# Patient Record
Sex: FEMALE | Race: WHITE | NOT HISPANIC OR LATINO | Employment: UNEMPLOYED | URBAN - NONMETROPOLITAN AREA
[De-identification: names, ages, dates, MRNs, and addresses within clinical notes are randomized per-mention and may not be internally consistent; named-entity substitution may affect disease eponyms.]

---

## 2017-08-14 ENCOUNTER — TRANSCRIBE ORDERS (OUTPATIENT)
Dept: ADMINISTRATIVE | Facility: HOSPITAL | Age: 82
End: 2017-08-14

## 2017-08-14 DIAGNOSIS — R42 DIZZINESS AND GIDDINESS: Primary | ICD-10-CM

## 2017-08-14 DIAGNOSIS — R41.3 MEMORY LOSS: ICD-10-CM

## 2017-08-28 ENCOUNTER — HOSPITAL ENCOUNTER (OUTPATIENT)
Dept: CT IMAGING | Facility: HOSPITAL | Age: 82
Discharge: HOME | End: 2017-08-28
Attending: INTERNAL MEDICINE

## 2017-08-28 ENCOUNTER — HOSPITAL ENCOUNTER (OUTPATIENT)
Dept: CARDIOLOGY | Facility: HOSPITAL | Age: 82
Discharge: HOME | End: 2017-08-28
Attending: INTERNAL MEDICINE | Admitting: INTERNAL MEDICINE

## 2017-08-28 DIAGNOSIS — R42 DIZZINESS AND GIDDINESS: ICD-10-CM

## 2017-08-28 DIAGNOSIS — R41.3 MEMORY LOSS: ICD-10-CM

## 2017-08-28 PROCEDURE — 93880 EXTRACRANIAL BILAT STUDY: CPT | Performed by: RADIOLOGY

## 2017-08-28 PROCEDURE — 70450 CT HEAD/BRAIN W/O DYE: CPT | Performed by: RADIOLOGY

## 2017-08-28 PROCEDURE — 70450 CT HEAD/BRAIN W/O DYE: CPT

## 2017-08-28 PROCEDURE — 93880 EXTRACRANIAL BILAT STUDY: CPT

## 2017-08-29 ENCOUNTER — APPOINTMENT (OUTPATIENT)
Dept: CT IMAGING | Facility: HOSPITAL | Age: 82
End: 2017-08-29
Attending: INTERNAL MEDICINE

## 2018-07-02 ENCOUNTER — HOSPITAL ENCOUNTER (OUTPATIENT)
Dept: GENERAL RADIOLOGY | Facility: HOSPITAL | Age: 83
Discharge: HOME | End: 2018-07-02
Attending: INTERNAL MEDICINE | Admitting: INTERNAL MEDICINE

## 2018-07-02 ENCOUNTER — TRANSCRIBE ORDERS (OUTPATIENT)
Dept: ADMINISTRATIVE | Facility: HOSPITAL | Age: 83
End: 2018-07-02

## 2018-07-02 DIAGNOSIS — R05.9 COUGH: ICD-10-CM

## 2018-07-02 DIAGNOSIS — R05.9 COUGH: Primary | ICD-10-CM

## 2018-07-02 PROCEDURE — 71046 X-RAY EXAM CHEST 2 VIEWS: CPT | Performed by: RADIOLOGY

## 2018-07-02 PROCEDURE — 71046 X-RAY EXAM CHEST 2 VIEWS: CPT

## 2018-12-17 ENCOUNTER — APPOINTMENT (OUTPATIENT)
Dept: GENERAL RADIOLOGY | Facility: HOSPITAL | Age: 83
End: 2018-12-17

## 2018-12-17 ENCOUNTER — HOSPITAL ENCOUNTER (EMERGENCY)
Facility: HOSPITAL | Age: 83
Discharge: ANOTHER HOSPITAL | End: 2018-12-17
Attending: FAMILY MEDICINE | Admitting: INTERNAL MEDICINE

## 2018-12-17 ENCOUNTER — ANESTHESIA (OUTPATIENT)
Dept: PERIOP | Facility: HOSPITAL | Age: 83
End: 2018-12-17

## 2018-12-17 ENCOUNTER — HOSPITAL ENCOUNTER (INPATIENT)
Facility: HOSPITAL | Age: 83
LOS: 4 days | Discharge: HOME HEALTH | End: 2018-12-22
Attending: THORACIC SURGERY (CARDIOTHORACIC VASCULAR SURGERY) | Admitting: THORACIC SURGERY (CARDIOTHORACIC VASCULAR SURGERY)

## 2018-12-17 ENCOUNTER — ANESTHESIA EVENT (OUTPATIENT)
Dept: PERIOP | Facility: HOSPITAL | Age: 83
End: 2018-12-17

## 2018-12-17 VITALS
HEART RATE: 83 BPM | SYSTOLIC BLOOD PRESSURE: 108 MMHG | DIASTOLIC BLOOD PRESSURE: 59 MMHG | WEIGHT: 120 LBS | TEMPERATURE: 97.9 F | BODY MASS INDEX: 23.56 KG/M2 | HEIGHT: 60 IN | RESPIRATION RATE: 20 BRPM | OXYGEN SATURATION: 98 %

## 2018-12-17 DIAGNOSIS — I74.3 POPLITEAL THROMBOSIS (HCC): ICD-10-CM

## 2018-12-17 DIAGNOSIS — I99.8 ISCHEMIC LEG: ICD-10-CM

## 2018-12-17 DIAGNOSIS — Z74.09 IMPAIRED MOBILITY AND ADLS: ICD-10-CM

## 2018-12-17 DIAGNOSIS — Z78.9 IMPAIRED MOBILITY AND ADLS: ICD-10-CM

## 2018-12-17 DIAGNOSIS — I44.2 COMPLETE HEART BLOCK (HCC): Primary | ICD-10-CM

## 2018-12-17 DIAGNOSIS — Z86.73 HISTORY OF CVA (CEREBROVASCULAR ACCIDENT): ICD-10-CM

## 2018-12-17 DIAGNOSIS — Z74.09 IMPAIRED FUNCTIONAL MOBILITY, BALANCE, GAIT, AND ENDURANCE: Primary | ICD-10-CM

## 2018-12-17 DIAGNOSIS — J44.9 CHRONIC OBSTRUCTIVE PULMONARY DISEASE, UNSPECIFIED COPD TYPE (HCC): ICD-10-CM

## 2018-12-17 DIAGNOSIS — I44.2 COMPLETE HEART BLOCK (HCC): ICD-10-CM

## 2018-12-17 PROBLEM — I73.9 PAD (PERIPHERAL ARTERY DISEASE): Status: ACTIVE | Noted: 2018-12-17

## 2018-12-17 PROBLEM — I21.4 NSTEMI (NON-ST ELEVATED MYOCARDIAL INFARCTION): Status: ACTIVE | Noted: 2018-12-17

## 2018-12-17 PROBLEM — I10 HTN (HYPERTENSION): Status: ACTIVE | Noted: 2018-12-17

## 2018-12-17 PROBLEM — E78.5 HYPERLIPIDEMIA: Status: ACTIVE | Noted: 2018-12-17

## 2018-12-17 PROBLEM — Z91.199 HISTORY OF NONCOMPLIANCE WITH MEDICAL TREATMENT: Status: ACTIVE | Noted: 2018-12-17

## 2018-12-17 PROBLEM — Z72.0 TOBACCO USE: Status: ACTIVE | Noted: 2018-12-17

## 2018-12-17 PROBLEM — N17.9 ACUTE KIDNEY INJURY (HCC): Status: ACTIVE | Noted: 2018-12-17

## 2018-12-17 PROBLEM — I25.10 CAD (CORONARY ARTERY DISEASE): Status: ACTIVE | Noted: 2018-12-17

## 2018-12-17 LAB
ALBUMIN SERPL-MCNC: 4.3 G/DL (ref 3.4–4.8)
ALBUMIN/GLOB SERPL: 1.4 G/DL (ref 1.5–2.5)
ALP SERPL-CCNC: 82 U/L (ref 35–104)
ALT SERPL W P-5'-P-CCNC: 28 U/L (ref 10–36)
ANION GAP SERPL CALCULATED.3IONS-SCNC: 13.6 MMOL/L (ref 3.6–11.2)
APTT PPP: 126.3 SECONDS (ref 55–70)
AST SERPL-CCNC: 68 U/L (ref 10–30)
BASOPHILS # BLD AUTO: 0.03 10*3/MM3 (ref 0–0.3)
BASOPHILS NFR BLD AUTO: 0.2 % (ref 0–2)
BILIRUB SERPL-MCNC: 0.2 MG/DL (ref 0.2–1.8)
BNP SERPL-MCNC: 377 PG/ML (ref 0–100)
BUN BLD-MCNC: 27 MG/DL (ref 7–21)
BUN/CREAT SERPL: 15.7 (ref 7–25)
CALCIUM SPEC-SCNC: 9.5 MG/DL (ref 7.7–10)
CHLORIDE SERPL-SCNC: 104 MMOL/L (ref 99–112)
CHOLEST SERPL-MCNC: 167 MG/DL (ref 0–200)
CO2 SERPL-SCNC: 15.4 MMOL/L (ref 24.3–31.9)
CREAT BLD-MCNC: 1.72 MG/DL (ref 0.43–1.29)
DEPRECATED RDW RBC AUTO: 46.1 FL (ref 37–54)
EOSINOPHIL # BLD AUTO: 0 10*3/MM3 (ref 0–0.7)
EOSINOPHIL NFR BLD AUTO: 0 % (ref 0–7)
ERYTHROCYTE [DISTWIDTH] IN BLOOD BY AUTOMATED COUNT: 14.1 % (ref 11.5–14.5)
GFR SERPL CREATININE-BSD FRML MDRD: 28 ML/MIN/1.73
GLOBULIN UR ELPH-MCNC: 3.1 GM/DL
GLUCOSE BLD-MCNC: 165 MG/DL (ref 70–110)
HCT VFR BLD AUTO: 41.9 % (ref 37–47)
HDLC SERPL-MCNC: 38 MG/DL (ref 60–100)
HGB BLD-MCNC: 13.3 G/DL (ref 12–16)
IMM GRANULOCYTES # BLD: 0.05 10*3/MM3 (ref 0–0.03)
IMM GRANULOCYTES NFR BLD: 0.4 % (ref 0–0.5)
INR PPP: 1.02 (ref 0.91–1.09)
LDLC SERPL CALC-MCNC: 95 MG/DL (ref 0–100)
LDLC/HDLC SERPL: 2.49 {RATIO}
LYMPHOCYTES # BLD AUTO: 1.43 10*3/MM3 (ref 1–3)
LYMPHOCYTES NFR BLD AUTO: 11.5 % (ref 16–46)
MAGNESIUM SERPL-MCNC: 2.5 MG/DL (ref 1.7–2.6)
MCH RBC QN AUTO: 29.7 PG (ref 27–33)
MCHC RBC AUTO-ENTMCNC: 31.7 G/DL (ref 33–37)
MCV RBC AUTO: 93.5 FL (ref 80–94)
MONOCYTES # BLD AUTO: 0.71 10*3/MM3 (ref 0.1–0.9)
MONOCYTES NFR BLD AUTO: 5.7 % (ref 0–12)
NEUTROPHILS # BLD AUTO: 10.19 10*3/MM3 (ref 1.4–6.5)
NEUTROPHILS NFR BLD AUTO: 82.2 % (ref 40–75)
OSMOLALITY SERPL CALC.SUM OF ELEC: 275.2 MOSM/KG (ref 273–305)
PLATELET # BLD AUTO: 205 10*3/MM3 (ref 130–400)
PMV BLD AUTO: 10.4 FL (ref 6–10)
POTASSIUM BLD-SCNC: 4.3 MMOL/L (ref 3.5–5.3)
PROT SERPL-MCNC: 7.4 G/DL (ref 6–8)
PROTHROMBIN TIME: 10.7 SECONDS (ref 9.6–11.5)
RBC # BLD AUTO: 4.48 10*6/MM3 (ref 4.2–5.4)
SODIUM BLD-SCNC: 133 MMOL/L (ref 135–153)
TRIGL SERPL-MCNC: 171 MG/DL (ref 0–150)
TROPONIN I SERPL-MCNC: 10.98 NG/ML
TSH SERPL DL<=0.05 MIU/L-ACNC: 0.76 MIU/ML (ref 0.55–4.78)
VLDLC SERPL-MCNC: 34.2 MG/DL
WBC NRBC COR # BLD: 12.41 10*3/MM3 (ref 4.5–12.5)

## 2018-12-17 PROCEDURE — 71045 X-RAY EXAM CHEST 1 VIEW: CPT

## 2018-12-17 PROCEDURE — A9270 NON-COVERED ITEM OR SERVICE: HCPCS | Performed by: NURSE PRACTITIONER

## 2018-12-17 PROCEDURE — 25010000002 HEPARIN (PORCINE) PER 1000 UNITS: Performed by: INTERNAL MEDICINE

## 2018-12-17 PROCEDURE — 25010000002 CEFUROXIME: Performed by: PHYSICIAN ASSISTANT

## 2018-12-17 PROCEDURE — 99233 SBSQ HOSP IP/OBS HIGH 50: CPT | Performed by: INTERNAL MEDICINE

## 2018-12-17 PROCEDURE — C1894 INTRO/SHEATH, NON-LASER: HCPCS | Performed by: THORACIC SURGERY (CARDIOTHORACIC VASCULAR SURGERY)

## 2018-12-17 PROCEDURE — 83735 ASSAY OF MAGNESIUM: CPT | Performed by: EMERGENCY MEDICINE

## 2018-12-17 PROCEDURE — 99153 MOD SED SAME PHYS/QHP EA: CPT

## 2018-12-17 PROCEDURE — 25010000002 MORPHINE PER 10 MG: Performed by: EMERGENCY MEDICINE

## 2018-12-17 PROCEDURE — 0 IOPAMIDOL PER 1 ML: Performed by: INTERNAL MEDICINE

## 2018-12-17 PROCEDURE — 83880 ASSAY OF NATRIURETIC PEPTIDE: CPT | Performed by: EMERGENCY MEDICINE

## 2018-12-17 PROCEDURE — 96376 TX/PRO/DX INJ SAME DRUG ADON: CPT

## 2018-12-17 PROCEDURE — 96375 TX/PRO/DX INJ NEW DRUG ADDON: CPT

## 2018-12-17 PROCEDURE — 96374 THER/PROPH/DIAG INJ IV PUSH: CPT

## 2018-12-17 PROCEDURE — 93458 L HRT ARTERY/VENTRICLE ANGIO: CPT | Performed by: INTERNAL MEDICINE

## 2018-12-17 PROCEDURE — C1769 GUIDE WIRE: HCPCS | Performed by: THORACIC SURGERY (CARDIOTHORACIC VASCULAR SURGERY)

## 2018-12-17 PROCEDURE — 99285 EMERGENCY DEPT VISIT HI MDM: CPT

## 2018-12-17 PROCEDURE — 25010000002 GLUCAGON (HUMAN RECOMBINANT) 1 MG RECONSTITUTED SOLUTION: Performed by: FAMILY MEDICINE

## 2018-12-17 PROCEDURE — 80061 LIPID PANEL: CPT | Performed by: EMERGENCY MEDICINE

## 2018-12-17 PROCEDURE — 85025 COMPLETE CBC W/AUTO DIFF WBC: CPT | Performed by: EMERGENCY MEDICINE

## 2018-12-17 PROCEDURE — C1894 INTRO/SHEATH, NON-LASER: HCPCS | Performed by: INTERNAL MEDICINE

## 2018-12-17 PROCEDURE — 63710000001 HEPARIN (PORCINE) IN NACL 25000-0.45 UT/250ML-% SOLUTION: Performed by: THORACIC SURGERY (CARDIOTHORACIC VASCULAR SURGERY)

## 2018-12-17 PROCEDURE — G0278 ILIAC ART ANGIO,CARDIAC CATH: HCPCS | Performed by: INTERNAL MEDICINE

## 2018-12-17 PROCEDURE — 84484 ASSAY OF TROPONIN QUANT: CPT | Performed by: EMERGENCY MEDICINE

## 2018-12-17 PROCEDURE — 36415 COLL VENOUS BLD VENIPUNCTURE: CPT

## 2018-12-17 PROCEDURE — 25010000002 PROPOFOL 10 MG/ML EMULSION: Performed by: NURSE ANESTHETIST, CERTIFIED REGISTERED

## 2018-12-17 PROCEDURE — 99223 1ST HOSP IP/OBS HIGH 75: CPT | Performed by: THORACIC SURGERY (CARDIOTHORACIC VASCULAR SURGERY)

## 2018-12-17 PROCEDURE — 93005 ELECTROCARDIOGRAM TRACING: CPT | Performed by: FAMILY MEDICINE

## 2018-12-17 PROCEDURE — 99152 MOD SED SAME PHYS/QHP 5/>YRS: CPT

## 2018-12-17 PROCEDURE — C1757 CATH, THROMBECTOMY/EMBOLECT: HCPCS | Performed by: THORACIC SURGERY (CARDIOTHORACIC VASCULAR SURGERY)

## 2018-12-17 PROCEDURE — 85730 THROMBOPLASTIN TIME PARTIAL: CPT | Performed by: THORACIC SURGERY (CARDIOTHORACIC VASCULAR SURGERY)

## 2018-12-17 PROCEDURE — 85610 PROTHROMBIN TIME: CPT | Performed by: THORACIC SURGERY (CARDIOTHORACIC VASCULAR SURGERY)

## 2018-12-17 PROCEDURE — 84443 ASSAY THYROID STIM HORMONE: CPT | Performed by: EMERGENCY MEDICINE

## 2018-12-17 PROCEDURE — C1769 GUIDE WIRE: HCPCS | Performed by: INTERNAL MEDICINE

## 2018-12-17 PROCEDURE — 25010000002 MIDAZOLAM PER 1 MG: Performed by: INTERNAL MEDICINE

## 2018-12-17 PROCEDURE — 35371 RECHANNELING OF ARTERY: CPT | Performed by: THORACIC SURGERY (CARDIOTHORACIC VASCULAR SURGERY)

## 2018-12-17 PROCEDURE — A9270 NON-COVERED ITEM OR SERVICE: HCPCS | Performed by: THORACIC SURGERY (CARDIOTHORACIC VASCULAR SURGERY)

## 2018-12-17 PROCEDURE — 25010000002 HEPARIN (PORCINE) PER 1000 UNITS: Performed by: THORACIC SURGERY (CARDIOTHORACIC VASCULAR SURGERY)

## 2018-12-17 PROCEDURE — 25010000003 CEFAZOLIN IN DEXTROSE 2-4 GM/100ML-% SOLUTION: Performed by: NURSE ANESTHETIST, CERTIFIED REGISTERED

## 2018-12-17 PROCEDURE — 71045 X-RAY EXAM CHEST 1 VIEW: CPT | Performed by: RADIOLOGY

## 2018-12-17 PROCEDURE — 80053 COMPREHEN METABOLIC PANEL: CPT | Performed by: EMERGENCY MEDICINE

## 2018-12-17 PROCEDURE — 75630 X-RAY AORTA LEG ARTERIES: CPT | Performed by: INTERNAL MEDICINE

## 2018-12-17 PROCEDURE — 04CM0ZZ EXTIRPATION OF MATTER FROM RIGHT POPLITEAL ARTERY, OPEN APPROACH: ICD-10-PCS | Performed by: THORACIC SURGERY (CARDIOTHORACIC VASCULAR SURGERY)

## 2018-12-17 PROCEDURE — 88304 TISSUE EXAM BY PATHOLOGIST: CPT | Performed by: THORACIC SURGERY (CARDIOTHORACIC VASCULAR SURGERY)

## 2018-12-17 PROCEDURE — B41F1ZZ FLUOROSCOPY OF RIGHT LOWER EXTREMITY ARTERIES USING LOW OSMOLAR CONTRAST: ICD-10-PCS | Performed by: THORACIC SURGERY (CARDIOTHORACIC VASCULAR SURGERY)

## 2018-12-17 PROCEDURE — 25010000002 FUROSEMIDE PER 20 MG: Performed by: PHYSICIAN ASSISTANT

## 2018-12-17 PROCEDURE — 96361 HYDRATE IV INFUSION ADD-ON: CPT

## 2018-12-17 PROCEDURE — 25010000002 HEPARIN (PORCINE) PER 1000 UNITS: Performed by: NURSE ANESTHETIST, CERTIFIED REGISTERED

## 2018-12-17 PROCEDURE — 25010000002 FENTANYL CITRATE (PF) 100 MCG/2ML SOLUTION: Performed by: INTERNAL MEDICINE

## 2018-12-17 PROCEDURE — 99283 EMERGENCY DEPT VISIT LOW MDM: CPT | Performed by: INTERNAL MEDICINE

## 2018-12-17 PROCEDURE — 63710000001 ACETAMINOPHEN 325 MG TABLET: Performed by: NURSE PRACTITIONER

## 2018-12-17 RX ORDER — MAGNESIUM HYDROXIDE 1200 MG/15ML
LIQUID ORAL AS NEEDED
Status: DISCONTINUED | OUTPATIENT
Start: 2018-12-17 | End: 2018-12-17 | Stop reason: HOSPADM

## 2018-12-17 RX ORDER — HEPARIN SODIUM 10000 [USP'U]/100ML
12 INJECTION, SOLUTION INTRAVENOUS
Status: DISCONTINUED | OUTPATIENT
Start: 2018-12-17 | End: 2018-12-17

## 2018-12-17 RX ORDER — HEPARIN SODIUM 1000 [USP'U]/ML
3000 INJECTION, SOLUTION INTRAVENOUS; SUBCUTANEOUS ONCE
Status: COMPLETED | OUTPATIENT
Start: 2018-12-17 | End: 2018-12-17

## 2018-12-17 RX ORDER — ERGOCALCIFEROL 1.25 MG/1
50000 CAPSULE ORAL WEEKLY
Status: ON HOLD | COMMUNITY
End: 2019-03-24

## 2018-12-17 RX ORDER — MORPHINE SULFATE 2 MG/ML
2 INJECTION, SOLUTION INTRAMUSCULAR; INTRAVENOUS EVERY 4 HOURS PRN
Status: DISCONTINUED | OUTPATIENT
Start: 2018-12-17 | End: 2018-12-22 | Stop reason: HOSPADM

## 2018-12-17 RX ORDER — ONDANSETRON 2 MG/ML
4 INJECTION INTRAMUSCULAR; INTRAVENOUS EVERY 6 HOURS PRN
Status: DISCONTINUED | OUTPATIENT
Start: 2018-12-17 | End: 2018-12-18 | Stop reason: SDUPTHER

## 2018-12-17 RX ORDER — HEPARIN SODIUM 5000 [USP'U]/ML
60 INJECTION, SOLUTION INTRAVENOUS; SUBCUTANEOUS ONCE
Status: DISCONTINUED | OUTPATIENT
Start: 2018-12-17 | End: 2018-12-17

## 2018-12-17 RX ORDER — ATENOLOL 25 MG/1
25 TABLET ORAL DAILY
COMMUNITY
End: 2019-01-04

## 2018-12-17 RX ORDER — HEPARIN SODIUM 10000 [USP'U]/100ML
INJECTION, SOLUTION INTRAVENOUS CONTINUOUS PRN
Status: COMPLETED | OUTPATIENT
Start: 2018-12-17 | End: 2018-12-17

## 2018-12-17 RX ORDER — MELOXICAM 15 MG/1
15 TABLET ORAL DAILY
COMMUNITY
End: 2019-04-01 | Stop reason: HOSPADM

## 2018-12-17 RX ORDER — HEPARIN SODIUM 1000 [USP'U]/ML
INJECTION, SOLUTION INTRAVENOUS; SUBCUTANEOUS AS NEEDED
Status: DISCONTINUED | OUTPATIENT
Start: 2018-12-17 | End: 2018-12-17 | Stop reason: SURG

## 2018-12-17 RX ORDER — FUROSEMIDE 10 MG/ML
40 INJECTION INTRAMUSCULAR; INTRAVENOUS ONCE
Status: COMPLETED | OUTPATIENT
Start: 2018-12-17 | End: 2018-12-17

## 2018-12-17 RX ORDER — HEPARIN SODIUM 5000 [USP'U]/ML
60 INJECTION, SOLUTION INTRAVENOUS; SUBCUTANEOUS AS NEEDED
Status: DISCONTINUED | OUTPATIENT
Start: 2018-12-17 | End: 2018-12-17

## 2018-12-17 RX ORDER — MORPHINE SULFATE 2 MG/ML
2 INJECTION, SOLUTION INTRAMUSCULAR; INTRAVENOUS ONCE
Status: COMPLETED | OUTPATIENT
Start: 2018-12-17 | End: 2018-12-17

## 2018-12-17 RX ORDER — ACETAMINOPHEN 325 MG/1
650 TABLET ORAL EVERY 4 HOURS PRN
Status: DISCONTINUED | OUTPATIENT
Start: 2018-12-17 | End: 2018-12-22 | Stop reason: HOSPADM

## 2018-12-17 RX ORDER — SODIUM CHLORIDE 9 MG/ML
INJECTION, SOLUTION INTRAVENOUS CONTINUOUS PRN
Status: COMPLETED | OUTPATIENT
Start: 2018-12-17 | End: 2018-12-17

## 2018-12-17 RX ORDER — ATORVASTATIN CALCIUM 40 MG/1
40 TABLET, FILM COATED ORAL DAILY
COMMUNITY

## 2018-12-17 RX ORDER — HEPARIN SODIUM 1000 [USP'U]/ML
INJECTION, SOLUTION INTRAVENOUS; SUBCUTANEOUS AS NEEDED
Status: DISCONTINUED | OUTPATIENT
Start: 2018-12-17 | End: 2018-12-17 | Stop reason: HOSPADM

## 2018-12-17 RX ORDER — HEPARIN SODIUM 10000 [USP'U]/100ML
16 INJECTION, SOLUTION INTRAVENOUS
Status: DISCONTINUED | OUTPATIENT
Start: 2018-12-17 | End: 2018-12-18

## 2018-12-17 RX ORDER — HEPARIN SODIUM 5000 [USP'U]/ML
30 INJECTION, SOLUTION INTRAVENOUS; SUBCUTANEOUS AS NEEDED
Status: DISCONTINUED | OUTPATIENT
Start: 2018-12-17 | End: 2018-12-17

## 2018-12-17 RX ORDER — FENTANYL CITRATE 50 UG/ML
INJECTION, SOLUTION INTRAMUSCULAR; INTRAVENOUS AS NEEDED
Status: DISCONTINUED | OUTPATIENT
Start: 2018-12-17 | End: 2018-12-17 | Stop reason: HOSPADM

## 2018-12-17 RX ORDER — HYDROCODONE BITARTRATE AND ACETAMINOPHEN 5; 325 MG/1; MG/1
1 TABLET ORAL EVERY 6 HOURS PRN
Status: DISCONTINUED | OUTPATIENT
Start: 2018-12-17 | End: 2018-12-22 | Stop reason: HOSPADM

## 2018-12-17 RX ORDER — LIDOCAINE HYDROCHLORIDE 10 MG/ML
INJECTION, SOLUTION EPIDURAL; INFILTRATION; INTRACAUDAL; PERINEURAL AS NEEDED
Status: DISCONTINUED | OUTPATIENT
Start: 2018-12-17 | End: 2018-12-17 | Stop reason: SURG

## 2018-12-17 RX ORDER — FAMOTIDINE 10 MG/ML
20 INJECTION, SOLUTION INTRAVENOUS DAILY
Status: DISCONTINUED | OUTPATIENT
Start: 2018-12-17 | End: 2018-12-20

## 2018-12-17 RX ORDER — SODIUM CHLORIDE 0.9 % (FLUSH) 0.9 %
3 SYRINGE (ML) INJECTION EVERY 12 HOURS SCHEDULED
Status: DISCONTINUED | OUTPATIENT
Start: 2018-12-17 | End: 2018-12-19

## 2018-12-17 RX ORDER — ATRACURIUM BESYLATE 10 MG/ML
INJECTION, SOLUTION INTRAVENOUS AS NEEDED
Status: DISCONTINUED | OUTPATIENT
Start: 2018-12-17 | End: 2018-12-17 | Stop reason: SURG

## 2018-12-17 RX ORDER — OLMESARTAN MEDOXOMIL 40 MG/1
40 TABLET ORAL DAILY
Status: ON HOLD | COMMUNITY
End: 2019-04-17

## 2018-12-17 RX ORDER — LIDOCAINE HYDROCHLORIDE 20 MG/ML
INJECTION, SOLUTION INFILTRATION; PERINEURAL AS NEEDED
Status: DISCONTINUED | OUTPATIENT
Start: 2018-12-17 | End: 2018-12-17 | Stop reason: HOSPADM

## 2018-12-17 RX ORDER — HYDRALAZINE HYDROCHLORIDE 50 MG/1
50 TABLET, FILM COATED ORAL 3 TIMES DAILY
COMMUNITY
End: 2018-12-22 | Stop reason: HOSPADM

## 2018-12-17 RX ORDER — PROPOFOL 10 MG/ML
VIAL (ML) INTRAVENOUS AS NEEDED
Status: DISCONTINUED | OUTPATIENT
Start: 2018-12-17 | End: 2018-12-17 | Stop reason: SURG

## 2018-12-17 RX ORDER — SODIUM CHLORIDE 0.9 % (FLUSH) 0.9 %
3-10 SYRINGE (ML) INJECTION AS NEEDED
Status: DISCONTINUED | OUTPATIENT
Start: 2018-12-17 | End: 2018-12-19

## 2018-12-17 RX ORDER — MIDAZOLAM HYDROCHLORIDE 1 MG/ML
INJECTION INTRAMUSCULAR; INTRAVENOUS AS NEEDED
Status: DISCONTINUED | OUTPATIENT
Start: 2018-12-17 | End: 2018-12-17 | Stop reason: HOSPADM

## 2018-12-17 RX ORDER — SODIUM CHLORIDE 0.9 % (FLUSH) 0.9 %
10 SYRINGE (ML) INJECTION AS NEEDED
Status: DISCONTINUED | OUTPATIENT
Start: 2018-12-17 | End: 2018-12-17 | Stop reason: HOSPADM

## 2018-12-17 RX ORDER — CEFAZOLIN SODIUM 2 G/100ML
INJECTION, SOLUTION INTRAVENOUS AS NEEDED
Status: DISCONTINUED | OUTPATIENT
Start: 2018-12-17 | End: 2018-12-17 | Stop reason: SURG

## 2018-12-17 RX ORDER — SODIUM CHLORIDE 450 MG/100ML
100 INJECTION, SOLUTION INTRAVENOUS CONTINUOUS
Status: DISCONTINUED | OUTPATIENT
Start: 2018-12-17 | End: 2018-12-17

## 2018-12-17 RX ADMIN — ATROPINE SULFATE 1 MG: 0.1 INJECTION PARENTERAL at 06:25

## 2018-12-17 RX ADMIN — LIDOCAINE HYDROCHLORIDE 50 MG: 10 INJECTION, SOLUTION EPIDURAL; INFILTRATION; INTRACAUDAL; PERINEURAL at 15:02

## 2018-12-17 RX ADMIN — HEPARIN SODIUM 5000 UNITS: 1000 INJECTION, SOLUTION INTRAVENOUS; SUBCUTANEOUS at 15:28

## 2018-12-17 RX ADMIN — SODIUM CHLORIDE 500 ML: 9 INJECTION, SOLUTION INTRAVENOUS at 05:59

## 2018-12-17 RX ADMIN — CEFUROXIME 1.5 G: 1.5 INJECTION, POWDER, FOR SOLUTION INTRAVENOUS at 21:21

## 2018-12-17 RX ADMIN — ACETAMINOPHEN 650 MG: 325 TABLET ORAL at 22:25

## 2018-12-17 RX ADMIN — GLUCAGON HYDROCHLORIDE 1 MG: 1 INJECTION, POWDER, FOR SOLUTION INTRAMUSCULAR; INTRAVENOUS; SUBCUTANEOUS at 06:02

## 2018-12-17 RX ADMIN — ATROPINE SULFATE 1 MG: 0.1 INJECTION, SOLUTION ENDOTRACHEAL; INTRAMUSCULAR; INTRAVENOUS; SUBCUTANEOUS at 07:08

## 2018-12-17 RX ADMIN — HEPARIN SODIUM 3000 UNITS: 1000 INJECTION, SOLUTION INTRAVENOUS; SUBCUTANEOUS at 14:10

## 2018-12-17 RX ADMIN — ATROPINE SULFATE 0.5 MG: 0.1 INJECTION PARENTERAL at 05:15

## 2018-12-17 RX ADMIN — FUROSEMIDE 40 MG: 10 INJECTION, SOLUTION INTRAMUSCULAR; INTRAVENOUS at 21:21

## 2018-12-17 RX ADMIN — PROPOFOL 100 MG: 10 INJECTION, EMULSION INTRAVENOUS at 15:02

## 2018-12-17 RX ADMIN — ATRACURIUM BESYLATE 50 MG: 10 INJECTION, SOLUTION INTRAVENOUS at 15:02

## 2018-12-17 RX ADMIN — FAMOTIDINE 20 MG: 10 INJECTION, SOLUTION INTRAVENOUS at 14:20

## 2018-12-17 RX ADMIN — SODIUM CHLORIDE: 9 INJECTION, SOLUTION INTRAVENOUS at 11:12

## 2018-12-17 RX ADMIN — CEFAZOLIN SODIUM 2 G: 2 INJECTION, SOLUTION INTRAVENOUS at 15:13

## 2018-12-17 RX ADMIN — HEPARIN SODIUM 12 UNITS/KG/HR: 10000 INJECTION, SOLUTION INTRAVENOUS at 14:10

## 2018-12-17 RX ADMIN — MORPHINE SULFATE 2 MG: 2 INJECTION, SOLUTION INTRAMUSCULAR; INTRAVENOUS at 06:03

## 2018-12-17 NOTE — ANESTHESIA PROCEDURE NOTES
ANESTHESIA INTUBATION  Urgency: elective    Airway not difficult    General Information and Staff    Patient location during procedure: OR  CRNA: Ta Stout CRNA    Indications and Patient Condition  Indications for airway management: airway protection    Preoxygenated: yes  MILS not maintained throughout  Mask difficulty assessment: 1 - vent by mask    Final Airway Details  Final airway type: endotracheal airway      Successful airway: ETT  Cuffed: yes   Successful intubation technique: direct laryngoscopy  Endotracheal tube insertion site: oral  Blade: Flor  Blade size: 3  ETT size (mm): 7.0  Cormack-Lehane Classification: grade I - full view of glottis  Placement verified by: chest auscultation and capnometry   Measured from: lips  ETT to lips (cm): 20  Number of attempts at approach: 1    Additional Comments  Negative epigastric sounds, Breath sound equal bilaterally with symmetric chest rise and fall

## 2018-12-17 NOTE — ANESTHESIA POSTPROCEDURE EVALUATION
Patient: Rupinder Lees    Procedure Summary     Date:  12/17/18 Room / Location:   [facility] 15 /  [facility] 15    Anesthesia Start:  1458 Anesthesia Stop:  1653    Procedure:  RT. LOWER EXTREMITY EMBELECTOMY (Right Thigh) Diagnosis:      Surgeon:  Dvaid Beatty MD Provider:  Ever Zamarripa MD    Anesthesia Type:  general ASA Status:  5 - Emergent          Anesthesia Type: general  Last vitals  BP   120/48 (12/17/18 1445)   Temp       Pulse   94 (12/17/18 1445)   Resp         SpO2   99 % (12/17/18 1445)     Post Anesthesia Care and Evaluation    Patient location during evaluation: ICU  Patient participation: waiting for patient participation  Level of consciousness: sleepy but conscious  Pain management: adequate  Airway patency: patent  Anesthetic complications: No anesthetic complications  PONV Status: none  Cardiovascular status: acceptable  Respiratory status: nonlabored ventilation, acceptable and nasal cannula  Hydration status: acceptable

## 2018-12-17 NOTE — ANESTHESIA PREPROCEDURE EVALUATION
Anesthesia Evaluation                  Airway   Mallampati: II  Dental      Pulmonary    (+) COPD,   Cardiovascular     (+) hypertension, past MI , PVD,       Neuro/Psych  GI/Hepatic/Renal/Endo      Musculoskeletal     Abdominal    Substance History      OB/GYN          Other                        Anesthesia Plan    ASA 5 - emergent     general     intravenous induction   Anesthetic plan, all risks, benefits, and alternatives have been provided, discussed and informed consent has been obtained with: patient.

## 2018-12-18 ENCOUNTER — APPOINTMENT (OUTPATIENT)
Dept: GENERAL RADIOLOGY | Facility: HOSPITAL | Age: 83
End: 2018-12-18

## 2018-12-18 PROBLEM — I74.3: Status: ACTIVE | Noted: 2018-12-18

## 2018-12-18 LAB
ALBUMIN SERPL-MCNC: 3.84 G/DL (ref 3.2–4.8)
ALBUMIN/GLOB SERPL: 2.2 G/DL (ref 1.5–2.5)
ALP SERPL-CCNC: 61 U/L (ref 25–100)
ALT SERPL W P-5'-P-CCNC: 25 U/L (ref 7–40)
ANION GAP SERPL CALCULATED.3IONS-SCNC: 10 MMOL/L (ref 3–11)
APTT PPP: 37.3 SECONDS (ref 55–70)
APTT PPP: 55.7 SECONDS (ref 55–70)
AST SERPL-CCNC: 39 U/L (ref 0–33)
BASOPHILS # BLD AUTO: 0 10*3/MM3 (ref 0–0.2)
BASOPHILS NFR BLD AUTO: 0 % (ref 0–1)
BILIRUB SERPL-MCNC: 0.4 MG/DL (ref 0.3–1.2)
BUN BLD-MCNC: 27 MG/DL (ref 9–23)
BUN/CREAT SERPL: 26 (ref 7–25)
CALCIUM SPEC-SCNC: 8.1 MG/DL (ref 8.7–10.4)
CHLORIDE SERPL-SCNC: 107 MMOL/L (ref 99–109)
CO2 SERPL-SCNC: 20 MMOL/L (ref 20–31)
CREAT BLD-MCNC: 1.04 MG/DL (ref 0.6–1.3)
DEPRECATED RDW RBC AUTO: 46.2 FL (ref 37–54)
DEPRECATED RDW RBC AUTO: 49.4 FL (ref 37–54)
EOSINOPHIL # BLD AUTO: 0 10*3/MM3 (ref 0–0.3)
EOSINOPHIL NFR BLD AUTO: 0 % (ref 0–3)
ERYTHROCYTE [DISTWIDTH] IN BLOOD BY AUTOMATED COUNT: 13.8 % (ref 11.3–14.5)
ERYTHROCYTE [DISTWIDTH] IN BLOOD BY AUTOMATED COUNT: 14.1 % (ref 11.3–14.5)
GFR SERPL CREATININE-BSD FRML MDRD: 51 ML/MIN/1.73
GLOBULIN UR ELPH-MCNC: 1.8 GM/DL
GLUCOSE BLD-MCNC: 130 MG/DL (ref 70–100)
HCT VFR BLD AUTO: 34.3 % (ref 34.5–44)
HCT VFR BLD AUTO: 35.7 % (ref 34.5–44)
HGB BLD-MCNC: 10.8 G/DL (ref 11.5–15.5)
HGB BLD-MCNC: 11.2 G/DL (ref 11.5–15.5)
IMM GRANULOCYTES # BLD: 0.02 10*3/MM3 (ref 0–0.03)
IMM GRANULOCYTES NFR BLD: 0.2 % (ref 0–0.6)
LYMPHOCYTES # BLD AUTO: 1.14 10*3/MM3 (ref 0.6–4.8)
LYMPHOCYTES NFR BLD AUTO: 11.2 % (ref 24–44)
MAGNESIUM SERPL-MCNC: 1.8 MG/DL (ref 1.3–2.7)
MCH RBC QN AUTO: 28.8 PG (ref 27–31)
MCH RBC QN AUTO: 29.9 PG (ref 27–31)
MCHC RBC AUTO-ENTMCNC: 31.4 G/DL (ref 32–36)
MCHC RBC AUTO-ENTMCNC: 31.5 G/DL (ref 32–36)
MCV RBC AUTO: 91.5 FL (ref 80–99)
MCV RBC AUTO: 95.2 FL (ref 80–99)
MONOCYTES # BLD AUTO: 0.8 10*3/MM3 (ref 0–1)
MONOCYTES NFR BLD AUTO: 7.9 % (ref 0–12)
NEUTROPHILS # BLD AUTO: 8.24 10*3/MM3 (ref 1.5–8.3)
NEUTROPHILS NFR BLD AUTO: 80.9 % (ref 41–71)
PHOSPHATE SERPL-MCNC: 4.2 MG/DL (ref 2.4–5.1)
PLATELET # BLD AUTO: 158 10*3/MM3 (ref 150–450)
PLATELET # BLD AUTO: 193 10*3/MM3 (ref 150–450)
PMV BLD AUTO: 10.6 FL (ref 6–12)
PMV BLD AUTO: 10.8 FL (ref 6–12)
POTASSIUM BLD-SCNC: 3.5 MMOL/L (ref 3.5–5.5)
PROT SERPL-MCNC: 5.6 G/DL (ref 5.7–8.2)
RBC # BLD AUTO: 3.75 10*6/MM3 (ref 3.89–5.14)
RBC # BLD AUTO: 3.75 10*6/MM3 (ref 3.89–5.14)
SODIUM BLD-SCNC: 137 MMOL/L (ref 132–146)
TROPONIN I SERPL-MCNC: 4.4 NG/ML
WBC NRBC COR # BLD: 10.18 10*3/MM3 (ref 3.5–10.8)
WBC NRBC COR # BLD: 11.37 10*3/MM3 (ref 3.5–10.8)

## 2018-12-18 PROCEDURE — 85730 THROMBOPLASTIN TIME PARTIAL: CPT

## 2018-12-18 PROCEDURE — 84100 ASSAY OF PHOSPHORUS: CPT | Performed by: INTERNAL MEDICINE

## 2018-12-18 PROCEDURE — C1785 PMKR, DUAL, RATE-RESP: HCPCS | Performed by: INTERNAL MEDICINE

## 2018-12-18 PROCEDURE — 25010000002 HEPARIN (PORCINE) PER 1000 UNITS

## 2018-12-18 PROCEDURE — 99152 MOD SED SAME PHYS/QHP 5/>YRS: CPT | Performed by: INTERNAL MEDICINE

## 2018-12-18 PROCEDURE — 99233 SBSQ HOSP IP/OBS HIGH 50: CPT | Performed by: INTERNAL MEDICINE

## 2018-12-18 PROCEDURE — 25010000002 ONDANSETRON PER 1 MG: Performed by: INTERNAL MEDICINE

## 2018-12-18 PROCEDURE — 84484 ASSAY OF TROPONIN QUANT: CPT | Performed by: PHYSICIAN ASSISTANT

## 2018-12-18 PROCEDURE — 33208 INSRT HEART PM ATRIAL & VENT: CPT | Performed by: INTERNAL MEDICINE

## 2018-12-18 PROCEDURE — 85027 COMPLETE CBC AUTOMATED: CPT | Performed by: INTERNAL MEDICINE

## 2018-12-18 PROCEDURE — 25010000002 VANCOMYCIN PER 500 MG: Performed by: PHYSICIAN ASSISTANT

## 2018-12-18 PROCEDURE — 02HK3JZ INSERTION OF PACEMAKER LEAD INTO RIGHT VENTRICLE, PERCUTANEOUS APPROACH: ICD-10-PCS | Performed by: INTERNAL MEDICINE

## 2018-12-18 PROCEDURE — 71045 X-RAY EXAM CHEST 1 VIEW: CPT

## 2018-12-18 PROCEDURE — 25010000002 MIDAZOLAM PER 1 MG: Performed by: INTERNAL MEDICINE

## 2018-12-18 PROCEDURE — 25010000002 CEFUROXIME: Performed by: PHYSICIAN ASSISTANT

## 2018-12-18 PROCEDURE — 80053 COMPREHEN METABOLIC PANEL: CPT | Performed by: INTERNAL MEDICINE

## 2018-12-18 PROCEDURE — 99024 POSTOP FOLLOW-UP VISIT: CPT | Performed by: THORACIC SURGERY (CARDIOTHORACIC VASCULAR SURGERY)

## 2018-12-18 PROCEDURE — 02H63JZ INSERTION OF PACEMAKER LEAD INTO RIGHT ATRIUM, PERCUTANEOUS APPROACH: ICD-10-PCS | Performed by: INTERNAL MEDICINE

## 2018-12-18 PROCEDURE — 85025 COMPLETE CBC W/AUTO DIFF WBC: CPT | Performed by: INTERNAL MEDICINE

## 2018-12-18 PROCEDURE — 25010000002 FENTANYL CITRATE (PF) 100 MCG/2ML SOLUTION: Performed by: INTERNAL MEDICINE

## 2018-12-18 PROCEDURE — 0JH606Z INSERTION OF PACEMAKER, DUAL CHAMBER INTO CHEST SUBCUTANEOUS TISSUE AND FASCIA, OPEN APPROACH: ICD-10-PCS | Performed by: INTERNAL MEDICINE

## 2018-12-18 PROCEDURE — C1898 LEAD, PMKR, OTHER THAN TRANS: HCPCS | Performed by: INTERNAL MEDICINE

## 2018-12-18 PROCEDURE — A9270 NON-COVERED ITEM OR SERVICE: HCPCS | Performed by: NURSE PRACTITIONER

## 2018-12-18 PROCEDURE — 83735 ASSAY OF MAGNESIUM: CPT | Performed by: INTERNAL MEDICINE

## 2018-12-18 PROCEDURE — C1892 INTRO/SHEATH,FIXED,PEEL-AWAY: HCPCS | Performed by: INTERNAL MEDICINE

## 2018-12-18 PROCEDURE — 63710000001 POTASSIUM CHLORIDE 20 MEQ PACK: Performed by: NURSE PRACTITIONER

## 2018-12-18 PROCEDURE — 85347 COAGULATION TIME ACTIVATED: CPT

## 2018-12-18 DEVICE — LD PM TENDRIL STS 6F46CM 2088TC46: Type: IMPLANTABLE DEVICE | Status: FUNCTIONAL

## 2018-12-18 DEVICE — LD PM TENDRIL STS 6F52CM 2088TC52: Type: IMPLANTABLE DEVICE | Status: FUNCTIONAL

## 2018-12-18 DEVICE — GEN PM ASSURITY DR RF: Type: IMPLANTABLE DEVICE | Status: FUNCTIONAL

## 2018-12-18 RX ORDER — SODIUM CHLORIDE 0.9 % (FLUSH) 0.9 %
3-10 SYRINGE (ML) INJECTION AS NEEDED
Status: DISCONTINUED | OUTPATIENT
Start: 2018-12-18 | End: 2018-12-22 | Stop reason: HOSPADM

## 2018-12-18 RX ORDER — FENTANYL CITRATE 50 UG/ML
INJECTION, SOLUTION INTRAMUSCULAR; INTRAVENOUS AS NEEDED
Status: DISCONTINUED | OUTPATIENT
Start: 2018-12-18 | End: 2018-12-18 | Stop reason: HOSPADM

## 2018-12-18 RX ORDER — HEPARIN SODIUM 10000 [USP'U]/100ML
23 INJECTION, SOLUTION INTRAVENOUS
Status: DISCONTINUED | OUTPATIENT
Start: 2018-12-19 | End: 2018-12-21

## 2018-12-18 RX ORDER — ONDANSETRON 2 MG/ML
INJECTION INTRAMUSCULAR; INTRAVENOUS AS NEEDED
Status: DISCONTINUED | OUTPATIENT
Start: 2018-12-18 | End: 2018-12-18 | Stop reason: HOSPADM

## 2018-12-18 RX ORDER — HEPARIN SODIUM 1000 [USP'U]/ML
3000 INJECTION, SOLUTION INTRAVENOUS; SUBCUTANEOUS ONCE
Status: COMPLETED | OUTPATIENT
Start: 2018-12-18 | End: 2018-12-18

## 2018-12-18 RX ORDER — SODIUM CHLORIDE 9 MG/ML
INJECTION, SOLUTION INTRAVENOUS CONTINUOUS PRN
Status: COMPLETED | OUTPATIENT
Start: 2018-12-18 | End: 2018-12-18

## 2018-12-18 RX ORDER — SODIUM CHLORIDE 0.9 % (FLUSH) 0.9 %
5 SYRINGE (ML) INJECTION AS NEEDED
Status: DISCONTINUED | OUTPATIENT
Start: 2018-12-18 | End: 2018-12-22 | Stop reason: HOSPADM

## 2018-12-18 RX ORDER — SODIUM CHLORIDE, SODIUM LACTATE, POTASSIUM CHLORIDE, CALCIUM CHLORIDE 600; 310; 30; 20 MG/100ML; MG/100ML; MG/100ML; MG/100ML
250 INJECTION, SOLUTION INTRAVENOUS ONCE
Status: COMPLETED | OUTPATIENT
Start: 2018-12-18 | End: 2018-12-18

## 2018-12-18 RX ORDER — MIDAZOLAM HYDROCHLORIDE 1 MG/ML
INJECTION INTRAMUSCULAR; INTRAVENOUS AS NEEDED
Status: DISCONTINUED | OUTPATIENT
Start: 2018-12-18 | End: 2018-12-18 | Stop reason: HOSPADM

## 2018-12-18 RX ORDER — POTASSIUM CHLORIDE 750 MG/1
40 CAPSULE, EXTENDED RELEASE ORAL AS NEEDED
Status: DISCONTINUED | OUTPATIENT
Start: 2018-12-18 | End: 2018-12-22 | Stop reason: HOSPADM

## 2018-12-18 RX ORDER — POTASSIUM CHLORIDE 1.5 G/1.77G
40 POWDER, FOR SOLUTION ORAL AS NEEDED
Status: DISCONTINUED | OUTPATIENT
Start: 2018-12-18 | End: 2018-12-22 | Stop reason: HOSPADM

## 2018-12-18 RX ORDER — BUPIVACAINE HYDROCHLORIDE 5 MG/ML
INJECTION, SOLUTION EPIDURAL; INTRACAUDAL AS NEEDED
Status: DISCONTINUED | OUTPATIENT
Start: 2018-12-18 | End: 2018-12-18 | Stop reason: HOSPADM

## 2018-12-18 RX ORDER — SODIUM CHLORIDE 0.9 % (FLUSH) 0.9 %
3 SYRINGE (ML) INJECTION EVERY 12 HOURS SCHEDULED
Status: DISCONTINUED | OUTPATIENT
Start: 2018-12-18 | End: 2018-12-22 | Stop reason: HOSPADM

## 2018-12-18 RX ORDER — MAGNESIUM SULFATE HEPTAHYDRATE 40 MG/ML
2 INJECTION, SOLUTION INTRAVENOUS AS NEEDED
Status: DISCONTINUED | OUTPATIENT
Start: 2018-12-18 | End: 2018-12-22 | Stop reason: HOSPADM

## 2018-12-18 RX ORDER — ONDANSETRON 2 MG/ML
4 INJECTION INTRAMUSCULAR; INTRAVENOUS EVERY 6 HOURS PRN
Status: DISCONTINUED | OUTPATIENT
Start: 2018-12-18 | End: 2018-12-22 | Stop reason: HOSPADM

## 2018-12-18 RX ORDER — MAGNESIUM SULFATE HEPTAHYDRATE 40 MG/ML
4 INJECTION, SOLUTION INTRAVENOUS AS NEEDED
Status: DISCONTINUED | OUTPATIENT
Start: 2018-12-18 | End: 2018-12-22 | Stop reason: HOSPADM

## 2018-12-18 RX ORDER — VANCOMYCIN HYDROCHLORIDE 1 G/200ML
20 INJECTION, SOLUTION INTRAVENOUS ONCE
Status: COMPLETED | OUTPATIENT
Start: 2018-12-18 | End: 2018-12-18

## 2018-12-18 RX ORDER — POTASSIUM CHLORIDE 7.45 MG/ML
10 INJECTION INTRAVENOUS
Status: DISCONTINUED | OUTPATIENT
Start: 2018-12-18 | End: 2018-12-22 | Stop reason: HOSPADM

## 2018-12-18 RX ORDER — LIDOCAINE HYDROCHLORIDE 10 MG/ML
INJECTION, SOLUTION INFILTRATION; PERINEURAL AS NEEDED
Status: DISCONTINUED | OUTPATIENT
Start: 2018-12-18 | End: 2018-12-18 | Stop reason: HOSPADM

## 2018-12-18 RX ADMIN — FAMOTIDINE 20 MG: 10 INJECTION, SOLUTION INTRAVENOUS at 10:20

## 2018-12-18 RX ADMIN — CEFUROXIME 1.5 G: 1.5 INJECTION, POWDER, FOR SOLUTION INTRAVENOUS at 05:39

## 2018-12-18 RX ADMIN — POTASSIUM CHLORIDE 40 MEQ: 1.5 POWDER, FOR SOLUTION ORAL at 06:32

## 2018-12-18 RX ADMIN — HEPARIN SODIUM 3000 UNITS: 1000 INJECTION, SOLUTION INTRAVENOUS; SUBCUTANEOUS at 06:32

## 2018-12-18 RX ADMIN — SODIUM CHLORIDE, POTASSIUM CHLORIDE, SODIUM LACTATE AND CALCIUM CHLORIDE 250 ML/HR: 600; 310; 30; 20 INJECTION, SOLUTION INTRAVENOUS at 00:54

## 2018-12-18 RX ADMIN — SODIUM CHLORIDE, PRESERVATIVE FREE 3 ML: 5 INJECTION INTRAVENOUS at 10:20

## 2018-12-18 RX ADMIN — ACETAMINOPHEN 650 MG: 325 TABLET ORAL at 23:14

## 2018-12-18 RX ADMIN — VANCOMYCIN HYDROCHLORIDE 1000 MG: 1 INJECTION, SOLUTION INTRAVENOUS at 17:10

## 2018-12-18 RX ADMIN — MAGNESIUM SULFATE HEPTAHYDRATE 4 G: 40 INJECTION, SOLUTION INTRAVENOUS at 06:32

## 2018-12-19 ENCOUNTER — APPOINTMENT (OUTPATIENT)
Dept: GENERAL RADIOLOGY | Facility: HOSPITAL | Age: 83
End: 2018-12-19

## 2018-12-19 LAB
ANION GAP SERPL CALCULATED.3IONS-SCNC: 5 MMOL/L (ref 3–11)
APTT PPP: 35.8 SECONDS (ref 55–70)
APTT PPP: 38.5 SECONDS (ref 55–70)
BUN BLD-MCNC: 22 MG/DL (ref 9–23)
BUN/CREAT SERPL: 26.2 (ref 7–25)
CALCIUM SPEC-SCNC: 8.2 MG/DL (ref 8.7–10.4)
CHLORIDE SERPL-SCNC: 109 MMOL/L (ref 99–109)
CO2 SERPL-SCNC: 25 MMOL/L (ref 20–31)
CREAT BLD-MCNC: 0.84 MG/DL (ref 0.6–1.3)
GFR SERPL CREATININE-BSD FRML MDRD: 65 ML/MIN/1.73
GLUCOSE BLD-MCNC: 102 MG/DL (ref 70–100)
MAGNESIUM SERPL-MCNC: 2.6 MG/DL (ref 1.3–2.7)
POTASSIUM BLD-SCNC: 4.2 MMOL/L (ref 3.5–5.5)
SODIUM BLD-SCNC: 139 MMOL/L (ref 132–146)

## 2018-12-19 PROCEDURE — 85730 THROMBOPLASTIN TIME PARTIAL: CPT

## 2018-12-19 PROCEDURE — 99024 POSTOP FOLLOW-UP VISIT: CPT | Performed by: THORACIC SURGERY (CARDIOTHORACIC VASCULAR SURGERY)

## 2018-12-19 PROCEDURE — 80048 BASIC METABOLIC PNL TOTAL CA: CPT | Performed by: INTERNAL MEDICINE

## 2018-12-19 PROCEDURE — 25010000002 VANCOMYCIN PER 500 MG: Performed by: INTERNAL MEDICINE

## 2018-12-19 PROCEDURE — 71045 X-RAY EXAM CHEST 1 VIEW: CPT

## 2018-12-19 PROCEDURE — 99024 POSTOP FOLLOW-UP VISIT: CPT | Performed by: INTERNAL MEDICINE

## 2018-12-19 PROCEDURE — 83735 ASSAY OF MAGNESIUM: CPT | Performed by: THORACIC SURGERY (CARDIOTHORACIC VASCULAR SURGERY)

## 2018-12-19 PROCEDURE — 25010000002 HEPARIN (PORCINE) PER 1000 UNITS: Performed by: PHYSICIAN ASSISTANT

## 2018-12-19 PROCEDURE — 99232 SBSQ HOSP IP/OBS MODERATE 35: CPT | Performed by: INTERNAL MEDICINE

## 2018-12-19 RX ORDER — CLOPIDOGREL BISULFATE 75 MG/1
75 TABLET ORAL DAILY
Status: DISCONTINUED | OUTPATIENT
Start: 2018-12-19 | End: 2018-12-22 | Stop reason: HOSPADM

## 2018-12-19 RX ADMIN — SODIUM CHLORIDE, PRESERVATIVE FREE 3 ML: 5 INJECTION INTRAVENOUS at 08:25

## 2018-12-19 RX ADMIN — ACETAMINOPHEN 650 MG: 325 TABLET ORAL at 12:57

## 2018-12-19 RX ADMIN — CLOPIDOGREL BISULFATE 75 MG: 75 TABLET ORAL at 17:42

## 2018-12-19 RX ADMIN — VANCOMYCIN HYDROCHLORIDE 750 MG: 750 INJECTION, SOLUTION INTRAVENOUS at 12:57

## 2018-12-19 RX ADMIN — HEPARIN SODIUM 16 UNITS/KG/HR: 10000 INJECTION, SOLUTION INTRAVENOUS at 07:14

## 2018-12-19 RX ADMIN — FAMOTIDINE 20 MG: 10 INJECTION, SOLUTION INTRAVENOUS at 08:25

## 2018-12-19 RX ADMIN — ACETAMINOPHEN 650 MG: 325 TABLET ORAL at 06:01

## 2018-12-20 LAB
ANION GAP SERPL CALCULATED.3IONS-SCNC: 7 MMOL/L (ref 3–11)
APTT PPP: 44.8 SECONDS (ref 55–70)
APTT PPP: 49.5 SECONDS (ref 55–70)
APTT PPP: 72.5 SECONDS (ref 55–70)
BASOPHILS # BLD AUTO: 0.02 10*3/MM3 (ref 0–0.2)
BASOPHILS NFR BLD AUTO: 0.3 % (ref 0–1)
BUN BLD-MCNC: 16 MG/DL (ref 9–23)
BUN/CREAT SERPL: 21.6 (ref 7–25)
CALCIUM SPEC-SCNC: 7.9 MG/DL (ref 8.7–10.4)
CHLORIDE SERPL-SCNC: 109 MMOL/L (ref 99–109)
CO2 SERPL-SCNC: 23 MMOL/L (ref 20–31)
CREAT BLD-MCNC: 0.74 MG/DL (ref 0.6–1.3)
CYTO UR: NORMAL
DEPRECATED RDW RBC AUTO: 45.4 FL (ref 37–54)
EOSINOPHIL # BLD AUTO: 0.04 10*3/MM3 (ref 0–0.3)
EOSINOPHIL NFR BLD AUTO: 0.5 % (ref 0–3)
ERYTHROCYTE [DISTWIDTH] IN BLOOD BY AUTOMATED COUNT: 13.7 % (ref 11.3–14.5)
GFR SERPL CREATININE-BSD FRML MDRD: 75 ML/MIN/1.73
GLUCOSE BLD-MCNC: 96 MG/DL (ref 70–100)
HCT VFR BLD AUTO: 31.8 % (ref 34.5–44)
HGB BLD-MCNC: 10.1 G/DL (ref 11.5–15.5)
IMM GRANULOCYTES # BLD: 0.02 10*3/MM3 (ref 0–0.03)
IMM GRANULOCYTES NFR BLD: 0.3 % (ref 0–0.6)
LAB AP CASE REPORT: NORMAL
LAB AP CLINICAL INFORMATION: NORMAL
LYMPHOCYTES # BLD AUTO: 1.57 10*3/MM3 (ref 0.6–4.8)
LYMPHOCYTES NFR BLD AUTO: 21.1 % (ref 24–44)
MCH RBC QN AUTO: 29.1 PG (ref 27–31)
MCHC RBC AUTO-ENTMCNC: 31.8 G/DL (ref 32–36)
MCV RBC AUTO: 91.6 FL (ref 80–99)
MONOCYTES # BLD AUTO: 0.56 10*3/MM3 (ref 0–1)
MONOCYTES NFR BLD AUTO: 7.5 % (ref 0–12)
NEUTROPHILS # BLD AUTO: 5.24 10*3/MM3 (ref 1.5–8.3)
NEUTROPHILS NFR BLD AUTO: 70.6 % (ref 41–71)
PATH REPORT.FINAL DX SPEC: NORMAL
PATH REPORT.GROSS SPEC: NORMAL
PLATELET # BLD AUTO: 162 10*3/MM3 (ref 150–450)
PMV BLD AUTO: 10.1 FL (ref 6–12)
POTASSIUM BLD-SCNC: 3.8 MMOL/L (ref 3.5–5.5)
RBC # BLD AUTO: 3.47 10*6/MM3 (ref 3.89–5.14)
SODIUM BLD-SCNC: 139 MMOL/L (ref 132–146)
WBC NRBC COR # BLD: 7.43 10*3/MM3 (ref 3.5–10.8)

## 2018-12-20 PROCEDURE — 85730 THROMBOPLASTIN TIME PARTIAL: CPT | Performed by: INTERNAL MEDICINE

## 2018-12-20 PROCEDURE — 25010000002 HEPARIN (PORCINE) PER 1000 UNITS

## 2018-12-20 PROCEDURE — 93005 ELECTROCARDIOGRAM TRACING: CPT | Performed by: NURSE PRACTITIONER

## 2018-12-20 PROCEDURE — 97162 PT EVAL MOD COMPLEX 30 MIN: CPT

## 2018-12-20 PROCEDURE — 93010 ELECTROCARDIOGRAM REPORT: CPT | Performed by: INTERNAL MEDICINE

## 2018-12-20 PROCEDURE — 85730 THROMBOPLASTIN TIME PARTIAL: CPT

## 2018-12-20 PROCEDURE — 25010000002 AMIODARONE IN DEXTROSE 5% 150-4.21 MG/100ML-% SOLUTION: Performed by: INTERNAL MEDICINE

## 2018-12-20 PROCEDURE — 25010000002 AMIODARONE IN DEXTROSE 5% 360-4.14 MG/200ML-% SOLUTION: Performed by: INTERNAL MEDICINE

## 2018-12-20 PROCEDURE — 80048 BASIC METABOLIC PNL TOTAL CA: CPT | Performed by: INTERNAL MEDICINE

## 2018-12-20 PROCEDURE — 85025 COMPLETE CBC W/AUTO DIFF WBC: CPT | Performed by: INTERNAL MEDICINE

## 2018-12-20 PROCEDURE — 99233 SBSQ HOSP IP/OBS HIGH 50: CPT | Performed by: INTERNAL MEDICINE

## 2018-12-20 PROCEDURE — 99024 POSTOP FOLLOW-UP VISIT: CPT | Performed by: THORACIC SURGERY (CARDIOTHORACIC VASCULAR SURGERY)

## 2018-12-20 PROCEDURE — 97530 THERAPEUTIC ACTIVITIES: CPT

## 2018-12-20 RX ORDER — HEPARIN SODIUM 1000 [USP'U]/ML
2000 INJECTION, SOLUTION INTRAVENOUS; SUBCUTANEOUS ONCE
Status: COMPLETED | OUTPATIENT
Start: 2018-12-20 | End: 2018-12-20

## 2018-12-20 RX ORDER — AMIODARONE HYDROCHLORIDE 200 MG/1
200 TABLET ORAL EVERY 12 HOURS SCHEDULED
Status: DISCONTINUED | OUTPATIENT
Start: 2018-12-20 | End: 2018-12-22 | Stop reason: HOSPADM

## 2018-12-20 RX ORDER — FAMOTIDINE 20 MG/1
20 TABLET, FILM COATED ORAL DAILY
Status: DISCONTINUED | OUTPATIENT
Start: 2018-12-21 | End: 2018-12-22 | Stop reason: HOSPADM

## 2018-12-20 RX ADMIN — METOPROLOL TARTRATE 5 MG: 5 INJECTION, SOLUTION INTRAVENOUS at 02:24

## 2018-12-20 RX ADMIN — ACETAMINOPHEN 650 MG: 325 TABLET ORAL at 20:07

## 2018-12-20 RX ADMIN — METOPROLOL TARTRATE 5 MG: 5 INJECTION, SOLUTION INTRAVENOUS at 04:56

## 2018-12-20 RX ADMIN — FAMOTIDINE 20 MG: 10 INJECTION, SOLUTION INTRAVENOUS at 08:34

## 2018-12-20 RX ADMIN — ACETAMINOPHEN 650 MG: 325 TABLET ORAL at 04:56

## 2018-12-20 RX ADMIN — SODIUM CHLORIDE, PRESERVATIVE FREE 3 ML: 5 INJECTION INTRAVENOUS at 20:17

## 2018-12-20 RX ADMIN — HEPARIN SODIUM 22 UNITS/KG/HR: 10000 INJECTION, SOLUTION INTRAVENOUS at 07:43

## 2018-12-20 RX ADMIN — AMIODARONE HYDROCHLORIDE 150 MG: 1.5 INJECTION, SOLUTION INTRAVENOUS at 09:57

## 2018-12-20 RX ADMIN — AMIODARONE HYDROCHLORIDE 0.5 MG/MIN: 1.8 INJECTION, SOLUTION INTRAVENOUS at 16:48

## 2018-12-20 RX ADMIN — AMIODARONE HYDROCHLORIDE 200 MG: 200 TABLET ORAL at 09:56

## 2018-12-20 RX ADMIN — HEPARIN SODIUM 2000 UNITS: 1000 INJECTION, SOLUTION INTRAVENOUS; SUBCUTANEOUS at 15:01

## 2018-12-20 RX ADMIN — AMIODARONE HYDROCHLORIDE 1 MG/MIN: 1.8 INJECTION, SOLUTION INTRAVENOUS at 10:15

## 2018-12-20 RX ADMIN — AMIODARONE HYDROCHLORIDE 200 MG: 200 TABLET ORAL at 20:07

## 2018-12-20 RX ADMIN — CLOPIDOGREL BISULFATE 75 MG: 75 TABLET ORAL at 08:34

## 2018-12-21 LAB
ALBUMIN SERPL-MCNC: 3.49 G/DL (ref 3.2–4.8)
ANION GAP SERPL CALCULATED.3IONS-SCNC: 9 MMOL/L (ref 3–11)
APTT PPP: 56.5 SECONDS (ref 55–70)
BASOPHILS # BLD AUTO: 0.04 10*3/MM3 (ref 0–0.2)
BASOPHILS NFR BLD AUTO: 0.7 % (ref 0–1)
BUN BLD-MCNC: 13 MG/DL (ref 9–23)
BUN/CREAT SERPL: 16.7 (ref 7–25)
CALCIUM SPEC-SCNC: 8.3 MG/DL (ref 8.7–10.4)
CHLORIDE SERPL-SCNC: 107 MMOL/L (ref 99–109)
CO2 SERPL-SCNC: 26 MMOL/L (ref 20–31)
CREAT BLD-MCNC: 0.78 MG/DL (ref 0.6–1.3)
DEPRECATED RDW RBC AUTO: 45.4 FL (ref 37–54)
EOSINOPHIL # BLD AUTO: 0.11 10*3/MM3 (ref 0–0.3)
EOSINOPHIL NFR BLD AUTO: 1.9 % (ref 0–3)
ERYTHROCYTE [DISTWIDTH] IN BLOOD BY AUTOMATED COUNT: 13.7 % (ref 11.3–14.5)
GFR SERPL CREATININE-BSD FRML MDRD: 71 ML/MIN/1.73
GLUCOSE BLD-MCNC: 105 MG/DL (ref 70–100)
HCT VFR BLD AUTO: 33.8 % (ref 34.5–44)
HGB BLD-MCNC: 10.8 G/DL (ref 11.5–15.5)
IMM GRANULOCYTES # BLD AUTO: 0.01 10*3/MM3 (ref 0–0.03)
IMM GRANULOCYTES NFR BLD AUTO: 0.2 % (ref 0–0.6)
LYMPHOCYTES # BLD AUTO: 1.36 10*3/MM3 (ref 0.6–4.8)
LYMPHOCYTES NFR BLD AUTO: 23.8 % (ref 24–44)
MCH RBC QN AUTO: 29.1 PG (ref 27–31)
MCHC RBC AUTO-ENTMCNC: 32 G/DL (ref 32–36)
MCV RBC AUTO: 91.1 FL (ref 80–99)
MONOCYTES # BLD AUTO: 0.63 10*3/MM3 (ref 0–1)
MONOCYTES NFR BLD AUTO: 11 % (ref 0–12)
NEUTROPHILS # BLD AUTO: 3.57 10*3/MM3 (ref 1.5–8.3)
NEUTROPHILS NFR BLD AUTO: 62.6 % (ref 41–71)
PHOSPHATE SERPL-MCNC: 2.5 MG/DL (ref 2.4–5.1)
PLATELET # BLD AUTO: 212 10*3/MM3 (ref 150–450)
PMV BLD AUTO: 10.2 FL (ref 6–12)
POTASSIUM BLD-SCNC: 3.8 MMOL/L (ref 3.5–5.5)
RBC # BLD AUTO: 3.71 10*6/MM3 (ref 3.89–5.14)
SODIUM BLD-SCNC: 142 MMOL/L (ref 132–146)
WBC NRBC COR # BLD: 5.71 10*3/MM3 (ref 3.5–10.8)

## 2018-12-21 PROCEDURE — 99024 POSTOP FOLLOW-UP VISIT: CPT | Performed by: THORACIC SURGERY (CARDIOTHORACIC VASCULAR SURGERY)

## 2018-12-21 PROCEDURE — 25010000002 AMIODARONE IN DEXTROSE 5% 360-4.14 MG/200ML-% SOLUTION: Performed by: INTERNAL MEDICINE

## 2018-12-21 PROCEDURE — 85730 THROMBOPLASTIN TIME PARTIAL: CPT

## 2018-12-21 PROCEDURE — 99024 POSTOP FOLLOW-UP VISIT: CPT | Performed by: PHYSICIAN ASSISTANT

## 2018-12-21 PROCEDURE — 97116 GAIT TRAINING THERAPY: CPT

## 2018-12-21 PROCEDURE — 25010000002 HEPARIN (PORCINE) PER 1000 UNITS

## 2018-12-21 PROCEDURE — 80069 RENAL FUNCTION PANEL: CPT | Performed by: INTERNAL MEDICINE

## 2018-12-21 PROCEDURE — 97165 OT EVAL LOW COMPLEX 30 MIN: CPT

## 2018-12-21 PROCEDURE — 99024 POSTOP FOLLOW-UP VISIT: CPT | Performed by: NURSE PRACTITIONER

## 2018-12-21 PROCEDURE — 99232 SBSQ HOSP IP/OBS MODERATE 35: CPT | Performed by: INTERNAL MEDICINE

## 2018-12-21 PROCEDURE — 85025 COMPLETE CBC W/AUTO DIFF WBC: CPT | Performed by: INTERNAL MEDICINE

## 2018-12-21 RX ORDER — SENNA AND DOCUSATE SODIUM 50; 8.6 MG/1; MG/1
2 TABLET, FILM COATED ORAL 2 TIMES DAILY
Status: DISCONTINUED | OUTPATIENT
Start: 2018-12-21 | End: 2018-12-22 | Stop reason: HOSPADM

## 2018-12-21 RX ADMIN — HEPARIN SODIUM 23 UNITS/KG/HR: 10000 INJECTION, SOLUTION INTRAVENOUS at 04:02

## 2018-12-21 RX ADMIN — METOPROLOL TARTRATE 25 MG: 25 TABLET ORAL at 09:49

## 2018-12-21 RX ADMIN — FAMOTIDINE 20 MG: 20 TABLET ORAL at 08:19

## 2018-12-21 RX ADMIN — METOPROLOL TARTRATE 25 MG: 25 TABLET ORAL at 21:08

## 2018-12-21 RX ADMIN — SODIUM CHLORIDE, PRESERVATIVE FREE 3 ML: 5 INJECTION INTRAVENOUS at 21:10

## 2018-12-21 RX ADMIN — AMIODARONE HYDROCHLORIDE 200 MG: 200 TABLET ORAL at 21:08

## 2018-12-21 RX ADMIN — AMIODARONE HYDROCHLORIDE 200 MG: 200 TABLET ORAL at 08:18

## 2018-12-21 RX ADMIN — CLOPIDOGREL BISULFATE 75 MG: 75 TABLET ORAL at 08:19

## 2018-12-21 RX ADMIN — AMIODARONE HYDROCHLORIDE 0.5 MG/MIN: 1.8 INJECTION, SOLUTION INTRAVENOUS at 04:02

## 2018-12-21 RX ADMIN — APIXABAN 2.5 MG: 2.5 TABLET, FILM COATED ORAL at 11:29

## 2018-12-21 RX ADMIN — APIXABAN 2.5 MG: 2.5 TABLET, FILM COATED ORAL at 21:08

## 2018-12-22 VITALS
OXYGEN SATURATION: 97 % | DIASTOLIC BLOOD PRESSURE: 84 MMHG | HEART RATE: 79 BPM | RESPIRATION RATE: 18 BRPM | BODY MASS INDEX: 24.24 KG/M2 | TEMPERATURE: 97.2 F | HEIGHT: 60 IN | SYSTOLIC BLOOD PRESSURE: 132 MMHG | WEIGHT: 123.46 LBS

## 2018-12-22 PROCEDURE — 99024 POSTOP FOLLOW-UP VISIT: CPT | Performed by: INTERNAL MEDICINE

## 2018-12-22 PROCEDURE — 99024 POSTOP FOLLOW-UP VISIT: CPT | Performed by: THORACIC SURGERY (CARDIOTHORACIC VASCULAR SURGERY)

## 2018-12-22 RX ORDER — METOPROLOL TARTRATE 50 MG/1
50 TABLET, FILM COATED ORAL EVERY 12 HOURS SCHEDULED
Qty: 60 TABLET | Refills: 11 | Status: CANCELLED | OUTPATIENT
Start: 2018-12-22

## 2018-12-22 RX ORDER — AMIODARONE HYDROCHLORIDE 200 MG/1
200 TABLET ORAL EVERY 12 HOURS SCHEDULED
Qty: 60 TABLET | Refills: 6 | Status: SHIPPED | OUTPATIENT
Start: 2018-12-22 | End: 2018-12-22

## 2018-12-22 RX ORDER — METOPROLOL TARTRATE 50 MG/1
50 TABLET, FILM COATED ORAL EVERY 12 HOURS SCHEDULED
Status: DISCONTINUED | OUTPATIENT
Start: 2018-12-22 | End: 2018-12-22 | Stop reason: HOSPADM

## 2018-12-22 RX ORDER — METOPROLOL TARTRATE 50 MG/1
50 TABLET, FILM COATED ORAL EVERY 12 HOURS SCHEDULED
Qty: 60 TABLET | Refills: 6 | Status: SHIPPED | OUTPATIENT
Start: 2018-12-22 | End: 2018-12-22

## 2018-12-22 RX ORDER — METOPROLOL TARTRATE 50 MG/1
50 TABLET, FILM COATED ORAL EVERY 12 HOURS SCHEDULED
Qty: 60 TABLET | Refills: 6 | Status: ON HOLD | OUTPATIENT
Start: 2018-12-22 | End: 2019-04-01 | Stop reason: SDUPTHER

## 2018-12-22 RX ORDER — ASPIRIN 325 MG
325 TABLET, DELAYED RELEASE (ENTERIC COATED) ORAL DAILY
Qty: 100 TABLET | Refills: 3 | Status: ON HOLD | OUTPATIENT
Start: 2018-12-22 | End: 2019-03-24

## 2018-12-22 RX ORDER — AMIODARONE HYDROCHLORIDE 200 MG/1
200 TABLET ORAL EVERY 12 HOURS SCHEDULED
Qty: 60 TABLET | Refills: 6 | Status: ON HOLD | OUTPATIENT
Start: 2018-12-22 | End: 2019-04-17

## 2018-12-22 RX ORDER — AMIODARONE HYDROCHLORIDE 200 MG/1
200 TABLET ORAL EVERY 12 HOURS SCHEDULED
Qty: 60 TABLET | Refills: 6 | Status: CANCELLED | OUTPATIENT
Start: 2018-12-22

## 2018-12-22 RX ADMIN — CLOPIDOGREL BISULFATE 75 MG: 75 TABLET ORAL at 08:12

## 2018-12-22 RX ADMIN — AMIODARONE HYDROCHLORIDE 200 MG: 200 TABLET ORAL at 08:12

## 2018-12-22 RX ADMIN — SODIUM CHLORIDE, PRESERVATIVE FREE 3 ML: 5 INJECTION INTRAVENOUS at 08:13

## 2018-12-22 RX ADMIN — APIXABAN 2.5 MG: 2.5 TABLET, FILM COATED ORAL at 08:12

## 2018-12-22 RX ADMIN — ACETAMINOPHEN 650 MG: 325 TABLET ORAL at 07:20

## 2018-12-22 RX ADMIN — METOPROLOL TARTRATE 50 MG: 50 TABLET ORAL at 08:12

## 2018-12-22 RX ADMIN — FAMOTIDINE 20 MG: 20 TABLET ORAL at 08:12

## 2018-12-22 RX ADMIN — SODIUM CHLORIDE, PRESERVATIVE FREE 3 ML: 5 INJECTION INTRAVENOUS at 08:14

## 2018-12-23 ENCOUNTER — READMISSION MANAGEMENT (OUTPATIENT)
Dept: CALL CENTER | Facility: HOSPITAL | Age: 83
End: 2018-12-23

## 2018-12-23 NOTE — OUTREACH NOTE
Prep Survey      Responses   Facility patient discharged from?  [place]   Is patient eligible?  Yes   Discharge diagnosis  pacemaker implanted, RLE thrombectomy   Does the patient have one of the following disease processes/diagnoses(primary or secondary)?  Cardiothoracic surgery   Does the patient have Home health ordered?  Yes   What is the Home health agency?   [facility]   Is there a DME ordered?  No   Prep survey completed?  Yes          Dilma France RN

## 2018-12-26 ENCOUNTER — READMISSION MANAGEMENT (OUTPATIENT)
Dept: CALL CENTER | Facility: HOSPITAL | Age: 83
End: 2018-12-26

## 2018-12-26 ENCOUNTER — CLINICAL SUPPORT NO REQUIREMENTS (OUTPATIENT)
Dept: CARDIOLOGY | Facility: CLINIC | Age: 83
End: 2018-12-26

## 2018-12-26 DIAGNOSIS — I48.0 PAROXYSMAL ATRIAL FIBRILLATION (HCC): Primary | ICD-10-CM

## 2018-12-26 NOTE — OUTREACH NOTE
CT Surgery Week 1 Survey      Responses   Facility patient discharged from?  [place]   Does the patient have one of the following disease processes/diagnoses(primary or secondary)?  Cardiothoracic surgery   Is there a successful TCM telephone encounter documented?  No   Week 1 attempt successful?  Yes   Call start time  1608   Call end time  1612   Discharge diagnosis  pacemaker implanted, RLE thrombectomy   Meds reviewed with patient/caregiver?  Yes   Is the patient having any side effects they believe may be caused by any medication additions or changes?  No   Does the patient have all medications related to this admission filled (includes all antibiotics, pain medications, cardiac medications, etc.)  Yes   Is the patient taking all medications as directed (includes completed medication regime)?  Yes   Does the patient have a primary care provider?   Yes   Does the patient have an appointment scheduled with their C/T surgeon?  Yes   Has the patient kept scheduled appointments due by today?  N/A   What is the Home health agency?   [facility]   Has home health visited the patient within 72 hours of discharge?  Call prior to 72 hours   Psychosocial issues?  No   Did the patient receive a copy of their discharge instructions?  Yes   Nursing interventions  Reviewed instructions with patient   What is the patient's perception of their health status since discharge?  Improving   Nursing interventions  Nurse provided patient education   Is the patient/caregiver able to teach back normal signs of recovery?  Pain or discomfort at incisional site   Nursing interventions  Reassured on normal signs of recovery   Is the patient /caregiver able to teach back basic post-op care?  Take showers only when approved by MD-sponge bathe until then, Keep incision areas clean, dry and protected, Lifting as instructed by MD in discharge instructions   Is the patient/caregiver able to teach back signs and symptoms of  incisional infection?  Increased redness, swelling or pain at the incisonal site, Increased drainage or bleeding, Pus or odor from incision, Fever   Is the patient/caregiver able to teach back steps to recovery at home?  Set small, achievable goals for return to baseline health, Rest and rebuild strength, gradually increase activity   Is the patient/caregiver able to teach back the hierarchy of who to call/visit for symptoms/problems? PCP, Specialist, Home health nurse, Urgent Care, ED, 911  Yes   Week 1 call completed?  Yes            Bhumi Daugherty RN

## 2019-01-03 ENCOUNTER — READMISSION MANAGEMENT (OUTPATIENT)
Dept: CALL CENTER | Facility: HOSPITAL | Age: 84
End: 2019-01-03

## 2019-01-03 NOTE — OUTREACH NOTE
CT Surgery Week 2 Survey      Responses   Facility patient discharged from?  [place]   Does the patient have one of the following disease processes/diagnoses(primary or secondary)?  Cardiothoracic surgery   Week 2 attempt successful?  Yes   Call start time  1019   Call end time  1024   Discharge diagnosis  pacemaker implanted, RLE thrombectomy   Meds reviewed with patient/caregiver?  Yes   Is the patient taking all medications as directed (includes completed medication regime)?  Yes   Has the patient kept scheduled appointments due by today?  N/A   Comments  MD appt  01/04/2019   What is the Home health agency?   [facility]   Has home health visited the patient within 72 hours of discharge?  Yes   Psychosocial issues?  No   Nursing interventions  Reviewed instructions with patient   What is the patient's perception of their health status since discharge?  Improving   Week 2 call completed?  Yes          Mayte Tran RN

## 2019-01-03 NOTE — DISCHARGE SUMMARY
CTS Discharge Summary    Patient Care Team:  Gifty Kruse MD as PCP - General  Gifty Kruse MD as PCP - Family Medicine      Date of Admission: 12/17/2018  1:01 PM  Date of Discharge:  12/22/2018    Discharge Diagnosis  Past Medical History:   Diagnosis Date   • COPD  12/17/2018   • History of CVA 2013 12/17/2018   • HTN 12/17/2018   • PAD  12/17/2018   • Tobacco use 12/17/2018         Right ischemic leg    Complete heart block S/P transvenous pacemaker placement at [facility] on 12/17    PAD     HTN    History of subacute left MCA distribution ischemic CVA    COPD     Tobacco use    History of noncompliance with medical treatment    Acute kidney injury Cr 1.72 on admission     Hyperlipidemia    NSTEMI     CAD with occluded LAD and RCA per [place] 12/17/18    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  The patient evidently noted the onset of pain in her right foot at approximately 5 PM on December 16, 2018.  Patient was seen in the emergency department at [place] approximately 12 hours later.  When she arrived in [place] she was noted to be hypotensive, bradycardic, and was complaining of severe pain and numbness in the right lower extremity.  History of claudication, rest pain, or other vascular sequelae not available at the time of this dictation.  Unfortunately the patient received 2 mg of Ativan in preparation for her helicopter transfer and she is now obtunded and unresponsive verbally.  While in [place] she was seen in consultation by cardiology and was taken to the cardiac Cath Lab.  She was noted to have elevation of her troponin.  She was also noted to have bradycardia and what appeared to be sinus arrest on her EKG.  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  Coronary angiography revealed mild coronary lesions in the LAD and right coronary artery.  Risk factors for vascular disease in this patient include a previous history of vascular problems (left middle cerebral artery CVA), tobacco abuse, hyperlipidemia, previous NSTEMI, hypertension, and a past history of peripheral arterial disease.          Hospital Course  The patient is an 83-year-old female who was transferred to [facility] from [facility] and underwent emergent right lower extremity embolectomy performed by Dr. David Beatty.  The patient tolerated procedure well without any immediate complications and was transferred to the intensive care unit in stable condition.  On postoperative day #1, the patient was doing well having previously been extruded.  Patient had dopplerable biphasic dorsalis pedis and posterior tibials pulses bilaterally.  Patient was regaining sensorium in the right foot and motor function of the right foot was normal.  Patient continued to require temporary transvenous pacing since having transferred.  The electrophysiology service was consulted to evaluate for possible permanent pacemaker placement.  Patient's heparin drip was continued.  On postoperative day #2, patient continued to make good progress regaining sensation and motor function lower extremities maintaining good pulses.  Patient did have her permanent pacemaker placed and tolerated this well.  The patient had the right femoral vein and femoral arterial sheaths removed without difficulty.  Patient experienced some postoperative confusion at night and on 12/20/2018 experienced some paroxysmal atrial fibrillation.  The heparin drip was eventually discontinued and the patient was started on Eliquis.  The amiodarone drip initiated for atrial fibrillation was transitioned to oral dosing.  Over the previous couple days, the patient continued to make slow but steady progress, ambulating and  working with nursing staff to improve her functional status.  On 12/22/2018, patient ultimately met criteria for discharge and was discharged home without difficulty.    Procedures Performed    #1Cut Down Right Femoral Artery, Right Lower Extremity Embolectomy, Right Lower Extremity Angiography  #2 Permanent Pacemaker Placement      Consults:   Consults     Date and Time Order Name Status Description    12/17/2018 1659 Inpatient Cardiology Consult Completed       Intensivist      Discharge Medications     Discharge Medications      New Medications      Instructions Start Date   amiodarone 200 MG tablet  Commonly known as:  PACERONE  Notes to patient:  Morning dose given, start taking tonight.   200 mg, Oral, Every 12 Hours Scheduled      aspirin  MG tablet  Notes to patient:  Start 12/23 in morning.   325 mg, Oral, Daily      ELIQUIS 2.5 MG tablet tablet  Generic drug:  apixaban  Notes to patient:  Morning dose given, start taking tonight.   2.5 mg, Oral, Every 12 Hours Scheduled      metoprolol tartrate 50 MG tablet  Commonly known as:  LOPRESSOR  Notes to patient:  Morning dose given, start taking tonight.   50 mg, Oral, Every 12 Hours Scheduled         Continue These Medications      Instructions Start Date   atenolol 25 MG tablet  Commonly known as:  TENORMIN  Notes to patient:  Start taking 12/23 in morning.   25 mg, Oral, Daily      atorvastatin 40 MG tablet  Commonly known as:  LIPITOR  Notes to patient:  Start taking this evening.    40 mg, Oral, Daily      meloxicam 15 MG tablet  Commonly known as:  MOBIC  Notes to patient:  Start taking 12/23 in morning.    15 mg, Oral, Daily      olmesartan 40 MG tablet  Commonly known as:  BENICAR  Notes to patient:  Start taking 12/23 in morning.    40 mg, Oral, Daily      vitamin D 87877 units capsule capsule  Commonly known as:  ERGOCALCIFEROL   50,000 Units, Oral, Weekly         Stop These Medications    hydrALAZINE 50 MG tablet  Commonly known as:   APRESOLINE            Discharge Diet:   Cardiac, ADA 1800-calorie diet      Activity at Discharge:   No lifting greater than 10 pounds for 3 weeks and no driving for 3 weeks or while taking narcotics.    Follow-up Appointments  Future Appointments   Date Time Provider Department Center   1/4/2019  2:00 PM Tyrese Davis MD [facility] None   4/12/2019 11:30 AM Tyrese Davis MD [facility] None     Follow up with Dr. Beatty in office in 3-4 weeks.       GATITO Aaron  01/03/19  4:08 PM

## 2019-01-04 ENCOUNTER — TELEPHONE (OUTPATIENT)
Dept: CARDIOLOGY | Facility: CLINIC | Age: 84
End: 2019-01-04

## 2019-01-04 ENCOUNTER — OFFICE VISIT (OUTPATIENT)
Dept: CARDIOLOGY | Facility: CLINIC | Age: 84
End: 2019-01-04

## 2019-01-04 DIAGNOSIS — Z95.0 CARDIAC PACEMAKER IN SITU: Primary | ICD-10-CM

## 2019-01-04 PROCEDURE — 99024 POSTOP FOLLOW-UP VISIT: CPT | Performed by: INTERNAL MEDICINE

## 2019-01-04 PROCEDURE — 93280 PM DEVICE PROGR EVAL DUAL: CPT | Performed by: INTERNAL MEDICINE

## 2019-01-04 NOTE — PROGRESS NOTES
WOUND CHECK    2019    Rupinder HO Nabeel, : 1935        B/P: (Sitting) (Standing)     Pulse:    Patient has fever: [] Temperature if indicated: No fevers noted per patient.    Wound Location: left infraclavicular    Dressing Removed [x]        Old Dressing Appearance:  Clean, dry []                 Old, bloody drainage [x]                            Moist, serous drainage []                Moist, thick yellow/green drainage []       Wound Appearance: Redness []   none               Drainage []   none               Culture obtained []  no      Color:      Consistency:      Amount:         Gloves used, wound cleansed with sterile 4x4 and peroxide [x]       MD notified [] MD orders:    Antibiotic started []  If checked, type no  Other: Wound is clean, dry and intact. No s/s of infection noted. Patient given wound care instructions and limitations. She verbalized understanding.    Appointment for follow-up scheduled for 3 months post procedure [x]    Future Appointments   Date Time Provider Department Center   2019  2:00 PM Tyrese Davis MD [facility] None   2019 11:30 AM Tyrese Davis MD [facility] None           Lisbeth Castro RN, 19      MD Signature:______________________________ Completed By/Date:

## 2019-01-08 LAB — ACT BLD: 92 SECONDS (ref 82–152)

## 2019-01-10 ENCOUNTER — READMISSION MANAGEMENT (OUTPATIENT)
Dept: CALL CENTER | Facility: HOSPITAL | Age: 84
End: 2019-01-10

## 2019-01-10 NOTE — OUTREACH NOTE
CT Surgery Week 3 Survey      Responses   Facility patient discharged from?  [place]   Does the patient have one of the following disease processes/diagnoses(primary or secondary)?  Cardiothoracic surgery   Week 3 attempt successful?  Yes   Call start time  1130   Call end time  1134   Discharge diagnosis  pacemaker implanted, RLE thrombectomy   Meds reviewed with patient/caregiver?  Yes   Is the patient taking all medications as directed (includes completed medication regime)?  Yes   Has the patient kept scheduled appointments due by today?  Yes   What is the patient's perception of their health status since discharge?  Improving   Nursing interventions  Nurse provided patient education   Nursing interventions  Reassured on normal signs of recovery   Is the patient /caregiver able to teach back basic post-op care?  Take showers only when approved by MD-sponge bathe until then, Keep incision areas clean, dry and protected, Lifting as instructed by MD in discharge instructions   Is the patient/caregiver able to teach back signs and symptoms of incisional infection?  Increased redness, swelling or pain at the incisonal site, Increased drainage or bleeding, Pus or odor from incision, Fever   Is the patient/caregiver able to teach back steps to recovery at home?  Set small, achievable goals for return to baseline health, Rest and rebuild strength, gradually increase activity   Additional teach back comments  Pt states she is doing very well. Pacer site looks good.   Week 3 call completed?  Yes          Odette Burnett RN

## 2019-01-17 ENCOUNTER — READMISSION MANAGEMENT (OUTPATIENT)
Dept: CALL CENTER | Facility: HOSPITAL | Age: 84
End: 2019-01-17

## 2019-01-17 NOTE — OUTREACH NOTE
CT Surgery Week 4 Survey      Responses   Facility patient discharged from?  [place]   Does the patient have one of the following disease processes/diagnoses(primary or secondary)?  Cardiothoracic surgery   Week 4 attempt successful?  No          Evette Dugan RN

## 2019-01-25 ENCOUNTER — TELEPHONE (OUTPATIENT)
Dept: CARDIAC SURGERY | Facility: CLINIC | Age: 84
End: 2019-01-25

## 2019-01-25 NOTE — TELEPHONE ENCOUNTER
Pt called to today to [facility] f/u due to the travel is to long for pt and will not want to r/s told pt that she should f/u with pcp

## 2019-02-06 ENCOUNTER — APPOINTMENT (OUTPATIENT)
Dept: ULTRASOUND IMAGING | Facility: HOSPITAL | Age: 84
End: 2019-02-06

## 2019-02-06 ENCOUNTER — HOSPITAL ENCOUNTER (EMERGENCY)
Facility: HOSPITAL | Age: 84
Discharge: HOME | End: 2019-02-06
Attending: FAMILY MEDICINE | Admitting: FAMILY MEDICINE

## 2019-02-06 VITALS
OXYGEN SATURATION: 97 % | WEIGHT: 115 LBS | HEART RATE: 87 BPM | SYSTOLIC BLOOD PRESSURE: 136 MMHG | RESPIRATION RATE: 18 BRPM | DIASTOLIC BLOOD PRESSURE: 88 MMHG | TEMPERATURE: 98.9 F | BODY MASS INDEX: 22.58 KG/M2 | HEIGHT: 60 IN

## 2019-02-06 DIAGNOSIS — I73.9 CLAUDICATION (HCC): Primary | ICD-10-CM

## 2019-02-06 LAB
ALBUMIN SERPL-MCNC: 4 G/DL (ref 3.4–4.8)
ALBUMIN/GLOB SERPL: 1.5 G/DL (ref 1.5–2.5)
ALP SERPL-CCNC: 78 U/L (ref 35–104)
ALT SERPL W P-5'-P-CCNC: 26 U/L (ref 10–36)
ANION GAP SERPL CALCULATED.3IONS-SCNC: 5 MMOL/L (ref 3.6–11.2)
APTT PPP: 34.1 SECONDS (ref 23.8–36.1)
AST SERPL-CCNC: 20 U/L (ref 10–30)
BASOPHILS # BLD AUTO: 0.03 10*3/MM3 (ref 0–0.3)
BASOPHILS NFR BLD AUTO: 0.3 % (ref 0–2)
BILIRUB SERPL-MCNC: 0.2 MG/DL (ref 0.2–1.8)
BNP SERPL-MCNC: 313 PG/ML (ref 0–100)
BUN BLD-MCNC: 20 MG/DL (ref 7–21)
BUN/CREAT SERPL: 15.9 (ref 7–25)
CALCIUM SPEC-SCNC: 8.8 MG/DL (ref 7.7–10)
CHLORIDE SERPL-SCNC: 109 MMOL/L (ref 99–112)
CO2 SERPL-SCNC: 22 MMOL/L (ref 24.3–31.9)
CREAT BLD-MCNC: 1.26 MG/DL (ref 0.43–1.29)
CRP SERPL-MCNC: 4.61 MG/DL (ref 0–0.99)
DEPRECATED RDW RBC AUTO: 54.6 FL (ref 37–54)
EOSINOPHIL # BLD AUTO: 0.12 10*3/MM3 (ref 0–0.7)
EOSINOPHIL NFR BLD AUTO: 1.2 % (ref 0–7)
ERYTHROCYTE [DISTWIDTH] IN BLOOD BY AUTOMATED COUNT: 15.2 % (ref 11.5–14.5)
GFR SERPL CREATININE-BSD FRML MDRD: 41 ML/MIN/1.73
GLOBULIN UR ELPH-MCNC: 2.7 GM/DL
GLUCOSE BLD-MCNC: 106 MG/DL (ref 70–110)
HCT VFR BLD AUTO: 36.1 % (ref 37–47)
HGB BLD-MCNC: 11.1 G/DL (ref 12–16)
IMM GRANULOCYTES # BLD AUTO: 0.04 10*3/MM3 (ref 0–0.03)
IMM GRANULOCYTES NFR BLD AUTO: 0.4 % (ref 0–0.5)
INR PPP: 1.19 (ref 0.9–1.1)
LYMPHOCYTES # BLD AUTO: 1.35 10*3/MM3 (ref 1–3)
LYMPHOCYTES NFR BLD AUTO: 13.8 % (ref 16–46)
MAGNESIUM SERPL-MCNC: 2.4 MG/DL (ref 1.7–2.6)
MCH RBC QN AUTO: 30.2 PG (ref 27–33)
MCHC RBC AUTO-ENTMCNC: 30.7 G/DL (ref 33–37)
MCV RBC AUTO: 98.4 FL (ref 80–94)
MONOCYTES # BLD AUTO: 0.9 10*3/MM3 (ref 0.1–0.9)
MONOCYTES NFR BLD AUTO: 9.2 % (ref 0–12)
NEUTROPHILS # BLD AUTO: 7.36 10*3/MM3 (ref 1.4–6.5)
NEUTROPHILS NFR BLD AUTO: 75.1 % (ref 40–75)
NRBC BLD AUTO-RTO: 0 /100 WBC (ref 0–0)
OSMOLALITY SERPL CALC.SUM OF ELEC: 275 MOSM/KG (ref 273–305)
PLATELET # BLD AUTO: 264 10*3/MM3 (ref 130–400)
PMV BLD AUTO: 9.8 FL (ref 6–10)
POTASSIUM BLD-SCNC: 3.9 MMOL/L (ref 3.5–5.3)
PROT SERPL-MCNC: 6.7 G/DL (ref 6–8)
PROTHROMBIN TIME: 15.3 SECONDS (ref 11–15.4)
RBC # BLD AUTO: 3.67 10*6/MM3 (ref 4.2–5.4)
SODIUM BLD-SCNC: 136 MMOL/L (ref 135–153)
TROPONIN I SERPL-MCNC: 0.01 NG/ML
WBC NRBC COR # BLD: 9.8 10*3/MM3 (ref 4.5–12.5)

## 2019-02-06 PROCEDURE — 36415 COLL VENOUS BLD VENIPUNCTURE: CPT

## 2019-02-06 PROCEDURE — 80053 COMPREHEN METABOLIC PANEL: CPT | Performed by: FAMILY MEDICINE

## 2019-02-06 PROCEDURE — 85025 COMPLETE CBC W/AUTO DIFF WBC: CPT | Performed by: FAMILY MEDICINE

## 2019-02-06 PROCEDURE — 86140 C-REACTIVE PROTEIN: CPT | Performed by: FAMILY MEDICINE

## 2019-02-06 PROCEDURE — 93971 EXTREMITY STUDY: CPT | Performed by: RADIOLOGY

## 2019-02-06 PROCEDURE — 93926 LOWER EXTREMITY STUDY: CPT | Performed by: RADIOLOGY

## 2019-02-06 PROCEDURE — 85730 THROMBOPLASTIN TIME PARTIAL: CPT | Performed by: FAMILY MEDICINE

## 2019-02-06 PROCEDURE — 85610 PROTHROMBIN TIME: CPT | Performed by: FAMILY MEDICINE

## 2019-02-06 PROCEDURE — 84484 ASSAY OF TROPONIN QUANT: CPT | Performed by: FAMILY MEDICINE

## 2019-02-06 PROCEDURE — 93005 ELECTROCARDIOGRAM TRACING: CPT | Performed by: FAMILY MEDICINE

## 2019-02-06 PROCEDURE — 99283 EMERGENCY DEPT VISIT LOW MDM: CPT

## 2019-02-06 PROCEDURE — 93971 EXTREMITY STUDY: CPT

## 2019-02-06 PROCEDURE — 93010 ELECTROCARDIOGRAM REPORT: CPT | Performed by: INTERNAL MEDICINE

## 2019-02-06 PROCEDURE — 83735 ASSAY OF MAGNESIUM: CPT | Performed by: FAMILY MEDICINE

## 2019-02-06 PROCEDURE — 93926 LOWER EXTREMITY STUDY: CPT

## 2019-02-06 PROCEDURE — 83880 ASSAY OF NATRIURETIC PEPTIDE: CPT | Performed by: FAMILY MEDICINE

## 2019-02-06 NOTE — ED PROVIDER NOTES
Subjective   Patient is an 83-year-old female who presents emergency department for right-sided leg pain.  She states that this pain has been going on for approximately 6 weeks after she had peripheral stenting done for an arterial occlusion of his leg.  She was supposed to follow-up with her vascular surgeon that performed this procedure about 2 weeks ago was unable to make the trip due to pain.  She states that her right leg has been hurting tremendously since this procedure was performed and is getting worse each day.  She denies any fever, chills, nausea, vomiting or chest pain.  The same day she had her vascular procedure done she also had a cardiac pacemaker placed.  She states that since this pacemaker was placed she has not had any issues with her heart and has not had any chest pain or shortness of air.  Her only complaint is of intense right-sided leg pain.  The pain is gotten so bad that she has difficulty walking to her mailbox to get the mail.  She was prescribed different medications for this procedure including Plavix, aspirin and a blood thinner and states that she's been compliant with all of them.  She has no pain in her left leg.  She denies any additional symptoms.  She also denies any history of prolonged inactivity.            Review of Systems   Constitutional: Negative for activity change, appetite change, chills, diaphoresis, fatigue and fever.   HENT: Negative for congestion, postnasal drip, rhinorrhea, sinus pressure, sinus pain, sneezing and sore throat.    Eyes: Negative for pain, discharge, redness and itching.   Respiratory: Negative for apnea, cough, choking, chest tightness and shortness of breath.    Cardiovascular: Negative for chest pain, palpitations and leg swelling.   Gastrointestinal: Negative for abdominal distention, abdominal pain, constipation, diarrhea and nausea.   Endocrine: Negative for cold intolerance, heat intolerance and polydipsia.   Genitourinary: Negative for  difficulty urinating, dysuria and hematuria.   Musculoskeletal: Positive for gait problem. Negative for arthralgias and back pain.   Skin: Negative for color change, pallor and rash.   Allergic/Immunologic: Negative for environmental allergies, food allergies and immunocompromised state.   Neurological: Negative for dizziness, facial asymmetry and headaches.   Psychiatric/Behavioral: Negative for agitation and behavioral problems.       Past Medical History:   Diagnosis Date   • COPD  12/17/2018   • DVT of lower extremity (deep venous thrombosis) (CMS/HCC)    • History of CVA 2013 12/17/2018   • HTN 12/17/2018   • PAD  12/17/2018   • Tobacco use 12/17/2018       No Known Allergies    Past Surgical History:   Procedure Laterality Date   • CARDIAC CATHETERIZATION  12/17/2018    Procedure: Left Heart Cath;  Surgeon: Imtiaz Fuentes MD;  Location:  [facility] CATH INVASIVE LOCATION;  Service: Cardiology   • CARDIAC CATHETERIZATION N/A 12/17/2018    Procedure: Thrombolysis-peripheral;  Surgeon: Imtiaz Fuentes MD;  Location:  [facility] CATH INVASIVE LOCATION;  Service: Cardiology   • CARDIAC ELECTROPHYSIOLOGY PROCEDURE N/A 12/18/2018    Procedure: PACEMAKER IMPLANTATION- DC;  Surgeon: Thony Bobby MD;  Location: [facility] EP INVASIVE LOCATION;  Service: Cardiology   • CARDIAC ELECTROPHYSIOLOGY PROCEDURE N/A 12/17/2018    Procedure: Temporary Pacemaker;  Surgeon: Imtiaz Fuentes MD;  Location:  [facility] CATH INVASIVE LOCATION;  Service: Cardiology   • FEMORAL ARTERY STENT  2013   • FEMORAL POPLITEAL BYPASS Right 12/17/2018    Procedure: LOWER EXTREMITY EMBELECTOMY RIGHT;  Surgeon: David Beatty MD;  Location: [facility] HYBRID OR 15;  Service: Vascular   • HYSTERECTOMY     • LEFT HEART CATH  12/17/2018    At [facility] with TV PM placement    • PACEMAKER IMPLANTATION         History reviewed. No pertinent family history.    Social History     Socioeconomic History   • Marital status:       Spouse name: Not on file   • Number of children: Not on file   • Years of education: Not on file   • Highest education level: Not on file   Tobacco Use   • Smoking status: Current Every Day Smoker     Packs/day: 0.50     Years: 55.00     Pack years: 27.50     Types: Cigarettes   Substance and Sexual Activity   • Alcohol use: No     Frequency: Never   • Drug use: No   • Sexual activity: Defer           Objective   Physical Exam   Constitutional: She is oriented to person, place, and time. She appears well-developed and well-nourished. No distress.   Elderly and frail   HENT:   Head: Normocephalic and atraumatic.   Right Ear: External ear normal.   Left Ear: External ear normal.   Nose: Nose normal.   Mouth/Throat: Oropharynx is clear and moist. No oropharyngeal exudate.   Eyes: Conjunctivae and EOM are normal. Pupils are equal, round, and reactive to light. Right eye exhibits no discharge. Left eye exhibits no discharge. No scleral icterus.   Neck: Normal range of motion. Neck supple. No JVD present. No tracheal deviation present. No thyromegaly present.   Cardiovascular: Normal rate, regular rhythm and normal heart sounds. Exam reveals no gallop and no friction rub.   No murmur heard.  Unable to palpate dorsalis pedis or posterior tibial pulse on either leg.  Feet are warm to touch and have good capillary refill.   Pulmonary/Chest: Effort normal and breath sounds normal. No stridor. No respiratory distress. She has no wheezes. She has no rales. She exhibits no tenderness.   Abdominal: Soft. Bowel sounds are normal. She exhibits no distension and no mass. There is no tenderness. There is no guarding. No hernia.   Musculoskeletal: Normal range of motion. She exhibits no edema, tenderness or deformity.   Lymphadenopathy:     She has no cervical adenopathy.   Neurological: She is alert and oriented to person, place, and time. She displays normal reflexes. No cranial nerve deficit. Coordination normal.   Skin: Skin is  warm and dry. Capillary refill takes less than 2 seconds. No rash noted. She is not diaphoretic. No erythema. No pallor.   Psychiatric: She has a normal mood and affect. Her behavior is normal.   Nursing note and vitals reviewed.      Procedures           ED Course  ED Course as of Feb 06 1618 Wed Feb 06, 2019   1448 Atrial paced rhythm with prolonged AV conduction rate 70 bpm RI interval 242 ms ECG 12 Lead [EG]   1617 Patient was advised to follow-up with Dr. Beatty, to discuss further intervention as she is experiencing symptoms of claudication.  Patient agrees to follow-up and plans in doing so shortly.  Dr. Beatty information was given to the patient on discharge instructions.  [EG]      ED Course User Index  [EG] Fior Gonzales DO                  MDM  Number of Diagnoses or Management Options  Claudication (CMS/HCC): new and requires workup     Amount and/or Complexity of Data Reviewed  Clinical lab tests: ordered and reviewed  Tests in the radiology section of CPT®: ordered and reviewed  Tests in the medicine section of CPT®: ordered and reviewed  Decide to obtain previous medical records or to obtain history from someone other than the patient: yes  Obtain history from someone other than the patient: yes  Review and summarize past medical records: yes  Independent visualization of images, tracings, or specimens: yes    Risk of Complications, Morbidity, and/or Mortality  Presenting problems: high  Diagnostic procedures: high  Management options: high    Critical Care  Total time providing critical care: < 30 minutes    Patient Progress  Patient progress: stable        Final diagnoses:   Claudication (CMS/HCC)            Fior Gonzales DO  02/06/19 1611

## 2019-02-08 ENCOUNTER — TRANSCRIBE ORDERS (OUTPATIENT)
Dept: ADMINISTRATIVE | Facility: HOSPITAL | Age: 84
End: 2019-02-08

## 2019-02-08 DIAGNOSIS — I99.8 ANGIECTASIA: Primary | ICD-10-CM

## 2019-02-14 ENCOUNTER — OFFICE VISIT (OUTPATIENT)
Dept: CARDIAC SURGERY | Facility: CLINIC | Age: 84
End: 2019-02-14

## 2019-02-14 VITALS
SYSTOLIC BLOOD PRESSURE: 163 MMHG | DIASTOLIC BLOOD PRESSURE: 83 MMHG | OXYGEN SATURATION: 98 % | BODY MASS INDEX: 22.81 KG/M2 | WEIGHT: 116.2 LBS | TEMPERATURE: 98 F | HEART RATE: 78 BPM | HEIGHT: 60 IN

## 2019-02-14 DIAGNOSIS — I99.8 ISCHEMIC LEG: ICD-10-CM

## 2019-02-14 DIAGNOSIS — I44.2 COMPLETE HEART BLOCK (HCC): ICD-10-CM

## 2019-02-14 DIAGNOSIS — J44.9 CHRONIC OBSTRUCTIVE PULMONARY DISEASE, UNSPECIFIED COPD TYPE (HCC): ICD-10-CM

## 2019-02-14 DIAGNOSIS — I73.9 PAD (PERIPHERAL ARTERY DISEASE) (HCC): ICD-10-CM

## 2019-02-14 DIAGNOSIS — I25.118 CORONARY ARTERY DISEASE OF NATIVE HEART WITH STABLE ANGINA PECTORIS, UNSPECIFIED VESSEL OR LESION TYPE (HCC): ICD-10-CM

## 2019-02-14 DIAGNOSIS — I74.3 POPLITEAL THROMBOSIS (HCC): ICD-10-CM

## 2019-02-14 DIAGNOSIS — N17.9 ACUTE KIDNEY INJURY (HCC): Primary | ICD-10-CM

## 2019-02-14 DIAGNOSIS — Z86.73 HISTORY OF CVA (CEREBROVASCULAR ACCIDENT): ICD-10-CM

## 2019-02-14 PROCEDURE — 99024 POSTOP FOLLOW-UP VISIT: CPT | Performed by: THORACIC SURGERY (CARDIOTHORACIC VASCULAR SURGERY)

## 2019-02-14 NOTE — PROGRESS NOTES
02/14/2019  Patient Information  Rupinder Lees                                                                                          [address] [facility] [place] [address]   1935  'PCP/Referring Physician'  Gifty Kruse MD  [phone]  No ref. provider found    Chief Complaint   Patient presents with   • Post-op Follow-up     Hospital follow-up s/p right lower extremity embolectomy 12/17/18 for acute ischemia in right lower extremity due to thrombosis      CC: my legs hurt to walk  History of Present Illness: 83-year-old  female being seen in follow-up.  This patient presented 6 weeks ago at [facility] in [facility] with an ischemic right lower extremity.  Embolectomy/thrombectomy was performed.  Postoperatively the patient had dopplerable dorsalis pedis and posterior tibial pulses bilaterally.  Prior to transfer the patient had undergone cardiac catheterization and was also noted in [place] to have complete heart block.  Her complete heart block symptoms did not change following transfer and she underwent placement of a pacemaker by the electrophysiology service.  She was also treated by them with anticoagulation and with amiodarone for intermittent atrial fibrillation.  She is now being seen in follow-up for the right lower extremity.  She states that she can only walk about 30 feet before she gets pain in her right thigh and right calf.  If she stops and weights she can then walk further until the pain comes back.  She states that this is a difficult situation at best and it is lifestyle limiting.  Unfortunately she has been noncompliant with her medical regimen and continues to smoke.  She has a past history of myocardial infarction and cerebrovascular accident.  She does not have rest pain.      Patient Active Problem List   Diagnosis   • Complete heart block S/P transvenous pacemaker placement at [facility] on 12/17   • PAD    • HTN   • History of  subacute left MCA distribution ischemic CVA   • COPD    • Tobacco use   • History of noncompliance with medical treatment   • Acute kidney injury Cr 1.72 on admission    • Hyperlipidemia   • NSTEMI    • CAD with occluded LAD and RCA per [facility] 12/17/18   • Right ischemic leg   • Popliteal thrombosis (CMS/[facility])     Past Medical History:   Diagnosis Date   • COPD  12/17/2018   • DVT of lower extremity (deep venous thrombosis) (CMS/[facility])    • History of CVA 2013 12/17/2018   • HTN 12/17/2018   • PAD  12/17/2018   • Tobacco use 12/17/2018     Past Surgical History:   Procedure Laterality Date   • CARDIAC CATHETERIZATION  12/17/2018    Procedure: Left Heart Cath;  Surgeon: Imtiaz Fuentes MD;  Location:  [facility] CATH INVASIVE LOCATION;  Service: Cardiology   • CARDIAC CATHETERIZATION N/A 12/17/2018    Procedure: Thrombolysis-peripheral;  Surgeon: Imtiaz Fuentes MD;  Location:  [facility] CATH INVASIVE LOCATION;  Service: Cardiology   • CARDIAC ELECTROPHYSIOLOGY PROCEDURE N/A 12/18/2018    Procedure: PACEMAKER IMPLANTATION- DC;  Surgeon: Thony Bobby MD;  Location:  [facility] EP INVASIVE LOCATION;  Service: Cardiology   • CARDIAC ELECTROPHYSIOLOGY PROCEDURE N/A 12/17/2018    Procedure: Temporary Pacemaker;  Surgeon: Imtiaz Fuentes MD;  Location:  [facility] CATH INVASIVE LOCATION;  Service: Cardiology   • FEMORAL ARTERY STENT  2013   • FEMORAL POPLITEAL BYPASS Right 12/17/2018    Procedure: LOWER EXTREMITY EMBELECTOMY RIGHT;  Surgeon: David Beatty MD;  Location:  [facility] HYBRID OR 15;  Service: Vascular   • HYSTERECTOMY     • LEFT HEART CATH  12/17/2018    At [facility] with TV PM placement    • PACEMAKER IMPLANTATION         Current Outpatient Medications:   •  amiodarone (PACERONE) 200 MG tablet, Take 1 tablet by mouth Every 12 (Twelve) Hours., Disp: 60 tablet, Rfl: 6  •  apixaban (ELIQUIS) 2.5 MG tablet tablet, Take 1 tablet by mouth Every 12 (Twelve) Hours., Disp: 60 tablet, Rfl: 6  •  aspirin EC  325 MG tablet, Take 1 tablet by mouth Daily., Disp: 100 tablet, Rfl: 3  •  atorvastatin (LIPITOR) 40 MG tablet, Take 40 mg by mouth Daily., Disp: , Rfl:   •  meloxicam (MOBIC) 15 MG tablet, Take 15 mg by mouth Daily., Disp: , Rfl:   •  metoprolol tartrate (LOPRESSOR) 50 MG tablet, Take 1 tablet by mouth Every 12 (Twelve) Hours., Disp: 60 tablet, Rfl: 6  •  olmesartan (BENICAR) 40 MG tablet, Take 40 mg by mouth Daily., Disp: , Rfl:   •  vitamin D (ERGOCALCIFEROL) 36470 units capsule capsule, Take 50,000 Units by mouth 1 (One) Time Per Week., Disp: , Rfl:   No Known Allergies  Social History     Socioeconomic History   • Marital status:      Spouse name: Not on file   • Number of children: 3   • Years of education: Not on file   • Highest education level: Not on file   Social Needs   • Financial resource strain: Not on file   • Food insecurity - worry: Not on file   • Food insecurity - inability: Not on file   • Transportation needs - medical: Not on file   • Transportation needs - non-medical: Not on file   Occupational History   • Occupation: Homemaker   Tobacco Use   • Smoking status: Current Every Day Smoker     Packs/day: 0.50     Years: 55.00     Pack years: 27.50     Types: Cigarettes   • Smokeless tobacco: Never Used   Substance and Sexual Activity   • Alcohol use: No     Frequency: Never   • Drug use: No   • Sexual activity: Defer   Other Topics Concern   • Not on file   Social History Narrative    Lives in [place], [place] alone     Family History   Problem Relation Age of Onset   • Cancer Mother    • Cancer Father    • Heart attack Brother    • Heart attack Brother      Review of Systems   Constitution: Positive for weakness and malaise/fatigue. Negative for chills, fever, night sweats and weight loss.   HENT: Negative for hearing loss, odynophagia and sore throat.    Eyes: Negative for blurred vision, vision loss in left eye, vision loss in right eye and visual disturbance.   Cardiovascular: Positive for  "claudication. Negative for chest pain, dyspnea on exertion, leg swelling, orthopnea and palpitations.   Respiratory: Positive for cough and shortness of breath. Negative for hemoptysis and wheezing.    Endocrine: Negative for cold intolerance, heat intolerance, polydipsia, polyphagia and polyuria.   Hematologic/Lymphatic: Does not bruise/bleed easily.   Skin: Negative for itching and rash.   Musculoskeletal: Positive for arthritis, back pain, joint pain and muscle weakness. Negative for joint swelling and myalgias.   Gastrointestinal: Positive for nausea. Negative for abdominal pain, constipation, diarrhea, hematemesis, hematochezia, melena and vomiting.   Genitourinary: Negative for dysuria, frequency and hematuria.   Neurological: Negative for focal weakness, headaches, numbness and seizures.   Psychiatric/Behavioral: Positive for memory loss. Negative for altered mental status and suicidal ideas. The patient is not nervous/anxious.    All other systems reviewed and are negative.    Vitals:    02/14/19 1255   BP: 163/83   BP Location: Right arm   Patient Position: Sitting   Pulse: 78   Temp: 98 °F (36.7 °C)   TempSrc: Temporal   SpO2: 98%   Weight: 52.7 kg (116 lb 3.2 oz)   Height: 152.4 cm (60\")      Physical Exam   Constitutional: She is oriented to person, place, and time. She appears well-developed and well-nourished. No distress.    Elderly  female complaining primarily of leg pain when she walks   HENT:   Head: Normocephalic.   Right Ear: External ear normal.   Left Ear: External ear normal.   Nose: Nose normal.   Eyes: Pupils are equal, round, and reactive to light.   Neck: Normal range of motion. Neck supple. No tracheal deviation present. No thyromegaly present.   Cardiovascular: Normal rate.   Murmur heard.  1/6 systolic murmur left sternal border   Pulmonary/Chest: Effort normal and breath sounds normal. No respiratory distress. She has no wheezes. She has no rales. She exhibits no tenderness. "   Scattered rhonchi bilaterally no wheezes   Abdominal: Soft. Bowel sounds are normal. She exhibits no distension and no mass. There is no tenderness.   Musculoskeletal: Normal range of motion. She exhibits no edema.   Femoral pulses are 1+ popliteal pulses are absent dorsalis pedis and posterior tibial pulses are present with the Doppler.  Hair and scan on the toes is normal.  Nails are normal.  Capillary refill is 2 seconds bilateral.   Lymphadenopathy:     She has no cervical adenopathy.   Neurological: She is alert and oriented to person, place, and time. She has normal reflexes. No cranial nerve deficit.   Skin: Skin is warm and dry. No rash noted. No erythema.   Psychiatric: She has a normal mood and affect. Her behavior is normal.       Labs/Imaging: none    Assessment:   Elderly  female with significant peripheral vascular disease and likely small vessel disease in both lower extremities.  No current evidence of acute ischemia however the patient does have severe chronic ischemic changes.  Comorbidities include the patient's noncompliance, coronary artery disease, cerebrovascular disease, and an ongoing history of tobacco abuse    Plan: the patient will have CT angiography of the abdominal aorta with runoff done in [place] and then will be followed up at this office.    Patient Active Problem List   Diagnosis   • Complete heart block S/P transvenous pacemaker placement at [facility] on 12/17   • PAD    • HTN   • History of subacute left MCA distribution ischemic CVA   • COPD    • Tobacco use   • History of noncompliance with medical treatment   • Acute kidney injury Cr 1.72 on admission    • Hyperlipidemia   • NSTEMI    • CAD with occluded LAD and RCA per [facility] 12/17/18   • Right ischemic leg   • Popliteal thrombosis (CMS/HCC)         David Beatty MD  CTSurgery  02/18/19   4:37 PM

## 2019-02-15 DIAGNOSIS — I73.9 PVD (PERIPHERAL VASCULAR DISEASE) WITH CLAUDICATION (HCC): Primary | ICD-10-CM

## 2019-02-19 ENCOUNTER — HOSPITAL ENCOUNTER (OUTPATIENT)
Dept: CT IMAGING | Facility: HOSPITAL | Age: 84
Discharge: HOME | End: 2019-02-19

## 2019-02-19 DIAGNOSIS — I73.9 PVD (PERIPHERAL VASCULAR DISEASE) WITH CLAUDICATION (HCC): ICD-10-CM

## 2019-02-26 ENCOUNTER — TRANSCRIBE ORDERS (OUTPATIENT)
Dept: INFUSION THERAPY | Facility: HOSPITAL | Age: 84
End: 2019-02-26

## 2019-02-26 DIAGNOSIS — N28.9 ACUTE RENAL INSUFFICIENCY: Primary | ICD-10-CM

## 2019-02-27 ENCOUNTER — HOSPITAL ENCOUNTER (OUTPATIENT)
Dept: INFUSION THERAPY | Facility: HOSPITAL | Age: 84
Discharge: HOME | End: 2019-02-27
Admitting: NURSE PRACTITIONER

## 2019-02-27 VITALS
RESPIRATION RATE: 18 BRPM | BODY MASS INDEX: 22.58 KG/M2 | HEIGHT: 60 IN | TEMPERATURE: 98.6 F | SYSTOLIC BLOOD PRESSURE: 170 MMHG | WEIGHT: 115 LBS | DIASTOLIC BLOOD PRESSURE: 81 MMHG | HEART RATE: 72 BPM

## 2019-02-27 DIAGNOSIS — N28.9 ACUTE RENAL INSUFFICIENCY: ICD-10-CM

## 2019-02-27 LAB
ANION GAP SERPL CALCULATED.3IONS-SCNC: 5.3 MMOL/L (ref 3.6–11.2)
BUN BLD-MCNC: 20 MG/DL (ref 7–21)
BUN/CREAT SERPL: 16.5 (ref 7–25)
CALCIUM SPEC-SCNC: 9 MG/DL (ref 7.7–10)
CHLORIDE SERPL-SCNC: 110 MMOL/L (ref 99–112)
CO2 SERPL-SCNC: 23.7 MMOL/L (ref 24.3–31.9)
CREAT BLD-MCNC: 1.21 MG/DL (ref 0.43–1.29)
GFR SERPL CREATININE-BSD FRML MDRD: 43 ML/MIN/1.73
GLUCOSE BLD-MCNC: 90 MG/DL (ref 70–110)
OSMOLALITY SERPL CALC.SUM OF ELEC: 279.7 MOSM/KG (ref 273–305)
POTASSIUM BLD-SCNC: 4.3 MMOL/L (ref 3.5–5.3)
SODIUM BLD-SCNC: 139 MMOL/L (ref 135–153)

## 2019-02-27 PROCEDURE — 96360 HYDRATION IV INFUSION INIT: CPT

## 2019-02-27 PROCEDURE — 80048 BASIC METABOLIC PNL TOTAL CA: CPT | Performed by: NURSE PRACTITIONER

## 2019-02-27 RX ADMIN — SODIUM CHLORIDE 1000 ML: 9 INJECTION, SOLUTION INTRAVENOUS at 11:04

## 2019-03-04 ENCOUNTER — HOSPITAL ENCOUNTER (OUTPATIENT)
Dept: CT IMAGING | Facility: HOSPITAL | Age: 84
Discharge: HOME | End: 2019-03-04
Admitting: SURGERY

## 2019-03-04 PROCEDURE — 75635 CT ANGIO ABDOMINAL ARTERIES: CPT

## 2019-03-04 PROCEDURE — 0 IOPAMIDOL PER 1 ML: Performed by: SURGERY

## 2019-03-04 PROCEDURE — 75635 CT ANGIO ABDOMINAL ARTERIES: CPT | Performed by: RADIOLOGY

## 2019-03-04 RX ADMIN — IOPAMIDOL 100 ML: 755 INJECTION, SOLUTION INTRAVENOUS at 10:59

## 2019-03-21 ENCOUNTER — OFFICE VISIT (OUTPATIENT)
Dept: CARDIAC SURGERY | Facility: CLINIC | Age: 84
End: 2019-03-21

## 2019-03-21 VITALS
TEMPERATURE: 97.8 F | OXYGEN SATURATION: 98 % | HEART RATE: 80 BPM | DIASTOLIC BLOOD PRESSURE: 54 MMHG | HEIGHT: 60 IN | SYSTOLIC BLOOD PRESSURE: 112 MMHG | BODY MASS INDEX: 23.32 KG/M2 | WEIGHT: 118.8 LBS

## 2019-03-21 DIAGNOSIS — J44.9 CHRONIC OBSTRUCTIVE PULMONARY DISEASE, UNSPECIFIED COPD TYPE (HCC): ICD-10-CM

## 2019-03-21 DIAGNOSIS — J43.9 PULMONARY EMPHYSEMA, UNSPECIFIED EMPHYSEMA TYPE (HCC): Primary | ICD-10-CM

## 2019-03-21 DIAGNOSIS — I73.9 PAD (PERIPHERAL ARTERY DISEASE) (HCC): ICD-10-CM

## 2019-03-21 PROCEDURE — 99213 OFFICE O/P EST LOW 20 MIN: CPT | Performed by: THORACIC SURGERY (CARDIOTHORACIC VASCULAR SURGERY)

## 2019-03-21 RX ORDER — HYDRALAZINE HYDROCHLORIDE 50 MG/1
25 TABLET, FILM COATED ORAL 2 TIMES DAILY
Status: ON HOLD | COMMUNITY
Start: 2019-02-25 | End: 2019-04-17

## 2019-03-21 RX ORDER — ATENOLOL 25 MG/1
25 TABLET ORAL DAILY
COMMUNITY
Start: 2019-03-04 | End: 2019-04-01 | Stop reason: HOSPADM

## 2019-03-21 RX ORDER — CLOPIDOGREL BISULFATE 75 MG/1
TABLET ORAL
Status: ON HOLD | COMMUNITY
Start: 2019-02-25 | End: 2019-03-24

## 2019-03-21 NOTE — PROGRESS NOTES
03/21/2019  Patient Information  [place] [address]   1935  'PCP/Referring Physician'  Gifty Kruse MD  [phone]  No ref. provider found    Chief Complaint   Patient presents with   • Leg Pain     Follow-up with results from CTA abdominal aorta with runoff.      CC: My feet hurt when I walk    History of Present Illness: 83-year-old  female being seen in follow-up after a recent CT angiogram of the abdominal aorta with runoff study.  This patient has a long history of vascular disease.  Unfortunately she has been relatively noncompliant with her medical regimen and continues to smoke.  Risk factors for vascular disease include a positive smoking history, hypertension, dyslipidemia, and a positive family history of atherosclerotic disease.  The patient states that she has numbness in her toes. She does not have rest pain.  She can walk approximately 50 feet but then gets discomfort in her calves and her thighs. She previously had stenting of the left iliac artery.  She has recently had a thrombectomy of the right superficial femoral artery and popliteal artery.  A CT angiogram shows major vessels are open with some vascular disease however the patient has significant microvascular disease and occlusion of the vessels and the distal aspect of both lower extremities.  It appears to me that the occlusions are so far distal that bypass or stenting is not possible.  H      Patient Active Problem List   Diagnosis   • Complete heart block S/P transvenous pacemaker placement at [facility] on 12/17   • PAD    • HTN   • History of subacute left MCA distribution ischemic CVA   • COPD    • Tobacco use   • History of noncompliance with medical treatment   • Acute kidney injury Cr 1.72 on admission    • Hyperlipidemia   • NSTEMI    • CAD with occluded LAD and RCA per [facility] 12/17/18   • Right  ischemic leg   • Popliteal thrombosis (CMS/[facility])     Past Medical History:   Diagnosis Date   • COPD  12/17/2018   • DVT of lower extremity (deep venous thrombosis) (CMS/HCC)    • History of CVA 2013 12/17/2018   • HTN 12/17/2018   • PAD  12/17/2018   • Tobacco use 12/17/2018     Past Surgical History:   Procedure Laterality Date   • CARDIAC CATHETERIZATION  12/17/2018    Procedure: Left Heart Cath;  Surgeon: Imtiaz Fuentes MD;  Location:  [facility] CATH INVASIVE LOCATION;  Service: Cardiology   • CARDIAC CATHETERIZATION N/A 12/17/2018    Procedure: Thrombolysis-peripheral;  Surgeon: Imtiaz Fuentes MD;  Location:  [facility] CATH INVASIVE LOCATION;  Service: Cardiology   • CARDIAC ELECTROPHYSIOLOGY PROCEDURE N/A 12/18/2018    Procedure: PACEMAKER IMPLANTATION- DC;  Surgeon: Thony Bobby MD;  Location:  [facility] INVASIVE LOCATION;  Service: Cardiology   • CARDIAC ELECTROPHYSIOLOGY PROCEDURE N/A 12/17/2018    Procedure: Temporary Pacemaker;  Surgeon: Imtiaz Fuentes MD;  Location:  [facility] CATH INVASIVE LOCATION;  Service: Cardiology   • FEMORAL ARTERY STENT  2013   • FEMORAL POPLITEAL BYPASS Right 12/17/2018    Procedure: LOWER EXTREMITY EMBELECTOMY RIGHT;  Surgeon: David Beatty MD;  Location:  [facility] HYBRID OR 15;  Service: Vascular   • HYSTERECTOMY     • LEFT HEART CATH  12/17/2018    At [facility] with TV PM placement    • PACEMAKER IMPLANTATION         Current Outpatient Medications:   •  amiodarone (PACERONE) 200 MG tablet, Take 1 tablet by mouth Every 12 (Twelve) Hours., Disp: 60 tablet, Rfl: 6  •  apixaban (ELIQUIS) 2.5 MG tablet tablet, Take 1 tablet by mouth Every 12 (Twelve) Hours., Disp: 60 tablet, Rfl: 6  •  aspirin  MG tablet, Take 1 tablet by mouth Daily., Disp: 100 tablet, Rfl: 3  •  atenolol (TENORMIN) 25 MG tablet, , Disp: , Rfl:   •  atorvastatin (LIPITOR) 40 MG tablet, Take 40 mg by mouth Daily., Disp: , Rfl:   •  clopidogrel (PLAVIX) 75 MG tablet, , Disp: , Rfl:    •  hydrALAZINE (APRESOLINE) 50 MG tablet, , Disp: , Rfl:   •  meloxicam (MOBIC) 15 MG tablet, Take 15 mg by mouth Daily., Disp: , Rfl:   •  metoprolol tartrate (LOPRESSOR) 50 MG tablet, Take 1 tablet by mouth Every 12 (Twelve) Hours., Disp: 60 tablet, Rfl: 6  •  olmesartan (BENICAR) 40 MG tablet, Take 40 mg by mouth Daily., Disp: , Rfl:   •  vitamin D (ERGOCALCIFEROL) 71591 units capsule capsule, Take 50,000 Units by mouth 1 (One) Time Per Week., Disp: , Rfl:   No Known Allergies  Social History     Socioeconomic History   • Marital status:      Spouse name: Not on file   • Number of children: 3   • Years of education: Not on file   • Highest education level: Not on file   Occupational History   • Occupation: Homemaker   Tobacco Use   • Smoking status: Current Every Day Smoker     Packs/day: 0.50     Years: 55.00     Pack years: 27.50     Types: Cigarettes   • Smokeless tobacco: Never Used   Substance and Sexual Activity   • Alcohol use: No     Frequency: Never   • Drug use: No   • Sexual activity: Defer   Social History Narrative    Lives in [facility]     Family History   Problem Relation Age of Onset   • Cancer Mother    • Cancer Father    • Heart attack Brother    • Heart attack Brother      Review of Systems   Constitution: Negative for chills, fever, malaise/fatigue, night sweats and weight loss.   HENT: Positive for nosebleeds. Negative for hearing loss, odynophagia and sore throat.    Cardiovascular: Positive for claudication and dyspnea on exertion. Negative for chest pain, leg swelling, orthopnea and palpitations.   Respiratory: Negative for cough and hemoptysis.    Endocrine: Negative for cold intolerance, heat intolerance, polydipsia, polyphagia and polyuria.   Hematologic/Lymphatic: Bruises/bleeds easily.   Skin: Negative for itching and rash.   Musculoskeletal: Negative for joint pain, joint swelling and myalgias.   Gastrointestinal: Negative for abdominal pain, constipation, diarrhea,  "hematemesis, hematochezia, melena, nausea and vomiting.   Genitourinary: Negative for dysuria, frequency and hematuria.   Neurological: Positive for dizziness, light-headedness and loss of balance. Negative for focal weakness, headaches, numbness and seizures.   Psychiatric/Behavioral: Negative for depression and suicidal ideas. The patient is not nervous/anxious.    All other systems reviewed and are negative.    Vitals:    03/21/19 1017   BP: 112/54   BP Location: Right arm   Patient Position: Sitting   Pulse: 80   Temp: 97.8 °F (36.6 °C)   TempSrc: Temporal   SpO2: 98%   Weight: 53.9 kg (118 lb 12.8 oz)   Height: 152.4 cm (60\")      Physical Exam   Constitutional: She is oriented to person, place, and time. She appears well-developed and well-nourished. No distress.   Elderly  female in no acute distress but complaining of bilateral leg and foot pain.   HENT:   Head: Normocephalic.   Right Ear: External ear normal.   Left Ear: External ear normal.   Nose: Nose normal.   Eyes: Pupils are equal, round, and reactive to light.   Neck: Normal range of motion. Neck supple. No tracheal deviation present. No thyromegaly present.   Cardiovascular: Normal rate.   Murmur heard.  1/6 systolic murmur left sternal border normal S1 and normal S2.  Regular rhythm.   Pulmonary/Chest: Effort normal and breath sounds normal. No respiratory distress. She has no wheezes. She has no rales. She exhibits no tenderness.   Abdominal: Soft. Bowel sounds are normal. She exhibits no distension and no mass. There is no tenderness.   Soft nontender with no palpable masses.  Femoral pulses are 1-2+ with no bruits.   Musculoskeletal: Normal range of motion. She exhibits no edema.   Dorsalis pedis and posterior tibial pulses are present with the Doppler bilaterally.  The feet are slightly reddened but capillary refill is 2 seconds bilaterally.  There is no hair distribution on the toes.  The nails are normal.  Skin appendages are not " atrophic.   Lymphadenopathy:     She has no cervical adenopathy.   Neurological: She is alert and oriented to person, place, and time. She has normal reflexes. No cranial nerve deficit.   Skin: Skin is warm and dry. No rash noted. No erythema.   Psychiatric: She has a normal mood and affect. Her behavior is normal.       Labs/Imaging:  CT angiogram of the aorta with runoff reviewed as noted above.      Assessment:  Elderly  female with significant peripheral vascular disease primarily distal in both lower extremities with significant microvascular disease in the feet.    Plan: I have had a long discussion with the patient in regard to continued aggressive medical treatment, walking to tolerance, and absolutely avoiding tobacco abuse. Although the patient medication list has Pletal on the the patient has no recollection of taking the medicine.  I have given her a prescription for this and we will see her back in 3 months.      Patient Active Problem List   Diagnosis   • Complete heart block S/P transvenous pacemaker placement at [facility] on 12/17   • PAD    • HTN   • History of subacute left MCA distribution ischemic CVA   • COPD    • Tobacco use   • History of noncompliance with medical treatment   • Acute kidney injury Cr 1.72 on admission    • Hyperlipidemia   • NSTEMI    • CAD with occluded LAD and RCA per [facility] 12/17/18   • Right ischemic leg   • Popliteal thrombosis (CMS/[facility])       David Beatty MD  CTSurgery  03/24/19   4:31 PM

## 2019-03-24 ENCOUNTER — APPOINTMENT (OUTPATIENT)
Dept: GENERAL RADIOLOGY | Facility: HOSPITAL | Age: 84
End: 2019-03-24

## 2019-03-24 ENCOUNTER — HOSPITAL ENCOUNTER (INPATIENT)
Facility: HOSPITAL | Age: 84
LOS: 8 days | Discharge: HOME HEALTH | End: 2019-04-01
Attending: EMERGENCY MEDICINE | Admitting: INTERNAL MEDICINE

## 2019-03-24 DIAGNOSIS — A41.9 SEPSIS, DUE TO UNSPECIFIED ORGANISM: ICD-10-CM

## 2019-03-24 DIAGNOSIS — R09.02 COPD WITH HYPOXIA (HCC): ICD-10-CM

## 2019-03-24 DIAGNOSIS — I74.3 POPLITEAL THROMBOSIS (HCC): ICD-10-CM

## 2019-03-24 DIAGNOSIS — J18.9 PNEUMONIA OF LEFT LOWER LOBE DUE TO INFECTIOUS ORGANISM: Primary | ICD-10-CM

## 2019-03-24 DIAGNOSIS — I99.8 ISCHEMIC LEG: ICD-10-CM

## 2019-03-24 DIAGNOSIS — J44.9 COPD WITH HYPOXIA (HCC): ICD-10-CM

## 2019-03-24 LAB
A-A DO2: 40.7 MMHG (ref 0–300)
ALBUMIN SERPL-MCNC: 4.06 G/DL (ref 3.5–5.2)
ALBUMIN/GLOB SERPL: 1.4 G/DL
ALP SERPL-CCNC: 82 U/L (ref 39–117)
ALT SERPL W P-5'-P-CCNC: 130 U/L (ref 1–33)
ANION GAP SERPL CALCULATED.3IONS-SCNC: 12.9 MMOL/L
ARTERIAL PATENCY WRIST A: ABNORMAL
AST SERPL-CCNC: 125 U/L (ref 1–32)
ATMOSPHERIC PRESS: 734 MMHG
BACTERIA UR QL AUTO: ABNORMAL /HPF
BASE EXCESS BLDA CALC-SCNC: -8.8 MMOL/L
BASOPHILS # BLD AUTO: 0.02 10*3/MM3 (ref 0–0.2)
BASOPHILS NFR BLD AUTO: 0.2 % (ref 0–1.5)
BDY SITE: ABNORMAL
BILIRUB SERPL-MCNC: 0.2 MG/DL (ref 0.2–1.2)
BILIRUB UR QL STRIP: NEGATIVE
BODY TEMPERATURE: 98.6 C
BUN BLD-MCNC: 29 MG/DL (ref 8–23)
BUN/CREAT SERPL: 22.3 (ref 7–25)
CALCIUM SPEC-SCNC: 8.6 MG/DL (ref 8.6–10.5)
CHLORIDE SERPL-SCNC: 103 MMOL/L (ref 98–107)
CLARITY UR: CLEAR
CO2 SERPL-SCNC: 19.1 MMOL/L (ref 22–29)
COHGB MFR BLD: 1.8 % (ref 0–5)
COLOR UR: YELLOW
CREAT BLD-MCNC: 1.3 MG/DL (ref 0.57–1)
D-LACTATE SERPL-SCNC: 2.7 MMOL/L (ref 0.5–2)
D-LACTATE SERPL-SCNC: 3.6 MMOL/L (ref 0.5–2)
D-LACTATE SERPL-SCNC: 4.1 MMOL/L (ref 0.5–2)
DEPRECATED RDW RBC AUTO: 49.7 FL (ref 37–54)
EOSINOPHIL # BLD AUTO: 0 10*3/MM3 (ref 0–0.4)
EOSINOPHIL NFR BLD AUTO: 0 % (ref 0.3–6.2)
ERYTHROCYTE [DISTWIDTH] IN BLOOD BY AUTOMATED COUNT: 15.3 % (ref 12.3–15.4)
FLUAV AG NPH QL: NEGATIVE
FLUBV AG NPH QL IA: NEGATIVE
GFR SERPL CREATININE-BSD FRML MDRD: 39 ML/MIN/1.73
GLOBULIN UR ELPH-MCNC: 2.8 GM/DL
GLUCOSE BLD-MCNC: 149 MG/DL (ref 65–99)
GLUCOSE UR STRIP-MCNC: ABNORMAL MG/DL
HCO3 BLDA-SCNC: 15.3 MMOL/L (ref 22–26)
HCT VFR BLD AUTO: 26.1 % (ref 34–46.6)
HCT VFR BLD CALC: 26 % (ref 37–47)
HGB BLD-MCNC: 7.9 G/DL (ref 12–15.9)
HGB BLDA-MCNC: 9 G/DL (ref 12–16)
HGB UR QL STRIP.AUTO: NEGATIVE
HOLD SPECIMEN: NORMAL
HOROWITZ INDEX BLD+IHG-RTO: 21 %
HYALINE CASTS UR QL AUTO: ABNORMAL /LPF
IMM GRANULOCYTES # BLD AUTO: 0.03 10*3/MM3 (ref 0–0.05)
IMM GRANULOCYTES NFR BLD AUTO: 0.4 % (ref 0–0.5)
KETONES UR QL STRIP: NEGATIVE
L PNEUMO1 AG UR QL IA: NEGATIVE
LEUKOCYTE ESTERASE UR QL STRIP.AUTO: NEGATIVE
LYMPHOCYTES # BLD AUTO: 0.38 10*3/MM3 (ref 0.7–3.1)
LYMPHOCYTES NFR BLD AUTO: 4.7 % (ref 19.6–45.3)
M PNEUMO IGM SER QL: NEGATIVE
MCH RBC QN AUTO: 27.9 PG (ref 26.6–33)
MCHC RBC AUTO-ENTMCNC: 30.3 G/DL (ref 31.5–35.7)
MCV RBC AUTO: 92.2 FL (ref 79–97)
METHGB BLD QL: 0.2 % (ref 0–3)
MODALITY: ABNORMAL
MONOCYTES # BLD AUTO: 0.53 10*3/MM3 (ref 0.1–0.9)
MONOCYTES NFR BLD AUTO: 6.5 % (ref 5–12)
NEUTROPHILS # BLD AUTO: 7.15 10*3/MM3 (ref 1.4–7)
NEUTROPHILS NFR BLD AUTO: 88.2 % (ref 42.7–76)
NITRITE UR QL STRIP: NEGATIVE
NOTE: ABNORMAL
OXYHGB MFR BLDV: 91.1 % (ref 85–100)
PCO2 BLDA: 27 MM HG (ref 35–45)
PCO2 TEMP ADJ BLD: ABNORMAL MM HG (ref 35–45)
PH BLDA: 7.37 PH UNITS (ref 7.35–7.45)
PH UR STRIP.AUTO: <=5 [PH] (ref 5–8)
PH, TEMP CORRECTED: ABNORMAL PH UNITS (ref 7.35–7.45)
PLATELET # BLD AUTO: 224 10*3/MM3 (ref 140–450)
PMV BLD AUTO: 10.1 FL (ref 6–12)
PO2 BLDA: 71.2 MM HG (ref 80–100)
PO2 TEMP ADJ BLD: ABNORMAL MM HG (ref 83–108)
POTASSIUM BLD-SCNC: 4 MMOL/L (ref 3.5–5.2)
PROT SERPL-MCNC: 6.9 G/DL (ref 6–8.5)
PROT UR QL STRIP: ABNORMAL
RBC # BLD AUTO: 2.83 10*6/MM3 (ref 3.77–5.28)
RBC # UR: ABNORMAL /HPF
REF LAB TEST METHOD: ABNORMAL
SAO2 % BLDCOA: 93 % (ref 90–100)
SODIUM BLD-SCNC: 135 MMOL/L (ref 136–145)
SP GR UR STRIP: 1.02 (ref 1–1.03)
SQUAMOUS #/AREA URNS HPF: ABNORMAL /HPF
TROPONIN T SERPL-MCNC: <0.01 NG/ML (ref 0–0.03)
UROBILINOGEN UR QL STRIP: ABNORMAL
WBC NRBC COR # BLD: 8.11 10*3/MM3 (ref 3.4–10.8)
WBC UR QL AUTO: ABNORMAL /HPF
WHOLE BLOOD HOLD SPECIMEN: NORMAL
WHOLE BLOOD HOLD SPECIMEN: NORMAL

## 2019-03-24 PROCEDURE — 36600 WITHDRAWAL OF ARTERIAL BLOOD: CPT | Performed by: EMERGENCY MEDICINE

## 2019-03-24 PROCEDURE — 99285 EMERGENCY DEPT VISIT HI MDM: CPT

## 2019-03-24 PROCEDURE — 86738 MYCOPLASMA ANTIBODY: CPT | Performed by: NURSE PRACTITIONER

## 2019-03-24 PROCEDURE — 99223 1ST HOSP IP/OBS HIGH 75: CPT | Performed by: INTERNAL MEDICINE

## 2019-03-24 PROCEDURE — 85025 COMPLETE CBC W/AUTO DIFF WBC: CPT | Performed by: EMERGENCY MEDICINE

## 2019-03-24 PROCEDURE — 94799 UNLISTED PULMONARY SVC/PX: CPT

## 2019-03-24 PROCEDURE — 87077 CULTURE AEROBIC IDENTIFY: CPT | Performed by: NURSE PRACTITIONER

## 2019-03-24 PROCEDURE — 87899 AGENT NOS ASSAY W/OPTIC: CPT | Performed by: NURSE PRACTITIONER

## 2019-03-24 PROCEDURE — 87186 SC STD MICRODIL/AGAR DIL: CPT | Performed by: NURSE PRACTITIONER

## 2019-03-24 PROCEDURE — 82805 BLOOD GASES W/O2 SATURATION: CPT | Performed by: EMERGENCY MEDICINE

## 2019-03-24 PROCEDURE — 94640 AIRWAY INHALATION TREATMENT: CPT

## 2019-03-24 PROCEDURE — 82375 ASSAY CARBOXYHB QUANT: CPT | Performed by: EMERGENCY MEDICINE

## 2019-03-24 PROCEDURE — 71045 X-RAY EXAM CHEST 1 VIEW: CPT

## 2019-03-24 PROCEDURE — 71045 X-RAY EXAM CHEST 1 VIEW: CPT | Performed by: RADIOLOGY

## 2019-03-24 PROCEDURE — 87205 SMEAR GRAM STAIN: CPT | Performed by: NURSE PRACTITIONER

## 2019-03-24 PROCEDURE — 81001 URINALYSIS AUTO W/SCOPE: CPT | Performed by: EMERGENCY MEDICINE

## 2019-03-24 PROCEDURE — 25010000002 CEFTRIAXONE: Performed by: EMERGENCY MEDICINE

## 2019-03-24 PROCEDURE — 80053 COMPREHEN METABOLIC PANEL: CPT | Performed by: EMERGENCY MEDICINE

## 2019-03-24 PROCEDURE — 84484 ASSAY OF TROPONIN QUANT: CPT | Performed by: EMERGENCY MEDICINE

## 2019-03-24 PROCEDURE — 83050 HGB METHEMOGLOBIN QUAN: CPT | Performed by: EMERGENCY MEDICINE

## 2019-03-24 PROCEDURE — 25010000002 METHYLPREDNISOLONE PER 125 MG: Performed by: EMERGENCY MEDICINE

## 2019-03-24 PROCEDURE — 87070 CULTURE OTHR SPECIMN AEROBIC: CPT | Performed by: NURSE PRACTITIONER

## 2019-03-24 PROCEDURE — 93010 ELECTROCARDIOGRAM REPORT: CPT | Performed by: INTERNAL MEDICINE

## 2019-03-24 PROCEDURE — 87040 BLOOD CULTURE FOR BACTERIA: CPT | Performed by: EMERGENCY MEDICINE

## 2019-03-24 PROCEDURE — 93005 ELECTROCARDIOGRAM TRACING: CPT | Performed by: EMERGENCY MEDICINE

## 2019-03-24 PROCEDURE — 83605 ASSAY OF LACTIC ACID: CPT | Performed by: EMERGENCY MEDICINE

## 2019-03-24 PROCEDURE — 83605 ASSAY OF LACTIC ACID: CPT | Performed by: NURSE PRACTITIONER

## 2019-03-24 PROCEDURE — 25010000002 AZITHROMYCIN: Performed by: EMERGENCY MEDICINE

## 2019-03-24 PROCEDURE — 25010000002 METHYLPREDNISOLONE PER 40 MG: Performed by: NURSE PRACTITIONER

## 2019-03-24 PROCEDURE — 87804 INFLUENZA ASSAY W/OPTIC: CPT | Performed by: NURSE PRACTITIONER

## 2019-03-24 RX ORDER — ATENOLOL 25 MG/1
25 TABLET ORAL DAILY
Status: CANCELLED | OUTPATIENT
Start: 2019-03-24

## 2019-03-24 RX ORDER — PANTOPRAZOLE SODIUM 40 MG/1
40 TABLET, DELAYED RELEASE ORAL
Status: DISCONTINUED | OUTPATIENT
Start: 2019-03-24 | End: 2019-04-01 | Stop reason: HOSPADM

## 2019-03-24 RX ORDER — IPRATROPIUM BROMIDE AND ALBUTEROL SULFATE 2.5; .5 MG/3ML; MG/3ML
3 SOLUTION RESPIRATORY (INHALATION) ONCE
Status: COMPLETED | OUTPATIENT
Start: 2019-03-24 | End: 2019-03-24

## 2019-03-24 RX ORDER — IPRATROPIUM BROMIDE AND ALBUTEROL SULFATE 2.5; .5 MG/3ML; MG/3ML
3 SOLUTION RESPIRATORY (INHALATION)
Status: DISCONTINUED | OUTPATIENT
Start: 2019-03-24 | End: 2019-03-24

## 2019-03-24 RX ORDER — CILOSTAZOL 100 MG/1
100 TABLET ORAL 2 TIMES DAILY
Status: ON HOLD | COMMUNITY
End: 2019-04-19 | Stop reason: SDUPTHER

## 2019-03-24 RX ORDER — METHYLPREDNISOLONE SODIUM SUCCINATE 125 MG/2ML
125 INJECTION, POWDER, LYOPHILIZED, FOR SOLUTION INTRAMUSCULAR; INTRAVENOUS ONCE
Status: COMPLETED | OUTPATIENT
Start: 2019-03-24 | End: 2019-03-24

## 2019-03-24 RX ORDER — METHYLPREDNISOLONE SODIUM SUCCINATE 40 MG/ML
40 INJECTION, POWDER, LYOPHILIZED, FOR SOLUTION INTRAMUSCULAR; INTRAVENOUS EVERY 12 HOURS
Status: DISCONTINUED | OUTPATIENT
Start: 2019-03-24 | End: 2019-04-01 | Stop reason: HOSPADM

## 2019-03-24 RX ORDER — SODIUM CHLORIDE 0.9 % (FLUSH) 0.9 %
1-10 SYRINGE (ML) INJECTION AS NEEDED
Status: DISCONTINUED | OUTPATIENT
Start: 2019-03-24 | End: 2019-04-01 | Stop reason: HOSPADM

## 2019-03-24 RX ORDER — SODIUM CHLORIDE 0.9 % (FLUSH) 0.9 %
3 SYRINGE (ML) INJECTION EVERY 12 HOURS SCHEDULED
Status: DISCONTINUED | OUTPATIENT
Start: 2019-03-24 | End: 2019-04-01 | Stop reason: HOSPADM

## 2019-03-24 RX ORDER — CILOSTAZOL 100 MG/1
100 TABLET ORAL 2 TIMES DAILY
Status: DISCONTINUED | OUTPATIENT
Start: 2019-03-24 | End: 2019-04-01 | Stop reason: HOSPADM

## 2019-03-24 RX ORDER — METOPROLOL TARTRATE 50 MG/1
50 TABLET, FILM COATED ORAL EVERY 12 HOURS SCHEDULED
Status: DISCONTINUED | OUTPATIENT
Start: 2019-03-24 | End: 2019-03-31

## 2019-03-24 RX ORDER — LOSARTAN POTASSIUM 50 MG/1
50 TABLET ORAL
Status: DISCONTINUED | OUTPATIENT
Start: 2019-03-24 | End: 2019-04-01 | Stop reason: HOSPADM

## 2019-03-24 RX ORDER — AMIODARONE HYDROCHLORIDE 200 MG/1
200 TABLET ORAL EVERY 12 HOURS SCHEDULED
Status: DISCONTINUED | OUTPATIENT
Start: 2019-03-24 | End: 2019-04-01 | Stop reason: HOSPADM

## 2019-03-24 RX ORDER — MELOXICAM 7.5 MG/1
15 TABLET ORAL DAILY
Status: CANCELLED | OUTPATIENT
Start: 2019-03-24

## 2019-03-24 RX ORDER — HYDRALAZINE HYDROCHLORIDE 50 MG/1
50 TABLET, FILM COATED ORAL EVERY 8 HOURS SCHEDULED
Status: DISCONTINUED | OUTPATIENT
Start: 2019-03-24 | End: 2019-04-01 | Stop reason: HOSPADM

## 2019-03-24 RX ORDER — ATORVASTATIN CALCIUM 40 MG/1
40 TABLET, FILM COATED ORAL DAILY
Status: DISCONTINUED | OUTPATIENT
Start: 2019-03-24 | End: 2019-04-01 | Stop reason: HOSPADM

## 2019-03-24 RX ORDER — SODIUM CHLORIDE 0.9 % (FLUSH) 0.9 %
10 SYRINGE (ML) INJECTION AS NEEDED
Status: DISCONTINUED | OUTPATIENT
Start: 2019-03-24 | End: 2019-04-01 | Stop reason: HOSPADM

## 2019-03-24 RX ORDER — NITROGLYCERIN 0.4 MG/1
0.4 TABLET SUBLINGUAL
Status: DISCONTINUED | OUTPATIENT
Start: 2019-03-24 | End: 2019-04-01 | Stop reason: HOSPADM

## 2019-03-24 RX ADMIN — IPRATROPIUM BROMIDE AND ALBUTEROL SULFATE 3 ML: .5; 3 SOLUTION RESPIRATORY (INHALATION) at 13:07

## 2019-03-24 RX ADMIN — ATORVASTATIN CALCIUM 40 MG: 40 TABLET, FILM COATED ORAL at 16:34

## 2019-03-24 RX ADMIN — IPRATROPIUM BROMIDE 0.5 MG: 0.5 SOLUTION RESPIRATORY (INHALATION) at 19:57

## 2019-03-24 RX ADMIN — SODIUM CHLORIDE, PRESERVATIVE FREE 3 ML: 5 INJECTION INTRAVENOUS at 21:38

## 2019-03-24 RX ADMIN — APIXABAN 2.5 MG: 2.5 TABLET, FILM COATED ORAL at 21:37

## 2019-03-24 RX ADMIN — SODIUM CHLORIDE 500 ML: 9 INJECTION, SOLUTION INTRAVENOUS at 09:00

## 2019-03-24 RX ADMIN — METHYLPREDNISOLONE SODIUM SUCCINATE 125 MG: 125 INJECTION, POWDER, FOR SOLUTION INTRAMUSCULAR; INTRAVENOUS at 07:53

## 2019-03-24 RX ADMIN — AMIODARONE HYDROCHLORIDE 200 MG: 200 TABLET ORAL at 21:37

## 2019-03-24 RX ADMIN — CILOSTAZOL 100 MG: 100 TABLET ORAL at 21:37

## 2019-03-24 RX ADMIN — PANTOPRAZOLE SODIUM 40 MG: 40 TABLET, DELAYED RELEASE ORAL at 13:23

## 2019-03-24 RX ADMIN — IPRATROPIUM BROMIDE AND ALBUTEROL SULFATE 3 ML: .5; 3 SOLUTION RESPIRATORY (INHALATION) at 08:00

## 2019-03-24 RX ADMIN — SODIUM CHLORIDE, PRESERVATIVE FREE 3 ML: 5 INJECTION INTRAVENOUS at 13:24

## 2019-03-24 RX ADMIN — DOXYCYCLINE 100 MG: 100 INJECTION, POWDER, LYOPHILIZED, FOR SOLUTION INTRAVENOUS at 14:44

## 2019-03-24 RX ADMIN — IPRATROPIUM BROMIDE AND ALBUTEROL SULFATE 3 ML: .5; 3 SOLUTION RESPIRATORY (INHALATION) at 09:27

## 2019-03-24 RX ADMIN — CEFTRIAXONE 1 G: 1 INJECTION, POWDER, FOR SOLUTION INTRAMUSCULAR; INTRAVENOUS at 09:00

## 2019-03-24 RX ADMIN — AZITHROMYCIN MONOHYDRATE 500 MG: 500 INJECTION, POWDER, LYOPHILIZED, FOR SOLUTION INTRAVENOUS at 09:52

## 2019-03-24 RX ADMIN — METHYLPREDNISOLONE SODIUM SUCCINATE 40 MG: 40 INJECTION, POWDER, FOR SOLUTION INTRAMUSCULAR; INTRAVENOUS at 13:23

## 2019-03-25 LAB
ALBUMIN SERPL-MCNC: 3.52 G/DL (ref 3.5–5.2)
ALBUMIN/GLOB SERPL: 1.4 G/DL
ALP SERPL-CCNC: 72 U/L (ref 39–117)
ALT SERPL W P-5'-P-CCNC: 184 U/L (ref 1–33)
ANION GAP SERPL CALCULATED.3IONS-SCNC: 11.2 MMOL/L
AST SERPL-CCNC: 163 U/L (ref 1–32)
BASOPHILS # BLD AUTO: 0.01 10*3/MM3 (ref 0–0.2)
BASOPHILS NFR BLD AUTO: 0.1 % (ref 0–1.5)
BILIRUB SERPL-MCNC: 0.2 MG/DL (ref 0.2–1.2)
BUN BLD-MCNC: 20 MG/DL (ref 8–23)
BUN/CREAT SERPL: 18.5 (ref 7–25)
CALCIUM SPEC-SCNC: 8.6 MG/DL (ref 8.6–10.5)
CHLORIDE SERPL-SCNC: 109 MMOL/L (ref 98–107)
CO2 SERPL-SCNC: 18.8 MMOL/L (ref 22–29)
CREAT BLD-MCNC: 1.08 MG/DL (ref 0.57–1)
CRP SERPL-MCNC: 10.89 MG/DL (ref 0–0.5)
D-LACTATE SERPL-SCNC: 1.2 MMOL/L (ref 0.5–2)
DEPRECATED RDW RBC AUTO: 49.7 FL (ref 37–54)
EOSINOPHIL # BLD AUTO: 0.01 10*3/MM3 (ref 0–0.4)
EOSINOPHIL NFR BLD AUTO: 0.1 % (ref 0.3–6.2)
ERYTHROCYTE [DISTWIDTH] IN BLOOD BY AUTOMATED COUNT: 15.2 % (ref 12.3–15.4)
GFR SERPL CREATININE-BSD FRML MDRD: 49 ML/MIN/1.73
GLOBULIN UR ELPH-MCNC: 2.5 GM/DL
GLUCOSE BLD-MCNC: 153 MG/DL (ref 65–99)
HCT VFR BLD AUTO: 23 % (ref 34–46.6)
HGB BLD-MCNC: 7 G/DL (ref 12–15.9)
IMM GRANULOCYTES # BLD AUTO: 0.03 10*3/MM3 (ref 0–0.05)
IMM GRANULOCYTES NFR BLD AUTO: 0.3 % (ref 0–0.5)
LYMPHOCYTES # BLD AUTO: 0.44 10*3/MM3 (ref 0.7–3.1)
LYMPHOCYTES NFR BLD AUTO: 4.4 % (ref 19.6–45.3)
MAGNESIUM SERPL-MCNC: 2.1 MG/DL (ref 1.6–2.4)
MCH RBC QN AUTO: 28.1 PG (ref 26.6–33)
MCHC RBC AUTO-ENTMCNC: 30.4 G/DL (ref 31.5–35.7)
MCV RBC AUTO: 92.4 FL (ref 79–97)
MONOCYTES # BLD AUTO: 0.39 10*3/MM3 (ref 0.1–0.9)
MONOCYTES NFR BLD AUTO: 3.9 % (ref 5–12)
NEUTROPHILS # BLD AUTO: 9.1 10*3/MM3 (ref 1.4–7)
NEUTROPHILS NFR BLD AUTO: 91.2 % (ref 42.7–76)
PHOSPHATE SERPL-MCNC: 2.4 MG/DL (ref 2.5–4.5)
PLATELET # BLD AUTO: 229 10*3/MM3 (ref 140–450)
PMV BLD AUTO: 10.3 FL (ref 6–12)
POTASSIUM BLD-SCNC: 3.4 MMOL/L (ref 3.5–5.2)
PROT SERPL-MCNC: 6 G/DL (ref 6–8.5)
RBC # BLD AUTO: 2.49 10*6/MM3 (ref 3.77–5.28)
SODIUM BLD-SCNC: 139 MMOL/L (ref 136–145)
WBC NRBC COR # BLD: 9.98 10*3/MM3 (ref 3.4–10.8)

## 2019-03-25 PROCEDURE — 80053 COMPREHEN METABOLIC PANEL: CPT | Performed by: NURSE PRACTITIONER

## 2019-03-25 PROCEDURE — 94799 UNLISTED PULMONARY SVC/PX: CPT

## 2019-03-25 PROCEDURE — 85025 COMPLETE CBC W/AUTO DIFF WBC: CPT | Performed by: NURSE PRACTITIONER

## 2019-03-25 PROCEDURE — 83735 ASSAY OF MAGNESIUM: CPT | Performed by: NURSE PRACTITIONER

## 2019-03-25 PROCEDURE — 25010000002 METHYLPREDNISOLONE PER 40 MG: Performed by: NURSE PRACTITIONER

## 2019-03-25 PROCEDURE — 84100 ASSAY OF PHOSPHORUS: CPT | Performed by: NURSE PRACTITIONER

## 2019-03-25 PROCEDURE — 99233 SBSQ HOSP IP/OBS HIGH 50: CPT | Performed by: INTERNAL MEDICINE

## 2019-03-25 PROCEDURE — 25010000002 CEFTRIAXONE: Performed by: NURSE PRACTITIONER

## 2019-03-25 PROCEDURE — 86140 C-REACTIVE PROTEIN: CPT | Performed by: NURSE PRACTITIONER

## 2019-03-25 PROCEDURE — 83605 ASSAY OF LACTIC ACID: CPT | Performed by: NURSE PRACTITIONER

## 2019-03-25 RX ADMIN — DOXYCYCLINE 100 MG: 100 INJECTION, POWDER, LYOPHILIZED, FOR SOLUTION INTRAVENOUS at 14:32

## 2019-03-25 RX ADMIN — METOPROLOL TARTRATE 50 MG: 50 TABLET, FILM COATED ORAL at 08:57

## 2019-03-25 RX ADMIN — DOXYCYCLINE 100 MG: 100 INJECTION, POWDER, LYOPHILIZED, FOR SOLUTION INTRAVENOUS at 01:30

## 2019-03-25 RX ADMIN — SODIUM CHLORIDE, PRESERVATIVE FREE 3 ML: 5 INJECTION INTRAVENOUS at 08:59

## 2019-03-25 RX ADMIN — ATORVASTATIN CALCIUM 40 MG: 40 TABLET, FILM COATED ORAL at 08:57

## 2019-03-25 RX ADMIN — APIXABAN 2.5 MG: 2.5 TABLET, FILM COATED ORAL at 21:00

## 2019-03-25 RX ADMIN — IPRATROPIUM BROMIDE 0.5 MG: 0.5 SOLUTION RESPIRATORY (INHALATION) at 06:58

## 2019-03-25 RX ADMIN — IPRATROPIUM BROMIDE 0.5 MG: 0.5 SOLUTION RESPIRATORY (INHALATION) at 20:09

## 2019-03-25 RX ADMIN — HYDRALAZINE HYDROCHLORIDE 50 MG: 50 TABLET ORAL at 04:59

## 2019-03-25 RX ADMIN — LOSARTAN POTASSIUM 50 MG: 50 TABLET, FILM COATED ORAL at 08:58

## 2019-03-25 RX ADMIN — SODIUM CHLORIDE, PRESERVATIVE FREE 3 ML: 5 INJECTION INTRAVENOUS at 21:02

## 2019-03-25 RX ADMIN — CILOSTAZOL 100 MG: 100 TABLET ORAL at 08:58

## 2019-03-25 RX ADMIN — METOPROLOL TARTRATE 50 MG: 50 TABLET, FILM COATED ORAL at 21:01

## 2019-03-25 RX ADMIN — AMIODARONE HYDROCHLORIDE 200 MG: 200 TABLET ORAL at 21:00

## 2019-03-25 RX ADMIN — IPRATROPIUM BROMIDE 0.5 MG: 0.5 SOLUTION RESPIRATORY (INHALATION) at 13:29

## 2019-03-25 RX ADMIN — APIXABAN 2.5 MG: 2.5 TABLET, FILM COATED ORAL at 08:58

## 2019-03-25 RX ADMIN — AMIODARONE HYDROCHLORIDE 200 MG: 200 TABLET ORAL at 08:57

## 2019-03-25 RX ADMIN — PANTOPRAZOLE SODIUM 40 MG: 40 TABLET, DELAYED RELEASE ORAL at 05:00

## 2019-03-25 RX ADMIN — CILOSTAZOL 100 MG: 100 TABLET ORAL at 21:00

## 2019-03-25 RX ADMIN — CEFTRIAXONE 1 G: 1 INJECTION, POWDER, FOR SOLUTION INTRAMUSCULAR; INTRAVENOUS at 09:01

## 2019-03-25 RX ADMIN — METHYLPREDNISOLONE SODIUM SUCCINATE 40 MG: 40 INJECTION, POWDER, FOR SOLUTION INTRAMUSCULAR; INTRAVENOUS at 00:02

## 2019-03-25 RX ADMIN — METHYLPREDNISOLONE SODIUM SUCCINATE 40 MG: 40 INJECTION, POWDER, FOR SOLUTION INTRAMUSCULAR; INTRAVENOUS at 23:37

## 2019-03-25 RX ADMIN — IPRATROPIUM BROMIDE 0.5 MG: 0.5 SOLUTION RESPIRATORY (INHALATION) at 00:56

## 2019-03-25 RX ADMIN — METHYLPREDNISOLONE SODIUM SUCCINATE 40 MG: 40 INJECTION, POWDER, FOR SOLUTION INTRAMUSCULAR; INTRAVENOUS at 11:00

## 2019-03-26 LAB
BASOPHILS # BLD AUTO: 0 10*3/MM3 (ref 0–0.2)
BASOPHILS NFR BLD AUTO: 0 % (ref 0–1.5)
CRP SERPL-MCNC: 4 MG/DL (ref 0–0.5)
DEPRECATED RDW RBC AUTO: 50.1 FL (ref 37–54)
EOSINOPHIL # BLD AUTO: 0 10*3/MM3 (ref 0–0.4)
EOSINOPHIL NFR BLD AUTO: 0 % (ref 0.3–6.2)
ERYTHROCYTE [DISTWIDTH] IN BLOOD BY AUTOMATED COUNT: 15.3 % (ref 12.3–15.4)
HCT VFR BLD AUTO: 24.8 % (ref 34–46.6)
HGB BLD-MCNC: 7.5 G/DL (ref 12–15.9)
IMM GRANULOCYTES # BLD AUTO: 0.05 10*3/MM3 (ref 0–0.05)
IMM GRANULOCYTES NFR BLD AUTO: 0.4 % (ref 0–0.5)
LYMPHOCYTES # BLD AUTO: 0.45 10*3/MM3 (ref 0.7–3.1)
LYMPHOCYTES NFR BLD AUTO: 3.2 % (ref 19.6–45.3)
MCH RBC QN AUTO: 27.9 PG (ref 26.6–33)
MCHC RBC AUTO-ENTMCNC: 30.2 G/DL (ref 31.5–35.7)
MCV RBC AUTO: 92.2 FL (ref 79–97)
MONOCYTES # BLD AUTO: 0.38 10*3/MM3 (ref 0.1–0.9)
MONOCYTES NFR BLD AUTO: 2.7 % (ref 5–12)
NEUTROPHILS # BLD AUTO: 13.37 10*3/MM3 (ref 1.4–7)
NEUTROPHILS NFR BLD AUTO: 93.7 % (ref 42.7–76)
PLATELET # BLD AUTO: 273 10*3/MM3 (ref 140–450)
PMV BLD AUTO: 10.4 FL (ref 6–12)
RBC # BLD AUTO: 2.69 10*6/MM3 (ref 3.77–5.28)
WBC NRBC COR # BLD: 14.25 10*3/MM3 (ref 3.4–10.8)

## 2019-03-26 PROCEDURE — 85025 COMPLETE CBC W/AUTO DIFF WBC: CPT | Performed by: INTERNAL MEDICINE

## 2019-03-26 PROCEDURE — 86140 C-REACTIVE PROTEIN: CPT | Performed by: INTERNAL MEDICINE

## 2019-03-26 PROCEDURE — 99232 SBSQ HOSP IP/OBS MODERATE 35: CPT | Performed by: INTERNAL MEDICINE

## 2019-03-26 PROCEDURE — 25010000002 METHYLPREDNISOLONE PER 40 MG: Performed by: NURSE PRACTITIONER

## 2019-03-26 PROCEDURE — 94799 UNLISTED PULMONARY SVC/PX: CPT

## 2019-03-26 PROCEDURE — 25010000002 CEFTRIAXONE: Performed by: NURSE PRACTITIONER

## 2019-03-26 RX ORDER — BUDESONIDE 0.5 MG/2ML
0.5 INHALANT ORAL
Status: DISCONTINUED | OUTPATIENT
Start: 2019-03-26 | End: 2019-04-01 | Stop reason: HOSPADM

## 2019-03-26 RX ADMIN — METOPROLOL TARTRATE 50 MG: 50 TABLET, FILM COATED ORAL at 21:01

## 2019-03-26 RX ADMIN — SODIUM CHLORIDE, PRESERVATIVE FREE 3 ML: 5 INJECTION INTRAVENOUS at 21:02

## 2019-03-26 RX ADMIN — CEFTRIAXONE 1 G: 1 INJECTION, POWDER, FOR SOLUTION INTRAMUSCULAR; INTRAVENOUS at 09:04

## 2019-03-26 RX ADMIN — PANTOPRAZOLE SODIUM 40 MG: 40 TABLET, DELAYED RELEASE ORAL at 05:46

## 2019-03-26 RX ADMIN — IPRATROPIUM BROMIDE 0.5 MG: 0.5 SOLUTION RESPIRATORY (INHALATION) at 09:01

## 2019-03-26 RX ADMIN — AMIODARONE HYDROCHLORIDE 200 MG: 200 TABLET ORAL at 21:01

## 2019-03-26 RX ADMIN — METOPROLOL TARTRATE 50 MG: 50 TABLET, FILM COATED ORAL at 09:04

## 2019-03-26 RX ADMIN — APIXABAN 2.5 MG: 2.5 TABLET, FILM COATED ORAL at 21:01

## 2019-03-26 RX ADMIN — METHYLPREDNISOLONE SODIUM SUCCINATE 40 MG: 40 INJECTION, POWDER, FOR SOLUTION INTRAMUSCULAR; INTRAVENOUS at 12:30

## 2019-03-26 RX ADMIN — AMIODARONE HYDROCHLORIDE 200 MG: 200 TABLET ORAL at 09:04

## 2019-03-26 RX ADMIN — APIXABAN 2.5 MG: 2.5 TABLET, FILM COATED ORAL at 09:04

## 2019-03-26 RX ADMIN — IPRATROPIUM BROMIDE 0.5 MG: 0.5 SOLUTION RESPIRATORY (INHALATION) at 19:02

## 2019-03-26 RX ADMIN — BUDESONIDE 0.5 MG: 0.5 INHALANT RESPIRATORY (INHALATION) at 19:02

## 2019-03-26 RX ADMIN — ATORVASTATIN CALCIUM 40 MG: 40 TABLET, FILM COATED ORAL at 09:04

## 2019-03-26 RX ADMIN — LOSARTAN POTASSIUM 50 MG: 50 TABLET, FILM COATED ORAL at 09:04

## 2019-03-26 RX ADMIN — CILOSTAZOL 100 MG: 100 TABLET ORAL at 21:01

## 2019-03-26 RX ADMIN — DOXYCYCLINE 100 MG: 100 INJECTION, POWDER, LYOPHILIZED, FOR SOLUTION INTRAVENOUS at 13:51

## 2019-03-26 RX ADMIN — IPRATROPIUM BROMIDE 0.5 MG: 0.5 SOLUTION RESPIRATORY (INHALATION) at 12:06

## 2019-03-26 RX ADMIN — HYDRALAZINE HYDROCHLORIDE 50 MG: 50 TABLET ORAL at 05:46

## 2019-03-26 RX ADMIN — IPRATROPIUM BROMIDE 0.5 MG: 0.5 SOLUTION RESPIRATORY (INHALATION) at 15:28

## 2019-03-26 RX ADMIN — IPRATROPIUM BROMIDE 0.5 MG: 0.5 SOLUTION RESPIRATORY (INHALATION) at 01:59

## 2019-03-26 RX ADMIN — CILOSTAZOL 100 MG: 100 TABLET ORAL at 09:04

## 2019-03-26 RX ADMIN — DOXYCYCLINE 100 MG: 100 INJECTION, POWDER, LYOPHILIZED, FOR SOLUTION INTRAVENOUS at 02:31

## 2019-03-26 RX ADMIN — BUDESONIDE 0.5 MG: 0.5 INHALANT RESPIRATORY (INHALATION) at 12:06

## 2019-03-26 RX ADMIN — METHYLPREDNISOLONE SODIUM SUCCINATE 40 MG: 40 INJECTION, POWDER, FOR SOLUTION INTRAMUSCULAR; INTRAVENOUS at 23:34

## 2019-03-27 LAB
BACTERIA SPEC RESP CULT: ABNORMAL
BACTERIA SPEC RESP CULT: ABNORMAL
GRAM STN SPEC: ABNORMAL

## 2019-03-27 PROCEDURE — 25010000002 CEFTRIAXONE: Performed by: NURSE PRACTITIONER

## 2019-03-27 PROCEDURE — 94799 UNLISTED PULMONARY SVC/PX: CPT

## 2019-03-27 PROCEDURE — 99232 SBSQ HOSP IP/OBS MODERATE 35: CPT | Performed by: INTERNAL MEDICINE

## 2019-03-27 PROCEDURE — 25010000002 METHYLPREDNISOLONE PER 40 MG: Performed by: NURSE PRACTITIONER

## 2019-03-27 RX ADMIN — CILOSTAZOL 100 MG: 100 TABLET ORAL at 08:16

## 2019-03-27 RX ADMIN — IPRATROPIUM BROMIDE 0.5 MG: 0.5 SOLUTION RESPIRATORY (INHALATION) at 07:08

## 2019-03-27 RX ADMIN — AMIODARONE HYDROCHLORIDE 200 MG: 200 TABLET ORAL at 08:16

## 2019-03-27 RX ADMIN — IPRATROPIUM BROMIDE 0.5 MG: 0.5 SOLUTION RESPIRATORY (INHALATION) at 14:22

## 2019-03-27 RX ADMIN — METHYLPREDNISOLONE SODIUM SUCCINATE 40 MG: 40 INJECTION, POWDER, FOR SOLUTION INTRAMUSCULAR; INTRAVENOUS at 11:41

## 2019-03-27 RX ADMIN — CILOSTAZOL 100 MG: 100 TABLET ORAL at 19:55

## 2019-03-27 RX ADMIN — IPRATROPIUM BROMIDE 0.5 MG: 0.5 SOLUTION RESPIRATORY (INHALATION) at 00:15

## 2019-03-27 RX ADMIN — DOXYCYCLINE 100 MG: 100 INJECTION, POWDER, LYOPHILIZED, FOR SOLUTION INTRAVENOUS at 01:24

## 2019-03-27 RX ADMIN — BUDESONIDE 0.5 MG: 0.5 INHALANT RESPIRATORY (INHALATION) at 07:08

## 2019-03-27 RX ADMIN — METOPROLOL TARTRATE 50 MG: 50 TABLET, FILM COATED ORAL at 08:16

## 2019-03-27 RX ADMIN — IPRATROPIUM BROMIDE 0.5 MG: 0.5 SOLUTION RESPIRATORY (INHALATION) at 18:50

## 2019-03-27 RX ADMIN — BUDESONIDE 0.5 MG: 0.5 INHALANT RESPIRATORY (INHALATION) at 18:50

## 2019-03-27 RX ADMIN — HYDRALAZINE HYDROCHLORIDE 50 MG: 50 TABLET ORAL at 05:46

## 2019-03-27 RX ADMIN — IPRATROPIUM BROMIDE 0.5 MG: 0.5 SOLUTION RESPIRATORY (INHALATION) at 11:19

## 2019-03-27 RX ADMIN — APIXABAN 2.5 MG: 2.5 TABLET, FILM COATED ORAL at 08:16

## 2019-03-27 RX ADMIN — METOPROLOL TARTRATE 50 MG: 50 TABLET, FILM COATED ORAL at 19:55

## 2019-03-27 RX ADMIN — METHYLPREDNISOLONE SODIUM SUCCINATE 40 MG: 40 INJECTION, POWDER, FOR SOLUTION INTRAMUSCULAR; INTRAVENOUS at 22:52

## 2019-03-27 RX ADMIN — AMIODARONE HYDROCHLORIDE 200 MG: 200 TABLET ORAL at 19:55

## 2019-03-27 RX ADMIN — SODIUM CHLORIDE, PRESERVATIVE FREE 3 ML: 5 INJECTION INTRAVENOUS at 19:57

## 2019-03-27 RX ADMIN — DOXYCYCLINE 100 MG: 100 INJECTION, POWDER, LYOPHILIZED, FOR SOLUTION INTRAVENOUS at 13:43

## 2019-03-27 RX ADMIN — LOSARTAN POTASSIUM 50 MG: 50 TABLET, FILM COATED ORAL at 08:16

## 2019-03-27 RX ADMIN — ATORVASTATIN CALCIUM 40 MG: 40 TABLET, FILM COATED ORAL at 08:16

## 2019-03-27 RX ADMIN — PANTOPRAZOLE SODIUM 40 MG: 40 TABLET, DELAYED RELEASE ORAL at 05:45

## 2019-03-27 RX ADMIN — CEFTRIAXONE 1 G: 1 INJECTION, POWDER, FOR SOLUTION INTRAMUSCULAR; INTRAVENOUS at 09:47

## 2019-03-27 RX ADMIN — IPRATROPIUM BROMIDE 0.5 MG: 0.5 SOLUTION RESPIRATORY (INHALATION) at 03:11

## 2019-03-27 RX ADMIN — APIXABAN 2.5 MG: 2.5 TABLET, FILM COATED ORAL at 19:55

## 2019-03-28 LAB
ALBUMIN SERPL-MCNC: 3.07 G/DL (ref 3.5–5.2)
ALBUMIN/GLOB SERPL: 1.2 G/DL
ALP SERPL-CCNC: 63 U/L (ref 39–117)
ALT SERPL W P-5'-P-CCNC: 117 U/L (ref 1–33)
ANION GAP SERPL CALCULATED.3IONS-SCNC: 10.9 MMOL/L
AST SERPL-CCNC: 34 U/L (ref 1–32)
BASOPHILS # BLD AUTO: 0.01 10*3/MM3 (ref 0–0.2)
BASOPHILS NFR BLD AUTO: 0.1 % (ref 0–1.5)
BILIRUB SERPL-MCNC: 0.2 MG/DL (ref 0.2–1.2)
BUN BLD-MCNC: 35 MG/DL (ref 8–23)
BUN/CREAT SERPL: 25.9 (ref 7–25)
CALCIUM SPEC-SCNC: 8.5 MG/DL (ref 8.6–10.5)
CHLORIDE SERPL-SCNC: 108 MMOL/L (ref 98–107)
CO2 SERPL-SCNC: 18.1 MMOL/L (ref 22–29)
CREAT BLD-MCNC: 1.35 MG/DL (ref 0.57–1)
CRP SERPL-MCNC: 0.98 MG/DL (ref 0–0.5)
DEPRECATED RDW RBC AUTO: 49.2 FL (ref 37–54)
EOSINOPHIL # BLD AUTO: 0 10*3/MM3 (ref 0–0.4)
EOSINOPHIL NFR BLD AUTO: 0 % (ref 0.3–6.2)
ERYTHROCYTE [DISTWIDTH] IN BLOOD BY AUTOMATED COUNT: 15.2 % (ref 12.3–15.4)
GFR SERPL CREATININE-BSD FRML MDRD: 38 ML/MIN/1.73
GLOBULIN UR ELPH-MCNC: 2.5 GM/DL
GLUCOSE BLD-MCNC: 126 MG/DL (ref 65–99)
HCT VFR BLD AUTO: 24.2 % (ref 34–46.6)
HGB BLD-MCNC: 7.3 G/DL (ref 12–15.9)
IMM GRANULOCYTES # BLD AUTO: 0.03 10*3/MM3 (ref 0–0.05)
IMM GRANULOCYTES NFR BLD AUTO: 0.4 % (ref 0–0.5)
LYMPHOCYTES # BLD AUTO: 0.38 10*3/MM3 (ref 0.7–3.1)
LYMPHOCYTES NFR BLD AUTO: 5 % (ref 19.6–45.3)
MAGNESIUM SERPL-MCNC: 2.1 MG/DL (ref 1.6–2.4)
MCH RBC QN AUTO: 27.3 PG (ref 26.6–33)
MCHC RBC AUTO-ENTMCNC: 30.2 G/DL (ref 31.5–35.7)
MCV RBC AUTO: 90.6 FL (ref 79–97)
MONOCYTES # BLD AUTO: 0.27 10*3/MM3 (ref 0.1–0.9)
MONOCYTES NFR BLD AUTO: 3.6 % (ref 5–12)
NEUTROPHILS # BLD AUTO: 6.85 10*3/MM3 (ref 1.4–7)
NEUTROPHILS NFR BLD AUTO: 90.9 % (ref 42.7–76)
PHOSPHATE SERPL-MCNC: 3.3 MG/DL (ref 2.5–4.5)
PLATELET # BLD AUTO: 291 10*3/MM3 (ref 140–450)
PMV BLD AUTO: 10.1 FL (ref 6–12)
POTASSIUM BLD-SCNC: 3.9 MMOL/L (ref 3.5–5.2)
PROT SERPL-MCNC: 5.6 G/DL (ref 6–8.5)
RBC # BLD AUTO: 2.67 10*6/MM3 (ref 3.77–5.28)
SODIUM BLD-SCNC: 137 MMOL/L (ref 136–145)
WBC NRBC COR # BLD: 7.54 10*3/MM3 (ref 3.4–10.8)

## 2019-03-28 PROCEDURE — 86140 C-REACTIVE PROTEIN: CPT | Performed by: INTERNAL MEDICINE

## 2019-03-28 PROCEDURE — 85025 COMPLETE CBC W/AUTO DIFF WBC: CPT | Performed by: INTERNAL MEDICINE

## 2019-03-28 PROCEDURE — 25010000002 METHYLPREDNISOLONE PER 40 MG: Performed by: NURSE PRACTITIONER

## 2019-03-28 PROCEDURE — 83735 ASSAY OF MAGNESIUM: CPT | Performed by: INTERNAL MEDICINE

## 2019-03-28 PROCEDURE — 93010 ELECTROCARDIOGRAM REPORT: CPT | Performed by: INTERNAL MEDICINE

## 2019-03-28 PROCEDURE — 94799 UNLISTED PULMONARY SVC/PX: CPT

## 2019-03-28 PROCEDURE — 84100 ASSAY OF PHOSPHORUS: CPT | Performed by: INTERNAL MEDICINE

## 2019-03-28 PROCEDURE — 25010000002 CEFTRIAXONE: Performed by: NURSE PRACTITIONER

## 2019-03-28 PROCEDURE — 99232 SBSQ HOSP IP/OBS MODERATE 35: CPT | Performed by: INTERNAL MEDICINE

## 2019-03-28 PROCEDURE — 93005 ELECTROCARDIOGRAM TRACING: CPT | Performed by: INTERNAL MEDICINE

## 2019-03-28 PROCEDURE — 80053 COMPREHEN METABOLIC PANEL: CPT | Performed by: INTERNAL MEDICINE

## 2019-03-28 RX ADMIN — IPRATROPIUM BROMIDE 0.5 MG: 0.5 SOLUTION RESPIRATORY (INHALATION) at 10:51

## 2019-03-28 RX ADMIN — IPRATROPIUM BROMIDE 0.5 MG: 0.5 SOLUTION RESPIRATORY (INHALATION) at 14:52

## 2019-03-28 RX ADMIN — IPRATROPIUM BROMIDE 0.5 MG: 0.5 SOLUTION RESPIRATORY (INHALATION) at 23:11

## 2019-03-28 RX ADMIN — IPRATROPIUM BROMIDE 0.5 MG: 0.5 SOLUTION RESPIRATORY (INHALATION) at 19:51

## 2019-03-28 RX ADMIN — IPRATROPIUM BROMIDE 0.5 MG: 0.5 SOLUTION RESPIRATORY (INHALATION) at 07:43

## 2019-03-28 RX ADMIN — IPRATROPIUM BROMIDE 0.5 MG: 0.5 SOLUTION RESPIRATORY (INHALATION) at 03:16

## 2019-03-28 RX ADMIN — AMIODARONE HYDROCHLORIDE 200 MG: 200 TABLET ORAL at 21:40

## 2019-03-28 RX ADMIN — CEFTRIAXONE 1 G: 1 INJECTION, POWDER, FOR SOLUTION INTRAMUSCULAR; INTRAVENOUS at 09:11

## 2019-03-28 RX ADMIN — APIXABAN 2.5 MG: 2.5 TABLET, FILM COATED ORAL at 21:40

## 2019-03-28 RX ADMIN — BUDESONIDE 0.5 MG: 0.5 INHALANT RESPIRATORY (INHALATION) at 19:51

## 2019-03-28 RX ADMIN — HYDRALAZINE HYDROCHLORIDE 50 MG: 50 TABLET ORAL at 14:09

## 2019-03-28 RX ADMIN — PANTOPRAZOLE SODIUM 40 MG: 40 TABLET, DELAYED RELEASE ORAL at 05:25

## 2019-03-28 RX ADMIN — CILOSTAZOL 100 MG: 100 TABLET ORAL at 21:40

## 2019-03-28 RX ADMIN — METHYLPREDNISOLONE SODIUM SUCCINATE 40 MG: 40 INJECTION, POWDER, FOR SOLUTION INTRAMUSCULAR; INTRAVENOUS at 23:40

## 2019-03-28 RX ADMIN — AMIODARONE HYDROCHLORIDE 200 MG: 200 TABLET ORAL at 09:09

## 2019-03-28 RX ADMIN — METHYLPREDNISOLONE SODIUM SUCCINATE 40 MG: 40 INJECTION, POWDER, FOR SOLUTION INTRAMUSCULAR; INTRAVENOUS at 12:25

## 2019-03-28 RX ADMIN — ATORVASTATIN CALCIUM 40 MG: 40 TABLET, FILM COATED ORAL at 09:09

## 2019-03-28 RX ADMIN — SODIUM CHLORIDE, PRESERVATIVE FREE 3 ML: 5 INJECTION INTRAVENOUS at 09:11

## 2019-03-28 RX ADMIN — APIXABAN 2.5 MG: 2.5 TABLET, FILM COATED ORAL at 09:09

## 2019-03-28 RX ADMIN — HYDRALAZINE HYDROCHLORIDE 50 MG: 50 TABLET ORAL at 05:25

## 2019-03-28 RX ADMIN — IPRATROPIUM BROMIDE 0.5 MG: 0.5 SOLUTION RESPIRATORY (INHALATION) at 00:20

## 2019-03-28 RX ADMIN — DOXYCYCLINE 100 MG: 100 INJECTION, POWDER, LYOPHILIZED, FOR SOLUTION INTRAVENOUS at 01:42

## 2019-03-28 RX ADMIN — BUDESONIDE 0.5 MG: 0.5 INHALANT RESPIRATORY (INHALATION) at 07:47

## 2019-03-28 RX ADMIN — CILOSTAZOL 100 MG: 100 TABLET ORAL at 09:09

## 2019-03-28 RX ADMIN — DOXYCYCLINE 100 MG: 100 INJECTION, POWDER, LYOPHILIZED, FOR SOLUTION INTRAVENOUS at 14:09

## 2019-03-29 LAB
ANION GAP SERPL CALCULATED.3IONS-SCNC: 13.9 MMOL/L
BACTERIA SPEC AEROBE CULT: NORMAL
BACTERIA SPEC AEROBE CULT: NORMAL
BASOPHILS # BLD AUTO: 0 10*3/MM3 (ref 0–0.2)
BASOPHILS NFR BLD AUTO: 0 % (ref 0–1.5)
BUN BLD-MCNC: 36 MG/DL (ref 8–23)
BUN/CREAT SERPL: 27.3 (ref 7–25)
CALCIUM SPEC-SCNC: 9.2 MG/DL (ref 8.6–10.5)
CHLORIDE SERPL-SCNC: 106 MMOL/L (ref 98–107)
CO2 SERPL-SCNC: 20.1 MMOL/L (ref 22–29)
CREAT BLD-MCNC: 1.32 MG/DL (ref 0.57–1)
DEPRECATED RDW RBC AUTO: 48.7 FL (ref 37–54)
EOSINOPHIL # BLD AUTO: 0 10*3/MM3 (ref 0–0.4)
EOSINOPHIL NFR BLD AUTO: 0 % (ref 0.3–6.2)
ERYTHROCYTE [DISTWIDTH] IN BLOOD BY AUTOMATED COUNT: 15.4 % (ref 12.3–15.4)
FOLATE SERPL-MCNC: 10.2 NG/ML (ref 4.78–24.2)
GFR SERPL CREATININE-BSD FRML MDRD: 39 ML/MIN/1.73
GLUCOSE BLD-MCNC: 120 MG/DL (ref 65–99)
HCT VFR BLD AUTO: 25.7 % (ref 34–46.6)
HGB BLD-MCNC: 7.9 G/DL (ref 12–15.9)
IMM GRANULOCYTES # BLD AUTO: 0.07 10*3/MM3 (ref 0–0.05)
IMM GRANULOCYTES NFR BLD AUTO: 0.6 % (ref 0–0.5)
IRON 24H UR-MRATE: 23 MCG/DL (ref 37–145)
IRON SATN MFR SERPL: 7 % (ref 20–50)
LYMPHOCYTES # BLD AUTO: 0.47 10*3/MM3 (ref 0.7–3.1)
LYMPHOCYTES NFR BLD AUTO: 4 % (ref 19.6–45.3)
MCH RBC QN AUTO: 27.2 PG (ref 26.6–33)
MCHC RBC AUTO-ENTMCNC: 30.7 G/DL (ref 31.5–35.7)
MCV RBC AUTO: 88.6 FL (ref 79–97)
MONOCYTES # BLD AUTO: 1.01 10*3/MM3 (ref 0.1–0.9)
MONOCYTES NFR BLD AUTO: 8.7 % (ref 5–12)
NEUTROPHILS # BLD AUTO: 10.11 10*3/MM3 (ref 1.4–7)
NEUTROPHILS NFR BLD AUTO: 86.7 % (ref 42.7–76)
PLATELET # BLD AUTO: 316 10*3/MM3 (ref 140–450)
PMV BLD AUTO: 10.4 FL (ref 6–12)
POTASSIUM BLD-SCNC: 3.9 MMOL/L (ref 3.5–5.2)
RBC # BLD AUTO: 2.9 10*6/MM3 (ref 3.77–5.28)
SODIUM BLD-SCNC: 140 MMOL/L (ref 136–145)
TIBC SERPL-MCNC: 329 MCG/DL (ref 298–536)
TRANSFERRIN SERPL-MCNC: 221 MG/DL (ref 200–360)
VIT B12 BLD-MCNC: 921 PG/ML (ref 211–946)
WBC NRBC COR # BLD: 11.66 10*3/MM3 (ref 3.4–10.8)

## 2019-03-29 PROCEDURE — 36410 VNPNXR 3YR/> PHY/QHP DX/THER: CPT

## 2019-03-29 PROCEDURE — 25010000002 METHYLPREDNISOLONE PER 40 MG: Performed by: NURSE PRACTITIONER

## 2019-03-29 PROCEDURE — 82746 ASSAY OF FOLIC ACID SERUM: CPT | Performed by: INTERNAL MEDICINE

## 2019-03-29 PROCEDURE — 94799 UNLISTED PULMONARY SVC/PX: CPT

## 2019-03-29 PROCEDURE — 83540 ASSAY OF IRON: CPT | Performed by: INTERNAL MEDICINE

## 2019-03-29 PROCEDURE — C1751 CATH, INF, PER/CENT/MIDLINE: HCPCS

## 2019-03-29 PROCEDURE — 80048 BASIC METABOLIC PNL TOTAL CA: CPT | Performed by: INTERNAL MEDICINE

## 2019-03-29 PROCEDURE — 93010 ELECTROCARDIOGRAM REPORT: CPT | Performed by: INTERNAL MEDICINE

## 2019-03-29 PROCEDURE — 93005 ELECTROCARDIOGRAM TRACING: CPT | Performed by: PHYSICIAN ASSISTANT

## 2019-03-29 PROCEDURE — 85025 COMPLETE CBC W/AUTO DIFF WBC: CPT | Performed by: INTERNAL MEDICINE

## 2019-03-29 PROCEDURE — 25010000002 CEFTRIAXONE: Performed by: NURSE PRACTITIONER

## 2019-03-29 PROCEDURE — 84466 ASSAY OF TRANSFERRIN: CPT | Performed by: INTERNAL MEDICINE

## 2019-03-29 PROCEDURE — 99232 SBSQ HOSP IP/OBS MODERATE 35: CPT | Performed by: INTERNAL MEDICINE

## 2019-03-29 PROCEDURE — 82607 VITAMIN B-12: CPT | Performed by: INTERNAL MEDICINE

## 2019-03-29 RX ORDER — SODIUM CHLORIDE 0.9 % (FLUSH) 0.9 %
10 SYRINGE (ML) INJECTION EVERY 12 HOURS SCHEDULED
Status: DISCONTINUED | OUTPATIENT
Start: 2019-03-29 | End: 2019-04-01 | Stop reason: HOSPADM

## 2019-03-29 RX ORDER — SODIUM CHLORIDE 9 MG/ML
50 INJECTION, SOLUTION INTRAVENOUS CONTINUOUS
Status: DISCONTINUED | OUTPATIENT
Start: 2019-03-29 | End: 2019-04-01 | Stop reason: HOSPADM

## 2019-03-29 RX ORDER — SODIUM CHLORIDE 0.9 % (FLUSH) 0.9 %
10 SYRINGE (ML) INJECTION AS NEEDED
Status: DISCONTINUED | OUTPATIENT
Start: 2019-03-29 | End: 2019-04-01 | Stop reason: HOSPADM

## 2019-03-29 RX ADMIN — SODIUM CHLORIDE, PRESERVATIVE FREE 3 ML: 5 INJECTION INTRAVENOUS at 08:45

## 2019-03-29 RX ADMIN — IPRATROPIUM BROMIDE 0.5 MG: 0.5 SOLUTION RESPIRATORY (INHALATION) at 19:41

## 2019-03-29 RX ADMIN — DOXYCYCLINE 100 MG: 100 INJECTION, POWDER, LYOPHILIZED, FOR SOLUTION INTRAVENOUS at 02:41

## 2019-03-29 RX ADMIN — IPRATROPIUM BROMIDE 0.5 MG: 0.5 SOLUTION RESPIRATORY (INHALATION) at 10:46

## 2019-03-29 RX ADMIN — BUDESONIDE 0.5 MG: 0.5 INHALANT RESPIRATORY (INHALATION) at 07:33

## 2019-03-29 RX ADMIN — DOXYCYCLINE 100 MG: 100 INJECTION, POWDER, LYOPHILIZED, FOR SOLUTION INTRAVENOUS at 14:25

## 2019-03-29 RX ADMIN — METOPROLOL TARTRATE 50 MG: 50 TABLET, FILM COATED ORAL at 08:44

## 2019-03-29 RX ADMIN — PANTOPRAZOLE SODIUM 40 MG: 40 TABLET, DELAYED RELEASE ORAL at 05:31

## 2019-03-29 RX ADMIN — LOSARTAN POTASSIUM 50 MG: 50 TABLET, FILM COATED ORAL at 08:44

## 2019-03-29 RX ADMIN — HYDRALAZINE HYDROCHLORIDE 50 MG: 50 TABLET ORAL at 05:31

## 2019-03-29 RX ADMIN — IPRATROPIUM BROMIDE 0.5 MG: 0.5 SOLUTION RESPIRATORY (INHALATION) at 22:44

## 2019-03-29 RX ADMIN — APIXABAN 2.5 MG: 2.5 TABLET, FILM COATED ORAL at 08:44

## 2019-03-29 RX ADMIN — SODIUM CHLORIDE 250 ML: 9 INJECTION, SOLUTION INTRAVENOUS at 10:15

## 2019-03-29 RX ADMIN — CILOSTAZOL 100 MG: 100 TABLET ORAL at 21:04

## 2019-03-29 RX ADMIN — IPRATROPIUM BROMIDE 0.5 MG: 0.5 SOLUTION RESPIRATORY (INHALATION) at 02:43

## 2019-03-29 RX ADMIN — APIXABAN 2.5 MG: 2.5 TABLET, FILM COATED ORAL at 21:04

## 2019-03-29 RX ADMIN — SODIUM CHLORIDE 50 ML/HR: 9 INJECTION, SOLUTION INTRAVENOUS at 23:59

## 2019-03-29 RX ADMIN — IPRATROPIUM BROMIDE 0.5 MG: 0.5 SOLUTION RESPIRATORY (INHALATION) at 07:32

## 2019-03-29 RX ADMIN — METHYLPREDNISOLONE SODIUM SUCCINATE 40 MG: 40 INJECTION, POWDER, FOR SOLUTION INTRAMUSCULAR; INTRAVENOUS at 23:59

## 2019-03-29 RX ADMIN — AMIODARONE HYDROCHLORIDE 200 MG: 200 TABLET ORAL at 08:44

## 2019-03-29 RX ADMIN — BUDESONIDE 0.5 MG: 0.5 INHALANT RESPIRATORY (INHALATION) at 19:41

## 2019-03-29 RX ADMIN — METHYLPREDNISOLONE SODIUM SUCCINATE 40 MG: 40 INJECTION, POWDER, FOR SOLUTION INTRAMUSCULAR; INTRAVENOUS at 12:30

## 2019-03-29 RX ADMIN — CEFTRIAXONE 1 G: 1 INJECTION, POWDER, FOR SOLUTION INTRAMUSCULAR; INTRAVENOUS at 10:15

## 2019-03-29 RX ADMIN — ATORVASTATIN CALCIUM 40 MG: 40 TABLET, FILM COATED ORAL at 08:44

## 2019-03-29 RX ADMIN — CILOSTAZOL 100 MG: 100 TABLET ORAL at 08:44

## 2019-03-29 RX ADMIN — IPRATROPIUM BROMIDE 0.5 MG: 0.5 SOLUTION RESPIRATORY (INHALATION) at 15:00

## 2019-03-30 ENCOUNTER — APPOINTMENT (OUTPATIENT)
Dept: CT IMAGING | Facility: HOSPITAL | Age: 84
End: 2019-03-30

## 2019-03-30 LAB
ANION GAP SERPL CALCULATED.3IONS-SCNC: 11.2 MMOL/L
BASOPHILS # BLD AUTO: 0 10*3/MM3 (ref 0–0.2)
BASOPHILS NFR BLD AUTO: 0 % (ref 0–1.5)
BUN BLD-MCNC: 36 MG/DL (ref 8–23)
BUN/CREAT SERPL: 31 (ref 7–25)
CALCIUM SPEC-SCNC: 8.5 MG/DL (ref 8.6–10.5)
CHLORIDE SERPL-SCNC: 110 MMOL/L (ref 98–107)
CO2 SERPL-SCNC: 20.8 MMOL/L (ref 22–29)
CREAT BLD-MCNC: 1.16 MG/DL (ref 0.57–1)
DEPRECATED RDW RBC AUTO: 49 FL (ref 37–54)
EOSINOPHIL # BLD AUTO: 0 10*3/MM3 (ref 0–0.4)
EOSINOPHIL NFR BLD AUTO: 0 % (ref 0.3–6.2)
ERYTHROCYTE [DISTWIDTH] IN BLOOD BY AUTOMATED COUNT: 15.3 % (ref 12.3–15.4)
GFR SERPL CREATININE-BSD FRML MDRD: 45 ML/MIN/1.73
GLUCOSE BLD-MCNC: 117 MG/DL (ref 65–99)
GLUCOSE BLDC GLUCOMTR-MCNC: 126 MG/DL (ref 70–130)
HCT VFR BLD AUTO: 24.5 % (ref 34–46.6)
HGB BLD-MCNC: 7.5 G/DL (ref 12–15.9)
IMM GRANULOCYTES # BLD AUTO: 0.04 10*3/MM3 (ref 0–0.05)
IMM GRANULOCYTES NFR BLD AUTO: 0.5 % (ref 0–0.5)
LYMPHOCYTES # BLD AUTO: 0.38 10*3/MM3 (ref 0.7–3.1)
LYMPHOCYTES NFR BLD AUTO: 5.1 % (ref 19.6–45.3)
MCH RBC QN AUTO: 27.5 PG (ref 26.6–33)
MCHC RBC AUTO-ENTMCNC: 30.6 G/DL (ref 31.5–35.7)
MCV RBC AUTO: 89.7 FL (ref 79–97)
MONOCYTES # BLD AUTO: 0.26 10*3/MM3 (ref 0.1–0.9)
MONOCYTES NFR BLD AUTO: 3.5 % (ref 5–12)
NEUTROPHILS # BLD AUTO: 6.77 10*3/MM3 (ref 1.4–7)
NEUTROPHILS NFR BLD AUTO: 90.9 % (ref 42.7–76)
PLATELET # BLD AUTO: 296 10*3/MM3 (ref 140–450)
PMV BLD AUTO: 9.7 FL (ref 6–12)
POTASSIUM BLD-SCNC: 3.9 MMOL/L (ref 3.5–5.2)
RBC # BLD AUTO: 2.73 10*6/MM3 (ref 3.77–5.28)
SODIUM BLD-SCNC: 142 MMOL/L (ref 136–145)
WBC NRBC COR # BLD: 7.45 10*3/MM3 (ref 3.4–10.8)

## 2019-03-30 PROCEDURE — 94799 UNLISTED PULMONARY SVC/PX: CPT

## 2019-03-30 PROCEDURE — 85025 COMPLETE CBC W/AUTO DIFF WBC: CPT | Performed by: INTERNAL MEDICINE

## 2019-03-30 PROCEDURE — 25010000002 CEFTRIAXONE: Performed by: NURSE PRACTITIONER

## 2019-03-30 PROCEDURE — 82962 GLUCOSE BLOOD TEST: CPT

## 2019-03-30 PROCEDURE — 80048 BASIC METABOLIC PNL TOTAL CA: CPT | Performed by: INTERNAL MEDICINE

## 2019-03-30 PROCEDURE — 71250 CT THORAX DX C-: CPT | Performed by: RADIOLOGY

## 2019-03-30 PROCEDURE — 99232 SBSQ HOSP IP/OBS MODERATE 35: CPT | Performed by: INTERNAL MEDICINE

## 2019-03-30 PROCEDURE — 71250 CT THORAX DX C-: CPT

## 2019-03-30 PROCEDURE — 25010000002 METHYLPREDNISOLONE PER 40 MG: Performed by: NURSE PRACTITIONER

## 2019-03-30 RX ORDER — FERROUS GLUCONATE 324(37.5)
324 TABLET ORAL
Status: DISCONTINUED | OUTPATIENT
Start: 2019-03-31 | End: 2019-04-01 | Stop reason: HOSPADM

## 2019-03-30 RX ADMIN — ATORVASTATIN CALCIUM 40 MG: 40 TABLET, FILM COATED ORAL at 09:58

## 2019-03-30 RX ADMIN — IPRATROPIUM BROMIDE 0.5 MG: 0.5 SOLUTION RESPIRATORY (INHALATION) at 11:09

## 2019-03-30 RX ADMIN — IPRATROPIUM BROMIDE 0.5 MG: 0.5 SOLUTION RESPIRATORY (INHALATION) at 03:08

## 2019-03-30 RX ADMIN — IPRATROPIUM BROMIDE 0.5 MG: 0.5 SOLUTION RESPIRATORY (INHALATION) at 22:25

## 2019-03-30 RX ADMIN — CEFTRIAXONE 1 G: 1 INJECTION, POWDER, FOR SOLUTION INTRAMUSCULAR; INTRAVENOUS at 09:58

## 2019-03-30 RX ADMIN — BUDESONIDE 0.5 MG: 0.5 INHALANT RESPIRATORY (INHALATION) at 07:22

## 2019-03-30 RX ADMIN — CILOSTAZOL 100 MG: 100 TABLET ORAL at 09:58

## 2019-03-30 RX ADMIN — IPRATROPIUM BROMIDE 0.5 MG: 0.5 SOLUTION RESPIRATORY (INHALATION) at 19:12

## 2019-03-30 RX ADMIN — BUDESONIDE 0.5 MG: 0.5 INHALANT RESPIRATORY (INHALATION) at 19:13

## 2019-03-30 RX ADMIN — SODIUM CHLORIDE, PRESERVATIVE FREE 3 ML: 5 INJECTION INTRAVENOUS at 10:00

## 2019-03-30 RX ADMIN — DOXYCYCLINE 100 MG: 100 INJECTION, POWDER, LYOPHILIZED, FOR SOLUTION INTRAVENOUS at 13:41

## 2019-03-30 RX ADMIN — IPRATROPIUM BROMIDE 0.5 MG: 0.5 SOLUTION RESPIRATORY (INHALATION) at 15:22

## 2019-03-30 RX ADMIN — METHYLPREDNISOLONE SODIUM SUCCINATE 40 MG: 40 INJECTION, POWDER, FOR SOLUTION INTRAMUSCULAR; INTRAVENOUS at 13:45

## 2019-03-30 RX ADMIN — METHYLPREDNISOLONE SODIUM SUCCINATE 40 MG: 40 INJECTION, POWDER, FOR SOLUTION INTRAMUSCULAR; INTRAVENOUS at 09:58

## 2019-03-30 RX ADMIN — SODIUM CHLORIDE, PRESERVATIVE FREE 10 ML: 5 INJECTION INTRAVENOUS at 20:50

## 2019-03-30 RX ADMIN — AMIODARONE HYDROCHLORIDE 200 MG: 200 TABLET ORAL at 09:59

## 2019-03-30 RX ADMIN — DOXYCYCLINE 100 MG: 100 INJECTION, POWDER, LYOPHILIZED, FOR SOLUTION INTRAVENOUS at 02:41

## 2019-03-30 RX ADMIN — IPRATROPIUM BROMIDE 0.5 MG: 0.5 SOLUTION RESPIRATORY (INHALATION) at 07:21

## 2019-03-30 RX ADMIN — APIXABAN 2.5 MG: 2.5 TABLET, FILM COATED ORAL at 09:59

## 2019-03-30 RX ADMIN — APIXABAN 2.5 MG: 2.5 TABLET, FILM COATED ORAL at 20:50

## 2019-03-30 RX ADMIN — PANTOPRAZOLE SODIUM 40 MG: 40 TABLET, DELAYED RELEASE ORAL at 05:17

## 2019-03-31 LAB
ANION GAP SERPL CALCULATED.3IONS-SCNC: 10.5 MMOL/L
BASOPHILS # BLD AUTO: 0 10*3/MM3 (ref 0–0.2)
BASOPHILS NFR BLD AUTO: 0 % (ref 0–1.5)
BUN BLD-MCNC: 33 MG/DL (ref 8–23)
BUN/CREAT SERPL: 28.9 (ref 7–25)
CALCIUM SPEC-SCNC: 8.6 MG/DL (ref 8.6–10.5)
CHLORIDE SERPL-SCNC: 110 MMOL/L (ref 98–107)
CO2 SERPL-SCNC: 21.5 MMOL/L (ref 22–29)
CREAT BLD-MCNC: 1.14 MG/DL (ref 0.57–1)
DEPRECATED RDW RBC AUTO: 48.2 FL (ref 37–54)
EOSINOPHIL # BLD AUTO: 0 10*3/MM3 (ref 0–0.4)
EOSINOPHIL NFR BLD AUTO: 0 % (ref 0.3–6.2)
ERYTHROCYTE [DISTWIDTH] IN BLOOD BY AUTOMATED COUNT: 15.3 % (ref 12.3–15.4)
GFR SERPL CREATININE-BSD FRML MDRD: 46 ML/MIN/1.73
GLUCOSE BLD-MCNC: 128 MG/DL (ref 65–99)
HCT VFR BLD AUTO: 24 % (ref 34–46.6)
HGB BLD-MCNC: 7.3 G/DL (ref 12–15.9)
IMM GRANULOCYTES # BLD AUTO: 0.07 10*3/MM3 (ref 0–0.05)
IMM GRANULOCYTES NFR BLD AUTO: 0.7 % (ref 0–0.5)
LYMPHOCYTES # BLD AUTO: 0.37 10*3/MM3 (ref 0.7–3.1)
LYMPHOCYTES NFR BLD AUTO: 3.6 % (ref 19.6–45.3)
MCH RBC QN AUTO: 26.8 PG (ref 26.6–33)
MCHC RBC AUTO-ENTMCNC: 30.4 G/DL (ref 31.5–35.7)
MCV RBC AUTO: 88.2 FL (ref 79–97)
MONOCYTES # BLD AUTO: 0.4 10*3/MM3 (ref 0.1–0.9)
MONOCYTES NFR BLD AUTO: 3.9 % (ref 5–12)
NEUTROPHILS # BLD AUTO: 9.37 10*3/MM3 (ref 1.4–7)
NEUTROPHILS NFR BLD AUTO: 91.8 % (ref 42.7–76)
PLATELET # BLD AUTO: 328 10*3/MM3 (ref 140–450)
PMV BLD AUTO: 9.9 FL (ref 6–12)
POTASSIUM BLD-SCNC: 3.8 MMOL/L (ref 3.5–5.2)
RBC # BLD AUTO: 2.72 10*6/MM3 (ref 3.77–5.28)
SODIUM BLD-SCNC: 142 MMOL/L (ref 136–145)
WBC NRBC COR # BLD: 10.21 10*3/MM3 (ref 3.4–10.8)

## 2019-03-31 PROCEDURE — 25010000002 METHYLPREDNISOLONE PER 40 MG: Performed by: NURSE PRACTITIONER

## 2019-03-31 PROCEDURE — 93005 ELECTROCARDIOGRAM TRACING: CPT | Performed by: INTERNAL MEDICINE

## 2019-03-31 PROCEDURE — 80048 BASIC METABOLIC PNL TOTAL CA: CPT | Performed by: INTERNAL MEDICINE

## 2019-03-31 PROCEDURE — 85025 COMPLETE CBC W/AUTO DIFF WBC: CPT | Performed by: INTERNAL MEDICINE

## 2019-03-31 PROCEDURE — 94799 UNLISTED PULMONARY SVC/PX: CPT

## 2019-03-31 PROCEDURE — 93010 ELECTROCARDIOGRAM REPORT: CPT | Performed by: INTERNAL MEDICINE

## 2019-03-31 PROCEDURE — 25010000002 CEFTRIAXONE: Performed by: NURSE PRACTITIONER

## 2019-03-31 PROCEDURE — 99232 SBSQ HOSP IP/OBS MODERATE 35: CPT | Performed by: INTERNAL MEDICINE

## 2019-03-31 RX ADMIN — PANTOPRAZOLE SODIUM 40 MG: 40 TABLET, DELAYED RELEASE ORAL at 05:17

## 2019-03-31 RX ADMIN — METOPROLOL TARTRATE 75 MG: 50 TABLET, FILM COATED ORAL at 16:19

## 2019-03-31 RX ADMIN — METHYLPREDNISOLONE SODIUM SUCCINATE 40 MG: 40 INJECTION, POWDER, FOR SOLUTION INTRAMUSCULAR; INTRAVENOUS at 11:37

## 2019-03-31 RX ADMIN — ATORVASTATIN CALCIUM 40 MG: 40 TABLET, FILM COATED ORAL at 09:42

## 2019-03-31 RX ADMIN — BUDESONIDE 0.5 MG: 0.5 INHALANT RESPIRATORY (INHALATION) at 18:30

## 2019-03-31 RX ADMIN — METHYLPREDNISOLONE SODIUM SUCCINATE 40 MG: 40 INJECTION, POWDER, FOR SOLUTION INTRAMUSCULAR; INTRAVENOUS at 00:23

## 2019-03-31 RX ADMIN — CILOSTAZOL 100 MG: 100 TABLET ORAL at 21:11

## 2019-03-31 RX ADMIN — BUDESONIDE 0.5 MG: 0.5 INHALANT RESPIRATORY (INHALATION) at 07:13

## 2019-03-31 RX ADMIN — FERROUS GLUCONATE TAB 324 MG (37.5 MG ELEMENTAL IRON) 324 MG: 324 (37.5 FE) TAB at 09:43

## 2019-03-31 RX ADMIN — METHYLPREDNISOLONE SODIUM SUCCINATE 40 MG: 40 INJECTION, POWDER, FOR SOLUTION INTRAMUSCULAR; INTRAVENOUS at 22:54

## 2019-03-31 RX ADMIN — IPRATROPIUM BROMIDE 0.5 MG: 0.5 SOLUTION RESPIRATORY (INHALATION) at 18:29

## 2019-03-31 RX ADMIN — IPRATROPIUM BROMIDE 0.5 MG: 0.5 SOLUTION RESPIRATORY (INHALATION) at 10:38

## 2019-03-31 RX ADMIN — CEFTRIAXONE 1 G: 1 INJECTION, POWDER, FOR SOLUTION INTRAMUSCULAR; INTRAVENOUS at 09:42

## 2019-03-31 RX ADMIN — AMIODARONE HYDROCHLORIDE 200 MG: 200 TABLET ORAL at 21:11

## 2019-03-31 RX ADMIN — SODIUM CHLORIDE, PRESERVATIVE FREE 10 ML: 5 INJECTION INTRAVENOUS at 09:43

## 2019-03-31 RX ADMIN — IPRATROPIUM BROMIDE 0.5 MG: 0.5 SOLUTION RESPIRATORY (INHALATION) at 07:12

## 2019-03-31 RX ADMIN — APIXABAN 2.5 MG: 2.5 TABLET, FILM COATED ORAL at 09:42

## 2019-03-31 RX ADMIN — SODIUM CHLORIDE, PRESERVATIVE FREE 10 ML: 5 INJECTION INTRAVENOUS at 21:11

## 2019-03-31 RX ADMIN — AMIODARONE HYDROCHLORIDE 200 MG: 200 TABLET ORAL at 09:42

## 2019-03-31 RX ADMIN — IPRATROPIUM BROMIDE 0.5 MG: 0.5 SOLUTION RESPIRATORY (INHALATION) at 22:37

## 2019-03-31 RX ADMIN — CILOSTAZOL 100 MG: 100 TABLET ORAL at 09:42

## 2019-03-31 RX ADMIN — SODIUM CHLORIDE 50 ML/HR: 9 INJECTION, SOLUTION INTRAVENOUS at 16:17

## 2019-03-31 RX ADMIN — APIXABAN 2.5 MG: 2.5 TABLET, FILM COATED ORAL at 21:11

## 2019-03-31 RX ADMIN — IPRATROPIUM BROMIDE 0.5 MG: 0.5 SOLUTION RESPIRATORY (INHALATION) at 14:35

## 2019-04-01 VITALS
HEART RATE: 70 BPM | BODY MASS INDEX: 23.87 KG/M2 | SYSTOLIC BLOOD PRESSURE: 130 MMHG | OXYGEN SATURATION: 97 % | WEIGHT: 121.56 LBS | RESPIRATION RATE: 18 BRPM | TEMPERATURE: 98.7 F | HEIGHT: 60 IN | DIASTOLIC BLOOD PRESSURE: 71 MMHG

## 2019-04-01 LAB
ANION GAP SERPL CALCULATED.3IONS-SCNC: 10 MMOL/L
BASOPHILS # BLD AUTO: 0 10*3/MM3 (ref 0–0.2)
BASOPHILS NFR BLD AUTO: 0 % (ref 0–1.5)
BUN BLD-MCNC: 30 MG/DL (ref 8–23)
BUN/CREAT SERPL: 31.6 (ref 7–25)
CALCIUM SPEC-SCNC: 8.3 MG/DL (ref 8.6–10.5)
CHLORIDE SERPL-SCNC: 109 MMOL/L (ref 98–107)
CO2 SERPL-SCNC: 21 MMOL/L (ref 22–29)
CREAT BLD-MCNC: 0.95 MG/DL (ref 0.57–1)
DEPRECATED RDW RBC AUTO: 49.1 FL (ref 37–54)
EOSINOPHIL # BLD AUTO: 0 10*3/MM3 (ref 0–0.4)
EOSINOPHIL NFR BLD AUTO: 0 % (ref 0.3–6.2)
ERYTHROCYTE [DISTWIDTH] IN BLOOD BY AUTOMATED COUNT: 15.5 % (ref 12.3–15.4)
GFR SERPL CREATININE-BSD FRML MDRD: 56 ML/MIN/1.73
GLUCOSE BLD-MCNC: 108 MG/DL (ref 65–99)
HCT VFR BLD AUTO: 23.1 % (ref 34–46.6)
HGB BLD-MCNC: 7.1 G/DL (ref 12–15.9)
IMM GRANULOCYTES # BLD AUTO: 0.06 10*3/MM3 (ref 0–0.05)
IMM GRANULOCYTES NFR BLD AUTO: 0.7 % (ref 0–0.5)
LYMPHOCYTES # BLD AUTO: 0.35 10*3/MM3 (ref 0.7–3.1)
LYMPHOCYTES NFR BLD AUTO: 4.2 % (ref 19.6–45.3)
MCH RBC QN AUTO: 27.5 PG (ref 26.6–33)
MCHC RBC AUTO-ENTMCNC: 30.7 G/DL (ref 31.5–35.7)
MCV RBC AUTO: 89.5 FL (ref 79–97)
MONOCYTES # BLD AUTO: 0.26 10*3/MM3 (ref 0.1–0.9)
MONOCYTES NFR BLD AUTO: 3.1 % (ref 5–12)
NEUTROPHILS # BLD AUTO: 7.7 10*3/MM3 (ref 1.4–7)
NEUTROPHILS NFR BLD AUTO: 92 % (ref 42.7–76)
PLATELET # BLD AUTO: 312 10*3/MM3 (ref 140–450)
PMV BLD AUTO: 9.6 FL (ref 6–12)
POTASSIUM BLD-SCNC: 3.9 MMOL/L (ref 3.5–5.2)
RBC # BLD AUTO: 2.58 10*6/MM3 (ref 3.77–5.28)
SODIUM BLD-SCNC: 140 MMOL/L (ref 136–145)
WBC NRBC COR # BLD: 8.37 10*3/MM3 (ref 3.4–10.8)

## 2019-04-01 PROCEDURE — 25010000002 METHYLPREDNISOLONE PER 40 MG: Performed by: NURSE PRACTITIONER

## 2019-04-01 PROCEDURE — 80048 BASIC METABOLIC PNL TOTAL CA: CPT | Performed by: INTERNAL MEDICINE

## 2019-04-01 PROCEDURE — 99222 1ST HOSP IP/OBS MODERATE 55: CPT | Performed by: NURSE PRACTITIONER

## 2019-04-01 PROCEDURE — 94799 UNLISTED PULMONARY SVC/PX: CPT

## 2019-04-01 PROCEDURE — 99239 HOSP IP/OBS DSCHRG MGMT >30: CPT | Performed by: INTERNAL MEDICINE

## 2019-04-01 PROCEDURE — 85025 COMPLETE CBC W/AUTO DIFF WBC: CPT | Performed by: INTERNAL MEDICINE

## 2019-04-01 RX ORDER — PREDNISONE 20 MG/1
40 TABLET ORAL DAILY
Qty: 14 TABLET | Refills: 0 | Status: SHIPPED | OUTPATIENT
Start: 2019-04-01 | End: 2019-04-08

## 2019-04-01 RX ORDER — PANTOPRAZOLE SODIUM 40 MG/1
40 TABLET, DELAYED RELEASE ORAL DAILY
Qty: 30 TABLET | Refills: 0 | Status: SHIPPED | OUTPATIENT
Start: 2019-04-01 | End: 2019-04-11

## 2019-04-01 RX ORDER — METOPROLOL TARTRATE 100 MG/1
100 TABLET ORAL EVERY 12 HOURS SCHEDULED
Status: DISCONTINUED | OUTPATIENT
Start: 2019-04-01 | End: 2019-04-01 | Stop reason: HOSPADM

## 2019-04-01 RX ORDER — DOXYCYCLINE HYCLATE 50 MG/1
324 CAPSULE, GELATIN COATED ORAL
Qty: 30 TABLET | Refills: 0 | Status: ON HOLD | OUTPATIENT
Start: 2019-04-02 | End: 2019-04-17

## 2019-04-01 RX ORDER — AZITHROMYCIN 500 MG/1
500 TABLET, FILM COATED ORAL DAILY
Qty: 3 TABLET | Refills: 0 | Status: SHIPPED | OUTPATIENT
Start: 2019-04-01 | End: 2019-04-04

## 2019-04-01 RX ORDER — IPRATROPIUM BROMIDE AND ALBUTEROL SULFATE 2.5; .5 MG/3ML; MG/3ML
3 SOLUTION RESPIRATORY (INHALATION) EVERY 4 HOURS PRN
Qty: 360 ML | Refills: 0 | Status: SHIPPED | OUTPATIENT
Start: 2019-04-01 | End: 2019-04-01 | Stop reason: HOSPADM

## 2019-04-01 RX ORDER — METOPROLOL TARTRATE 100 MG/1
100 TABLET ORAL EVERY 12 HOURS SCHEDULED
Qty: 60 TABLET | Refills: 0 | Status: ON HOLD | OUTPATIENT
Start: 2019-04-01 | End: 2019-04-17

## 2019-04-01 RX ADMIN — BUDESONIDE 0.5 MG: 0.5 INHALANT RESPIRATORY (INHALATION) at 06:59

## 2019-04-01 RX ADMIN — IPRATROPIUM BROMIDE 0.5 MG: 0.5 SOLUTION RESPIRATORY (INHALATION) at 14:33

## 2019-04-01 RX ADMIN — PANTOPRAZOLE SODIUM 40 MG: 40 TABLET, DELAYED RELEASE ORAL at 05:47

## 2019-04-01 RX ADMIN — IPRATROPIUM BROMIDE 0.5 MG: 0.5 SOLUTION RESPIRATORY (INHALATION) at 02:20

## 2019-04-01 RX ADMIN — HYDRALAZINE HYDROCHLORIDE 50 MG: 50 TABLET ORAL at 16:20

## 2019-04-01 RX ADMIN — SODIUM CHLORIDE, PRESERVATIVE FREE 10 ML: 5 INJECTION INTRAVENOUS at 09:31

## 2019-04-01 RX ADMIN — APIXABAN 2.5 MG: 2.5 TABLET, FILM COATED ORAL at 09:31

## 2019-04-01 RX ADMIN — IPRATROPIUM BROMIDE 0.5 MG: 0.5 SOLUTION RESPIRATORY (INHALATION) at 06:48

## 2019-04-01 RX ADMIN — LOSARTAN POTASSIUM 50 MG: 50 TABLET, FILM COATED ORAL at 09:31

## 2019-04-01 RX ADMIN — AMIODARONE HYDROCHLORIDE 200 MG: 200 TABLET ORAL at 09:31

## 2019-04-01 RX ADMIN — CILOSTAZOL 100 MG: 100 TABLET ORAL at 09:31

## 2019-04-01 RX ADMIN — FERROUS GLUCONATE TAB 324 MG (37.5 MG ELEMENTAL IRON) 324 MG: 324 (37.5 FE) TAB at 09:30

## 2019-04-01 RX ADMIN — METOPROLOL TARTRATE 75 MG: 50 TABLET, FILM COATED ORAL at 09:31

## 2019-04-01 RX ADMIN — METHYLPREDNISOLONE SODIUM SUCCINATE 40 MG: 40 INJECTION, POWDER, FOR SOLUTION INTRAMUSCULAR; INTRAVENOUS at 11:54

## 2019-04-01 RX ADMIN — ATORVASTATIN CALCIUM 40 MG: 40 TABLET, FILM COATED ORAL at 09:31

## 2019-04-01 NOTE — PLAN OF CARE
Problem: Patient Care Overview  Goal: Plan of Care Review   04/01/19 0127   Plan of Care Review   Progress improving   Coping/Psychosocial   Plan of Care Reviewed With patient       Problem: Fall Risk (Adult)  Goal: Absence of Fall   04/01/19 0127   Fall Risk (Adult)   Absence of Fall making progress toward outcome       Problem: Breathing Pattern Ineffective (Adult)  Goal: Effective Oxygenation/Ventilation   04/01/19 0127   Breathing Pattern Ineffective (Adult)   Effective Oxygenation/Ventilation making progress toward outcome     Goal: Anxiety/Fear Reduction   04/01/19 0127   Breathing Pattern Ineffective (Adult)   Anxiety/Fear Reduction making progress toward outcome       Problem: Skin Injury Risk (Adult)  Goal: Identify Related Risk Factors and Signs and Symptoms   04/01/19 0127   Skin Injury Risk (Adult)   Related Risk Factors (Skin Injury Risk) advanced age;hospitalization prolonged;mobility impaired     Goal: Skin Health and Integrity   04/01/19 0127   Skin Injury Risk (Adult)   Skin Health and Integrity making progress toward outcome

## 2019-04-01 NOTE — PROGRESS NOTES
Discharge Planning Assessment   Ho     Patient Name: Rupinder Lees  MRN: [id number]  Today's Date: 4/1/2019    Admit Date: 3/24/2019      Discharge Plan     Row Name 04/01/19 1732       Plan    Final Discharge Disposition Code  06 - home with home health care    Final Note Pt is being discharged home today. SS spoke to pt and daughter in law, Michaelle. Pt is agreeable for home health services. Pt request quad cane. Pt needs a nebulizer machine and home oxygen due to O2 SAT on RA being 83%. Pt has no preference for DME company or home health agency. Pt lives in [place]. SS ask Dr. Munguia for orders for DME and home health services. SS contacted [facility] on-call service per Aracelis. [facility] on-call nurse will call back. SS contacted [facility] per Lucy to order DME. SS faxed information including orders to [facility]. [facility] to deliver DME to hospital and home oxygen concentrator to pt's home. SS received call back from [facility] per on-call nurseJennifer. No other needs identified.               Home Medical Care - Selection Complete      Service Provider Request Status Selected Services Address Phone Number Fax Number    [facility] [address] [place] [id number] [phone]     Eliane Le

## 2019-04-01 NOTE — DISCHARGE PLACEMENT REQUEST
"Rupinder Arias (83 y.o. Female)     Date of Birth Social Security Number Address Home Phone MRN    1935  [address] [facility] [address] [phone] [id number]    Faith Marital Status          Baptism        Admission Date Admission Type Admitting Provider Attending Provider Department, Room/Bed    3/24/19 Emergency Austin Vogt MD Baibars, Mhd Motaz, MD [address], 3351/2S    Discharge Date Discharge Disposition Discharge Destination         [place] [facility] [place]              Attending Provider:  Lilly Munguia MD    Allergies:  No Known Allergies    Isolation:  None   Infection:  None   Code Status:  CPR    Ht:  152.4 cm (60\")   Wt:  55.1 kg (121 lb 9 oz)    Admission Cmt:  None   Principal Problem:  None                Active Insurance as of 3/24/2019     Primary Coverage     Payor Plan Insurance Group Employer/Plan Group    MEDICARE MEDICARE A & B      Payor Plan Address Payor Plan Phone Number Payor Plan Fax Number Effective Dates    [address] [phone]  8/1/2002 - None Entered    [facility] [id number]       Subscriber Name Subscriber Birth Date Member ID       RUPINDER ARIAS 1935 [id number]           Secondary Coverage     Payor Plan Insurance Group Employer/Plan Group    ANTHEM BLUE CROSS ANTHEM BLUE CROSS BLUE SHIELD PPO [id number]     Payor Plan Address Payor Plan Phone Number Payor Plan Fax Number Effective Dates    [address] [phone]  1/1/2011 - None Entered    [facility] [id number]       Subscriber Name Subscriber Birth Date Member ID       RUPINDER ARIAS 1935 [id number]                 Emergency Contacts      (Rel.) Home Phone Work Phone Mobile Phone    AGNIESZKA ARIAS (Daughter) [phone] -- --    Michaelle Arias (Other) [phone] -- --    Nabeel Antony (Son) -- -- [phone]        [address] [address]  Phone:  [phone]  Fax:   Date: Apr 1, 2019 "      Ambulatory Referral to Home Health     Patient:  Rupinder Lees MRN:  [id number]   [address] [place] [address] :  1935  SSN:    Phone: [phone] Sex:  F      INSURANCE PAYOR PLAN GROUP # SUBSCRIBER ID   Primary:  Secondary:    MEDICARE  ANTH BLUE CROSS [id number]  [id number]    [id number]  [id number]      Referring Provider Information:  CASEY ARRIAGA Phone: [phone] Fax:       Referral Information:   # Visits:  1 Referral Type: Home Health [42]   Urgency:  Routine Referral Reason: Specialty Services Required   Start Date: 2019 End Date:  To be determined by Insurer   Diagnosis: COPD with hypoxia (CMS/HCC) (J44.9,R09.02 [ICD-10-CM] 496,799.02 [ICD-9-CM])      Refer to Dept: [facility]  Refer to Provider:   Refer to Facility:       Face to Face Visit Date: 2019  Follow-up Provider for Plan of Care? I treated the patient in an acute care facility and will not continue treatment after discharge.  Follow-up Provider: JUSTIN JORDAN [[id number]]  Reason/Clinical Findings: COPD exacerbation  Describe mobility limitations that make leaving home difficult: debility  Nursing/Therapeutic Services Requested: Skilled Nursing  Nursing/Therapeutic Services Requested: Physical Therapy  Nursing/Therapeutic Services Requested: Occupational Therapy  Skilled nursing orders: O2 instruction  Skilled nursing orders: COPD management  Skilled nursing orders: Cardiopulmonary assessments  PT orders: Strengthening  PT orders: Home safety assessment  Occupational orders: Activities of daily living  Occupational orders: Energy conservation  Occupational orders: Home safety assessment  Occupational orders: Strengthening  Occupational orders: Cognition  Occupational orders: Fine motor  Frequency: 1 Week 1     This document serves as a request of services and does not constitute Insurance authorization or approval of services.  To determine eligibility, please contact  the members Insurance carrier to verify and review coverage.     If you have medical questions regarding this request for services. Please contact [address] at [phone] between the hours of 8:00am - 5:00pm (Mon-Fri).       Authorizing Provider:Lilly Munguia MD  Authorizing Provider's NPI: [id number]  Order Entered By: Lilly Munguia MD 2019  4:48 PM     Electronically signed by: Lilly Munguia MD 2019  4:48 PM               History & Physical      Austin Vogt MD at 3/24/2019 10:44 AM            [facility]  History & Physical          Patient Identification:  Name:  Rupinder Lees  Age:  83 y.o.  Sex:  female  :  1935  MRN:  [id number]   Visit Number:  [id number]  Primary Care Physician:  Gifty Kruse MD    I have seen the patient in conjunction with ASHISH Hutton and I agree with the following statements:     Subjective     Chief complaint: dyspnea     History of presenting illness:    Mrs. Lees is a 83-year-old female patient who presented to [facility] ED on 3/24/2019. She presented to the ED today for coughing and shortness breath for around 3 days that has continued to worsen. She has had shortness of breath with activity, she has also had fatigue, cough that has been productive but doesn't know the color.Pt denies any fever.  She has had diarrhea that started around 3am, several episodes, no vomiting or nausea. She has not had fever but has had chills. She denies any orthopnea. No chest pain. She has been out running around and got sick the day after. Pt is also complaining of pain in her toe which is been going on for many days.     Her workup in the ER showed a blood gas with pH of 7.372, CO2 27.0, PO2 71.2, bicarb 15.3, troponin was less than 0.010, sodium 135 potassium 4.0, creatinine 1.30, lactic acid 2.7, WBC 8.11, H&H was 7.9 and 26.1.  Blood cultures were obtained in the ED.   Chest x-ray was done in the ER and per radiology reading was found to have minimal left basilar airspace disease.    Her medical history is significant for COPD, DVT, history of CVA, hypertension, peripheral arterial disease, CAD s/p stent 12/2018, s/p pacemaker, paroxysmal a-fib on chronic oral anticoagulation. She does smoke, 1/2 ppd for around 60 years. Her next of kin is her son, Antony Lees. She lives at home alone and cares for herself. Code status discussed, full code.       ---------------------------------------------------------------------------------------------------------------------   Review of Systems   Constitutional: Positive for chills and fatigue. Negative for fever.   HENT: Negative for congestion and rhinorrhea.    Eyes: Negative for visual disturbance.   Respiratory: Positive for cough and shortness of breath. Negative for wheezing.    Cardiovascular: Negative for chest pain and leg swelling.   Gastrointestinal: Positive for diarrhea. Negative for abdominal distention, abdominal pain, constipation, nausea and vomiting.   Endocrine: Negative for cold intolerance and heat intolerance.   Genitourinary: Negative for difficulty urinating.   Musculoskeletal: Negative for arthralgias.   Skin: Negative for color change and pallor.   Allergic/Immunologic: Negative for environmental allergies.   Neurological: Negative for dizziness, weakness and light-headedness.   Hematological: Negative for adenopathy.   Psychiatric/Behavioral: Negative for agitation, behavioral problems and confusion.      ---------------------------------------------------------------------------------------------------------------------   Past Medical History:   Diagnosis Date   • COPD  12/17/2018   • DVT of lower extremity (deep venous thrombosis) (CMS/HCC)    • History of CVA 2013 12/17/2018   • HTN 12/17/2018   • PAD  12/17/2018   • Tobacco use 12/17/2018     Past Surgical History:   Procedure Laterality Date   • CARDIAC  CATHETERIZATION  12/17/2018    Procedure: Left Heart Cath;  Surgeon: Imtiaz Fuentes MD;  Location:  [facility] LOC[facility];  Service: Cardiology   • CARDIAC CATHETERIZATION N/A 12/17/2018    Procedure: Thrombolysis-peripheral;  Surgeon: Imtiaz Fuentes MD;  Location:  [facility];  Service: Cardiology   • CARDIAC ELECTROPHYSIOLOGY PROCEDURE N/A 12/18/2018    Procedure: PACEMAKER IMPLANTATION- DC;  Surgeon: Thony Bobby MD;  Location:  [facility];  Service: Cardiology   • CARDIAC ELECTROPHYSIOLOGY PROCEDURE N/A 12/17/2018    Procedure: Temporary Pacemaker;  Surgeon: Imtiaz Fuentes MD;  Location:  [facility];  Service: Cardiology   • FEMORAL ARTERY STENT  2013   • FEMORAL POPLITEAL BYPASS Right 12/17/2018    Procedure: LOWER EXTREMITY EMBELECTOMY RIGHT;  Surgeon: David Beatty MD;  Location:  [facility];  Service: Vascular   • HYSTERECTOMY     • LEFT HEART CATH  12/17/2018    At [facility] with TV PM placement    • PACEMAKER IMPLANTATION       Family History   Problem Relation Age of Onset   • Cancer Mother    • Cancer Father    • Heart attack Brother    • Heart attack Brother      Social History     Socioeconomic History   • Marital status:      Spouse name: Not on file   • Number of children: 3   • Years of education: Not on file   • Highest education level: Not on file   Occupational History   • Occupation: Homemaker   Tobacco Use   • Smoking status: Current Every Day Smoker     Packs/day: 0.50     Years: 55.00     Pack years: 27.50     Types: Cigarettes   • Smokeless tobacco: Never Used   Substance and Sexual Activity   • Alcohol use: No     Frequency: Never   • Drug use: No   • Sexual activity: Defer   Social History Narrative    Lives in [place] alone     ---------------------------------------------------------------------------------------------------------------------   Allergies:  Patient has  no known allergies.  ---------------------------------------------------------------------------------------------------------------------     Medications below are reported home medications pulling from within the system; at this time, these medications have not been reconciled unless otherwise specified and are in the verification process for further verifcation as current home medications.    Prior to Admission Medications     Prescriptions Last Dose Informant Patient Reported? Taking?    cilostazol (PLETAL) 100 MG tablet   Yes Yes    Take 100 mg by mouth 2 (Two) Times a Day.    amiodarone (PACERONE) 200 MG tablet   No No    Take 1 tablet by mouth Every 12 (Twelve) Hours.    apixaban (ELIQUIS) 2.5 MG tablet tablet   No No    Take 1 tablet by mouth Every 12 (Twelve) Hours.    aspirin  MG tablet   No No    Take 1 tablet by mouth Daily.    atenolol (TENORMIN) 25 MG tablet   Yes No    Take 25 mg by mouth Daily.    atorvastatin (LIPITOR) 40 MG tablet   Yes No    Take 40 mg by mouth Daily.    clopidogrel (PLAVIX) 75 MG tablet   Yes No    hydrALAZINE (APRESOLINE) 50 MG tablet   Yes No    Take 50 mg by mouth 3 (Three) Times a Day.    meloxicam (MOBIC) 15 MG tablet   Yes No    Take 15 mg by mouth Daily.    metoprolol tartrate (LOPRESSOR) 50 MG tablet   No No    Take 1 tablet by mouth Every 12 (Twelve) Hours.    olmesartan (BENICAR) 40 MG tablet   Yes No    Take 40 mg by mouth Daily.    vitamin D (ERGOCALCIFEROL) 41517 units capsule capsule   Yes No    Take 50,000 Units by mouth 1 (One) Time Per Week.        Hospital Scheduled Meds:    azithromycin 500 mg Intravenous Once        ---------------------------------------------------------------------------------------------------------------------   Objective       Vital Signs:  Temp:  [98.3 °F (36.8 °C)] 98.3 °F (36.8 °C)  Heart Rate:  [70-80] 80  Resp:  [20-24] 20  BP: (125-161)/(47-92) 149/69      03/24/19  0722   Weight: 53.5 kg (118 lb)     Body mass index is 23.05  kg/m².  ---------------------------------------------------------------------------------------------------------------------       Physical Exam    Physical Exam:  Constitutional:  Well-developed and well-nourished.     HENT:  Head: Normocephalic and atraumatic.  Mouth:  Moist mucous membranes.    Eyes:  Pupils are equal, round, and reactive to light.  No scleral icterus.  Neck:  Neck supple.      Cardiovascular:  Normal rate, regular rhythm.  with no murmur.  Pulmonary/Chest:  Mild dyspnea, tachypnea, rhonchi, rales and b/l expiratory wheezing throughout.  Abdominal:  Soft.  Bowel sounds are present.  No distension and no tenderness.   Musculoskeletal:  No edema, no tenderness, and no deformity.  No red or swollen joints anywhere.    Neurological:  Alert and oriented to person, place, and time.   Skin:  Skin is warm and dry.  No rash noted.  No pallor.   Psychiatric:  Normal mood and affect.  Behavior is normal.  Judgment and thought content normal.   Peripheral vascular:  No edema and strong pulses on all 4 extremities.  ---------------------------------------------------------------------------------------------------------------------  EKG:          ---------------------------------------------------------------------------------------------------------------------   Results from last 7 days   Lab Units 03/24/19  0748   LACTATE mmol/L 2.7*   WBC 10*3/mm3 8.11   HEMOGLOBIN g/dL 7.9*   HEMATOCRIT % 26.1*   MCV fL 92.2   MCHC g/dL 30.3*   PLATELETS 10*3/mm3 224     Results from last 7 days   Lab Units 03/24/19  0957   PH, ARTERIAL pH units 7.372   PO2 ART mm Hg 71.2*   PCO2, ARTERIAL mm Hg 27.0*   HCO3 ART mmol/L 15.3*     Results from last 7 days   Lab Units 03/24/19  0748   SODIUM mmol/L 135*   POTASSIUM mmol/L 4.0   CHLORIDE mmol/L 103   CO2 mmol/L 19.1*   BUN mg/dL 29*   CREATININE mg/dL 1.30*   EGFR IF NONAFRICN AM mL/min/1.73 39*   CALCIUM mg/dL 8.6   GLUCOSE mg/dL 149*   ALBUMIN g/dL 4.06   BILIRUBIN mg/dL  0.2   ALK PHOS U/L 82   AST (SGOT) U/L 125*   ALT (SGPT) U/L 130*   Estimated Creatinine Clearance: 28.7 mL/min (A) (by C-G formula based on SCr of 1.3 mg/dL (H)).  No results found for: AMMONIA  Results from last 7 days   Lab Units 03/24/19  0748   TROPONIN T ng/mL <0.010         Lab Results   Component Value Date    HGBA1C 6.1 (H) 05/31/2015     Lab Results   Component Value Date    TSH 0.762 12/17/2018    FREET4 1.47 05/31/2015     No results found for: PREGTESTUR, PREGSERUM, HCG, HCGQUANT  Pain Management Panel     Pain Management Panel Latest Ref Rng & Units 5/31/2015    AMPHETAMINES SCREEN, URINE NEGATIVE Negative    BARBITURATES SCREEN NEGATIVE Negative    BENZODIAZEPINE SCREEN, URINE NEGATIVE Negative    COCAINE SCREEN, URINE NEGATIVE Negative    METHADONE SCREEN, URINE NEGATIVE Negative                        ---------------------------------------------------------------------------------------------------------------------  Imaging Results (last 7 days)     Procedure Component Value Units Date/Time    XR Chest 1 View [[id number]] Collected:  03/24/19 0844     Updated:  03/24/19 0847    Narrative:       XR CHEST 1 VW-     CLINICAL INDICATION: cough        COMPARISON: 12/17/2018      TECHNIQUE: Single frontal view of the chest.     FINDINGS:     Minimal left basilar airspace disease  The cardiac silhouette is normal. The pulmonary vasculature is  unremarkable.  Scoliosis  There are no suspicious-appearing parenchymal soft tissue nodules.          Impression:       Minimal left basilar airspace disease     This report was finalized on 3/24/2019 8:45 AM by Dr. Juan Plascencia MD.             Cultures: blood cultures done in the ED     I have personally reviewed the radiology images and read the final radiology report.  ---------------------------------------------------------------------------------------------------------------------  Assessment / Plan       Assessment and Plan:    Sepsis r/t CAP (left sided)  vs/and COPD exacerbation   Lactic Acidosis   COPD with acute exacerbation   Paroxysmal A-Fib on chronic oral anticoagulation   s/p pacemaker d/t complete heart block 2018  Essential HTN   Hx of CAD   Hx of DVT  Hx of CVA  Hx of PAD   Tobacco use     -Sepsis r/t CAP vs/and COPD exacerbation: Will admit to telemetry, continue doxycycline and Rocephin. Medrol 40 mg IV BID, duo-nebs every 6 hours, incentive spirometer 10 times an hour. Will add pulmicort nebs. Atypical's and respiratory cultures ordered. Repeat labs in the am.     -Lactic Acidosis: 2.7 on admission, repeat ordered for 12noon.     -COPD with acute exacerbation: continue with plan as outlined above, monitor Sp02, supplemental oxygen to maintain Saturation 90-92%.     -Paroxysmal a-fib on chronic oral anticoagulation, s/p pacer 2018, hx of CAD, HTN: We will continue home Eliquis when med rec is available by pharmacy, monitor on telemetry.     -Anemia: normocytic, normochromic    Activity: up with assistance   Precautions: falls   DVT prophylaxis: eliquis   GI prophylaxis: protonix 40mg PO     Code status: Full     Patient is high risk for the following reasons: Sepsis r/t CAP, COPD exacerbation, Paroxysmal A-Fib on Chronic oral anticoagulation    ASHISH Solomon  03/24/19  10:44 AM  ---------------------------------------------------------------------------------------------------------------------   I have seen and examined the patient. I agree with the assessment and plan of ASHISH Solomon. I have amended the above note to reflect my findings in history, physical examination, decision making and management plan.       Electronically signed by Austin Vogt MD at 3/24/2019  8:24 PM       ICU Vital Signs     Row Name 04/01/19 1438 04/01/19 1433 04/01/19 1405 04/01/19 1400 04/01/19 1102       Vitals    Temp  --  --  --  --  98.7 °F (37.1 °C)    Temp src  --  --  --  --  Oral    Pulse  70  72  --  --  70    Heart Rate Source  --   --  --  --  Monitor    Resp  18  18  --  --  20    Resp Rate Source  --  --  --  --  Visual    BP  --  --  --  --  130/71    BP Location  --  --  --  --  Left arm    BP Method  --  --  --  --  Automatic    Patient Position  --  --  --  --  Lying       Oxygen Therapy    SpO2  --  97 %  93 %  83 %  (Abnormal)   --    Device (Oxygen Therapy)  --  nasal cannula  nasal cannula  room air pt walked from the bed to the bathroom door on room air  --    Flow (L/min)  --  2  2  --  --    Row Name 04/01/19 1042 04/01/19 0900 04/01/19 0727 04/01/19 0704 04/01/19 0659       Vitals    Temp  --  --  98.6 °F (37 °C)  --  --    Temp src  --  --  Oral  --  --    Pulse  74  --  71  70  74    Heart Rate Source  --  --  Monitor  --  --    Resp  18  --  20  18  18    Resp Rate Source  --  --  Visual  --  --    BP  --  --  134/73  --  --    BP Location  --  --  Left arm  --  --    BP Method  --  --  Automatic  --  --    Patient Position  --  --  Lying  --  --       Oxygen Therapy    SpO2  98 %  --  98 %  --  --    Device (Oxygen Therapy)  nasal cannula  nasal cannula  --  --  --    Flow (L/min)  2  2  --  --  --    Row Name 04/01/19 0648 04/01/19 0548 04/01/19 0531 04/01/19 0300 04/01/19 0228       Height and Weight    Weight  --  55.1 kg (121 lb 9 oz)  --  --  --       Vitals    Temp  --  --  --  98.1 °F (36.7 °C)  --    Temp src  --  --  --  Oral  --    Pulse  71  --  70  72  74    Heart Rate Source  --  --  Monitor  Monitor  Monitor    Resp  18  --  --  18  18    Resp Rate Source  --  --  --  Visual  Visual    BP  --  --  128/65  141/67  --    BP Location  --  --  Left arm  Left arm  --    BP Method  --  --  Automatic  Automatic  --    Patient Position  --  --  Lying  Lying  --       Oxygen Therapy    SpO2  96 %  --  --  95 %  97 %    Pulse Oximetry Type  --  --  --  --  Continuous    Device (Oxygen Therapy)  nasal cannula  --  --  --  nasal cannula    Flow (L/min)  2  --  --  --  2    Shaun Name 04/01/19 0220 03/31/19 2233 03/31/19 3335  03/31/19 1900 03/31/19 1844       Vitals    Temp  --  98 °F (36.7 °C)  --  98.1 °F (36.7 °C)  --    Temp src  --  Oral  --  Oral  --    Pulse  74  69  --  72  75    Heart Rate Source  Monitor  Monitor  --  Monitor  Monitor    Resp  18  18  --  18 19    Resp Rate Source  Visual  Visual  --  Visual  Visual    BP  --  117/82  --  134/68  --    BP Location  --  Left arm  --  Left arm  --    BP Method  --  Automatic  --  Automatic  --    Patient Position  --  Lying  --  Lying  --       Oxygen Therapy    SpO2  96 %  99 %  --  98 %  98 %    Pulse Oximetry Type  Continuous  Continuous  --  --  Continuous    Device (Oxygen Therapy)  nasal cannula  nasal cannula  nasal cannula  --  nasal cannula    Flow (L/min)  2  2  1  --  2    Row Name 03/31/19 1830 03/31/19 1829                Vitals    Pulse  72  72       Heart Rate Source  Monitor  Monitor       Resp  18 18       Resp Rate Source  Visual  Visual          Oxygen Therapy    SpO2  98 %  98 %       Pulse Oximetry Type  Continuous  Continuous       Device (Oxygen Therapy)  nasal cannula  nasal cannula       Flow (L/min)  2  2           Intake & Output (last day)       03/31 0701 - 04/01 0700 04/01 0701 - 04/02 0700    P.O. 840 360    Total Intake(mL/kg) 840 (15.2) 360 (6.5)    Net +840 +360          Urine Unmeasured Occurrence 6 x 3 x    Stool Unmeasured Occurrence 0 x         Hospital Medications (active)       Dose Frequency Start End    amiodarone (PACERONE) tablet 200 mg 200 mg Every 12 Hours Scheduled 3/24/2019     Sig - Route: Take 1 tablet by mouth Every 12 (Twelve) Hours. - Oral    apixaban (ELIQUIS) tablet 2.5 mg 2.5 mg Every 12 Hours Scheduled 3/24/2019     Sig - Route: Take 1 tablet by mouth Every 12 (Twelve) Hours. - Oral    atorvastatin (LIPITOR) tablet 40 mg 40 mg Daily 3/24/2019     Sig - Route: Take 1 tablet by mouth Daily. - Oral    budesonide (PULMICORT) nebulizer solution 0.5 mg 0.5 mg 2 Times Daily - RT 3/26/2019     Sig - Route: Take 2 mL by  "nebulization 2 (Two) Times a Day. - Nebulization    cilostazol (PLETAL) tablet 100 mg 100 mg 2 Times Daily 3/24/2019     Sig - Route: Take 1 tablet by mouth 2 (Two) Times a Day. - Oral    ferrous gluconate tablet 324 mg 324 mg Daily With Breakfast 3/31/2019     Sig - Route: Take 1 tablet by mouth Daily With Breakfast. - Oral    hydrALAZINE (APRESOLINE) tablet 50 mg 50 mg Every 8 Hours Scheduled 3/24/2019     Sig - Route: Take 1 tablet by mouth Every 8 (Eight) Hours. - Oral    ipratropium (ATROVENT) nebulizer solution 0.5 mg 0.5 mg Every 4 Hours - RT 3/26/2019     Sig - Route: Take 2.5 mL by nebulization Every 4 (Four) Hours. - Nebulization    Cosign for Ordering: Accepted by Austin Vogt MD on 3/26/2019 12:21 PM    losartan (COZAAR) tablet 50 mg 50 mg Every 24 Hours Scheduled 3/24/2019     Sig - Route: Take 1 tablet by mouth Daily. - Oral    methylPREDNISolone sodium succinate (SOLU-Medrol) injection 40 mg 40 mg Every 12 Hours 3/24/2019     Sig - Route: Infuse 1 mL into a venous catheter Every 12 (Twelve) Hours. - Intravenous    metoprolol tartrate (LOPRESSOR) tablet 100 mg 100 mg Every 12 Hours Scheduled 4/1/2019     Sig - Route: Take 1 tablet by mouth Every 12 (Twelve) Hours. - Oral    nitroglycerin (NITROSTAT) SL tablet 0.4 mg 0.4 mg Every 5 Minutes PRN 3/24/2019     Sig - Route: Place 1 tablet under the tongue Every 5 (Five) Minutes As Needed for Chest Pain (Chest Pain With Systolic Blood Pressure Greater Than 100). - Sublingual    pantoprazole (PROTONIX) EC tablet 40 mg 40 mg Every Early Morning 3/24/2019     Sig - Route: Take 1 tablet by mouth Every Morning. - Oral    sodium chloride 0.9 % flush 1-10 mL 1-10 mL As Needed 3/24/2019     Sig - Route: Infuse 1-10 mL into a venous catheter As Needed for Line Care. - Intravenous    sodium chloride 0.9 % flush 10 mL 10 mL As Needed 3/24/2019     Sig - Route: Infuse 10 mL into a venous catheter As Needed for Line Care. - Intravenous    Linked Group 1:  \"And\" " Linked Group Details        sodium chloride 0.9 % flush 10 mL 10 mL Every 12 Hours Scheduled 3/29/2019     Sig - Route: Infuse 10 mL into a venous catheter Every 12 (Twelve) Hours. - Intravenous    sodium chloride 0.9 % flush 10 mL 10 mL As Needed 3/29/2019     Sig - Route: Infuse 10 mL into a venous catheter As Needed for Line Care (After Medication Administration). - Intravenous    sodium chloride 0.9 % flush 3 mL 3 mL Every 12 Hours Scheduled 3/24/2019     Sig - Route: Infuse 3 mL into a venous catheter Every 12 (Twelve) Hours. - Intravenous    sodium chloride 0.9 % infusion 50 mL/hr Continuous 3/29/2019     Sig - Route: Infuse 50 mL/hr into a venous catheter Continuous. - Intravenous    metoprolol tartrate (LOPRESSOR) tablet 75 mg (Discontinued) 75 mg Every 12 Hours Scheduled 3/31/2019 4/1/2019    Sig - Route: Take 75 mg by mouth Every 12 (Twelve) Hours. - Oral    Cosign for Ordering: Accepted by Austin Vogt MD on 3/31/2019  4:51 PM            Lab Results (last 24 hours)     Procedure Component Value Units Date/Time    Basic Metabolic Panel [[id number]]  (Abnormal) Collected:  04/01/19 0503    Specimen:  Blood Updated:  04/01/19 0556     Glucose 108 mg/dL      BUN 30 mg/dL      Creatinine 0.95 mg/dL      Sodium 140 mmol/L      Potassium 3.9 mmol/L      Chloride 109 mmol/L      CO2 21.0 mmol/L      Calcium 8.3 mg/dL      eGFR Non African Amer 56 mL/min/1.73      BUN/Creatinine Ratio 31.6     Anion Gap 10.0 mmol/L     Narrative:       GFR Normal >60  Chronic Kidney Disease <60  Kidney Failure <15    CBC & Differential [[id number]] Collected:  04/01/19 0503    Specimen:  Blood Updated:  04/01/19 0539    Narrative:       The following orders were created for panel order CBC & Differential.  Procedure                               Abnormality         Status                     ---------                               -----------         ------                     CBC Auto Differential[[id number]]        Abnormal             Final result                 Please view results for these tests on the individual orders.    CBC Auto Differential [[id number]]  (Abnormal) Collected:  04/01/19 0503    Specimen:  Blood Updated:  04/01/19 0539     WBC 8.37 10*3/mm3      RBC 2.58 10*6/mm3      Hemoglobin 7.1 g/dL      Hematocrit 23.1 %      MCV 89.5 fL      MCH 27.5 pg      MCHC 30.7 g/dL      RDW 15.5 %      RDW-SD 49.1 fl      MPV 9.6 fL      Platelets 312 10*3/mm3      Neutrophil % 92.0 %      Lymphocyte % 4.2 %      Monocyte % 3.1 %      Eosinophil % 0.0 %      Basophil % 0.0 %      Immature Grans % 0.7 %      Neutrophils, Absolute 7.70 10*3/mm3      Lymphocytes, Absolute 0.35 10*3/mm3      Monocytes, Absolute 0.26 10*3/mm3      Eosinophils, Absolute 0.00 10*3/mm3      Basophils, Absolute 0.00 10*3/mm3      Immature Grans, Absolute 0.06 10*3/mm3         Imaging Results (last 24 hours)     ** No results found for the last 24 hours. **        Orders (last 24 hrs)     Start     Ordered    04/02/19 0000  ferrous gluconate (FERGON) 324 MG tablet  Daily With Breakfast      04/01/19 1558    04/01/19 2100  metoprolol tartrate (LOPRESSOR) tablet 100 mg  Every 12 Hours Scheduled      04/01/19 1421    04/01/19 1648  Discontinue IV  Once      04/01/19 1648    04/01/19 1558  Discontinue IV  Once      04/01/19 1558    04/01/19 1553  Discharge patient  Once      04/01/19 1558    04/01/19 0600  CBC Auto Differential  PROCEDURE ONCE      04/01/19 0002    04/01/19 0000  fluticasone-salmeterol (ADVAIR) 250-50 MCG/DOSE DISKUS  2 Times Daily - RT,   Status:  Discontinued      04/01/19 1558    04/01/19 0000  metoprolol tartrate (LOPRESSOR) 100 MG tablet  Every 12 Hours Scheduled      04/01/19 1558    04/01/19 0000  pantoprazole (PROTONIX) 40 MG EC tablet  Daily      04/01/19 1558    04/01/19 0000  predniSONE (DELTASONE) 20 MG tablet  Daily      04/01/19 1558    04/01/19 0000  azithromycin (ZITHROMAX) 500 MG tablet  Daily      04/01/19 1558    04/01/19 0000   Activity as Tolerated      04/01/19 1558    04/01/19 0000  Diet: Cardiac      04/01/19 1558    04/01/19 0000  Home Oxygen Therapy      04/01/19 1558    04/01/19 0000  Ambulatory Referral to Home Health      04/01/19 1648    04/01/19 0000  ipratropium-albuterol (DUO-NEB) 0.5-2.5 mg/3 ml nebulizer  Every 4 Hours PRN,   Status:  Discontinued      04/01/19 1648    04/01/19 0000  Home Nebulizer      04/01/19 1648    04/01/19 0000  Cane      04/01/19 1648    04/01/19 0000  ipratropium (ATROVENT) 0.02 % nebulizer solution  Every 6 Hours      04/01/19 1653    03/31/19 2100  metoprolol tartrate (LOPRESSOR) tablet 75 mg  Every 12 Hours Scheduled,   Status:  Discontinued      03/31/19 1409    03/31/19 1715  metoprolol tartrate (LOPRESSOR) tablet 75 mg  Every 12 Hours Scheduled,   Status:  Discontinued      03/31/19 1617    03/31/19 0800  ferrous gluconate tablet 324 mg  Daily With Breakfast      03/30/19 1715    03/29/19 2100  sodium chloride 0.9 % infusion  Continuous      03/29/19 2011 03/29/19 1436  CBC & Differential  Daily      03/29/19 1435    03/29/19 1436  Basic Metabolic Panel  Daily      03/29/19 1435    03/29/19 1045  sodium chloride 0.9 % flush 10 mL  Every 12 Hours Scheduled      03/29/19 0957    03/29/19 0957  sodium chloride 0.9 % flush 10 mL  As Needed      03/29/19 0957    03/26/19 1800  Dietary Nutrition Supplements Magic Cup  Daily With Lunch & Dinner      03/26/19 1616    03/26/19 1330  budesonide (PULMICORT) nebulizer solution 0.5 mg  2 Times Daily - RT      03/26/19 1221    03/26/19 1300  ipratropium (ATROVENT) nebulizer solution 0.5 mg  Every 4 Hours - RT      03/26/19 1204    03/24/19 2100  amiodarone (PACERONE) tablet 200 mg  Every 12 Hours Scheduled      03/24/19 1504    03/24/19 2100  apixaban (ELIQUIS) tablet 2.5 mg  Every 12 Hours Scheduled      03/24/19 1504    03/24/19 2100  cilostazol (PLETAL) tablet 100 mg  2 Times Daily      03/24/19 1504    03/24/19 1600  atorvastatin (LIPITOR) tablet 40  mg  Daily      03/24/19 1504    03/24/19 1600  hydrALAZINE (APRESOLINE) tablet 50 mg  Every 8 Hours Scheduled      03/24/19 1504    03/24/19 1600  losartan (COZAAR) tablet 50 mg  Every 24 Hours Scheduled      03/24/19 1504    03/24/19 1145  sodium chloride 0.9 % flush 3 mL  Every 12 Hours Scheduled      03/24/19 1143    03/24/19 1145  methylPREDNISolone sodium succinate (SOLU-Medrol) injection 40 mg  Every 12 Hours      03/24/19 1143    03/24/19 1145  pantoprazole (PROTONIX) EC tablet 40 mg  Every Early Morning      03/24/19 1143    03/24/19 1143  nitroglycerin (NITROSTAT) SL tablet 0.4 mg  Every 5 Minutes PRN      03/24/19 1143    03/24/19 1143  sodium chloride 0.9 % flush 1-10 mL  As Needed      03/24/19 1143    03/24/19 0741  sodium chloride 0.9 % flush 10 mL  As Needed      03/24/19 0742    Unscheduled  ECG 12 Lead  As Needed     Comments:  Nurse to Release if Patient Expericences Acute Chest Pain or Dysrhythmias    03/24/19 1143    Unscheduled  Potassium  As Needed     Comments:  For Ventricular Arrhythmias      03/24/19 1143    Unscheduled  Magnesium  As Needed     Comments:  For Ventricular Arrhythmias      03/24/19 1143    Unscheduled  Troponin  As Needed     Comments:  For Chest Pain      03/24/19 1143    Unscheduled  Digoxin Level  As Needed     Comments:  For Atrial Arrhythmias      03/24/19 1143    Unscheduled  Blood Gas, Arterial  As Needed     Comments:  Per O2 PolicyNotify Physician      03/24/19 1143    Unscheduled  Up With Assistance  As Needed      03/24/19 1143    Unscheduled  Change Dressing to IV Site As Needed When Damp, Loose or Soiled  As Needed      03/29/19 0957    Unscheduled  Change Needleless Connectors  As Needed     Comments:  Change Needleless Connectors When:  - Removed For Any Reason  - Residual Blood or Debris Within Connector  - Prior to Drawing Blood Cultures  - Contamination of Connector  - After Administration of Blood or Blood Components    03/29/19 0957    --  cilostazol  (PLETAL) 100 MG tablet  2 Times Daily      19 0751    --  SCANNED - TELEMETRY        19 0000    --  SCANNED - TELEMETRY        19 0000    --  SCANNED - TELEMETRY        19 0000    --  SCANNED - TELEMETRY        19 0000          Operative/Procedure Notes (most recent note)     No notes of this type exist for this encounter.           Physician Progress Notes (most recent note)      Austin Vogt MD at 3/31/2019 12:22 PM              [facility]     Patient Identification:  Name:  Rupinder Lees  Age:  83 y.o.  Sex:  female  :  1935  MRN:  [id number]  Visit Number:  [id number]  Primary Care Provider:  Gifty Kruse MD    Length of stay:  7    Chief complaint:  83 y.o. old female admitted with Shortness of Breath          Subjective:    Pt seen with nursing staff.  Nursing staff report that patient's heart rate went upto 160s. Pt was sitting in her bed and states that she was not doing anything at all. Heart rate is improved now.          Current Hospital Meds:    amiodarone 200 mg Oral Q12H   apixaban 2.5 mg Oral Q12H   atorvastatin 40 mg Oral Daily   budesonide 0.5 mg Nebulization BID - RT   cilostazol 100 mg Oral BID   ferrous gluconate 324 mg Oral Daily With Breakfast   hydrALAZINE 50 mg Oral Q8H   ipratropium 0.5 mg Nebulization Q4H - RT   losartan 50 mg Oral Q24H   methylPREDNISolone sodium succinate 40 mg Intravenous Q12H   metoprolol tartrate 50 mg Oral Q12H   pantoprazole 40 mg Oral Q AM   sodium chloride 10 mL Intravenous Q12H   sodium chloride 3 mL Intravenous Q12H       sodium chloride 50 mL/hr Last Rate: 50 mL/hr (19 6799)     ----------------------------------------------------------------------------------------------------------------------  Vital Signs:  Temp:  [97.7 °F (36.5 °C)-98.5 °F (36.9 °C)] 98.5 °F (36.9 °C)  Heart Rate:  [] 83  Resp:  [18-21] 20  BP: ()/(40-84) 111/59      19  0503  03/27/19  0501 03/29/19  0500   Weight: 53.6 kg (118 lb 1 oz) 54.1 kg (119 lb 5 oz) 51.7 kg (113 lb 14.4 oz)     Body mass index is 22.24 kg/m².    Intake/Output Summary (Last 24 hours) at 3/31/2019 1223  Last data filed at 3/31/2019 0216  Gross per 24 hour   Intake 480 ml   Output --   Net 480 ml     Diet Regular; Cardiac  ----------------------------------------------------------------------------------------------------------------------  Physical exam:  Constitutional:  Well-developed and well-nourished.     HENT:  Head:  Normocephalic and atraumatic.  Mouth:  Moist mucous membranes.    Eyes:  Conjunctivae and EOM are normal.  Pupils are equal, round, and reactive to light.   Neck:  Neck supple.  No JVD present.    Cardiovascular:  Regular rate and rhythm. S1+S2. No murmur, rubs or gallops.   Pulmonary/Chest: Expiratory wheezing in right hemithorax and inspiratory crackles in left basilar area., impoving  Air entry is improving gradually and is better than yesterday.  Abdominal:  Soft. Non-tender. No viscera palpable.  Bowel sounds audible.   Musculoskeletal: No deformity or joint swelling.   Peripheral vascular: Bilateral dorsalis pedis palpable. No edema.   Neurological:  Alert and oriented to person, place, and time.  Cranial nerves grossly intact. Strength bilaterally symmetrical in upper and lower extremities.   Skin:  Skin is warm and dry. No rash noted. No pallor.   ----------------------------------------------------------------------------------------------------------------------  Tele:  Sinus rhythm  ----------------------------------------------------------------------------------------------------------------------      Results from last 7 days   Lab Units 03/31/19  0622 03/30/19  0347 03/29/19  1444 03/28/19  0319 03/26/19  0449 03/25/19  0037  03/24/19  1914 03/24/19  1352   CRP mg/dL  --   --   --  0.98* 4.00* 10.89*  --   --   --    LACTATE mmol/L  --   --   --   --   --  1.2  --  3.6* 4.1*    WBC 10*3/mm3 10.21 7.45 11.66* 7.54 14.25* 9.98   < >  --   --    HEMOGLOBIN g/dL 7.3* 7.5* 7.9* 7.3* 7.5* 7.0*   < >  --   --    HEMATOCRIT % 24.0* 24.5* 25.7* 24.2* 24.8* 23.0*   < >  --   --    MCV fL 88.2 89.7 88.6 90.6 92.2 92.4   < >  --   --    MCHC g/dL 30.4* 30.6* 30.7* 30.2* 30.2* 30.4*   < >  --   --    PLATELETS 10*3/mm3 328 296 316 291 273 229   < >  --   --     < > = values in this interval not displayed.         Results from last 7 days   Lab Units 03/31/19  0622 03/30/19  0347 03/29/19  1444 03/28/19  0319 03/25/19  0037   SODIUM mmol/L 142 142 140 137 139   POTASSIUM mmol/L 3.8 3.9 3.9 3.9 3.4*   MAGNESIUM mg/dL  --   --   --  2.1 2.1   CHLORIDE mmol/L 110* 110* 106 108* 109*   CO2 mmol/L 21.5* 20.8* 20.1* 18.1* 18.8*   BUN mg/dL 33* 36* 36* 35* 20   CREATININE mg/dL 1.14* 1.16* 1.32* 1.35* 1.08*   EGFR IF NONAFRICN AM mL/min/1.73 46* 45* 39* 38* 49*   CALCIUM mg/dL 8.6 8.5* 9.2 8.5* 8.6   GLUCOSE mg/dL 128* 117* 120* 126* 153*   ALBUMIN g/dL  --   --   --  3.07* 3.52   BILIRUBIN mg/dL  --   --   --  0.2 0.2   ALK PHOS U/L  --   --   --  63 72   AST (SGOT) U/L  --   --   --  34* 163*   ALT (SGPT) U/L  --   --   --  117* 184*   Estimated Creatinine Clearance: 31.6 mL/min (A) (by C-G formula based on SCr of 1.14 mg/dL (H)).    No results found for: AMMONIA      Blood Culture   Date Value Ref Range Status   03/24/2019 No growth at 24 hours  Preliminary   03/24/2019 No growth at 24 hours  Preliminary                I have personally looked at the labs and they are summarized above.  ----------------------------------------------------------------------------------------------------------------------  Imaging Results (last 24 hours)     ** No results found for the last 24 hours. **        ----------------------------------------------------------------------------------------------------------------------  Assessment and Plan:      -Sepsis due to CAP  Improving. Pt is afebrile and VSS. Lactic acid is  WNL, CRP improved. No growth on culture so far. Cont rocephin and doxy.     -Community-acquired pneumonia, left-sided  CXR showed left basilar airspace disease.  Patient continued to have persistent wheezing and CT scan of the chest was done that showed cardiomegaly with interstitial thickening and occasional areas of groundglass attenuation compatible with CHF, centrilobular nodularity of the right greater than left mid upper lung zones favoring atypical pneumonia and resolving phase and granulomatous changes.    serology -ve for mycoplasma, legionella and influenza.  Continue antibiotics as outlined above.    -Acute COPD exacerbation  Patient continues to have wheezing but is gradually improving.  Continue Atrovent nebs, Pulmicort nebs, and Solu-Medrol.  Continue Mucinex.      -PAF  Pt continues to have intermittent tachycardia. Will increase metoprolol and cont amiodarone and anticoagulation with eliquis. Will consult cardiology.     -Renal insufficiency  Creatinine is improved and is down to 1.1 from 1.3.  Continue gentle hydration and monitor renal function.    -Elevated LFTs  Improving.  Continue to monitor.    -Lactic acidosis  Resolved.     -Normocytic normochronic anemia  Hb is stable around 7 since yesterday.  Stool for occult blood is still pending.   Iron and iron saturation is low, B12 and folic acid is within normal limits.  Will start patient on iron replacement.    - Prophylaxis  DVT: eliquis  GI: PPI.       The patient is high risk due to:     I discussed the patients findings and my recommendations with patient and nursing staff.    Austin Vogt MD  03/31/19  12:23 PM    Electronically signed by Austin Vogt MD at 4/1/2019 12:03 AM          Consult Notes (most recent note)      Amy Barr APRN at 4/1/2019  1:36 PM      Consult Orders    1. Inpatient Cardiology Consult [[id number]] ordered by Austin Vogt MD at 03/31/19 1411                Inpatient Cardiology Consult  Consult  performed by: Amy Barr APRN  Consult ordered by: Austin Vogt MD        Date of Admit: 3/24/2019  Date of Consult: 04/01/19  No ref. provider found  Rupinder Lees  1935    Cardiology consultation    Reason for consultation: Paroxysms of tachycardia    Assessment:  1. Paroxysmal supraventricular tachycardia  2. Complete heart block status post permanent pacemaker placement December 2018  3. Peripheral arterial disease  4. Hypertension  5. Dyslipidemia  6. COPD  7. Tobacco use      Recommendations:  1. Ms. Lees has had no additional episodes of tachycardia today.    2. Will increase metoprolol to 100 mg BID.    3. Thank you for the consult.        History of Present Illness    Subjective     Chief Complaint   Patient presents with   • Shortness of Breath       Rupinder Lees is a 83 y.o. female with past medical history significant for complete heart block status post permanent pacemaker placement in December 2018, peripheral arterial disease, hypertension, dyslipidemia,  COPD, and tobacco use.  She presented to the ED on 3/24/2019 with complaint of shortness of air and cough.  She was admitted with pneumonia.  2 days ago she had a episode of tachycardia lasting about 30 minutes, cardiology was consulted.    Ms. Lees was asleep when I entered the room.  She awoke easily.  She denies chest pain and palpitations.  She denies symptoms of dizziness, lightheadedness, near-syncope or syncope.  She states that she was unaware of having had short episodes of tachycardia yesterday.    Review of telemetry strips reveal 2 paroxysms of tachycardia yesterday lasting no more than 2-3 minutes each.  She has had no subsequent events.      Past Medical History:   Diagnosis Date   • COPD  12/17/2018   • DVT of lower extremity (deep venous thrombosis) (CMS/[facility])    • History of CVA 2013 12/17/2018   • HTN 12/17/2018   • PAD  12/17/2018   • Tobacco use 12/17/2018     Past Surgical History:   Procedure Laterality  Date   • CARDIAC CATHETERIZATION  12/17/2018    Procedure: Left Heart Cath;  Surgeon: Imtiaz Fuentes MD;  Location:  [facility]ATH [facility]ATION;  Service: Cardiology   • CARDIAC CATHETERIZATION N/A 12/17/2018    Procedure: Thrombolysis-peripheral;  Surgeon: Imtiaz Fuentes MD;  Location:  [facility]ATH [facility];  Service: Cardiology   • CARDIAC ELECTROPHYSIOLOGY PROCEDURE N/A 12/18/2018    Procedure: PACEMAKER IMPLANTATION- DC;  Surgeon: Thony Bobby MD;  Location:  [facility] INVASIVE [facility];  Service: Cardiology   • CARDIAC ELECTROPHYSIOLOGY PROCEDURE N/A 12/17/2018    Procedure: Temporary Pacemaker;  Surgeon: Imtiaz Fuentes MD;  Location:  [facility]ATH [facility];  Service: Cardiology   • FEMORAL ARTERY STENT  2013   • FEMORAL POPLITEAL BYPASS Right 12/17/2018    Procedure: LOWER EXTREMITY EMBELECTOMY RIGHT;  Surgeon: David Beatty MD;  Location:  [facility] HYBRID OR 15;  Service: Vascular   • HYSTERECTOMY     • LEFT HEART CATH  12/17/2018    At [facility] with TV PM placement    • PACEMAKER IMPLANTATION       Family History   Problem Relation Age of Onset   • Cancer Mother    • Cancer Father    • Heart attack Brother    • Heart attack Brother      Social History     Tobacco Use   • Smoking status: Current Every Day Smoker     Packs/day: 0.50     Years: 55.00     Pack years: 27.50     Types: Cigarettes   • Smokeless tobacco: Never Used   Substance Use Topics   • Alcohol use: No     Frequency: Never   • Drug use: No     Medications Prior to Admission   Medication Sig Dispense Refill Last Dose   • cilostazol (PLETAL) 100 MG tablet Take 100 mg by mouth 2 (Two) Times a Day.   3/22/2019 at pm   • amiodarone (PACERONE) 200 MG tablet Take 1 tablet by mouth Every 12 (Twelve) Hours. 60 tablet 6 3/22/2019 at pm   • apixaban (ELIQUIS) 2.5 MG tablet tablet Take 1 tablet by mouth Every 12 (Twelve) Hours. 60 tablet 6 3/22/2019 at pm   • atenolol (TENORMIN) 25 MG tablet Take 25  mg by mouth Daily.   3/22/2019 at am   • atorvastatin (LIPITOR) 40 MG tablet Take 40 mg by mouth Daily.   3/22/2019 at pm   • hydrALAZINE (APRESOLINE) 50 MG tablet Take 50 mg by mouth 3 (Three) Times a Day.   3/22/2019 at pm   • meloxicam (MOBIC) 15 MG tablet Take 15 mg by mouth Daily.   3/22/2019 at am   • metoprolol tartrate (LOPRESSOR) 50 MG tablet Take 1 tablet by mouth Every 12 (Twelve) Hours. 60 tablet 6 3/22/2019 at pm   • olmesartan (BENICAR) 40 MG tablet Take 40 mg by mouth Daily.   3/22/2019 at  am     Allergies:  Patient has no known allergies.    Review of Systems   Constitutional: Negative for fatigue.   Respiratory: Negative for shortness of breath.    Cardiovascular: Negative for chest pain, palpitations and leg swelling.   Gastrointestinal: Negative for blood in stool.   Neurological: Negative for dizziness, syncope, weakness and light-headedness.   Hematological: Does not bruise/bleed easily.         Objective      Vital Signs  Temp:  [98 °F (36.7 °C)-98.7 °F (37.1 °C)] 98.7 °F (37.1 °C)  Heart Rate:  [] 70  Resp:  [18-21] 20  BP: (116-141)/(65-82) 130/71  Body mass index is 23.74 kg/m².    Intake/Output Summary (Last 24 hours) at 4/1/2019 1336  Last data filed at 4/1/2019 1138  Gross per 24 hour   Intake 360 ml   Output --   Net 360 ml       Physical Exam   Constitutional: She appears well-developed and well-nourished.   HENT:   Head: Normocephalic and atraumatic.   Eyes: Pupils are equal, round, and reactive to light.   Neck: No JVD present.   Cardiovascular: Normal rate, regular rhythm and intact distal pulses. Exam reveals no gallop and no friction rub.   No murmur heard.  Pulmonary/Chest: Effort normal. No respiratory distress. She has wheezes. She has rhonchi. She has no rales.   Abdominal: Soft. She exhibits no mass. There is no tenderness. No hernia.   Skin: Skin is warm and dry.   Psychiatric: She has a normal mood and affect.   Vitals reviewed.      Telemetry: Paced 70s    Results  Review:   I reviewed the patient's new clinical results.      Results from last 7 days   Lab Units 04/01/19  0503 03/31/19  0622 03/30/19 0347 03/29/19  1444 03/28/19  0319 03/26/19  0449   WBC 10*3/mm3 8.37 10.21 7.45 11.66* 7.54 14.25*   HEMOGLOBIN g/dL 7.1* 7.3* 7.5* 7.9* 7.3* 7.5*   PLATELETS 10*3/mm3 312 328 296 316 291 273     Results from last 7 days   Lab Units 04/01/19  0503 03/31/19  0622 03/30/19 0347 03/29/19  1444 03/28/19  0319   SODIUM mmol/L 140 142 142 140 137   POTASSIUM mmol/L 3.9 3.8 3.9 3.9 3.9   CHLORIDE mmol/L 109* 110* 110* 106 108*   CO2 mmol/L 21.0* 21.5* 20.8* 20.1* 18.1*   BUN mg/dL 30* 33* 36* 36* 35*   CREATININE mg/dL 0.95 1.14* 1.16* 1.32* 1.35*   CALCIUM mg/dL 8.3* 8.6 8.5* 9.2 8.5*   GLUCOSE mg/dL 108* 128* 117* 120* 126*   ALT (SGPT) U/L  --   --   --   --  117*   AST (SGOT) U/L  --   --   --   --  34*     Lab Results   Component Value Date    INR 1.19 (H) 02/06/2019    INR 1.02 12/17/2018    INR 0.91 05/30/2015    INR 0.90 05/30/2015     Lab Results   Component Value Date    MG 2.1 03/28/2019    MG 2.1 03/25/2019    MG 2.4 02/06/2019     Lab Results   Component Value Date    TSH 0.762 12/17/2018    CHLPL 257 (H) 05/31/2015    TRIG 171 (H) 12/17/2018    HDL 38 (L) 12/17/2018    LDL 95 12/17/2018      Lab Results   Component Value Date    .0 (H) 02/06/2019    .0 (H) 12/17/2018     (H) 05/30/2015        EKG:     Imaging Results (last 72 hours)     Procedure Component Value Units Date/Time    CT Chest Without Contrast [[id number]] Collected:  03/30/19 0822     Updated:  03/30/19 0826    Narrative:       EXAM: CT CHEST WO CONTRAST-            CLINICAL INDICATION:Cough, persistent      COMPARISON: NONE.     TECHNIQUE: Multiple axial CT images were obtained from lung apex through  upper abdomen WITHOUT administration of IV contrast. Reformatted images  in the coronal and/or sagittal plane(s) were generated from the axial  data set to facilitate diagnostic accuracy  and/or surgical planning.  Oral Contrast:NONE.     Radiation dose reduction techniques were utilized per ALARA protocol.  Automated exposure control was initiated through either or Telik or  DoseRight software packages by  protocol.    DOSE (DLP mGy-cm): 309.71 mGy.cm        FINDINGS:     LUNGS: THERE IS A MILD DEGREE OF PULMONARY INTERSTITIAL THICKENING WITH  SUBTLE AREAS OF PATCHY CENTRILOBULAR GROUNDGLASS ATTENUATION CAN BE SEEN  WITH UNDERLYING CHANGES OF ATYPICAL PNEUMONIA AS WELL AS MILD EDEMA.  CENTRILOBULAR NODULES OF THE BILATERAL UPPER LOBE REGION WITH CALCIFIED  GRANULOMAS OF THE LOWER LOBES NOTED REFLECTING SEQUELA OF ATYPICAL  PNEUMONIA/GRANULOMATOUS DISEASE.     HEART: MILD CARDIOMEGALY WITH MODERATE CORONARY ARTERY CALCIFICATIONS.     PERICARDIUM: No effusion.     MEDIASTINUM: No masses. No enlarged lymph nodes.  No fluid collections.     PLEURA: No pleural effusion. No pleural mass or abnormal calcification.  No pneumothorax.     MAJOR AIRWAYS: Clear. No intrinsic mass.     VASCULATURE: MODERATE ATHEROSCLEROSIS THORACIC AORTA WITHOUT EVIDENCE OF  ANEURYSM.     VISUALIZED UPPER ABDOMEN:CYSTS ARE SEEN THROUGHOUT THE LIVER. PRIOR  CHOLECYSTECTOMY. PROBABLE BENIGN ADENOMA LEFT ADRENAL GLAND.     OTHER: Fluid throughout the esophagus compatible with gastroesophageal  reflux..     BONES: No acute bony abnormality.       Impression:       1. Cardiomegaly with interstitial thickening and occasional areas of  groundglass attenuation most compatible with CHF.  2. Centrilobular nodularity of the right greater than left mid and upper  lung zones favors atypical pneumonia possibly in the resolving phase.  3. Granulomatous changes.  4. Gastroesophageal reflux..     This report was finalized on 3/30/2019 8:24 AM by Dr. Pedrito Alejandre MD.                Thank you very much for asking us to be involved in this patient's care.  We will follow along with you.    Amy Barr, APRN   04/01/19  1:36  PM        Electronically signed by Amy Barr APRN at 4/1/2019  2:22 PM       Nutrition Notes (most recent note)     No notes of this type exist for this encounter.        Physical Therapy Notes (most recent note)     No notes of this type exist for this encounter.        Occupational Therapy Notes (most recent note)     No notes of this type exist for this encounter.        Speech Language Pathology Notes (most recent note)     No notes of this type exist for this encounter.        Respiratory Therapy Notes (most recent note)     No notes of this type exist for this encounter.        ADL Documentation (most recent)      Most Recent Value   Presence of Pain  denies pain/discomfort   Transferring  3 - assistive equipment and person   Toileting  3 - assistive equipment and person   Bathing  2 - assistive person   Dressing  0 - independent   Eating  0 - independent   Communication  0 - understands/communicates without difficulty   Swallowing  0 - swallows foods/liquids without difficulty   Equipment Currently Used at Home  none            Discharge Summary     No notes of this type exist for this encounter.        Discharge Order (From admission, onward)    Start     Ordered    04/01/19 1550  Discharge patient  Once     Expected Discharge Date:  04/01/19    [place] [facility] [place]    Physician of Record for Attribution - Please select from Treatment Team:  CASEY ARRIAGA [[id number]]    Review needed by CMO to determine Physician of Record:  No       Question Answer Comment   Physician of Record for Attribution - Please select from Treatment Team CASEY ARRIAGA    Review needed by CMO to determine Physician of Record No        04/01/19 1553

## 2019-04-01 NOTE — DISCHARGE SUMMARY
[facility] DISCHARGE SUMMARY    Patient Identification:  Name:  Rupinder Lees  Age:  83 y.o.  Sex:  female  :  1935  MRN:  [id number]  Visit Number:  [id number]    Date of Admission: 3/24/2019  Date of Discharge:  2019     PCP: Gifty Kruse MD    DISCHARGE DIAGNOSIS    -Community-acquired pneumonia, left-sided  -COPD exacerbation, requires home oxygen as she was hypoxic required to be on 2 L  -PAF much improved  -Renal insufficiency  -Elevated LFTs  -Lactic acidosis  -Normocytic normochronic anemia      HOSPITAL COURSE  83 years old female admitted with COPD exacerbation. She continued to have bronchospasm which is slowly improving. Pt has h/o PAF and started having intermittent tachycardia. BB was increased.  Feels much better compared to before, although she was required to be on oxygen as she dropped her pulse ox on room air, dropped to the 86%.  Patient improved to the mid 90s on 2 L of oxygen.    VITAL SIGNS:  Temp:  [98 °F (36.7 °C)-98.7 °F (37.1 °C)] 98.7 °F (37.1 °C)  Heart Rate:  [69-75] 70  Resp:  [18-20] 18  BP: (117-141)/(65-82) 130/71  SpO2:  [83 %-99 %] 97 %  on  Flow (L/min):  [1-2] 2;   Device (Oxygen Therapy): nasal cannula    Body mass index is 23.74 kg/m².  Wt Readings from Last 3 Encounters:   19 55.1 kg (121 lb 9 oz)   19 53.9 kg (118 lb 12.8 oz)   19 52.2 kg (115 lb)       PHYSICAL EXAM:  Constitutional:  Well-developed and well-nourished.     HENT:  Head:  Normocephalic and atraumatic.  Mouth:  Moist mucous membranes.    Eyes:  Conjunctivae and EOM are normal.  Pupils are equal, round, and reactive to light.   Neck:  Neck supple.  No JVD present.    Cardiovascular:  Regular rate and rhythm. S1+S2. No murmur, rubs or gallops.   Pulmonary/Chest: Expiratory wheezing in right hemithorax and inspiratory crackles in left basilar area., impoving  Air entry is improving gradually and is better than yesterday.  Abdominal:  Soft.  Non-tender. No viscera palpable.  Bowel sounds audible.   Musculoskeletal: No deformity or joint swelling.   Peripheral vascular: Bilateral dorsalis pedis palpable. No edema.   Neurological:  Alert and oriented to person, place, and time.  Cranial nerves grossly intact.         DISCHARGE DISPOSITION   Stable    DISCHARGE MEDICATIONS:     Discharge Medications      New Medications      Instructions Start Date   azithromycin 500 MG tablet  Commonly known as:  ZITHROMAX   500 mg, Oral, Daily      ferrous gluconate 324 MG tablet  Commonly known as:  FERGON   324 mg, Oral, Daily With Breakfast   Start Date:  4/2/2019     ipratropium 0.02 % nebulizer solution  Commonly known as:  ATROVENT   500 mcg, Nebulization, Every 6 Hours      pantoprazole 40 MG EC tablet  Commonly known as:  PROTONIX   40 mg, Oral, Daily      predniSONE 20 MG tablet  Commonly known as:  DELTASONE   40 mg, Oral, Daily         Changes to Medications      Instructions Start Date   metoprolol tartrate 100 MG tablet  Commonly known as:  LOPRESSOR  What changed:    · medication strength  · how much to take   100 mg, Oral, Every 12 Hours Scheduled         Continue These Medications      Instructions Start Date   amiodarone 200 MG tablet  Commonly known as:  PACERONE   200 mg, Oral, Every 12 Hours Scheduled      atorvastatin 40 MG tablet  Commonly known as:  LIPITOR   40 mg, Oral, Daily      cilostazol 100 MG tablet  Commonly known as:  PLETAL   100 mg, Oral, 2 Times Daily      ELIQUIS 2.5 MG tablet tablet  Generic drug:  apixaban   2.5 mg, Oral, Every 12 Hours Scheduled      hydrALAZINE 50 MG tablet  Commonly known as:  APRESOLINE   50 mg, Oral, 3 Times Daily      olmesartan 40 MG tablet  Commonly known as:  BENICAR   40 mg, Oral, Daily         Stop These Medications    atenolol 25 MG tablet  Commonly known as:  TENORMIN     meloxicam 15 MG tablet  Commonly known as:  MOBIC            Diet Instructions     Diet: Cardiac      Discharge Diet:  Cardiac         Activity Instructions     Activity as Tolerated          Future Appointments   Date Time Provider Department Center   4/12/2019 11:30 AM Tyrese Davis MD [facility] None   5/2/2019 10:00 AM David Beatty MD [facility] None     Your Scheduled Appointments    Apr 12, 2019 11:30 AM EDT  Follow Up with Tyrese Davis MD  [facility] CARDIOLOGY (--) [address]  [facility] [address] [phone]   Arrive 15 minutes prior to appointment.   May 02, 2019 10:00 AM EDT  Office Visit with David Beatty MD  [facility] CARDIOTHORACIC SURGERY (--) [address]  [facility] [address] [phone]   Arrive 15 minutes prior to appointment.  If you are in need of a language or hearing  please call the Department.    Additional instructions:      Follow-up with Dr Jordan April 8, 2019 at 1:45                  Additional Instructions for the Follow-ups that You Need to Schedule     Ambulatory Referral to Home Health   As directed      Face to Face Visit Date:  4/1/2019    Follow-up Provider for Plan of Care?:  I treated the patient in an acute care facility and will not continue treatment after discharge.    Follow-up Provider:  JUSTIN JORDAN [[id number]]    Reason/Clinical Findings:  COPD exacerbation    Describe mobility limitations that make leaving home difficult:  debility    Nursing/Therapeutic Services Requested:  Skilled Nursing Physical Therapy Occupational Therapy    Skilled nursing orders:  O2 instruction COPD management Cardiopulmonary assessments    PT orders:  Strengthening Home safety assessment    Occupational orders:  Activities of daily living Energy conservation Home safety assessment Strengthening Cognition Fine motor    Frequency:  1 Week 1           Follow-up Information     Justin Jordan MD Follow up.    Specialty:  Geriatric Medicine  Contact information:  [address]  [address]  [phone]             [facility] [place] REFERRAL .    Specialty:  Home Health Services  Contact information:  [address] [id number] [phone]                     CODE STATUS  Code Status and Medical Interventions:   Ordered at: 03/24/19 1058     Code Status:    CPR     Medical Interventions (Level of Support Prior to Arrest):    Full       Mhd Praveena Munguia MD  04/01/19  5:50 PM    Please note that this discharge summary required more than 30 minutes to complete.    Please send a copy of this dictation to the following providers:  Gifty Kruse MD

## 2019-04-01 NOTE — CONSULTS
Inpatient Cardiology Consult  Consult performed by: Amy Barr APRN  Consult ordered by: Austin Vogt MD        Date of Admit: 3/24/2019  Date of Consult: 04/01/19  No ref. provider found  Rupinder Lees  1935    Cardiology consultation    Reason for consultation: Paroxysms of tachycardia    Assessment:  1. Paroxysmal supraventricular tachycardia  2. Complete heart block status post permanent pacemaker placement December 2018  3. Peripheral arterial disease  4. Hypertension  5. Dyslipidemia  6. COPD  7. Tobacco use      Recommendations:  1. Ms. Lees has had no additional episodes of tachycardia today.    2. Will increase metoprolol to 100 mg BID.    3. Thank you for the consult.        History of Present Illness    Subjective     Chief Complaint   Patient presents with   • Shortness of Breath       Rupinder Lees is a 83 y.o. female with past medical history significant for complete heart block status post permanent pacemaker placement in December 2018, peripheral arterial disease, hypertension, dyslipidemia,  COPD, and tobacco use.  She presented to the ED on 3/24/2019 with complaint of shortness of air and cough.  She was admitted with pneumonia.  2 days ago she had a episode of tachycardia lasting about 30 minutes, cardiology was consulted.    Ms. Lees was asleep when I entered the room.  She awoke easily.  She denies chest pain and palpitations.  She denies symptoms of dizziness, lightheadedness, near-syncope or syncope.  She states that she was unaware of having had short episodes of tachycardia yesterday.    Review of telemetry strips reveal 2 paroxysms of tachycardia yesterday lasting no more than 2-3 minutes each.  She has had no subsequent events.      Past Medical History:   Diagnosis Date   • COPD  12/17/2018   • DVT of lower extremity (deep venous thrombosis) (CMS/[facility])    • History of CVA 2013 12/17/2018   • HTN 12/17/2018   • PAD  12/17/2018   • Tobacco use 12/17/2018     Past  Surgical History:   Procedure Laterality Date   • CARDIAC CATHETERIZATION  12/17/2018    Procedure: Left Heart Cath;  Surgeon: Imtiaz Fuentes MD;  Location:  [facility] CATH INVASIVE LOCATION;  Service: Cardiology   • CARDIAC CATHETERIZATION N/A 12/17/2018    Procedure: Thrombolysis-peripheral;  Surgeon: Imtiaz Fuentes MD;  Location:  [facility] CATH INVASIVE LOCATION;  Service: Cardiology   • CARDIAC ELECTROPHYSIOLOGY PROCEDURE N/A 12/18/2018    Procedure: PACEMAKER IMPLANTATION- DC;  Surgeon: Thony Bobby MD;  Location:  [facility] EP INVASIVE LOCATION;  Service: Cardiology   • CARDIAC ELECTROPHYSIOLOGY PROCEDURE N/A 12/17/2018    Procedure: Temporary Pacemaker;  Surgeon: Imtiaz Fuentes MD;  Location:  [facility] CATH INVASIVE LOCATION;  Service: Cardiology   • FEMORAL ARTERY STENT  2013   • FEMORAL POPLITEAL BYPASS Right 12/17/2018    Procedure: LOWER EXTREMITY EMBELECTOMY RIGHT;  Surgeon: David Beatty MD;  Location:  [facility] HYBRID OR 15;  Service: Vascular   • HYSTERECTOMY     • LEFT HEART CATH  12/17/2018    At [facility] with TV PM placement    • PACEMAKER IMPLANTATION       Family History   Problem Relation Age of Onset   • Cancer Mother    • Cancer Father    • Heart attack Brother    • Heart attack Brother      Social History     Tobacco Use   • Smoking status: Current Every Day Smoker     Packs/day: 0.50     Years: 55.00     Pack years: 27.50     Types: Cigarettes   • Smokeless tobacco: Never Used   Substance Use Topics   • Alcohol use: No     Frequency: Never   • Drug use: No     Medications Prior to Admission   Medication Sig Dispense Refill Last Dose   • cilostazol (PLETAL) 100 MG tablet Take 100 mg by mouth 2 (Two) Times a Day.   3/22/2019 at pm   • amiodarone (PACERONE) 200 MG tablet Take 1 tablet by mouth Every 12 (Twelve) Hours. 60 tablet 6 3/22/2019 at pm   • apixaban (ELIQUIS) 2.5 MG tablet tablet Take 1 tablet by mouth Every 12 (Twelve) Hours. 60 tablet 6 3/22/2019 at pm   •  atenolol (TENORMIN) 25 MG tablet Take 25 mg by mouth Daily.   3/22/2019 at am   • atorvastatin (LIPITOR) 40 MG tablet Take 40 mg by mouth Daily.   3/22/2019 at pm   • hydrALAZINE (APRESOLINE) 50 MG tablet Take 50 mg by mouth 3 (Three) Times a Day.   3/22/2019 at pm   • meloxicam (MOBIC) 15 MG tablet Take 15 mg by mouth Daily.   3/22/2019 at am   • metoprolol tartrate (LOPRESSOR) 50 MG tablet Take 1 tablet by mouth Every 12 (Twelve) Hours. 60 tablet 6 3/22/2019 at pm   • olmesartan (BENICAR) 40 MG tablet Take 40 mg by mouth Daily.   3/22/2019 at  am     Allergies:  Patient has no known allergies.    Review of Systems   Constitutional: Negative for fatigue.   Respiratory: Negative for shortness of breath.    Cardiovascular: Negative for chest pain, palpitations and leg swelling.   Gastrointestinal: Negative for blood in stool.   Neurological: Negative for dizziness, syncope, weakness and light-headedness.   Hematological: Does not bruise/bleed easily.         Objective      Vital Signs  Temp:  [98 °F (36.7 °C)-98.7 °F (37.1 °C)] 98.7 °F (37.1 °C)  Heart Rate:  [] 70  Resp:  [18-21] 20  BP: (116-141)/(65-82) 130/71  Body mass index is 23.74 kg/m².    Intake/Output Summary (Last 24 hours) at 4/1/2019 1336  Last data filed at 4/1/2019 1138  Gross per 24 hour   Intake 360 ml   Output --   Net 360 ml       Physical Exam   Constitutional: She appears well-developed and well-nourished.   HENT:   Head: Normocephalic and atraumatic.   Eyes: Pupils are equal, round, and reactive to light.   Neck: No JVD present.   Cardiovascular: Normal rate, regular rhythm and intact distal pulses. Exam reveals no gallop and no friction rub.   No murmur heard.  Pulmonary/Chest: Effort normal. No respiratory distress. She has wheezes. She has rhonchi. She has no rales.   Abdominal: Soft. She exhibits no mass. There is no tenderness. No hernia.   Skin: Skin is warm and dry.   Psychiatric: She has a normal mood and affect.   Vitals  reviewed.      Telemetry: Paced 70s    Results Review:   I reviewed the patient's new clinical results.      Results from last 7 days   Lab Units 04/01/19  0503 03/31/19 0622 03/30/19 0347 03/29/19 1444 03/28/19 0319 03/26/19  0449   WBC 10*3/mm3 8.37 10.21 7.45 11.66* 7.54 14.25*   HEMOGLOBIN g/dL 7.1* 7.3* 7.5* 7.9* 7.3* 7.5*   PLATELETS 10*3/mm3 312 328 296 316 291 273     Results from last 7 days   Lab Units 04/01/19  0503 03/31/19  0622 03/30/19 0347 03/29/19 1444 03/28/19 0319   SODIUM mmol/L 140 142 142 140 137   POTASSIUM mmol/L 3.9 3.8 3.9 3.9 3.9   CHLORIDE mmol/L 109* 110* 110* 106 108*   CO2 mmol/L 21.0* 21.5* 20.8* 20.1* 18.1*   BUN mg/dL 30* 33* 36* 36* 35*   CREATININE mg/dL 0.95 1.14* 1.16* 1.32* 1.35*   CALCIUM mg/dL 8.3* 8.6 8.5* 9.2 8.5*   GLUCOSE mg/dL 108* 128* 117* 120* 126*   ALT (SGPT) U/L  --   --   --   --  117*   AST (SGOT) U/L  --   --   --   --  34*     Lab Results   Component Value Date    INR 1.19 (H) 02/06/2019    INR 1.02 12/17/2018    INR 0.91 05/30/2015    INR 0.90 05/30/2015     Lab Results   Component Value Date    MG 2.1 03/28/2019    MG 2.1 03/25/2019    MG 2.4 02/06/2019     Lab Results   Component Value Date    TSH 0.762 12/17/2018    CHLPL 257 (H) 05/31/2015    TRIG 171 (H) 12/17/2018    HDL 38 (L) 12/17/2018    LDL 95 12/17/2018      Lab Results   Component Value Date    .0 (H) 02/06/2019    .0 (H) 12/17/2018     (H) 05/30/2015        EKG:     Imaging Results (last 72 hours)     Procedure Component Value Units Date/Time    CT Chest Without Contrast [[id number]] Collected:  03/30/19 0822     Updated:  03/30/19 0826    Narrative:       EXAM: CT CHEST WO CONTRAST-            CLINICAL INDICATION:Cough, persistent      COMPARISON: NONE.     TECHNIQUE: Multiple axial CT images were obtained from lung apex through  upper abdomen WITHOUT administration of IV contrast. Reformatted images  in the coronal and/or sagittal plane(s) were generated from the  axial  data set to facilitate diagnostic accuracy and/or surgical planning.  Oral Contrast:NONE.     Radiation dose reduction techniques were utilized per ALARA protocol.  Automated exposure control was initiated through either or [employer] or  [employer] software packages by  protocol.    DOSE (DLP mGy-cm): 309.71 mGy.cm        FINDINGS:     LUNGS: THERE IS A MILD DEGREE OF PULMONARY INTERSTITIAL THICKENING WITH  SUBTLE AREAS OF PATCHY CENTRILOBULAR GROUNDGLASS ATTENUATION CAN BE SEEN  WITH UNDERLYING CHANGES OF ATYPICAL PNEUMONIA AS WELL AS MILD EDEMA.  CENTRILOBULAR NODULES OF THE BILATERAL UPPER LOBE REGION WITH CALCIFIED  GRANULOMAS OF THE LOWER LOBES NOTED REFLECTING SEQUELA OF ATYPICAL  PNEUMONIA/GRANULOMATOUS DISEASE.     HEART: MILD CARDIOMEGALY WITH MODERATE CORONARY ARTERY CALCIFICATIONS.     PERICARDIUM: No effusion.     MEDIASTINUM: No masses. No enlarged lymph nodes.  No fluid collections.     PLEURA: No pleural effusion. No pleural mass or abnormal calcification.  No pneumothorax.     MAJOR AIRWAYS: Clear. No intrinsic mass.     VASCULATURE: MODERATE ATHEROSCLEROSIS THORACIC AORTA WITHOUT EVIDENCE OF  ANEURYSM.     VISUALIZED UPPER ABDOMEN:CYSTS ARE SEEN THROUGHOUT THE LIVER. PRIOR  CHOLECYSTECTOMY. PROBABLE BENIGN ADENOMA LEFT ADRENAL GLAND.     OTHER: Fluid throughout the esophagus compatible with gastroesophageal  reflux..     BONES: No acute bony abnormality.       Impression:       1. Cardiomegaly with interstitial thickening and occasional areas of  groundglass attenuation most compatible with CHF.  2. Centrilobular nodularity of the right greater than left mid and upper  lung zones favors atypical pneumonia possibly in the resolving phase.  3. Granulomatous changes.  4. Gastroesophageal reflux..     This report was finalized on 3/30/2019 8:24 AM by Dr. Pedrito Alejandre MD.                Thank you very much for asking us to be involved in this patient's care.  We will follow along with  mandeep.    Amy Barr, APRN   04/01/19  1:36 PM

## 2019-04-01 NOTE — PLAN OF CARE
Problem: Patient Care Overview  Goal: Plan of Care Review  Outcome: Ongoing (interventions implemented as appropriate)      Problem: Fall Risk (Adult)  Goal: Absence of Fall  Outcome: Ongoing (interventions implemented as appropriate)      Problem: Breathing Pattern Ineffective (Adult)  Goal: Effective Oxygenation/Ventilation  Outcome: Ongoing (interventions implemented as appropriate)      Problem: Skin Injury Risk (Adult)  Goal: Identify Related Risk Factors and Signs and Symptoms  Outcome: Ongoing (interventions implemented as appropriate)

## 2019-04-01 NOTE — DISCHARGE PLACEMENT REQUEST
"Rupinder Arias (83 y.o. Female)     Date of Birth Social Security Number Address Home Phone MRN    1935  [address] [facility] [address] [phone] [id number]    Spiritism Marital Status          Protestant        Admission Date Admission Type Admitting Provider Attending Provider Department, Room/Bed    3/24/19 Emergency Austin Vogt MD Baibars, Mhd Motaz, MD [address], 3351/2S    Discharge Date Discharge Disposition Discharge Destination         Home-Health Care [facility]              Attending Provider:  Lilly Munguia MD    Allergies:  No Known Allergies    Isolation:  None   Infection:  None   Code Status:  CPR    Ht:  152.4 cm (60\")   Wt:  55.1 kg (121 lb 9 oz)    Admission Cmt:  None   Principal Problem:  None                Active Insurance as of 3/24/2019     Primary Coverage     Payor Plan Insurance Group Employer/Plan Group    MEDICARE MEDICARE A & B      Payor Plan Address Payor Plan Phone Number Payor Plan Fax Number Effective Dates    [address] [phone]  8/1/2002 - None Entered    Formerly [facility] [id number]       Subscriber Name Subscriber Birth Date Member ID       RUPINDER ARIAS 1935 [id number]           Secondary Coverage     Payor Plan Insurance Group Employer/Plan Group    ANTHEM BLUE CROSS ANTHEM BLUE CROSS BLUE SHIELD PPO [id number]     Payor Plan Address Payor Plan Phone Number Payor Plan Fax Number Effective Dates    [address] [phone]  1/1/2011 - None Entered    [facility] [address]       Subscriber Name Subscriber Birth Date Member ID       RUPINDER ARIAS 1935 [id number]                 Emergency Contacts      (Rel.) Home Phone Work Phone Mobile Phone    AGNIESZKA ARIAS (Daughter) [phone] -- --    Michaelle Arias (Other) [phone] -- --    NabeelCorrinay (Son) -- -- [phone]        [address] [address]  Dept. Phone:  [phone]  Dept. Fax:   Date " "Ordered: 2019         Patient:  Rupinder Lees MRN:  [id number]   [address] [place] [address] :  1935  SSN:    Phone: [phone] Sex:  F     Weight: 55.1 kg (121 lb 9 oz)         Ht Readings from Last 1 Encounters:   19 152.4 cm (60\")         Home Nebulizer          (Order ID: [id number])    Diagnosis:  COPD with hypoxia (CMS/HCC) (J44.9,R09.02 [ICD-10-CM] 496,799.02 [ICD-9-CM])   Quantity:  1     Nebulizer Equipment:  Nebulizer w/ Compressor  Nebulizer Accessories:  Nebulizer Kit - Administration Set, Non-Disposable  Length of Need (99 Months = Lifetime): 99 Months = Lifetime        Authorizing Provider's Phone: [phone]   Authorizing Provider:Lilly Munguia MD  Authorizing Provider's NPI: [id number]  Order Entered By: Lilly Munguia MD 2019  4:48 PM     Electronically signed by: Lilly Munguia MD 2019  4:48 PM        [address] [address]  Dept. Phone:  [phone]  Dept. Fax:   Date Ordered: 2019         Patient:  Rupinder Lees MRN:  [id number]   [address] [facility]  [place] [address] :  1935  SSN:    Phone: [phone] Sex:  F     Weight: 55.1 kg (121 lb 9 oz)         Ht Readings from Last 1 Encounters:   19 152.4 cm (60\")         Home Nebulizer          (Order ID: [id number])    Diagnosis:  COPD with hypoxia (CMS/HCC) (J44.9,R09.02 [ICD-10-CM] 496,799.02 [ICD-9-CM])   Quantity:  1     Nebulizer Equipment:  Nebulizer w/ Compressor  Nebulizer Accessories:  Nebulizer Kit - Administration Set, Non-Disposable  Length of Need (99 Months = Lifetime): 99 Months = Lifetime        Authorizing Provider's Phone: [phone]   Authorizing Provider:Lilly Munguia MD  Authorizing Provider's NPI: [id number]  Order Entered By: Lilly Munguia MD 2019  4:48 PM     Electronically signed by: Lilly Munguia MD 2019  4:48 PM        Hindu " "[address] [place] [address]  Dept. Phone:  [phone]  Dept. Fax:   Date Ordered: 2019         Patient:  Rupinder Lees MRN:  [id number]   [facility] [place] [address] [facility]  1935  SSN:    Phone: [phone] Sex:  F     Weight: 55.1 kg (121 lb 9 oz)         Ht Readings from Last 1 Encounters:   19 152.4 cm (60\")         Cane    (Order ID: [id number])    Diagnosis:  Ischemic leg (I99.8 [ICD-10-CM] 459.9 [ICD-9-CM])  Popliteal thrombosis (CMS/HCC) (I74.3 [ICD-10-CM] 444.22 [ICD-9-CM])   Quantity:  1     Equipment:  Quad or Three Prong Cane with Tips  Length of Need (99 Months = Lifetime): 99 Months = Lifetime        Authorizing Provider's Phone: [phone]   Authorizing Provider:Lilly Munguia MD  Authorizing Provider's NPI: [id number]  Order Entered By: Lilly Munguia MD 2019  4:48 PM     Electronically signed by: Lilly Munguia MD 2019  4:48 PM               History & Physical      Austin Vogt MD at 3/24/2019 10:44 AM            [facility] Medicine Services  History & Physical          Patient Identification:  Name:  Rupinder Lees  Age:  83 y.o.  Sex:  female  :  1935  MRN:  [id number]   Visit Number:  [id number]  Primary Care Physician:  Gifty Kruse MD    I have seen the patient in conjunction with ASHISH Hutton and I agree with the following statements:     Subjective     Chief complaint: dyspnea     History of presenting illness:    Mrs. Lese is a 83-year-old female patient who presented to [facility] ED on 3/24/2019. She presented to the ED today for coughing and shortness breath for around 3 days that has continued to worsen. She has had shortness of breath with activity, she has also had fatigue, cough that has been productive but doesn't know the color.Pt denies any fever.  She has had diarrhea that started around 3am, several episodes, no vomiting " or nausea. She has not had fever but has had chills. She denies any orthopnea. No chest pain. She has been out running around and got sick the day after. Pt is also complaining of pain in her toe which is been going on for many days.     Her workup in the ER showed a blood gas with pH of 7.372, CO2 27.0, PO2 71.2, bicarb 15.3, troponin was less than 0.010, sodium 135 potassium 4.0, creatinine 1.30, lactic acid 2.7, WBC 8.11, H&H was 7.9 and 26.1.  Blood cultures were obtained in the ED.  Chest x-ray was done in the ER and per radiology reading was found to have minimal left basilar airspace disease.    Her medical history is significant for COPD, DVT, history of CVA, hypertension, peripheral arterial disease, CAD s/p stent 12/2018, s/p pacemaker, paroxysmal a-fib on chronic oral anticoagulation. She does smoke, 1/2 ppd for around 60 years. Her next of kin is her son, Antony Lees. She lives at home alone and cares for herself. Code status discussed, full code.       ---------------------------------------------------------------------------------------------------------------------   Review of Systems   Constitutional: Positive for chills and fatigue. Negative for fever.   HENT: Negative for congestion and rhinorrhea.    Eyes: Negative for visual disturbance.   Respiratory: Positive for cough and shortness of breath. Negative for wheezing.    Cardiovascular: Negative for chest pain and leg swelling.   Gastrointestinal: Positive for diarrhea. Negative for abdominal distention, abdominal pain, constipation, nausea and vomiting.   Endocrine: Negative for cold intolerance and heat intolerance.   Genitourinary: Negative for difficulty urinating.   Musculoskeletal: Negative for arthralgias.   Skin: Negative for color change and pallor.   Allergic/Immunologic: Negative for environmental allergies.   Neurological: Negative for dizziness, weakness and light-headedness.   Hematological: Negative for adenopathy.    Psychiatric/Behavioral: Negative for agitation, behavioral problems and confusion.      ---------------------------------------------------------------------------------------------------------------------   Past Medical History:   Diagnosis Date   • COPD  12/17/2018   • DVT of lower extremity (deep venous thrombosis) (CMS/[facility])    • History of CVA 2013 12/17/2018   • HTN 12/17/2018   • PAD  12/17/2018   • Tobacco use 12/17/2018     Past Surgical History:   Procedure Laterality Date   • CARDIAC CATHETERIZATION  12/17/2018    Procedure: Left Heart Cath;  Surgeon: Imtiaz Fuentes MD;  Location:  [facility] CATH INVASIVE LOCATION;  Service: Cardiology   • CARDIAC CATHETERIZATION N/A 12/17/2018    Procedure: Thrombolysis-peripheral;  Surgeon: Imtiaz Fuentes MD;  Location:  [facility] CATH [facility];  Service: Cardiology   • CARDIAC ELECTROPHYSIOLOGY PROCEDURE N/A 12/18/2018    Procedure: PACEMAKER IMPLANTATION- DC;  Surgeon: Thony Bobby MD;  Location:  [facility] INVASIVE [facility];  Service: Cardiology   • CARDIAC ELECTROPHYSIOLOGY PROCEDURE N/A 12/17/2018    Procedure: Temporary Pacemaker;  Surgeon: Imtiaz Fuentes MD;  Location:  [facility] CATH INVASIVE LOCATION;  Service: Cardiology   • FEMORAL ARTERY STENT  2013   • FEMORAL POPLITEAL BYPASS Right 12/17/2018    Procedure: LOWER EXTREMITY EMBELECTOMY RIGHT;  Surgeon: David Beatty MD;  Location:  [facility] HYBRID OR 15;  Service: Vascular   • HYSTERECTOMY     • LEFT HEART CATH  12/17/2018    At [facility] with TV PM placement    • PACEMAKER IMPLANTATION       Family History   Problem Relation Age of Onset   • Cancer Mother    • Cancer Father    • Heart attack Brother    • Heart attack Brother      Social History     Socioeconomic History   • Marital status:      Spouse name: Not on file   • Number of children: 3   • Years of education: Not on file   • Highest education level: Not on file   Occupational History   • Occupation:  Homemaker   Tobacco Use   • Smoking status: Current Every Day Smoker     Packs/day: 0.50     Years: 55.00     Pack years: 27.50     Types: Cigarettes   • Smokeless tobacco: Never Used   Substance and Sexual Activity   • Alcohol use: No     Frequency: Never   • Drug use: No   • Sexual activity: Defer   Social History Narrative    Lives in [place] alone     ---------------------------------------------------------------------------------------------------------------------   Allergies:  Patient has no known allergies.  ---------------------------------------------------------------------------------------------------------------------     Medications below are reported home medications pulling from within the system; at this time, these medications have not been reconciled unless otherwise specified and are in the verification process for further verifcation as current home medications.    Prior to Admission Medications     Prescriptions Last Dose Informant Patient Reported? Taking?    cilostazol (PLETAL) 100 MG tablet   Yes Yes    Take 100 mg by mouth 2 (Two) Times a Day.    amiodarone (PACERONE) 200 MG tablet   No No    Take 1 tablet by mouth Every 12 (Twelve) Hours.    apixaban (ELIQUIS) 2.5 MG tablet tablet   No No    Take 1 tablet by mouth Every 12 (Twelve) Hours.    aspirin  MG tablet   No No    Take 1 tablet by mouth Daily.    atenolol (TENORMIN) 25 MG tablet   Yes No    Take 25 mg by mouth Daily.    atorvastatin (LIPITOR) 40 MG tablet   Yes No    Take 40 mg by mouth Daily.    clopidogrel (PLAVIX) 75 MG tablet   Yes No    hydrALAZINE (APRESOLINE) 50 MG tablet   Yes No    Take 50 mg by mouth 3 (Three) Times a Day.    meloxicam (MOBIC) 15 MG tablet   Yes No    Take 15 mg by mouth Daily.    metoprolol tartrate (LOPRESSOR) 50 MG tablet   No No    Take 1 tablet by mouth Every 12 (Twelve) Hours.    olmesartan (BENICAR) 40 MG tablet   Yes No    Take 40 mg by mouth Daily.    vitamin D (ERGOCALCIFEROL) 38201  units capsule capsule   Yes No    Take 50,000 Units by mouth 1 (One) Time Per Week.        Hospital Scheduled Meds:    azithromycin 500 mg Intravenous Once        ---------------------------------------------------------------------------------------------------------------------   Objective       Vital Signs:  Temp:  [98.3 °F (36.8 °C)] 98.3 °F (36.8 °C)  Heart Rate:  [70-80] 80  Resp:  [20-24] 20  BP: (125-161)/(47-92) 149/69      03/24/19  0722   Weight: 53.5 kg (118 lb)     Body mass index is 23.05 kg/m².  ---------------------------------------------------------------------------------------------------------------------       Physical Exam    Physical Exam:  Constitutional:  Well-developed and well-nourished.     HENT:  Head: Normocephalic and atraumatic.  Mouth:  Moist mucous membranes.    Eyes:  Pupils are equal, round, and reactive to light.  No scleral icterus.  Neck:  Neck supple.      Cardiovascular:  Normal rate, regular rhythm.  with no murmur.  Pulmonary/Chest:  Mild dyspnea, tachypnea, rhonchi, rales and b/l expiratory wheezing throughout.  Abdominal:  Soft.  Bowel sounds are present.  No distension and no tenderness.   Musculoskeletal:  No edema, no tenderness, and no deformity.  No red or swollen joints anywhere.    Neurological:  Alert and oriented to person, place, and time.   Skin:  Skin is warm and dry.  No rash noted.  No pallor.   Psychiatric:  Normal mood and affect.  Behavior is normal.  Judgment and thought content normal.   Peripheral vascular:  No edema and strong pulses on all 4 extremities.  ---------------------------------------------------------------------------------------------------------------------  EKG:          ---------------------------------------------------------------------------------------------------------------------   Results from last 7 days   Lab Units 03/24/19  0748   LACTATE mmol/L 2.7*   WBC 10*3/mm3 8.11   HEMOGLOBIN g/dL 7.9*   HEMATOCRIT % 26.1*   MCV fL  92.2   MCHC g/dL 30.3*   PLATELETS 10*3/mm3 224     Results from last 7 days   Lab Units 03/24/19  0957   PH, ARTERIAL pH units 7.372   PO2 ART mm Hg 71.2*   PCO2, ARTERIAL mm Hg 27.0*   HCO3 ART mmol/L 15.3*     Results from last 7 days   Lab Units 03/24/19  0748   SODIUM mmol/L 135*   POTASSIUM mmol/L 4.0   CHLORIDE mmol/L 103   CO2 mmol/L 19.1*   BUN mg/dL 29*   CREATININE mg/dL 1.30*   EGFR IF NONAFRICN AM mL/min/1.73 39*   CALCIUM mg/dL 8.6   GLUCOSE mg/dL 149*   ALBUMIN g/dL 4.06   BILIRUBIN mg/dL 0.2   ALK PHOS U/L 82   AST (SGOT) U/L 125*   ALT (SGPT) U/L 130*   Estimated Creatinine Clearance: 28.7 mL/min (A) (by C-G formula based on SCr of 1.3 mg/dL (H)).  No results found for: AMMONIA  Results from last 7 days   Lab Units 03/24/19  0748   TROPONIN T ng/mL <0.010         Lab Results   Component Value Date    HGBA1C 6.1 (H) 05/31/2015     Lab Results   Component Value Date    TSH 0.762 12/17/2018    FREET4 1.47 05/31/2015     No results found for: PREGTESTUR, PREGSERUM, HCG, HCGQUANT  Pain Management Panel     Pain Management Panel Latest Ref Rng & Units 5/31/2015    AMPHETAMINES SCREEN, URINE NEGATIVE Negative    BARBITURATES SCREEN NEGATIVE Negative    BENZODIAZEPINE SCREEN, URINE NEGATIVE Negative    COCAINE SCREEN, URINE NEGATIVE Negative    METHADONE SCREEN, URINE NEGATIVE Negative                        ---------------------------------------------------------------------------------------------------------------------  Imaging Results (last 7 days)     Procedure Component Value Units Date/Time    XR Chest 1 View [[id number]] Collected:  03/24/19 0844     Updated:  03/24/19 0847    Narrative:       XR CHEST 1 VW-     CLINICAL INDICATION: cough        COMPARISON: 12/17/2018      TECHNIQUE: Single frontal view of the chest.     FINDINGS:     Minimal left basilar airspace disease  The cardiac silhouette is normal. The pulmonary vasculature is  unremarkable.  Scoliosis  There are no suspicious-appearing  parenchymal soft tissue nodules.          Impression:       Minimal left basilar airspace disease     This report was finalized on 3/24/2019 8:45 AM by Dr. Juan Plascencia MD.             Cultures: blood cultures done in the ED     I have personally reviewed the radiology images and read the final radiology report.  ---------------------------------------------------------------------------------------------------------------------  Assessment / Plan       Assessment and Plan:    Sepsis r/t CAP (left sided) vs/and COPD exacerbation   Lactic Acidosis   COPD with acute exacerbation   Paroxysmal A-Fib on chronic oral anticoagulation   s/p pacemaker d/t complete heart block 2018  Essential HTN   Hx of CAD   Hx of DVT  Hx of CVA  Hx of PAD   Tobacco use     -Sepsis r/t CAP vs/and COPD exacerbation: Will admit to telemetry, continue doxycycline and Rocephin. Medrol 40 mg IV BID, duo-nebs every 6 hours, incentive spirometer 10 times an hour. Will add pulmicort nebs. Atypical's and respiratory cultures ordered. Repeat labs in the am.     -Lactic Acidosis: 2.7 on admission, repeat ordered for 12noon.     -COPD with acute exacerbation: continue with plan as outlined above, monitor Sp02, supplemental oxygen to maintain Saturation 90-92%.     -Paroxysmal a-fib on chronic oral anticoagulation, s/p pacer 2018, hx of CAD, HTN: We will continue home Eliquis when med rec is available by pharmacy, monitor on telemetry.     -Anemia: normocytic, normochromic    Activity: up with assistance   Precautions: falls   DVT prophylaxis: eliquis   GI prophylaxis: protonix 40mg PO     Code status: Full     Patient is high risk for the following reasons: Sepsis r/t CAP, COPD exacerbation, Paroxysmal A-Fib on Chronic oral anticoagulation    Phyllis Ortiz, ASHISH  03/24/19  10:44 AM  ---------------------------------------------------------------------------------------------------------------------   I have seen and examined the patient. I agree  with the assessment and plan of ASHISH Solomon. I have amended the above note to reflect my findings in history, physical examination, decision making and management plan.       Electronically signed by Austin Vogt MD at 3/24/2019  8:24 PM       ICU Vital Signs     Row Name 04/01/19 1438 04/01/19 1433 04/01/19 1405 04/01/19 1400 04/01/19 1102       Vitals    Temp  --  --  --  --  98.7 °F (37.1 °C)    Temp src  --  --  --  --  Oral    Pulse  70  72  --  --  70    Heart Rate Source  --  --  --  --  Monitor    Resp  18  18  --  --  20    Resp Rate Source  --  --  --  --  Visual    BP  --  --  --  --  130/71    BP Location  --  --  --  --  Left arm    BP Method  --  --  --  --  Automatic    Patient Position  --  --  --  --  Lying       Oxygen Therapy    SpO2  --  97 %  93 %  83 %  (Abnormal)   --    Device (Oxygen Therapy)  --  nasal cannula  nasal cannula  room air pt walked from the bed to the bathroom door on room air  --    Flow (L/min)  --  2  2  --  --    Row Name 04/01/19 1042 04/01/19 0900 04/01/19 0727 04/01/19 0704 04/01/19 0659       Vitals    Temp  --  --  98.6 °F (37 °C)  --  --    Temp src  --  --  Oral  --  --    Pulse  74  --  71  70  74    Heart Rate Source  --  --  Monitor  --  --    Resp  18  --  20  18  18    Resp Rate Source  --  --  Visual  --  --    BP  --  --  134/73  --  --    BP Location  --  --  Left arm  --  --    BP Method  --  --  Automatic  --  --    Patient Position  --  --  Lying  --  --       Oxygen Therapy    SpO2  98 %  --  98 %  --  --    Device (Oxygen Therapy)  nasal cannula  nasal cannula  --  --  --    Flow (L/min)  2  2  --  --  --    Row Name 04/01/19 0648 04/01/19 0548 04/01/19 0531 04/01/19 0300 04/01/19 0228       Height and Weight    Weight  --  55.1 kg (121 lb 9 oz)  --  --  --       Vitals    Temp  --  --  --  98.1 °F (36.7 °C)  --    Temp src  --  --  --  Oral  --    Pulse  71  --  70  72  74    Heart Rate Source  --  --  Monitor  Monitor  Monitor     Resp  18  --  --  18  18    Resp Rate Source  --  --  --  Visual  Visual    BP  --  --  128/65  141/67  --    BP Location  --  --  Left arm  Left arm  --    BP Method  --  --  Automatic  Automatic  --    Patient Position  --  --  Lying  Lying  --       Oxygen Therapy    SpO2  96 %  --  --  95 %  97 %    Pulse Oximetry Type  --  --  --  --  Continuous    Device (Oxygen Therapy)  nasal cannula  --  --  --  nasal cannula    Flow (L/min)  2  --  --  --  2    Row Name 04/01/19 0220 03/31/19 2237 03/31/19 2111 03/31/19 1900 03/31/19 1844       Vitals    Temp  --  98 °F (36.7 °C)  --  98.1 °F (36.7 °C)  --    Temp src  --  Oral  --  Oral  --    Pulse  74  69  --  72  75    Heart Rate Source  Monitor  Monitor  --  Monitor  Monitor    Resp  18  18  --  18 19    Resp Rate Source  Visual  Visual  --  Visual  Visual    BP  --  117/82  --  134/68  --    BP Location  --  Left arm  --  Left arm  --    BP Method  --  Automatic  --  Automatic  --    Patient Position  --  Lying  --  Lying  --       Oxygen Therapy    SpO2  96 %  99 %  --  98 %  98 %    Pulse Oximetry Type  Continuous  Continuous  --  --  Continuous    Device (Oxygen Therapy)  nasal cannula  nasal cannula  nasal cannula  --  nasal cannula    Flow (L/min)  2  2  1  --  2    Row Name 03/31/19 1830 03/31/19 1829                Vitals    Pulse  72  72       Heart Rate Source  Monitor  Monitor       Resp  18  18       Resp Rate Source  Visual  Visual          Oxygen Therapy    SpO2  98 %  98 %       Pulse Oximetry Type  Continuous  Continuous       Device (Oxygen Therapy)  nasal cannula  nasal cannula       Flow (L/min)  2  2           Physician Progress Notes (last 24 hours) (Notes from 3/31/2019  5:31 PM through 4/1/2019  5:31 PM)     No notes of this type exist for this encounter.

## 2019-04-02 ENCOUNTER — READMISSION MANAGEMENT (OUTPATIENT)
Dept: CALL CENTER | Facility: HOSPITAL | Age: 84
End: 2019-04-02

## 2019-04-02 ENCOUNTER — TELEPHONE (OUTPATIENT)
Dept: MEDSURG UNIT | Facility: HOSPITAL | Age: 84
End: 2019-04-02

## 2019-04-02 NOTE — OUTREACH NOTE
Prep Survey      Responses   Facility patient discharged from?  [place]   Is patient eligible?  Yes   Discharge diagnosis  Community-acquired pneumonia, left-sided   Does the patient have one of the following disease processes/diagnoses(primary or secondary)?  COPD/Pneumonia   Does the patient have Home health ordered?  Yes   What is the Home health agency?   [facility]   Is there a DME ordered?  Yes   What DME was ordered?  Oxygen, nebulizer and quad cane per [facility] home care   Prep survey completed?  Yes          Latrice Clemens RN

## 2019-04-04 ENCOUNTER — READMISSION MANAGEMENT (OUTPATIENT)
Dept: CALL CENTER | Facility: HOSPITAL | Age: 84
End: 2019-04-04

## 2019-04-04 NOTE — OUTREACH NOTE
COPD/PN Week 1 Survey      Responses   Facility patient discharged from?  [facility]   Does the patient have one of the following disease processes/diagnoses(primary or secondary)?  COPD/Pneumonia   Is there a successful TCM telephone encounter documented?  No   Was the primary reason for admission:  Pneumonia   Week 1 attempt successful?  Yes   Call start time  1357   Call end time  1408   Discharge diagnosis  Community-acquired pneumonia, left-sided   Is patient permission given to speak with other caregiver?  Yes   List who call center can speak with  Michaelle, HUA   Person spoke with today (if not patient) and relationship  Michaelle   Meds reviewed with patient/caregiver?  Yes   Is the patient having any side effects they believe may be caused by any medication additions or changes?  No   Does the patient have all medications ordered at discharge?  Yes   Is the patient taking all medications as directed (includes completed medication regime)?  Yes   Medication comments  Completed antibiotic, continues Prednisone   Does the patient have a primary care provider?   Yes   Does the patient have an appointment with their PCP or pulmonologist within 7 days of discharge?  Yes   Has the patient kept scheduled appointments due by today?  N/A   Comments  04/08 Dr. Kruse   What is the Home health agency?   [facility]   Has home health visited the patient within 72 hours of discharge?  Yes   Home health comments  OT in home at time of call.   What DME was ordered?  Oxygen, nebulizer and quad cane per [facility] care.   Has all DME been delivered?  Yes   Psychosocial issues?  No   Did the patient receive a copy of their discharge instructions?  Yes   Nursing interventions  Reviewed instructions with patient   What is the patient's perception of their health status since discharge?  Improving   Nursing Interventions  Nurse provided patient education   Are the patient's immunizations up to date?   -- [Has taken the pneumonia  shot. Refuses flu shot.]   Is the patient/caregiver able to teach back the hierarchy of who to call/visit for symptoms/problems? PCP, Specialist, Home health nurse, Urgent Care, ED, 911  Yes   Is the patient able to teach back COPD zones?  Yes [needs reinforcement]   Nursing interventions  Education provided on various zones   Patient reports what zone on this call?  Yellow Zone   Is the patient/caregiver able to teach back signs and symptoms of worsening condition:  Fever/chills, Shortness of breath, Chest pain   Is the patient/caregiver able to teach back importance of completing antibiotic course of treatment?  Yes   Week 1 call completed?  Yes          Mayte Tran RN

## 2019-04-11 ENCOUNTER — HOSPITAL ENCOUNTER (OUTPATIENT)
Dept: GENERAL RADIOLOGY | Facility: HOSPITAL | Age: 84
Discharge: HOME | End: 2019-04-11
Admitting: PHYSICIAN ASSISTANT

## 2019-04-11 ENCOUNTER — OFFICE VISIT (OUTPATIENT)
Dept: GASTROENTEROLOGY | Facility: CLINIC | Age: 84
End: 2019-04-11

## 2019-04-11 ENCOUNTER — TELEPHONE (OUTPATIENT)
Dept: GASTROENTEROLOGY | Facility: CLINIC | Age: 84
End: 2019-04-11

## 2019-04-11 ENCOUNTER — DOCUMENTATION (OUTPATIENT)
Dept: GASTROENTEROLOGY | Facility: CLINIC | Age: 84
End: 2019-04-11

## 2019-04-11 VITALS
BODY MASS INDEX: 23.75 KG/M2 | WEIGHT: 121 LBS | HEIGHT: 60 IN | SYSTOLIC BLOOD PRESSURE: 75 MMHG | HEART RATE: 69 BPM | DIASTOLIC BLOOD PRESSURE: 54 MMHG

## 2019-04-11 DIAGNOSIS — R10.13 EPIGASTRIC ABDOMINAL PAIN: ICD-10-CM

## 2019-04-11 DIAGNOSIS — R19.4 CHANGE IN BOWEL HABITS: ICD-10-CM

## 2019-04-11 DIAGNOSIS — R11.10 REGURGITATION OF FOOD: ICD-10-CM

## 2019-04-11 DIAGNOSIS — R10.13 EPIGASTRIC ABDOMINAL PAIN: Primary | ICD-10-CM

## 2019-04-11 DIAGNOSIS — R11.0 NAUSEA: Primary | ICD-10-CM

## 2019-04-11 PROCEDURE — 74019 RADEX ABDOMEN 2 VIEWS: CPT | Performed by: RADIOLOGY

## 2019-04-11 PROCEDURE — 74019 RADEX ABDOMEN 2 VIEWS: CPT

## 2019-04-11 PROCEDURE — 99203 OFFICE O/P NEW LOW 30 MIN: CPT | Performed by: PHYSICIAN ASSISTANT

## 2019-04-11 RX ORDER — FUROSEMIDE 20 MG/1
40 TABLET ORAL DAILY
Status: ON HOLD | COMMUNITY
Start: 2019-04-08 | End: 2019-04-17

## 2019-04-11 RX ORDER — POTASSIUM CHLORIDE 750 MG/1
CAPSULE, EXTENDED RELEASE ORAL
Status: ON HOLD | COMMUNITY
Start: 2019-04-08 | End: 2019-04-17

## 2019-04-11 RX ORDER — ONDANSETRON 4 MG/1
4 TABLET, ORALLY DISINTEGRATING ORAL EVERY 8 HOURS PRN
Qty: 42 TABLET | Refills: 0 | Status: SHIPPED | OUTPATIENT
Start: 2019-04-11 | End: 2019-04-19 | Stop reason: HOSPADM

## 2019-04-11 RX ORDER — DEXLANSOPRAZOLE 60 MG/1
60 CAPSULE, DELAYED RELEASE ORAL DAILY
Qty: 30 CAPSULE | Refills: 0 | Status: ON HOLD | COMMUNITY
Start: 2019-04-11 | End: 2019-04-17

## 2019-04-11 NOTE — PROGRESS NOTES
As per Abeba, patient's BP was 75/54 and I called Dr. Kruse's office and they have agreed to see her today at 2:45. I told patient she will be seeing Alison Harrell. Patient voiced understanding.

## 2019-04-11 NOTE — TELEPHONE ENCOUNTER
Please let pt or family know that abdominal xray was normal. Continue to take the Dexilant samples given at appt, can take Zofran as needed (I have sent). Call back in 2 weeks with response. May want to move f/u because I did not have enough Dexilant samples to last the 1 month as I had hoped.

## 2019-04-11 NOTE — PROGRESS NOTES
": 1935    Chief Complaint   Patient presents with   • Abdominal Pain   • Heartburn       Rupinder Lees is a 83 y.o. female who presents to the office today as a new patient for evaluation of Abdominal Pain and Heartburn.    History of Present Illness:  She has been having trouble with eating for the past 2 weeks. She will be hungry and want to eat but then have regurgitation and epigastric abdominal pain. Denies nausea or vomiting with this. She has been ill with this since her hospital discharge. Her bowel habits are described as  \"good\" but goes on to explain that she has not had a BM in 5 days. She was previously having very severe diarrhea and was asked to give a stool sample but has been unable to provide it. She had diarrhea (3-4 episodes per day) for approx 2 weeks prior to this change. At that time, she was having severe fecal urgency with it.     She does have heartburn and acid regurgitation but takes Protonix 40 mg once daily. EGD was performed in the remote past.     2019 Labs:  eGFR 56  BUN 8 - 23 mg/dL 30 Abnormally high     Creatinine 0.57 - 1.00 mg/dL 0.95      Review of Systems   Constitutional: Positive for fatigue. Negative for chills and fever.   HENT: Positive for congestion and sore throat. Negative for trouble swallowing.    Eyes: Negative.    Respiratory: Positive for shortness of breath. Negative for cough, choking and chest tightness.    Cardiovascular: Negative.    Gastrointestinal: Positive for abdominal pain.   Endocrine: Negative.    Genitourinary: Negative for difficulty urinating.   Musculoskeletal: Negative for back pain and neck pain.   Skin: Negative.    Allergic/Immunologic: Negative.    Neurological: Positive for dizziness and light-headedness. Negative for headaches.   Hematological: Bruises/bleeds easily.   Psychiatric/Behavioral: Negative.        Past Medical History:   Diagnosis Date   • COPD  2018   • DVT of lower extremity (deep venous thrombosis) " (CMS/Formerly [facility])    • History of CVA 2013 12/17/2018   • HTN 12/17/2018   • PAD  12/17/2018   • Tobacco use 12/17/2018       Past Surgical History:   Procedure Laterality Date   • CARDIAC CATHETERIZATION  12/17/2018    Procedure: Left Heart Cath;  Surgeon: Imtiaz Fuentes MD;  Location:  [facility] CATH [facility]VASIVE [facility];  Service: Cardiology   • CARDIAC CATHETERIZATION N/A 12/17/2018    Procedure: Thrombolysis-peripheral;  Surgeon: Imtiaz Fuentes MD;  Location:  [facility];  Service: Cardiology   • CARDIAC ELECTROPHYSIOLOGY PROCEDURE N/A 12/18/2018    Procedure: PACEMAKER IMPLANTATION- DC;  Surgeon: Thony Bobby MD;  Location:  [facility] EP INVASIVE [facility];  Service: Cardiology   • CARDIAC ELECTROPHYSIOLOGY PROCEDURE N/A 12/17/2018    Procedure: Temporary Pacemaker;  Surgeon: Imtiaz Fuentes MD;  Location:  [facility] CATH INV[facility];  Service: Cardiology   • FEMORAL ARTERY STENT  2013   • FEMORAL POPLITEAL BYPASS Right 12/17/2018    Procedure: LOWER EXTREMITY EMBELECTOMY RIGHT;  Surgeon: David Beatty MD;  Location:  [facility] HYBRID OR 15;  Service: Vascular   • HYSTERECTOMY     • LEFT HEART CATH  12/17/2018    At [facility] with TV PM placement    • PACEMAKER IMPLANTATION         Family History   Problem Relation Age of Onset   • Cancer Mother    • Cancer Father    • Heart attack Brother    • Heart attack Brother        Social History     Socioeconomic History   • Marital status:      Spouse name: Not on file   • Number of children: 3   • Years of education: Not on file   • Highest education level: Not on file   Occupational History   • Occupation: Homemaker   Tobacco Use   • Smoking status: Current Every Day Smoker     Packs/day: 0.50     Years: 55.00     Pack years: 27.50     Types: Cigarettes   • Smokeless tobacco: Never Used   Substance and Sexual Activity   • Alcohol use: No     Frequency: Never   • Drug use: No   • Sexual activity: Defer   Social History  "Narrative    Lives in [place], [place] alone       Current Outpatient Medications:   •  amiodarone (PACERONE) 200 MG tablet, Take 1 tablet by mouth Every 12 (Twelve) Hours., Disp: 60 tablet, Rfl: 6  •  apixaban (ELIQUIS) 2.5 MG tablet tablet, Take 1 tablet by mouth Every 12 (Twelve) Hours., Disp: 60 tablet, Rfl: 6  •  atorvastatin (LIPITOR) 40 MG tablet, Take 40 mg by mouth Daily., Disp: , Rfl:   •  cilostazol (PLETAL) 100 MG tablet, Take 100 mg by mouth 2 (Two) Times a Day., Disp: , Rfl:   •  ferrous gluconate (FERGON) 324 MG tablet, Take 1 tablet by mouth Daily With Breakfast., Disp: 30 tablet, Rfl: 0  •  furosemide (LASIX) 20 MG tablet, Take 20 mg by mouth., Disp: , Rfl:   •  hydrALAZINE (APRESOLINE) 50 MG tablet, Take 50 mg by mouth 3 (Three) Times a Day., Disp: , Rfl:   •  ipratropium (ATROVENT) 0.02 % nebulizer solution, Take 2.5 mL by nebulization Every 6 (Six) Hours for 30 days., Disp: 300 mL, Rfl: 0  •  metoprolol tartrate (LOPRESSOR) 100 MG tablet, Take 1 tablet by mouth Every 12 (Twelve) Hours for 30 days., Disp: 60 tablet, Rfl: 0  •  olmesartan (BENICAR) 40 MG tablet, Take 40 mg by mouth Daily., Disp: , Rfl:   •  pantoprazole (PROTONIX) 40 MG EC tablet, Take 1 tablet by mouth Daily for 30 days., Disp: 30 tablet, Rfl: 0  •  potassium chloride (MICRO-K) 10 MEQ CR capsule, , Disp: , Rfl:     Allergies:   Patient has no known allergies.    Vitals:  BP (!) 75/54   Pulse 69   Ht 152.4 cm (60\")   Wt 54.9 kg (121 lb)   BMI 23.63 kg/m²     Physical Exam   Constitutional: She is oriented to person, place, and time. She appears well-developed. No distress.   HENT:   Head: Normocephalic and atraumatic.   Nose: Nose normal.   Mouth/Throat: Oropharynx is clear and moist.   Has hearing aids. NC O2 in place.   Eyes: Conjunctivae are normal. Right eye exhibits no discharge. Left eye exhibits no discharge. No scleral icterus.   glasses   Neck: No JVD present.   Cardiovascular: Normal rate, regular rhythm and normal heart " sounds. Exam reveals no gallop and no friction rub.   No murmur heard.  Pulmonary/Chest: Effort normal. No respiratory distress. She has no wheezes. She has no rales. She exhibits no tenderness.   Decreased breath sounds thoughout   Abdominal: Soft. Bowel sounds are normal. She exhibits no mass. There is tenderness (epigastric).   Musculoskeletal: Normal range of motion. She exhibits no edema or deformity.   Slow gait. Abnormal balance. Ambulates with cane.   Neurological: She is alert and oriented to person, place, and time. Coordination normal.   Skin: Skin is warm and dry. No rash noted. She is not diaphoretic. No erythema.   Psychiatric: She has a normal mood and affect. Her behavior is normal. Judgment and thought content normal.   Vitals reviewed.      Assessment/Plan:  1. Epigastric abdominal pain    2. Regurgitation of food    3. Change in bowel habits      She will stop taking Protonix and take Dexilant 60 mg once daily instead. I have given samples of this medication. Abdominal film has been ordered to further evaluate complaints. She is not good candidate for EGD at this time due to low BP and age. Her PCP was contacted during her visit today due to hypotension and appt was made for her later today.     Orders Placed This Encounter   Procedures   • XR Abdomen Flat & Upright     New Medications Ordered This Visit   Medications   • dexlansoprazole (DEXILANT) 60 MG capsule     Sig: Take 1 capsule by mouth Daily.     Dispense:  30 capsule     Refill:  0     Order Specific Question:   Quantity     Answer:   2             Return in about 3 weeks (around 5/2/2019) for recheck abdominal pain.      Electronically signed 4/11/2019 3:11 PM  Abeba Min PA-C, [facility]

## 2019-04-12 ENCOUNTER — READMISSION MANAGEMENT (OUTPATIENT)
Dept: CALL CENTER | Facility: HOSPITAL | Age: 84
End: 2019-04-12

## 2019-04-12 NOTE — OUTREACH NOTE
COPD/PN Week 2 Survey      Responses   Facility patient discharged from?  [facility]   Does the patient have one of the following disease processes/diagnoses(primary or secondary)?  COPD/Pneumonia   Was the primary reason for admission:  Pneumonia   Week 2 attempt successful?  Yes   Call start time  1234   Call end time  1251   Discharge diagnosis  Community-acquired pneumonia, left-sided   Person spoke with today (if not patient) and relationship  Becka granddaughter   Meds reviewed with patient/caregiver?  Yes   Is the patient taking all medications as directed (includes completed medication regime)?  Yes   Medication comments  3 of her B/p meds stopped yesterday due to hypotension.  MD gave parameters for B/P.  Started on a new stomach pill, Protonix stopped..   Does the patient have an appointment with their PCP or pulmonologist within 7 days of discharge?  Yes   Has the patient kept scheduled appointments due by today?  Yes   Comments  MD yesterday 04/11   What is the Home health agency?   [facility]   What DME was ordered?  Wearing her oxygen   Comments  Very weak.  B/P was very low yesterday while at stomach doctor visit SBP was 75.  They called Dr Davis and her medications were adjusted.  Family monitering B/P and has parameters.   What is the patient's perception of their health status since discharge?  New symptoms unrelated to diagnosis [Hypotension yesterday, medications adjusted.  Very weak.]   Additional teach back comments  Edema is better, has gone down alot.  She has lost 8 lbs.   Is the patient able to teach back COPD zones?  Yes   Patient reports what zone on this call?  Yellow Zone [Wearing O2 and nebulizers about 3 times a day.]   Week 2 call completed?  Yes          Mayte Tran RN

## 2019-04-15 ENCOUNTER — CLINICAL SUPPORT NO REQUIREMENTS (OUTPATIENT)
Dept: CARDIOLOGY | Facility: CLINIC | Age: 84
End: 2019-04-15

## 2019-04-15 DIAGNOSIS — I44.2 COMPLETE HEART BLOCK (HCC): ICD-10-CM

## 2019-04-15 DIAGNOSIS — Z86.73 HISTORY OF CVA (CEREBROVASCULAR ACCIDENT): ICD-10-CM

## 2019-04-15 PROCEDURE — 93294 REM INTERROG EVL PM/LDLS PM: CPT | Performed by: PHYSICIAN ASSISTANT

## 2019-04-15 PROCEDURE — 93296 REM INTERROG EVL PM/IDS: CPT | Performed by: PHYSICIAN ASSISTANT

## 2019-04-15 NOTE — TELEPHONE ENCOUNTER
I left message with patients granddaughter for patient to call back. She was not on the HIPPA form.

## 2019-04-16 ENCOUNTER — HOSPITAL ENCOUNTER (OUTPATIENT)
Dept: GENERAL RADIOLOGY | Facility: HOSPITAL | Age: 84
Discharge: HOME | End: 2019-04-16
Admitting: INTERNAL MEDICINE

## 2019-04-16 ENCOUNTER — TRANSCRIBE ORDERS (OUTPATIENT)
Dept: ADMINISTRATIVE | Facility: HOSPITAL | Age: 84
End: 2019-04-16

## 2019-04-16 DIAGNOSIS — J15.9 UNSPECIFIED BACTERIAL PNEUMONIA: Primary | ICD-10-CM

## 2019-04-16 DIAGNOSIS — J15.9 UNSPECIFIED BACTERIAL PNEUMONIA: ICD-10-CM

## 2019-04-16 PROCEDURE — 71046 X-RAY EXAM CHEST 2 VIEWS: CPT | Performed by: RADIOLOGY

## 2019-04-16 PROCEDURE — 71046 X-RAY EXAM CHEST 2 VIEWS: CPT

## 2019-04-16 NOTE — TELEPHONE ENCOUNTER
I spoke with patients son and gave him results and recommendations. He voiced understanding. I let him know if she ran out of samples she could  more.

## 2019-04-17 ENCOUNTER — READMISSION MANAGEMENT (OUTPATIENT)
Dept: CALL CENTER | Facility: HOSPITAL | Age: 84
End: 2019-04-17

## 2019-04-17 ENCOUNTER — HOSPITAL ENCOUNTER (INPATIENT)
Facility: HOSPITAL | Age: 84
LOS: 2 days | Discharge: HOME HEALTH | End: 2019-04-19
Attending: EMERGENCY MEDICINE | Admitting: INTERNAL MEDICINE

## 2019-04-17 ENCOUNTER — APPOINTMENT (OUTPATIENT)
Dept: GENERAL RADIOLOGY | Facility: HOSPITAL | Age: 84
End: 2019-04-17

## 2019-04-17 DIAGNOSIS — E83.39 HYPOPHOSPHATEMIA: ICD-10-CM

## 2019-04-17 DIAGNOSIS — R53.1 WEAKNESS: ICD-10-CM

## 2019-04-17 DIAGNOSIS — D50.8 OTHER IRON DEFICIENCY ANEMIA: ICD-10-CM

## 2019-04-17 DIAGNOSIS — D50.0 IRON DEFICIENCY ANEMIA DUE TO CHRONIC BLOOD LOSS: ICD-10-CM

## 2019-04-17 DIAGNOSIS — I95.9 HYPOTENSION, UNSPECIFIED HYPOTENSION TYPE: ICD-10-CM

## 2019-04-17 DIAGNOSIS — D64.9 ANEMIA, UNSPECIFIED TYPE: ICD-10-CM

## 2019-04-17 DIAGNOSIS — R19.5 OCCULT BLOOD POSITIVE STOOL: Primary | ICD-10-CM

## 2019-04-17 PROBLEM — E87.6 HYPOKALEMIA: Status: ACTIVE | Noted: 2019-04-17

## 2019-04-17 LAB
A-A DO2: 40.1 MMHG (ref 0–300)
ABO GROUP BLD: NORMAL
ALBUMIN SERPL-MCNC: 3.08 G/DL (ref 3.5–5.2)
ALBUMIN/GLOB SERPL: 1 G/DL
ALP SERPL-CCNC: 72 U/L (ref 39–117)
ALT SERPL W P-5'-P-CCNC: 22 U/L (ref 1–33)
ANION GAP SERPL CALCULATED.3IONS-SCNC: 12 MMOL/L
APTT PPP: 32.6 SECONDS (ref 23.8–36.1)
ARTERIAL PATENCY WRIST A: POSITIVE
AST SERPL-CCNC: 18 U/L (ref 1–32)
ATMOSPHERIC PRESS: 730 MMHG
BASE EXCESS BLDA CALC-SCNC: 7 MMOL/L
BASOPHILS # BLD AUTO: 0.01 10*3/MM3 (ref 0–0.2)
BASOPHILS NFR BLD AUTO: 0.2 % (ref 0–1.5)
BDY SITE: ABNORMAL
BILIRUB SERPL-MCNC: 0.3 MG/DL (ref 0.2–1.2)
BILIRUB UR QL STRIP: NEGATIVE
BLD GP AB SCN SERPL QL: NEGATIVE
BODY TEMPERATURE: 98.6 C
BUN BLD-MCNC: 10 MG/DL (ref 8–23)
BUN/CREAT SERPL: 10.4 (ref 7–25)
CA-I BLD-MCNC: 4.47 MG/DL (ref 4.6–5.3)
CALCIUM SPEC-SCNC: 8.5 MG/DL (ref 8.6–10.5)
CHLORIDE SERPL-SCNC: 99 MMOL/L (ref 98–107)
CLARITY UR: CLEAR
CO2 SERPL-SCNC: 28 MMOL/L (ref 22–29)
COHGB MFR BLD: 1.7 % (ref 0–5)
COLOR UR: YELLOW
CREAT BLD-MCNC: 0.96 MG/DL (ref 0.57–1)
CRP SERPL-MCNC: 7.1 MG/DL (ref 0–0.5)
D-LACTATE SERPL-SCNC: 1.9 MMOL/L (ref 0.5–2)
DEPRECATED RDW RBC AUTO: 53.3 FL (ref 37–54)
DEVELOPER EXPIRATION DATE: ABNORMAL
DEVELOPER LOT NUMBER: ABNORMAL
EOSINOPHIL # BLD AUTO: 0.03 10*3/MM3 (ref 0–0.4)
EOSINOPHIL NFR BLD AUTO: 0.7 % (ref 0.3–6.2)
ERYTHROCYTE [DISTWIDTH] IN BLOOD BY AUTOMATED COUNT: 16.7 % (ref 12.3–15.4)
EXPIRATION DATE: ABNORMAL
FECAL OCCULT BLOOD SCREEN, POC: POSITIVE
FLUAV AG NPH QL: NEGATIVE
FLUBV AG NPH QL IA: NEGATIVE
GFR SERPL CREATININE-BSD FRML MDRD: 56 ML/MIN/1.73
GLOBULIN UR ELPH-MCNC: 3 GM/DL
GLUCOSE BLD-MCNC: 118 MG/DL (ref 65–99)
GLUCOSE UR STRIP-MCNC: NEGATIVE MG/DL
HCO3 BLDA-SCNC: 30.4 MMOL/L (ref 22–26)
HCT VFR BLD AUTO: 22.3 % (ref 34–46.6)
HCT VFR BLD CALC: 21 % (ref 37–47)
HGB BLD-MCNC: 6.6 G/DL (ref 12–15.9)
HGB BLDA-MCNC: 7.1 G/DL (ref 12–16)
HGB UR QL STRIP.AUTO: NEGATIVE
HOLD SPECIMEN: NORMAL
HOROWITZ INDEX BLD+IHG-RTO: 21 %
IMM GRANULOCYTES # BLD AUTO: 0.01 10*3/MM3 (ref 0–0.05)
IMM GRANULOCYTES NFR BLD AUTO: 0.2 % (ref 0–0.5)
INR PPP: 1.29 (ref 0.9–1.1)
KETONES UR QL STRIP: NEGATIVE
LEUKOCYTE ESTERASE UR QL STRIP.AUTO: NEGATIVE
LYMPHOCYTES # BLD AUTO: 0.74 10*3/MM3 (ref 0.7–3.1)
LYMPHOCYTES NFR BLD AUTO: 17.4 % (ref 19.6–45.3)
Lab: ABNORMAL
MAGNESIUM SERPL-MCNC: 1.6 MG/DL (ref 1.6–2.4)
MCH RBC QN AUTO: 26.7 PG (ref 26.6–33)
MCHC RBC AUTO-ENTMCNC: 29.6 G/DL (ref 31.5–35.7)
MCV RBC AUTO: 90.3 FL (ref 79–97)
METHGB BLD QL: 0.4 % (ref 0–3)
MODALITY: ABNORMAL
MONOCYTES # BLD AUTO: 0.3 10*3/MM3 (ref 0.1–0.9)
MONOCYTES NFR BLD AUTO: 7 % (ref 5–12)
NEGATIVE CONTROL: NEGATIVE
NEUTROPHILS # BLD AUTO: 3.17 10*3/MM3 (ref 1.4–7)
NEUTROPHILS NFR BLD AUTO: 74.5 % (ref 42.7–76)
NITRITE UR QL STRIP: NEGATIVE
OXYHGB MFR BLDV: 89.1 % (ref 85–100)
PCO2 BLDA: 37.6 MM HG (ref 35–45)
PH BLDA: 7.53 PH UNITS (ref 7.35–7.45)
PH UR STRIP.AUTO: 7 [PH] (ref 5–8)
PHOSPHATE SERPL-MCNC: 1.6 MG/DL (ref 2.5–4.5)
PLATELET # BLD AUTO: 229 10*3/MM3 (ref 140–450)
PMV BLD AUTO: 9.7 FL (ref 6–12)
PO2 BLDA: 58.3 MM HG (ref 80–100)
POSITIVE CONTROL: POSITIVE
POTASSIUM BLD-SCNC: 3 MMOL/L (ref 3.5–5.2)
PROCALCITONIN SERPL-MCNC: 0.06 NG/ML (ref 0.1–0.25)
PROT SERPL-MCNC: 6.1 G/DL (ref 6–8.5)
PROT UR QL STRIP: NEGATIVE
PROTHROMBIN TIME: 16.4 SECONDS (ref 11–15.4)
RBC # BLD AUTO: 2.47 10*6/MM3 (ref 3.77–5.28)
RH BLD: POSITIVE
SAO2 % BLDCOA: 91 % (ref 90–100)
SODIUM BLD-SCNC: 139 MMOL/L (ref 136–145)
SP GR UR STRIP: 1.01 (ref 1–1.03)
T&S EXPIRATION DATE: NORMAL
UROBILINOGEN UR QL STRIP: NORMAL
WBC NRBC COR # BLD: 4.26 10*3/MM3 (ref 3.4–10.8)
WHOLE BLOOD HOLD SPECIMEN: NORMAL
WHOLE BLOOD HOLD SPECIMEN: NORMAL

## 2019-04-17 PROCEDURE — 86850 RBC ANTIBODY SCREEN: CPT | Performed by: PHYSICIAN ASSISTANT

## 2019-04-17 PROCEDURE — 82805 BLOOD GASES W/O2 SATURATION: CPT | Performed by: PHYSICIAN ASSISTANT

## 2019-04-17 PROCEDURE — 85730 THROMBOPLASTIN TIME PARTIAL: CPT | Performed by: PHYSICIAN ASSISTANT

## 2019-04-17 PROCEDURE — 83605 ASSAY OF LACTIC ACID: CPT | Performed by: PHYSICIAN ASSISTANT

## 2019-04-17 PROCEDURE — 85025 COMPLETE CBC W/AUTO DIFF WBC: CPT | Performed by: PHYSICIAN ASSISTANT

## 2019-04-17 PROCEDURE — 93010 ELECTROCARDIOGRAM REPORT: CPT | Performed by: INTERNAL MEDICINE

## 2019-04-17 PROCEDURE — 86140 C-REACTIVE PROTEIN: CPT | Performed by: PHYSICIAN ASSISTANT

## 2019-04-17 PROCEDURE — 87040 BLOOD CULTURE FOR BACTERIA: CPT | Performed by: PHYSICIAN ASSISTANT

## 2019-04-17 PROCEDURE — 99285 EMERGENCY DEPT VISIT HI MDM: CPT

## 2019-04-17 PROCEDURE — 71045 X-RAY EXAM CHEST 1 VIEW: CPT | Performed by: RADIOLOGY

## 2019-04-17 PROCEDURE — 81003 URINALYSIS AUTO W/O SCOPE: CPT | Performed by: PHYSICIAN ASSISTANT

## 2019-04-17 PROCEDURE — 84100 ASSAY OF PHOSPHORUS: CPT | Performed by: PHYSICIAN ASSISTANT

## 2019-04-17 PROCEDURE — 36600 WITHDRAWAL OF ARTERIAL BLOOD: CPT | Performed by: PHYSICIAN ASSISTANT

## 2019-04-17 PROCEDURE — 71045 X-RAY EXAM CHEST 1 VIEW: CPT

## 2019-04-17 PROCEDURE — 82375 ASSAY CARBOXYHB QUANT: CPT | Performed by: PHYSICIAN ASSISTANT

## 2019-04-17 PROCEDURE — 82270 OCCULT BLOOD FECES: CPT | Performed by: EMERGENCY MEDICINE

## 2019-04-17 PROCEDURE — 86900 BLOOD TYPING SEROLOGIC ABO: CPT

## 2019-04-17 PROCEDURE — 87804 INFLUENZA ASSAY W/OPTIC: CPT | Performed by: PHYSICIAN ASSISTANT

## 2019-04-17 PROCEDURE — 84145 PROCALCITONIN (PCT): CPT | Performed by: PHYSICIAN ASSISTANT

## 2019-04-17 PROCEDURE — 86920 COMPATIBILITY TEST SPIN: CPT

## 2019-04-17 PROCEDURE — 82330 ASSAY OF CALCIUM: CPT | Performed by: PHYSICIAN ASSISTANT

## 2019-04-17 PROCEDURE — 83050 HGB METHEMOGLOBIN QUAN: CPT | Performed by: PHYSICIAN ASSISTANT

## 2019-04-17 PROCEDURE — 36430 TRANSFUSION BLD/BLD COMPNT: CPT

## 2019-04-17 PROCEDURE — 86901 BLOOD TYPING SEROLOGIC RH(D): CPT | Performed by: PHYSICIAN ASSISTANT

## 2019-04-17 PROCEDURE — 80053 COMPREHEN METABOLIC PANEL: CPT | Performed by: PHYSICIAN ASSISTANT

## 2019-04-17 PROCEDURE — 83735 ASSAY OF MAGNESIUM: CPT | Performed by: PHYSICIAN ASSISTANT

## 2019-04-17 PROCEDURE — 85610 PROTHROMBIN TIME: CPT | Performed by: PHYSICIAN ASSISTANT

## 2019-04-17 PROCEDURE — 99223 1ST HOSP IP/OBS HIGH 75: CPT | Performed by: HOSPITALIST

## 2019-04-17 PROCEDURE — 86900 BLOOD TYPING SEROLOGIC ABO: CPT | Performed by: PHYSICIAN ASSISTANT

## 2019-04-17 PROCEDURE — 93005 ELECTROCARDIOGRAM TRACING: CPT | Performed by: PHYSICIAN ASSISTANT

## 2019-04-17 PROCEDURE — P9016 RBC LEUKOCYTES REDUCED: HCPCS

## 2019-04-17 RX ORDER — POTASSIUM CHLORIDE 20 MEQ/1
20 TABLET, EXTENDED RELEASE ORAL DAILY
Status: CANCELLED | OUTPATIENT
Start: 2019-04-18

## 2019-04-17 RX ORDER — NITROGLYCERIN 0.4 MG/1
0.4 TABLET SUBLINGUAL
Status: DISCONTINUED | OUTPATIENT
Start: 2019-04-17 | End: 2019-04-19 | Stop reason: HOSPADM

## 2019-04-17 RX ORDER — HYDRALAZINE HYDROCHLORIDE 25 MG/1
25 TABLET, FILM COATED ORAL 2 TIMES DAILY
Status: CANCELLED | OUTPATIENT
Start: 2019-04-17

## 2019-04-17 RX ORDER — FUROSEMIDE 40 MG/1
40 TABLET ORAL DAILY
COMMUNITY
End: 2019-04-19 | Stop reason: HOSPADM

## 2019-04-17 RX ORDER — FUROSEMIDE 40 MG/1
40 TABLET ORAL DAILY
Status: CANCELLED | OUTPATIENT
Start: 2019-04-17

## 2019-04-17 RX ORDER — ONDANSETRON 4 MG/1
4 TABLET, ORALLY DISINTEGRATING ORAL EVERY 8 HOURS PRN
Status: CANCELLED | OUTPATIENT
Start: 2019-04-17

## 2019-04-17 RX ORDER — SODIUM CHLORIDE 0.9 % (FLUSH) 0.9 %
10 SYRINGE (ML) INJECTION AS NEEDED
Status: DISCONTINUED | OUTPATIENT
Start: 2019-04-17 | End: 2019-04-19 | Stop reason: HOSPADM

## 2019-04-17 RX ORDER — HYDRALAZINE HYDROCHLORIDE 25 MG/1
50 TABLET, FILM COATED ORAL 2 TIMES DAILY
COMMUNITY

## 2019-04-17 RX ORDER — MAGNESIUM SULFATE HEPTAHYDRATE 40 MG/ML
2 INJECTION, SOLUTION INTRAVENOUS AS NEEDED
Status: DISCONTINUED | OUTPATIENT
Start: 2019-04-17 | End: 2019-04-19 | Stop reason: HOSPADM

## 2019-04-17 RX ORDER — FUROSEMIDE 40 MG/1
40 TABLET ORAL DAILY
Status: CANCELLED | OUTPATIENT
Start: 2019-04-18

## 2019-04-17 RX ORDER — POTASSIUM CHLORIDE 20 MEQ/1
20 TABLET, EXTENDED RELEASE ORAL DAILY
COMMUNITY
End: 2019-04-19 | Stop reason: HOSPADM

## 2019-04-17 RX ORDER — POTASSIUM CHLORIDE 7.45 MG/ML
10 INJECTION INTRAVENOUS
Status: DISCONTINUED | OUTPATIENT
Start: 2019-04-17 | End: 2019-04-19 | Stop reason: HOSPADM

## 2019-04-17 RX ORDER — SODIUM CHLORIDE 0.9 % (FLUSH) 0.9 %
3 SYRINGE (ML) INJECTION EVERY 12 HOURS SCHEDULED
Status: DISCONTINUED | OUTPATIENT
Start: 2019-04-17 | End: 2019-04-19 | Stop reason: HOSPADM

## 2019-04-17 RX ORDER — CILOSTAZOL 100 MG/1
100 TABLET ORAL 2 TIMES DAILY
Status: CANCELLED | OUTPATIENT
Start: 2019-04-17

## 2019-04-17 RX ORDER — POTASSIUM CHLORIDE 20 MEQ/1
40 TABLET, EXTENDED RELEASE ORAL EVERY 4 HOURS
Status: COMPLETED | OUTPATIENT
Start: 2019-04-17 | End: 2019-04-18

## 2019-04-17 RX ORDER — POTASSIUM CHLORIDE 20 MEQ/1
20 TABLET, EXTENDED RELEASE ORAL DAILY
Status: CANCELLED | OUTPATIENT
Start: 2019-04-17

## 2019-04-17 RX ORDER — SODIUM CHLORIDE 0.9 % (FLUSH) 0.9 %
1-10 SYRINGE (ML) INJECTION AS NEEDED
Status: DISCONTINUED | OUTPATIENT
Start: 2019-04-17 | End: 2019-04-19 | Stop reason: HOSPADM

## 2019-04-17 RX ORDER — SODIUM CHLORIDE 9 MG/ML
INJECTION, SOLUTION INTRAVENOUS
Status: DISPENSED
Start: 2019-04-17 | End: 2019-04-18

## 2019-04-17 RX ORDER — POTASSIUM CHLORIDE 1.5 G/1.77G
40 POWDER, FOR SOLUTION ORAL AS NEEDED
Status: DISCONTINUED | OUTPATIENT
Start: 2019-04-17 | End: 2019-04-19 | Stop reason: HOSPADM

## 2019-04-17 RX ORDER — ATORVASTATIN CALCIUM 40 MG/1
40 TABLET, FILM COATED ORAL DAILY
Status: CANCELLED | OUTPATIENT
Start: 2019-04-17

## 2019-04-17 RX ORDER — MAGNESIUM SULFATE HEPTAHYDRATE 40 MG/ML
4 INJECTION, SOLUTION INTRAVENOUS AS NEEDED
Status: DISCONTINUED | OUTPATIENT
Start: 2019-04-17 | End: 2019-04-19 | Stop reason: HOSPADM

## 2019-04-17 RX ORDER — POTASSIUM CHLORIDE 750 MG/1
40 TABLET, FILM COATED, EXTENDED RELEASE ORAL AS NEEDED
Status: DISCONTINUED | OUTPATIENT
Start: 2019-04-17 | End: 2019-04-19 | Stop reason: HOSPADM

## 2019-04-17 RX ORDER — MAGNESIUM SULFATE HEPTAHYDRATE 40 MG/ML
4 INJECTION, SOLUTION INTRAVENOUS ONCE
Status: COMPLETED | OUTPATIENT
Start: 2019-04-17 | End: 2019-04-18

## 2019-04-17 RX ADMIN — MAGNESIUM SULFATE HEPTAHYDRATE 4 G: 40 INJECTION, SOLUTION INTRAVENOUS at 20:23

## 2019-04-17 RX ADMIN — SODIUM CHLORIDE, PRESERVATIVE FREE 3 ML: 5 INJECTION INTRAVENOUS at 21:56

## 2019-04-17 RX ADMIN — POTASSIUM CHLORIDE 40 MEQ: 1500 TABLET, EXTENDED RELEASE ORAL at 20:23

## 2019-04-17 RX ADMIN — SODIUM CHLORIDE, PRESERVATIVE FREE 10 ML: 5 INJECTION INTRAVENOUS at 20:23

## 2019-04-17 RX ADMIN — PANTOPRAZOLE SODIUM 8 MG/HR: 40 INJECTION, POWDER, FOR SOLUTION INTRAVENOUS at 20:23

## 2019-04-17 RX ADMIN — POTASSIUM & SODIUM PHOSPHATES POWDER PACK 280-160-250 MG 2 PACKET: 280-160-250 PACK at 21:55

## 2019-04-17 NOTE — PROGRESS NOTES
Case Management/Social Work    Patient Name:  Rupinder Lees  YOB: 1935  MRN: [id number]  Admit Date:  4/17/2019    Patient address and phone number were verified.  Patient is not  and her son Gurpreet Lees's phone number is [phone] and her daughter Eneida Lees's phone number is [phone].  Patient does not have Living Will/POA.     Electronically signed by:  Autumn Peñaloza RN  04/17/19 6:23 PM

## 2019-04-17 NOTE — ED PROVIDER NOTES
Subjective   This is a 83-year-old female presents the emergency department with chief complaint fatigue, weakness.  Patient was contacted by her primary care physician who told her to come to the emergency department due to abnormal lab values.  She was told that she was anemia and had elevated potassium.  Patient states she has had generalized fatigue and weakness for the past week.  States she has felt weak since being diagnosed with pneumonia several weeks ago.  States she was admitted at this facility.  Patient does have history of COPD that requires home oxygen. Patient also reports HTN, PAD.         History provided by:  Patient   used: No    Weakness - Generalized   Severity:  Moderate  Onset quality:  Gradual  Duration:  1 week  Timing:  Intermittent  Progression:  Worsening  Chronicity:  New  Context: not alcohol use, not allergies, not change in medication, not dehydration, not drug use, not increased activity and not stress    Relieved by:  Nothing  Worsened by:  Nothing  Ineffective treatments:  None tried  Associated symptoms: dizziness and shortness of breath    Associated symptoms: no abdominal pain, no anorexia, no aphasia, no arthralgias, no chest pain, no cough, no drooling, no dysuria, no numbness in extremities, no falls, no fever, no foul-smelling urine, no frequency, no headaches, no loss of consciousness, no nausea, no near-syncope, no seizures, no urgency and no vision change    Risk factors: anemia    Risk factors: no excessive menstruation, no family hx of stroke, no neurologic disease and no new medications        Review of Systems   Constitutional: Negative for activity change, appetite change and fever.   HENT: Negative.  Negative for dental problem, drooling, ear discharge, ear pain and facial swelling.    Respiratory: Positive for shortness of breath. Negative for apnea, cough, choking and chest tightness.    Cardiovascular: Negative.  Negative for chest pain, leg  swelling and near-syncope.   Gastrointestinal: Negative.  Negative for abdominal distention, abdominal pain, anorexia and nausea.   Genitourinary: Negative.  Negative for difficulty urinating, dyspareunia, dysuria, enuresis, frequency and urgency.   Musculoskeletal: Negative for arthralgias, back pain, falls, gait problem and joint swelling.   Skin: Positive for pallor.   Neurological: Positive for dizziness and weakness. Negative for seizures, loss of consciousness, syncope and headaches.   Hematological: Negative.  Negative for adenopathy. Does not bruise/bleed easily.   Psychiatric/Behavioral: Negative.  Negative for agitation, behavioral problems, confusion, decreased concentration and dysphoric mood.   All other systems reviewed and are negative.      Past Medical History:   Diagnosis Date   • COPD  12/17/2018   • DVT of lower extremity (deep venous thrombosis) (CMS/[facility])    • History of CVA 2013 12/17/2018   • HTN 12/17/2018   • PAD  12/17/2018   • Tobacco use 12/17/2018       No Known Allergies    Past Surgical History:   Procedure Laterality Date   • CARDIAC CATHETERIZATION  12/17/2018    Procedure: Left Heart Cath;  Surgeon: Imtiaz Fuentes MD;  Location:  [facility];  Service: Cardiology   • CARDIAC CATHETERIZATION N/A 12/17/2018    Procedure: Thrombolysis-peripheral;  Surgeon: Imtiaz Fuentes MD;  Location:  [facility] CATH [facility];  Service: Cardiology   • CARDIAC ELECTROPHYSIOLOGY PROCEDURE N/A 12/18/2018    Procedure: PACEMAKER IMPLANTATION- DC;  Surgeon: Thony Bobby MD;  Location:  [facility] EP INVASIVE [facility];  Service: Cardiology   • CARDIAC ELECTROPHYSIOLOGY PROCEDURE N/A 12/17/2018    Procedure: Temporary Pacemaker;  Surgeon: Imtiaz Fuentes MD;  Location:  [facility] CATH INVASIVE LOCATION;  Service: Cardiology   • FEMORAL ARTERY STENT  2013   • FEMORAL POPLITEAL BYPASS Right 12/17/2018    Procedure: LOWER EXTREMITY EMBELECTOMY RIGHT;   Surgeon: David Beatty MD;  Location: [facility] HYBRID OR 15;  Service: Vascular   • HYSTERECTOMY     • LEFT HEART CATH  2018    At [facility] with TV PM placement    • PACEMAKER IMPLANTATION         Family History   Problem Relation Age of Onset   • Cancer Mother    • Cancer Father    • Heart attack Brother    • Heart attack Brother        Social History     Socioeconomic History   • Marital status:      Spouse name: Not on file   • Number of children: 3   • Years of education: Not on file   • Highest education level: Not on file   Occupational History   • Occupation: Homemaker   Tobacco Use   • Smoking status: Former Smoker     Packs/day: 0.50     Years: 55.00     Pack years: 27.50     Types: Cigarettes     Last attempt to quit: 3/25/2019     Years since quittin.0   • Smokeless tobacco: Never Used   Substance and Sexual Activity   • Alcohol use: No     Frequency: Never   • Drug use: No   • Sexual activity: Defer   Social History Narrative    Lives in [place], [place] alone           Objective   Physical Exam   Constitutional: She is oriented to person, place, and time. She appears well-developed and well-nourished. No distress.   HENT:   Head: Normocephalic.   Right Ear: External ear normal.   Left Ear: External ear normal.   Nose: Nose normal.   Mouth/Throat: Oropharynx is clear and moist. No oropharyngeal exudate.   Eyes: Conjunctivae and EOM are normal. Pupils are equal, round, and reactive to light. Right eye exhibits no discharge. Left eye exhibits no discharge. No scleral icterus.   Neck: Normal range of motion. Neck supple. No JVD present. No tracheal deviation present. No thyromegaly present.   Cardiovascular: Normal rate, regular rhythm, normal heart sounds and intact distal pulses. Exam reveals no gallop and no friction rub.   No murmur heard.  Pulmonary/Chest: Effort normal. No stridor. No respiratory distress. She has wheezes. She has rales. She exhibits no tenderness.   Abdominal: Soft. Bowel  sounds are normal. She exhibits no distension and no mass. There is no tenderness. There is no rebound and no guarding. No hernia.   Musculoskeletal: Normal range of motion. She exhibits no edema, tenderness or deformity.   Lymphadenopathy:     She has no cervical adenopathy.   Neurological: She is alert and oriented to person, place, and time. She displays normal reflexes. No cranial nerve deficit or sensory deficit. Coordination normal.   Skin: Skin is warm and dry. Capillary refill takes less than 2 seconds. No rash noted. She is not diaphoretic. No erythema. There is pallor.   Psychiatric: She has a normal mood and affect. Her behavior is normal. Judgment and thought content normal.   Nursing note and vitals reviewed.      Procedures           ED Course  ED Course as of Apr 17 1710 Wed Apr 17, 2019   1319 Improvement in CHF. Otherwise stable chest.  []   1519 Patient now states that she has had some diarrhea for past 2 weeks. Denies any nausea, vomiting.   []   1657 Discussed care with Dr. Santos. Patient will be admitted to PCU for further evaluation.   []      ED Course User Index  [] Chance Diop PA-C                  [facility]      Final diagnoses:   Anemia, unspecified type   Weakness   Hypophosphatemia   Hypotension, unspecified hypotension type            Chance Diop PA-C  04/17/19 1702       Chance Doip PA-C  04/17/19 1710

## 2019-04-17 NOTE — PROGRESS NOTES
Discharge Planning Assessment   Ho     Patient Name: Rupinder Lees  MRN: [id number]  Today's Date: 4/17/2019    Admit Date: 4/17/2019    Discharge Needs Assessment     Row Name 04/17/19 1814       Living Environment    Lives With  alone    Current Living Arrangements  home/apartment/condo    Primary Care Provided by  self    Provides Primary Care For  no one    Family Caregiver if Needed  none    Quality of Family Relationships  helpful;involved;supportive    Able to Return to Prior Arrangements  yes       Resource/Environmental Concerns    Resource/Environmental Concerns  none    Transportation Concerns  other (see comments) Family/friends will provide        Transition Planning    Patient/Family Anticipates Transition to  home with help/services    Patient/Family [facility]ticipated Services at Transition  [facility]     Transportation Anticipated  family or friend will provide       Discharge Needs Assessment    Readmission Within the Last 30 Days  current reason for admission unrelated to previous admission Patient was inpatient 3/24/19 - 4/1/19 for Pneumonia.    Concerns to be Addressed  denies needs/concerns at this time    Equipment Currently Used at Home  oxygen;walker, rolling;cane, straight;nebulizer    Anticipated Changes Related to Illness  none    Equipment Needed After Discharge  none    Outpatient/Agency/Support Group Needs  homecare agency    Discharge Facility/Level of Care Needs  home with home health Patient utilizes [facility] at this time.     Current Discharge Risk  lives alone        Discharge Plan     Row Name 04/17/19 1817       Plan    Plan  Patient is an elderly female patient who has never worked that lives at home alone, where she plans to return at discharge.  Patient's PCP is Dr. Kruse and she uses [facility] for her prescription needs.  Patient denies any insurance or financial needs at this time.  Patient has Oxygen, RW, cane and nebulizer  that she obtained from [facility].  Nicole utilizes [facility] at this time.  Patient's family will provide tranpsoration via private vehicle at discharge.  No other issues or concerns are noted at this time.  SS has been consulted and will continue to follow and assist with any discharge needs.      Patient/Family in Agreement with Plan  yes        Destination      No service coordination in this encounter.      Durable Medical Equipment      No service coordination in this encounter.      Dialysis/Infusion      No service coordination in this encounter.      Home Medical Care      No service coordination in this encounter.      Therapy      No service coordination in this encounter.      Community Resources      No service coordination in this encounter.          Demographic Summary     Row Name 04/17/19 1811       General Information    Admission Type  inpatient    Arrived From  home;emergency department;correctional system/facility Patient's PCP phoned and told her to go to ER r/t Anemia and Hyperkalemia.    Referral Source  emergency department    Reason for Consult  discharge planning    Preferred Language  English     Used During This Interaction  no        Functional Status     Row Name 04/17/19 1813       Functional Status    Functional Status Comments  Independent        Functional Status, IADL    IADL Comments  Independent        Mental Status    General Appearance WDL  WDL       Mental Status Summary    Recent Changes in Mental Status/Cognitive Functioning  no changes       Employment/    Employment/ Comments  Patient has never worked         Psychosocial    No documentation.       Abuse/Neglect    No documentation.       Legal    No documentation.       Substance Abuse    No documentation.       Patient Forms    No documentation.           Autumn Peñaloza RN

## 2019-04-17 NOTE — PLAN OF CARE
Problem: Patient Care Overview  Goal: Plan of Care Review  Outcome: Ongoing (interventions implemented as appropriate)   04/17/19 1830   Coping/Psychosocial   Plan of Care Reviewed With patient   Plan of Care Review   Progress improving       Problem: Anemia (Adult)  Goal: Identify Related Risk Factors and Signs and Symptoms  Outcome: Ongoing (interventions implemented as appropriate)   04/17/19 1830   Anemia (Adult)   Signs and Symptoms (Anemia) pallor;fatigue     Goal: Symptom Improvement  Outcome: Ongoing (interventions implemented as appropriate)   04/17/19 1830   Anemia (Adult)   Symptom Improvement making progress toward outcome

## 2019-04-17 NOTE — H&P
[facility] Medicine Services  History & Physical    Patient Identification:  Name:  Rupinder Lees  Age:  83 y.o.  Sex:  female  :  1935  MRN:  [id number]   Visit Number:  [id number]  Primary Care Physician:  Gifty Kruse MD    I have seen the patient in conjunction with Cecelia Velazco PA-C and I agree with the following statements:    Subjective     Chief complaint: Sent from PCP due to low hemoglobin    History of presenting illness:      Rupinder Lees is a 83 y.o. female with past medical history significant for chronic obstructive pulmonary disease, deep vein thrombosis of the lower extremity, history of CVA in , hypertension, peripheral arterial disease, tobacco abuse, and history of pacemaker implantation.  She was contacted by her primary care physician on this date with instructions to come to the emergency department given recent abnormal lab values.  She reported generalized fatigue and weakness in addition to recent diarrhea that has since resolved (no loose bowel movement in the last 3 days) and recent treatment for pneumonia a few weeks ago with discharge from this facility.    Mrs. Lees reports ongoing weakness since discharge.  She went home on 2L/min NC. She had ongoing diarrhea over the past week but denies blood in her stool.  Her hemoglobin upon ED presentation was 6.6.  It was 7.1 on the day of discharge on 19, any upon further review it seems her hemoglobin averaged about 7.5 during her 1 week stay. Prior to this it appears her hemoglobin ranged between 10-14. She denies any prior known EGD but states she did have a screening colonoscopy greater than 5 years ago that was reportedly unremarkable.   She reports anemia to be new and denies prior history of GI blood loss.  She reports greater than 4 watery bowel movements per day for the past week up until 3 days ago.  She did recently complete oral antibiotics for recent pneumonia.       Upon  arrival to the ED, vital signs were temperature 99 °F, pulse 105, respiratory rate 18, blood pressure 65/41 and room air oxygen saturation of 87%. Blood pressures improved with IV fluid hydration.  She denies overt bleeding.  She has been admitted to PCU for close monitoring.     ---------------------------------------------------------------------------------------------------------------------   Review of Systems   Constitutional: Positive for activity change and fatigue. Negative for appetite change, chills and fever.   HENT: Negative for congestion, drooling, postnasal drip, rhinorrhea, sinus pressure, sinus pain and sore throat.         Some difficulty swallowing meat up until 3 days ago, now resolved   Eyes: Negative for photophobia, pain, discharge, redness, itching and visual disturbance.   Respiratory: Negative for cough, shortness of breath and wheezing.    Cardiovascular: Positive for leg swelling (per patient and son, swelling is improving since leaving the hospital earlier this month). Negative for chest pain and palpitations.   Gastrointestinal: Positive for diarrhea. Negative for nausea and vomiting.        Abdominal cramping   Endocrine: Negative for cold intolerance, heat intolerance, polydipsia, polyphagia and polyuria.   Genitourinary: Negative for difficulty urinating, dysuria, flank pain, frequency and hematuria.   Musculoskeletal: Positive for arthralgias. Negative for back pain, joint swelling, myalgias, neck pain and neck stiffness.   Skin: Positive for pallor and wound (scattered scrapes/abrasions on lower extremities from frequent falls (once per week according to son)). Negative for color change and rash.   Allergic/Immunologic: Negative for environmental allergies, food allergies and immunocompromised state.   Neurological: Positive for weakness (generalized). Negative for dizziness, tremors, seizures, syncope, numbness and headaches.   Hematological: Negative for adenopathy.  Bruises/bleeds easily.   Psychiatric/Behavioral: Negative for agitation and confusion.      ---------------------------------------------------------------------------------------------------------------------   Past Medical History:   Diagnosis Date   • COPD  12/17/2018   • DVT of lower extremity (deep venous thrombosis) (CMS/[facility])    • History of CVA 2013 12/17/2018   • HTN 12/17/2018   • PAD  12/17/2018   • Tobacco use 12/17/2018     Past Surgical History:   Procedure Laterality Date   • CARDIAC CATHETERIZATION  12/17/2018    Procedure: Left Heart Cath;  Surgeon: Imtiaz Fuentes MD;  Location:  [facility] CATH INVASIVE LOCATION;  Service: Cardiology   • CARDIAC CATHETERIZATION N/A 12/17/2018    Procedure: Thrombolysis-peripheral;  Surgeon: Imtiaz Fuentes MD;  Location:  [facility]ATH [facility];  Service: Cardiology   • CARDIAC ELECTROPHYSIOLOGY PROCEDURE N/A 12/18/2018    Procedure: PACEMAKER IMPLANTATION- DC;  Surgeon: Thony Bobby MD;  Location:  [facility] INVASIVE LOCATION;  Service: Cardiology   • CARDIAC ELECTROPHYSIOLOGY PROCEDURE N/A 12/17/2018    Procedure: Temporary Pacemaker;  Surgeon: Imtiaz Fuentes MD;  Location:  [facility] CATH INVASIVE LOCATION;  Service: Cardiology   • FEMORAL ARTERY STENT  2013   • FEMORAL POPLITEAL BYPASS Right 12/17/2018    Procedure: LOWER EXTREMITY EMBELECTOMY RIGHT;  Surgeon: David Beatty MD;  Location:  [facility] HYBRID OR 15;  Service: Vascular   • HYSTERECTOMY     • LEFT HEART CATH  12/17/2018    At [facility] with TV PM placement    • PACEMAKER IMPLANTATION       Family History   Problem Relation Age of Onset   • Cancer Mother    • Cancer Father    • Heart attack Brother    • Heart attack Brother      Social History     Socioeconomic History   • Marital status:      Spouse name: Not on file   • Number of children: 3   • Years of education: Not on file   • Highest education level: Not on file   Occupational History   • Occupation:  Homemaker   Tobacco Use   • Smoking status: Former Smoker     Packs/day: 0.50     Years: 55.00     Pack years: 27.50     Types: Cigarettes     Last attempt to quit: 3/25/2019     Years since quittin.0   • Smokeless tobacco: Never Used   Substance and Sexual Activity   • Alcohol use: No     Frequency: Never   • Drug use: No   • Sexual activity: Defer   Social History Narrative    Lives in [place], [place] alone     ---------------------------------------------------------------------------------------------------------------------   Allergies:  Patient has no known allergies.  ---------------------------------------------------------------------------------------------------------------------   Home medications:    Medications below are reported home medications pulling from within the system; at this time, these medications have not been reconciled unless otherwise specified and are in the verification process for further verifcation as current home medications.  Medications Prior to Admission   Medication Sig Dispense Refill Last Dose   • amiodarone (PACERONE) 200 MG tablet Take 1 tablet by mouth Every 12 (Twelve) Hours. 60 tablet 6 Taking   • apixaban (ELIQUIS) 2.5 MG tablet tablet Take 1 tablet by mouth Every 12 (Twelve) Hours. 60 tablet 6 Taking   • atorvastatin (LIPITOR) 40 MG tablet Take 40 mg by mouth Daily.   Taking   • cilostazol (PLETAL) 100 MG tablet Take 100 mg by mouth 2 (Two) Times a Day.   Taking   • dexlansoprazole (DEXILANT) 60 MG capsule Take 1 capsule by mouth Daily. 30 capsule 0    • ferrous gluconate (FERGON) 324 MG tablet Take 1 tablet by mouth Daily With Breakfast. 30 tablet 0 Taking   • furosemide (LASIX) 20 MG tablet Take 20 mg by mouth.   Taking   • hydrALAZINE (APRESOLINE) 50 MG tablet Take 50 mg by mouth 3 (Three) Times a Day.   Taking   • ipratropium (ATROVENT) 0.02 % nebulizer solution Take 2.5 mL by nebulization Every 6 (Six) Hours for 30 days. 300 mL 0 Taking   • metoprolol tartrate  (LOPRESSOR) 100 MG tablet Take 1 tablet by mouth Every 12 (Twelve) Hours for 30 days. 60 tablet 0 Taking   • olmesartan (BENICAR) 40 MG tablet Take 40 mg by mouth Daily.   Taking   • ondansetron ODT (ZOFRAN-ODT) 4 MG disintegrating tablet Take 1 tablet by mouth Every 8 (Eight) Hours As Needed for Nausea or Vomiting. 42 tablet 0    • potassium chloride (MICRO-K) 10 MEQ CR capsule    Taking       Hospital Scheduled Meds:    magnesium sulfate 4 g Intravenous Once   potassium chloride 40 mEq Oral Q4H   sodium chloride 3 mL Intravenous Q12H   sodium chloride          pantoprazole 8 mg/hr       Current listed hospital scheduled medications may not yet reflect those currently placed in orders that are signed and held awaiting patient's arrival to floor.   ---------------------------------------------------------------------------------------------------------------------     Objective     Vital Signs:  Temp:  [99 °F (37.2 °C)-99.1 °F (37.3 °C)] 99.1 °F (37.3 °C)  Heart Rate:  [] 75  Resp:  [18] 18  BP: ()/(41-85) 99/59      04/17/19  1120 04/17/19  1802   Weight: 49.9 kg (110 lb) 54 kg (119 lb)     Body mass index is 23.24 kg/m².  ---------------------------------------------------------------------------------------------------------------------       Physical Exam    Physical Exam:    Constitutional: Awake, alert, well-nourished, well-developed, nontoxic appearing. Pale. No acute distress.  HEENT: Normocephalic, atraumatic. PERRLA, EOMI, sclerae anicteric, conjunctivae without injection, mucous membranes moist, no oropharyngeal erythema appreciated.    Neck: Supple. No JVD appreciated.  Pulmonary: Clear to auscultation bilaterally, nonlabored respirations, no wheezing appreciated.   CV: Irregularly irregular. Rate controlled. Normal s1/s2 with no murmur appreciated.   Abdominal: Soft, No distension or tenderness appreciated. Bowel sounds appreciated in all four quadrants, no guarding or  rebound  Musculoskeletal: No erythema or swelling to joints of upper and lower extremities   Extremities: No clubbing, cyanosis. 1+ edema left lower ext, trace edema right lower extremity.   Skin: Skin is warm and dry. No truncal or extremity rash on limited exam. Scattered bruising noted. Also scattered scrapes/abrasions on lower extremities from frequent falls. Small dark spot on tip of left 1st toe.  Neuro: Alert and oriented to person, place, and time but somewhat forgetful. Strength symmetric in all extremities, Cranial Nerves grossly intact to confrontation, speech clear, sensation intact to fine touch throughout.  No slurred speech.  No facial droop.    Psych: Appropriate mood and affect.  Judgement and thought content appropriate.       ---------------------------------------------------------------------------------------------------------------------  EKG:  Atrial fibrillation, HR 97, QTc 543 (prolonged), nonspecific T wave abnormality in lateral leads.             I have personally reviewed this EKG.   ---------------------------------------------------------------------------------------------------------------------   Results from last 7 days   Lab Units 04/17/19  1221   CRP mg/dL 7.10*   LACTATE mmol/L 1.9   WBC 10*3/mm3 4.26   HEMOGLOBIN g/dL 6.6*   HEMATOCRIT % 22.3*   MCV fL 90.3   MCHC g/dL 29.6*   PLATELETS 10*3/mm3 229   INR  1.29*     Results from last 7 days   Lab Units 04/17/19  1206   PH, ARTERIAL pH units 7.525*   PO2 ART mm Hg 58.3*   PCO2, ARTERIAL mm Hg 37.6   HCO3 ART mmol/L 30.4*     Results from last 7 days   Lab Units 04/17/19  1221   SODIUM mmol/L 139   POTASSIUM mmol/L 3.0*   MAGNESIUM mg/dL 1.6   CHLORIDE mmol/L 99   CO2 mmol/L 28.0   BUN mg/dL 10   CREATININE mg/dL 0.96   EGFR IF NONAFRICN AM mL/min/1.73 56*   CALCIUM mg/dL 8.5*   GLUCOSE mg/dL 118*   ALBUMIN g/dL 3.08*   BILIRUBIN mg/dL 0.3   ALK PHOS U/L 72   AST (SGOT) U/L 18   ALT (SGPT) U/L 22   Estimated Creatinine  Clearance: 39.2 mL/min (by C-G formula based on SCr of 0.96 mg/dL).  No results found for: AMMONIA          Lab Results   Component Value Date    HGBA1C 6.1 (H) 05/31/2015     Lab Results   Component Value Date    TSH 0.762 12/17/2018    FREET4 1.47 05/31/2015     No results found for: PREGTESTUR, PREGSERUM, HCG, HCGQUANT  Pain Management Panel     Pain Management Panel Latest Ref Rng & Units 5/31/2015    AMPHETAMINES SCREEN, URINE NEGATIVE Negative    BARBITURATES SCREEN NEGATIVE Negative    BENZODIAZEPINE SCREEN, URINE NEGATIVE Negative    COCAINE SCREEN, URINE NEGATIVE Negative    METHADONE SCREEN, URINE NEGATIVE Negative                        ---------------------------------------------------------------------------------------------------------------------  Imaging Results (last 7 days)     Procedure Component Value Units Date/Time    XR Chest 1 View [[id number]] Collected:  04/17/19 1246     Updated:  04/17/19 1248    Narrative:       EXAMINATION: XR CHEST 1 VW-      CLINICAL INDICATION:     Severe Sepsis Protocol     TECHNIQUE:  XR CHEST 1 VW-      COMPARISON: 04/16/2019      FINDINGS:      CHF improved from the previous exam. No consolidation identified.   Mild cardiac enlargement noted. Pulmonary vascularity appears within  normal limits. Stable appearance of left pacer device.   No pneumothorax.   No effusions.   No acute osseous findings.          Impression:       Improvement in CHF. Otherwise stable chest.     This report was finalized on 4/17/2019 12:46 PM by Dr. Pedrito Alejandre MD.                     CHADS-VASc Risk Assessment            7       Total Score        1 Hypertension    2 Age >/= 75    2 PRIOR STROKE/TIA/THROMBO    1 Vascular Disease    1 Sex: Female            I have personally reviewed the radiology images and read the final radiology report.  ---------------------------------------------------------------------------------------------------------------------  Assessment / Plan      Active Hospital Problems    Diagnosis POA   • Anemia [D64.9] Yes   • Hypokalemia [E87.6] Yes   • Hypophosphatemia [E83.39] Yes       ASSESSMENT/PLAN:  · Symptomatic acute on chronic anemia with possible GI Bleedin unit of PRBCs ordered in the ED.    Recent anemia studies from 3/24/19 revealing iron deficiency anemia.  Fecal occult blood faintly positive.  She is on Eliquis at home for atrial fibrillation. This will be held for now. IV protonix idrip on board. Consult general surgery for consideration of endoscopy.    · Diarrhea with concern for underlying colitis: CT abdomen/pelvis ordered. C. diff testing ordered from ED given recent oral abx and watery stool with abdominal cramping.  Will follow stool studies.  WBC WNL though CRP is elevated.  HR>90.  RR WNL.  Lactate WNL @ 1.9. Continue to trend CRP.    · Hypotension, improved: Likely multifactorial secondary to volume depletion and multiple home blood pressure medications. Will continue gentle hydration.      · Hypokalemia, hypophosphatemia, low normal magnesium: Electrolyte replacement per protocol has been ordered. We will continue utlizing protocol.    · Chronic atrial fibrillation:  Will hold Eliquis at present secondary to concern for possible GI bleeding.  Will review home medications once reconciled by pharmacy.  [id number] at least 7.     · Chronic hypoxic respiratory failure: ABG and low pulse oximetry were on room air.  She has been on 2L/min chronically for history of COPD.     · CKD stage III: creatinine within baseline range. Continue to monitor closely.       ----------  -DVT prophylaxis: SCDs  -Activity: Up with assist   -Expected length of stay:  INPATIENT status due to the need for care which can only be reasonably provided in an hospital setting such as aggressive/expedited ancillary services and/or consultation services, the necessity for IV medications, close physician monitoring and/or the possible need for procedures.  In such, I  feel patient’s risk for adverse outcomes and need for care warrant INPATIENT evaluation and predict the patient’s care encounter to likely last beyond 2 midnights.    Plan of care discussed with patient, her son at bedside, and her RN Veronique who was present during my interview and exam in the PCU.    * Patient is high risk due to acute on chronic anemia, possible GI bleed, chronic afib with chronic anticoagulation, advanced age    Cecelia Velazco PA-C  04/17/19  6:08 PM  Pager # [phone]  ---------------------------------------------------------------------------------------------------------------------   * I have seen the patient in conjunction with Cecelia Velazco PA-C, and have amended her note to reflect my own findings, assessment and plan.

## 2019-04-18 ENCOUNTER — APPOINTMENT (OUTPATIENT)
Dept: CT IMAGING | Facility: HOSPITAL | Age: 84
End: 2019-04-18

## 2019-04-18 PROBLEM — D50.0 IRON DEFICIENCY ANEMIA DUE TO CHRONIC BLOOD LOSS: Status: ACTIVE | Noted: 2019-04-17

## 2019-04-18 PROBLEM — R19.5 OCCULT BLOOD POSITIVE STOOL: Status: ACTIVE | Noted: 2019-04-17

## 2019-04-18 LAB
ABO + RH BLD: NORMAL
ANION GAP SERPL CALCULATED.3IONS-SCNC: 10.1 MMOL/L
BASOPHILS # BLD AUTO: 0.02 10*3/MM3 (ref 0–0.2)
BASOPHILS NFR BLD AUTO: 0.5 % (ref 0–1.5)
BH BB BLOOD EXPIRATION DATE: NORMAL
BH BB BLOOD TYPE BARCODE: 5100
BH BB DISPENSE STATUS: NORMAL
BH BB PRODUCT CODE: NORMAL
BH BB UNIT NUMBER: NORMAL
BUN BLD-MCNC: 9 MG/DL (ref 8–23)
BUN/CREAT SERPL: 10.7 (ref 7–25)
CALCIUM SPEC-SCNC: 8.1 MG/DL (ref 8.6–10.5)
CHLORIDE SERPL-SCNC: 101 MMOL/L (ref 98–107)
CO2 SERPL-SCNC: 28.9 MMOL/L (ref 22–29)
CREAT BLD-MCNC: 0.84 MG/DL (ref 0.57–1)
CROSSMATCH INTERPRETATION: NORMAL
CRP SERPL-MCNC: 6.44 MG/DL (ref 0–0.5)
DEPRECATED RDW RBC AUTO: 52.4 FL (ref 37–54)
EOSINOPHIL # BLD AUTO: 0.05 10*3/MM3 (ref 0–0.4)
EOSINOPHIL NFR BLD AUTO: 1.4 % (ref 0.3–6.2)
ERYTHROCYTE [DISTWIDTH] IN BLOOD BY AUTOMATED COUNT: 16.9 % (ref 12.3–15.4)
GFR SERPL CREATININE-BSD FRML MDRD: 65 ML/MIN/1.73
GLUCOSE BLD-MCNC: 92 MG/DL (ref 65–99)
HCT VFR BLD AUTO: 25.7 % (ref 34–46.6)
HGB BLD-MCNC: 8 G/DL (ref 12–15.9)
IMM GRANULOCYTES # BLD AUTO: 0 10*3/MM3 (ref 0–0.05)
IMM GRANULOCYTES NFR BLD AUTO: 0 % (ref 0–0.5)
LYMPHOCYTES # BLD AUTO: 0.76 10*3/MM3 (ref 0.7–3.1)
LYMPHOCYTES NFR BLD AUTO: 20.8 % (ref 19.6–45.3)
MAGNESIUM SERPL-MCNC: 3 MG/DL (ref 1.6–2.4)
MCH RBC QN AUTO: 27.3 PG (ref 26.6–33)
MCHC RBC AUTO-ENTMCNC: 31.1 G/DL (ref 31.5–35.7)
MCV RBC AUTO: 87.7 FL (ref 79–97)
MONOCYTES # BLD AUTO: 0.32 10*3/MM3 (ref 0.1–0.9)
MONOCYTES NFR BLD AUTO: 8.7 % (ref 5–12)
NEUTROPHILS # BLD AUTO: 2.51 10*3/MM3 (ref 1.4–7)
NEUTROPHILS NFR BLD AUTO: 68.6 % (ref 42.7–76)
PHOSPHATE SERPL-MCNC: 2.2 MG/DL (ref 2.5–4.5)
PLATELET # BLD AUTO: 210 10*3/MM3 (ref 140–450)
PMV BLD AUTO: 9.3 FL (ref 6–12)
POTASSIUM BLD-SCNC: 4.1 MMOL/L (ref 3.5–5.2)
RBC # BLD AUTO: 2.93 10*6/MM3 (ref 3.77–5.28)
SODIUM BLD-SCNC: 140 MMOL/L (ref 136–145)
UNIT  ABO: NORMAL
UNIT  RH: NORMAL
WBC NRBC COR # BLD: 3.66 10*3/MM3 (ref 3.4–10.8)

## 2019-04-18 PROCEDURE — 94640 AIRWAY INHALATION TREATMENT: CPT

## 2019-04-18 PROCEDURE — 84100 ASSAY OF PHOSPHORUS: CPT | Performed by: INTERNAL MEDICINE

## 2019-04-18 PROCEDURE — 99232 SBSQ HOSP IP/OBS MODERATE 35: CPT | Performed by: INTERNAL MEDICINE

## 2019-04-18 PROCEDURE — 99221 1ST HOSP IP/OBS SF/LOW 40: CPT | Performed by: SURGERY

## 2019-04-18 PROCEDURE — 94799 UNLISTED PULMONARY SVC/PX: CPT

## 2019-04-18 PROCEDURE — 86140 C-REACTIVE PROTEIN: CPT | Performed by: HOSPITALIST

## 2019-04-18 PROCEDURE — 83735 ASSAY OF MAGNESIUM: CPT | Performed by: INTERNAL MEDICINE

## 2019-04-18 PROCEDURE — 74176 CT ABD & PELVIS W/O CONTRAST: CPT

## 2019-04-18 PROCEDURE — 74176 CT ABD & PELVIS W/O CONTRAST: CPT | Performed by: RADIOLOGY

## 2019-04-18 PROCEDURE — 80048 BASIC METABOLIC PNL TOTAL CA: CPT | Performed by: PHYSICIAN ASSISTANT

## 2019-04-18 PROCEDURE — 85025 COMPLETE CBC W/AUTO DIFF WBC: CPT | Performed by: PHYSICIAN ASSISTANT

## 2019-04-18 RX ORDER — POLYETHYLENE GLYCOL 3350, SODIUM CHLORIDE, POTASSIUM CHLORIDE, SODIUM BICARBONATE, AND SODIUM SULFATE 240; 5.84; 2.98; 6.72; 22.72 G/4L; G/4L; G/4L; G/4L; G/4L
4000 POWDER, FOR SOLUTION ORAL ONCE
Status: COMPLETED | OUTPATIENT
Start: 2019-04-18 | End: 2019-04-18

## 2019-04-18 RX ORDER — ATORVASTATIN CALCIUM 40 MG/1
40 TABLET, FILM COATED ORAL NIGHTLY
Status: DISCONTINUED | OUTPATIENT
Start: 2019-04-18 | End: 2019-04-19 | Stop reason: HOSPADM

## 2019-04-18 RX ADMIN — SODIUM PHOSPHATE, MONOBASIC, MONOHYDRATE 10 MMOL: 276; 142 INJECTION, SOLUTION INTRAVENOUS at 12:44

## 2019-04-18 RX ADMIN — PANTOPRAZOLE SODIUM 8 MG/HR: 40 INJECTION, POWDER, FOR SOLUTION INTRAVENOUS at 03:43

## 2019-04-18 RX ADMIN — POTASSIUM CHLORIDE 40 MEQ: 1500 TABLET, EXTENDED RELEASE ORAL at 03:41

## 2019-04-18 RX ADMIN — IPRATROPIUM BROMIDE 0.5 MG: 0.5 SOLUTION RESPIRATORY (INHALATION) at 06:45

## 2019-04-18 RX ADMIN — SODIUM CHLORIDE, PRESERVATIVE FREE 3 ML: 5 INJECTION INTRAVENOUS at 21:12

## 2019-04-18 RX ADMIN — ATORVASTATIN CALCIUM 40 MG: 40 TABLET, FILM COATED ORAL at 21:12

## 2019-04-18 RX ADMIN — POTASSIUM CHLORIDE 40 MEQ: 1500 TABLET, EXTENDED RELEASE ORAL at 00:48

## 2019-04-18 RX ADMIN — IPRATROPIUM BROMIDE 0.5 MG: 0.5 SOLUTION RESPIRATORY (INHALATION) at 12:37

## 2019-04-18 RX ADMIN — POLYETHYLENE GLYCOL 3350, SODIUM CHLORIDE, POTASSIUM CHLORIDE, SODIUM BICARBONATE, AND SODIUM SULFATE 4000 ML: 240; 5.84; 2.98; 6.72; 22.72 POWDER, FOR SOLUTION ORAL at 15:53

## 2019-04-18 NOTE — PROGRESS NOTES
Assisted By: Amy LOGAN    CC: F/U anemia    Interview History/HPI: Patient states she feels good she states she had no abdominal pain chest pain or shortness of breath.  Her son accompanies her as well.  She has been referred here because of anemia      Vitals:    04/18/19 0802   BP: 130/70   Pulse: 71   Resp: 10   Temp:    SpO2: 95%       Intake/Output Summary (Last 24 hours) at 4/18/2019 1149  Last data filed at 4/17/2019 1945  Gross per 24 hour   Intake 300 ml   Output 225 ml   Net 75 ml       EXAM: Temperature 98.5.  Mood is good she has decreased hearing there is a hearing aid in her right ear, lungs bilateral breath sounds are clear no rhonchi rales or wheezing heard, heart is irregularly irregular rhythm without murmur rub or gallop abdomen soft benign.    Tele: Probable atrial fibrillation, reviewed    LABS:   Lab Results (last 48 hours)     Procedure Component Value Units Date/Time    Magnesium [[id number]]  (Abnormal) Collected:  04/18/19 0854    Specimen:  Blood Updated:  04/18/19 0949     Magnesium 3.0 mg/dL     Phosphorus [[id number]]  (Abnormal) Collected:  04/18/19 0854    Specimen:  Blood Updated:  04/18/19 0949     Phosphorus 2.2 mg/dL     Basic Metabolic Panel [[id number]]  (Abnormal) Collected:  04/18/19 0326    Specimen:  Blood Updated:  04/18/19 0413     Glucose 92 mg/dL      BUN 9 mg/dL      Creatinine 0.84 mg/dL      Sodium 140 mmol/L      Potassium 4.1 mmol/L      Chloride 101 mmol/L      CO2 28.9 mmol/L      Calcium 8.1 mg/dL      eGFR Non African Amer 65 mL/min/1.73      BUN/Creatinine Ratio 10.7     Anion Gap 10.1 mmol/L     Narrative:       GFR Normal >60  Chronic Kidney Disease <60  Kidney Failure <15    C-reactive Protein [[id number]]  (Abnormal) Collected:  04/18/19 0326    Specimen:  Blood Updated:  04/18/19 0404     C-Reactive Protein 6.44 mg/dL     CBC & Differential [[id number]] Collected:  04/18/19 0326    Specimen:  Blood Updated:  04/18/19 0343    Narrative:       The following  "orders were created for panel order CBC & Differential.  Procedure                               Abnormality         Status                     ---------                               -----------         ------                     CBC Auto Differential[[id number]]        Abnormal            Final result                 Please view results for these tests on the individual orders.    CBC Auto Differential [[id number]]  (Abnormal) Collected:  04/18/19 0326    Specimen:  Blood Updated:  04/18/19 0343     WBC 3.66 10*3/mm3      RBC 2.93 10*6/mm3      Hemoglobin 8.0 g/dL      Hematocrit 25.7 %      MCV 87.7 fL      MCH 27.3 pg      MCHC 31.1 g/dL      RDW 16.9 %      RDW-SD 52.4 fl      MPV 9.3 fL      Platelets 210 10*3/mm3      Neutrophil % 68.6 %      Lymphocyte % 20.8 %      Monocyte % 8.7 %      Eosinophil % 1.4 %      Basophil % 0.5 %      Immature Grans % 0.0 %      Neutrophils, Absolute 2.51 10*3/mm3      Lymphocytes, Absolute 0.76 10*3/mm3      Monocytes, Absolute 0.32 10*3/mm3      Eosinophils, Absolute 0.05 10*3/mm3      Basophils, Absolute 0.02 10*3/mm3      Immature Grans, Absolute 0.00 10*3/mm3     Procalcitonin [[id number]]  (Abnormal) Collected:  04/17/19 1221    Specimen:  Blood Updated:  04/17/19 2312     Procalcitonin 0.06 ng/mL     Narrative:       As a Marker for Sepsis (Non-Neonates):   1. <0.5 ng/mL represents a low risk of severe sepsis and/or septic shock.  1. >2 ng/mL represents a high risk of severe sepsis and/or septic shock.    As a Marker for Lower Respiratory Tract Infections that require antibiotic therapy:  PCT on Admission     Antibiotic Therapy             6-12 Hrs later  > 0.5                Strongly Recommended            >0.25 - <0.5         Recommended  0.1 - 0.25           Discouraged                   Remeasure/reassess PCT  <0.1                 Strongly Discouraged          Remeasure/reassess PCT      As 28 day mortality risk marker: \"Change in Procalcitonin Result\" (> 80 % or <=80 " %) if Day 0 (or Day 1) and Day 4 values are available. Refer to [email or web address]   Change in PCT <=80 %   A decrease of PCT levels below or equal to 80 % defines a positive change in PCT test result representing a higher risk for 28-day all-cause mortality of patients diagnosed with severe sepsis or septic shock.  Change in PCT > 80 %   A decrease of PCT levels of more than 80 % defines a negative change in PCT result representing a lower risk for 28-day all-cause mortality of patients diagnosed with severe sepsis or septic shock.                POC Occult Blood Stool [[id number]]  (Abnormal) Collected:  04/17/19 1528    Specimen:  Stool Updated:  04/17/19 1530     Fecal Occult Blood Positive     Lot Number [id number]     Expiration Date 11-20     DEVELOPER LOT NUMBER [id number]     DEVELOPER EXPIRATION DATE 7-2,021     Positive Control Positive     Negative Control Negative    [place] Draw [[id number]] Collected:  04/17/19 1221    Specimen:  Blood Updated:  04/17/19 1330    Narrative:       The following orders were created for panel order [place] Draw.  Procedure                               Abnormality         Status                     ---------                               -----------         ------                     Light Blue Top[[id number]]                                   Final result               Green Top (Gel)[[id number]]                                  Final result               Lavender Top[[id number]]                                     Final result               Gold Top - SST[[id number]]                                                                Please view results for these tests on the individual orders.    Light Blue Top [[id number]] Collected:  04/17/19 1221    Specimen:  Blood Updated:  04/17/19 1330     Extra Tube hold for add-on     Comment: Auto resulted       Green Top (Gel) [[id number]] Collected:  04/17/19 1221    Specimen:  Blood Updated:  04/17/19 1330     Extra Tube  Hold for add-ons.     Comment: Auto resulted.       Phosphorus [[id number]]  (Abnormal) Collected:  04/17/19 1221    Specimen:  Blood Updated:  04/17/19 1313     Phosphorus 1.6 mg/dL     Calcium, Ionized [[id number]]  (Abnormal) Collected:  04/17/19 1221    Specimen:  Blood Updated:  04/17/19 1310     Ionized Calcium 4.47 mg/dL     Comprehensive Metabolic Panel [[id number]]  (Abnormal) Collected:  04/17/19 1221    Specimen:  Blood Updated:  04/17/19 1304     Glucose 118 mg/dL      BUN 10 mg/dL      Creatinine 0.96 mg/dL      Sodium 139 mmol/L      Potassium 3.0 mmol/L      Chloride 99 mmol/L      CO2 28.0 mmol/L      Calcium 8.5 mg/dL      Total Protein 6.1 g/dL      Albumin 3.08 g/dL      ALT (SGPT) 22 U/L      AST (SGOT) 18 U/L      Alkaline Phosphatase 72 U/L      Total Bilirubin 0.3 mg/dL      eGFR Non African Amer 56 mL/min/1.73      Globulin 3.0 gm/dL      A/G Ratio 1.0 g/dL      BUN/Creatinine Ratio 10.4     Anion Gap 12.0 mmol/L     Narrative:       GFR Normal >60  Chronic Kidney Disease <60  Kidney Failure <15    Blood Culture - Blood, Arm, Left [[id number]] Collected:  04/17/19 1221    Specimen:  Blood from Arm, Left Updated:  04/17/19 1302    Blood Culture - Blood, Arm, Right [[id number]] Collected:  04/17/19 1200    Specimen:  Blood from Arm, Right Updated:  04/17/19 1302    Magnesium [[id number]]  (Normal) Collected:  04/17/19 1221    Specimen:  Blood Updated:  04/17/19 1300     Magnesium 1.6 mg/dL     C-reactive Protein [[id number]]  (Abnormal) Collected:  04/17/19 1221    Specimen:  Blood Updated:  04/17/19 1300     C-Reactive Protein 7.10 mg/dL     Lavender Top [[id number]] Collected:  04/17/19 1221    Specimen:  Blood Updated:  04/17/19 1256     Extra Tube hold for add-on     Comment: Auto resulted       Lactic Acid, Plasma [[id number]]  (Normal) Collected:  04/17/19 1221    Specimen:  Blood Updated:  04/17/19 1255     Lactate 1.9 mmol/L     Influenza Antigen, Rapid - Swab, Nasopharynx [[id number]]  (Normal)  Collected:  04/17/19 1229    Specimen:  Swab from Nasopharynx Updated:  04/17/19 1253     Influenza A Ag, EIA Negative     Influenza B Ag, EIA Negative    Protime-INR [[id number]]  (Abnormal) Collected:  04/17/19 1221    Specimen:  Blood Updated:  04/17/19 1251     Protime 16.4 Seconds      INR 1.29    Narrative:       Suggested INR therapeutic range for stable oral anticoagulant therapy:    Low Intensity therapy:   1.5-2.0  Moderate Intensity therapy:   2.0-3.0  High Intensity therapy:   2.5-4.0    aPTT [[id number]]  (Normal) Collected:  04/17/19 1221    Specimen:  Blood Updated:  04/17/19 1251     PTT 32.6 seconds     Narrative:       PTT Heparin Therapeutic Range:  59 - 95 seconds    CBC & Differential [[id number]] Collected:  04/17/19 1221    Specimen:  Blood Updated:  04/17/19 1249    Narrative:       The following orders were created for panel order CBC & Differential.  Procedure                               Abnormality         Status                     ---------                               -----------         ------                     CBC Auto Differential[[id number]]        Abnormal            Final result                 Please view results for these tests on the individual orders.    CBC Auto Differential [[id number]]  (Abnormal) Collected:  04/17/19 1221    Specimen:  Blood Updated:  04/17/19 1249     WBC 4.26 10*3/mm3      RBC 2.47 10*6/mm3      Hemoglobin 6.6 g/dL      Hematocrit 22.3 %      MCV 90.3 fL      MCH 26.7 pg      MCHC 29.6 g/dL      RDW 16.7 %      RDW-SD 53.3 fl      MPV 9.7 fL      Platelets 229 10*3/mm3      Neutrophil % 74.5 %      Lymphocyte % 17.4 %      Monocyte % 7.0 %      Eosinophil % 0.7 %      Basophil % 0.2 %      Immature Grans % 0.2 %      Neutrophils, Absolute 3.17 10*3/mm3      Lymphocytes, Absolute 0.74 10*3/mm3      Monocytes, Absolute 0.30 10*3/mm3      Eosinophils, Absolute 0.03 10*3/mm3      Basophils, Absolute 0.01 10*3/mm3      Immature Grans, Absolute 0.01 10*3/mm3      Narrative:       Verified by Repeat Analysis    Urinalysis With Culture If Indicated - Urine, Clean Catch [[id number]]  (Normal) Collected:  04/17/19 1230    Specimen:  Urine, Clean Catch Updated:  04/17/19 1244     Color, UA Yellow     Appearance, UA Clear     pH, UA 7.0     Specific Gravity, UA 1.009     Glucose, UA Negative     Ketones, UA Negative     Bilirubin, UA Negative     Blood, UA Negative     Protein, UA Negative     Leuk Esterase, UA Negative     Nitrite, UA Negative     Urobilinogen, UA 0.2 E.U./dL    Narrative:       Urine microscopic not indicated.    Blood Gas, Arterial [[id number]]  (Abnormal) Collected:  04/17/19 1206    Specimen:  Arterial Blood Updated:  04/17/19 1211     Site Arterial: right radial     Isrrael's Test Positive     pH, Arterial 7.525 pH units      pCO2, Arterial 37.6 mm Hg      pO2, Arterial 58.3 mm Hg      HCO3, Arterial 30.4 mmol/L      Base Excess, Arterial 7.0 mmol/L      O2 Saturation, Arterial 91.0 %      Hemoglobin, Blood Gas 7.1 g/dL      Hematocrit, Blood Gas 21.0 %      Oxyhemoglobin 89.1 %      Methemoglobin 0.40 %      Carboxyhemoglobin 1.7 %      A-a Gradiant 40.1 mmHg      Temperature 98.6 C      Barometric Pressure for Blood Gas 730 mmHg      Modality Room Air     FIO2 21 %           Radiology:  Imaging Results (last 72 hours)     Procedure Component Value Units Date/Time    CT Abdomen Pelvis Without Contrast [[id number]] Collected:  04/18/19 0910     Updated:  04/18/19 0915    Narrative:       EXAM: CT ABDOMEN PELVIS WO CONTRAST-            TECHNIQUE: Multiple axial CT images were obtained from lung bases  through pubic symphysis WITHOUT administration of IV contrast.  Reformatted images in the coronal and/or sagittal plane(s) were  generated from the axial data set to facilitate diagnostic accuracy  and/or surgical planning.  Oral Contrast:NONE.     Radiation dose reduction techniques were utilized per ALARA protocol.  Automated exposure control was initiated  through either or [employer] or  [employer] software packages by  protocol.       DOSE: 484.75 mGy.cm     Clinical information  Nausea, vomiting, diarrhea; D64.9-Anemia, unspecified; R53.1-Weakness;  E83.39-Other disorders of phosphorus metabolism; I95.9-Hypotension,  unspecified      Comparison  Comparison: 03/04/2019     Findings  LOWER THORAX: BILATERAL PLEURAL EFFUSIONS WITH BIBASILAR AIRSPACE  DISEASE AND CARDIOMEGALY.     ABDOMEN:        LIVER: Homogeneous. No focal hepatic mass or ductal dilatation.        GALLBLADDER: PRIOR CHOLECYSTECTOMY.        PANCREAS: Unremarkable. No mass or ductal dilatation.        SPLEEN: Homogeneous. No splenomegaly.        ADRENALS: No mass.        KIDNEYS/URETERS: No mass. No obstructive uropathy.  No evidence of  urolithiasis.        GI TRACT: AIR-FLUID LEVELS THROUGHOUT THE COLON AND PORTIONS OF SMALL  BOWEL MOST CONSISTENT WITH ENTERITIS OR ENTEROCOLITIS BOWEL GAS PATTERN.  GASTRIC WALL THICKENING IN PART RELATED TO INCOMPLETE DISTENTION BUT  ALSO LIKELY ASSOCIATED WITH GASTRITIS. NO BOWEL OBSTRUCTION IDENTIFIED.  STOOL RETENTION IN THE DISTAL COLON MOST COMPATIBLE WITH CONSTIPATION.        PERITONEUM: No free air. No free fluid or loculated fluid  collections.        MESENTERY: Unremarkable.        LYMPH NODES: No lymphadenopathy.        VASCULATURE: MODERATE ATHEROSCLEROSIS IS STABLE.        ABDOMINAL WALL: No focal hernia or mass.           OTHER: None.     PELVIS:        BLADDER: No focal mass or significant wall thickening        REPRODUCTIVE: HYSTERECTOMY.        APPENDIX: Nondistended. No surrounding inflammation.     BONES: STABLE DEGENERATIVE CHANGES LUMBAR SPINE.       Impression:       Impression:  1. Gastroenteritis/enteritis bowel gas pattern. No perforation or  obstruction.  2. Diverticulosis without diverticulitis.  3. Probable changes of CHF with small pleural effusions. Otherwise  stable exam.     This report was finalized on 4/18/2019 9:12 AM by   Pedrito Alejandre MD.       XR Chest 1 View [[id number]] Collected:  04/17/19 1246     Updated:  04/17/19 1248    Narrative:       EXAMINATION: XR CHEST 1 VW-      CLINICAL INDICATION:     Severe Sepsis Protocol     TECHNIQUE:  XR CHEST 1 VW-      COMPARISON: 04/16/2019      FINDINGS:      CHF improved from the previous exam. No consolidation identified.   Mild cardiac enlargement noted. Pulmonary vascularity appears within  normal limits. Stable appearance of left pacer device.   No pneumothorax.   No effusions.   No acute osseous findings.          Impression:       Improvement in CHF. Otherwise stable chest.     This report was finalized on 4/17/2019 12:46 PM by Dr. Pedrito Alejandre MD.           D/W DR Lopez    Assessment/Plan:   Anemia, probably iron deficient with evidence of heme positive stool.  Patient's hemoglobin is stable posttransfusion, she has no upper GI symptoms, no lower GI symptoms.  She did have a positive stool but per discussion with emergency room provider this was only minimal.  Patient is agreeable to undergo endoscopy tomorrow, surgery has been consulted accordingly.  I do not think she is actively bleeding therefore IV Protonix drip is being discontinued.    Hypotension on admission probably related to an element of anemia but mostly her blood pressure medications.  These have been held, blood pressures improved, follow    Atrial fibrillation, holding on anticoagulation due to the above.    DVT prophylaxis, SCUDs    Hypophosphatemia, supplement    (Patient denied diarrhea to me)_

## 2019-04-18 NOTE — OUTREACH NOTE
COPD/PN Week 3 Survey      Responses   Facility patient discharged from?  [facility]   Does the patient have one of the following disease processes/diagnoses(primary or secondary)?  COPD/Pneumonia   Was the primary reason for admission:  Pneumonia   Week 3 attempt successful?  No   Revoke  Readmitted          Jessenia Jimenez RN

## 2019-04-18 NOTE — PLAN OF CARE
Problem: Patient Care Overview  Goal: Plan of Care Review  Outcome: Ongoing (interventions implemented as appropriate)   04/17/19 1830 04/17/19 1945   Coping/Psychosocial   Plan of Care Reviewed With --  patient   Plan of Care Review   Progress improving --      Goal: Discharge Needs Assessment  Outcome: Ongoing (interventions implemented as appropriate)   04/17/19 1814   Discharge Needs Assessment   Readmission Within the Last 30 Days current reason for admission unrelated to previous admission  (Patient was inpatient 3/24/19 - 4/1/19 for Pneumonia.)   Concerns to be Addressed denies needs/concerns at this time   Patient/Family Anticipates Transition to home with help/services   Patient/Family Anticipated Services at Transition home health care  (Patient utilizeses [facility] at this time. )   Transportation Concerns other (see comments)  (Family/friends will provide )   Transportation Anticipated family or friend will provide   Anticipated Changes Related to Illness none   Equipment Needed After Discharge none   Outpatient/Agency/Support Group Needs homecare agency   Discharge Facility/Level of Care Needs home with home health  (Patient utilizes [facility] at this time. )   Current Discharge Risk lives alone   Disability   Equipment Currently Used at Home oxygen;walker, rolling;cane, straight;nebulizer       Problem: Anemia (Adult)  Goal: Identify Related Risk Factors and Signs and Symptoms  Outcome: Ongoing (interventions implemented as appropriate)   04/17/19 1830   Anemia (Adult)   Signs and Symptoms (Anemia) pallor;fatigue     Goal: Symptom Improvement  Outcome: Ongoing (interventions implemented as appropriate)   04/17/19 1830   Anemia (Adult)   Symptom Improvement making progress toward outcome       Problem: Fall Risk (Adult)  Goal: Identify Related Risk Factors and Signs and Symptoms  Outcome: Ongoing (interventions implemented as appropriate)   04/18/19 0148   Fall Risk (Adult)   Related Risk Factors (Fall Risk)  environment unfamiliar;gait/mobility problems;history of falls     Goal: Absence of Fall  Outcome: Ongoing (interventions implemented as appropriate)   04/18/19 0148   Fall Risk (Adult)   Absence of Fall making progress toward outcome       Problem: Skin Injury Risk (Adult)  Goal: Identify Related Risk Factors and Signs and Symptoms  Outcome: Ongoing (interventions implemented as appropriate)   04/18/19 0148   Skin Injury Risk (Adult)   Related Risk Factors (Skin Injury Risk) mobility impaired;critical care admission     Goal: Skin Health and Integrity  Outcome: Ongoing (interventions implemented as appropriate)   04/18/19 0148   Skin Injury Risk (Adult)   Skin Health and Integrity making progress toward outcome

## 2019-04-18 NOTE — PLAN OF CARE
Problem: Patient Care Overview  Goal: Plan of Care Review  Outcome: Ongoing (interventions implemented as appropriate)      Problem: Anemia (Adult)  Goal: Identify Related Risk Factors and Signs and Symptoms  Outcome: Ongoing (interventions implemented as appropriate)      Problem: Fall Risk (Adult)  Goal: Identify Related Risk Factors and Signs and Symptoms  Outcome: Ongoing (interventions implemented as appropriate)

## 2019-04-18 NOTE — CONSULTS
Consulting physician:  Dr. Lopez    Referring physician: Dr. Flynn    Date of consultation: 04/18/19     Chief complaint possible GI bleed, consider endoscopy    Subjective     Patient is a 83 y.o. female who presented to the ED yesterday with complaints of fatigue, weakness, and diarrhea x  2 weeks.  She states that she had a fall over the weekend due to her weakness.  The patient was admitted with a hemoglobin of 6.6.  She required a blood transfusion and her hemoglobin is 8.0 today.  Patient denies any history of GI bleeding.  She also denies visible blood in her stools at home and all other GI symptoms other than the reported diarrhea.  Patient had a Hemoccult stool test which was positive.  CT scan of her abdomen was performed and revealed diverticulosis without diverticulitis and gastroenteritis/enteritis Her last colonoscopy was reported to be 5 years ago and was unremarkable.  Her CRP level is 6.44.  White blood count is normal.  The patient states that she is asymptomatic today other than a sore digit on her right upper extremity due to recent fall.  Medical, surgical, social, and family histories were reviewed and are listed below.  Family history is positive for colon cancer in the patient's father when he was in his 70s        Review of Systems  Review of Systems - General ROS: negative for - weight loss  Psychological ROS: negative for - behavioral disorder  Ophthalmic ROS: negative for - dry eyes  ENT ROS: negative for - vertigo or vocal changes  Hematological and Lymphatic ROS: Fatigue and weakness which have resolved since transfusion; negative for - swollen lymph nodes, DVT, PE.   Respiratory ROS: negative for - sputum changes or stridor.  Cardiovascular ROS: negative for - irregular heartbeat or murmur  Gastrointestinal ROS: Positive for-diarrhea, resolved; negative for - blood in stools or change in stools  Genito-Urinary ROS: negative for - hematuria or incontinence  Musculoskeletal ROS:  Pain of digit on right upper extremity; weakness which has improved after transfusion      History  Past Medical History:   Diagnosis Date   • COPD  2018   • DVT of lower extremity (deep venous thrombosis) (CMS/HCC)    • History of CVA 2013 2018   • HTN 2018   • PAD  2018   • Tobacco use 2018     Past Surgical History:   Procedure Laterality Date   • CARDIAC CATHETERIZATION  2018    Procedure: Left Heart Cath;  Surgeon: Imtiaz Fuentes MD;  Location:  [facility];  Service: Cardiology   • CARDIAC CATHETERIZATION N/A 2018    Procedure: Thrombolysis-peripheral;  Surgeon: Imtiaz Fuentes MD;  Location:  [facility];  Service: Cardiology   • CARDIAC ELECTROPHYSIOLOGY PROCEDURE N/A 2018    Procedure: PACEMAKER IMPLANTATION- DC;  Surgeon: Thony Bobby MD;  Location:  [facility];  Service: Cardiology   • CARDIAC ELECTROPHYSIOLOGY PROCEDURE N/A 2018    Procedure: Temporary Pacemaker;  Surgeon: Imtiaz Fuentes MD;  Location:  [facility];  Service: Cardiology   • FEMORAL ARTERY STENT  2013   • FEMORAL POPLITEAL BYPASS Right 2018    Procedure: LOWER EXTREMITY EMBELECTOMY RIGHT;  Surgeon: David Beatty MD;  Location: [facility] HYBRID OR 15;  Service: Vascular   • HYSTERECTOMY     • LEFT HEART CATH  2018    At [facility] with TV PM placement    • PACEMAKER IMPLANTATION       Family History   Problem Relation Age of Onset   • Cancer Mother    • Cancer Father    • Heart attack Brother    • Heart attack Brother      Social History     Tobacco Use   • Smoking status: Former Smoker     Packs/day: 0.50     Years: 55.00     Pack years: 27.50     Types: Cigarettes     Last attempt to quit: 3/25/2019     Years since quittin.0   • Smokeless tobacco: Never Used   Substance Use Topics   • Alcohol use: No     Frequency: Never   • Drug use: No     Medications Prior to  Admission   Medication Sig Dispense Refill Last Dose   • atorvastatin (LIPITOR) 40 MG tablet Take 40 mg by mouth Daily.   4/16/2019 at 2000   • cilostazol (PLETAL) 100 MG tablet Take 100 mg by mouth 2 (Two) Times a Day.   4/17/2019 at 0800   • furosemide (LASIX) 40 MG tablet Take 40 mg by mouth Daily.   4/17/2019 at 0800   • hydrALAZINE (APRESOLINE) 25 MG tablet Take 25 mg by mouth 2 (Two) Times a Day.   4/17/2019 at 0800   • ipratropium (ATROVENT) 0.02 % nebulizer solution Take 2.5 mL by nebulization Every 6 (Six) Hours for 30 days. 300 mL 0 4/17/2019 at 0800   • ondansetron ODT (ZOFRAN-ODT) 4 MG disintegrating tablet Take 1 tablet by mouth Every 8 (Eight) Hours As Needed for Nausea or Vomiting. 42 tablet 0 4/16/2019 at 1500   • potassium chloride (K-DUR,KLOR-CON) 20 MEQ CR tablet Take 20 mEq by mouth Daily.   4/17/2019 at 0800     Allergies:  Patient has no known allergies.    Objective     Vital Signs  Temp:  [98.2 °F (36.8 °C)-99.1 °F (37.3 °C)] 98.5 °F (36.9 °C)  Heart Rate:  [71-89] 73  Resp:  [10-20] 20  BP: ()/(51-73) 130/70    Physical Exam:  General: In no acute distress; very pleasant   HEENT exam: Decreased hearing in right ear; sclera are anicteric.    Neck:  supple, FROM.  No JVD.  Trachea midline  Lungs:  Respiratory effort normal. Auscultation: Clear, without wheezes, rhonchi, rales  Heart: Irregularly irregular rhythm, without murmur, gallop, rub.  No pedal edema  Abdomen: Soft, nontender, normal bowel sounds. No palpable mass  Musculoskeletal: Minimal right upper extremity edema, decreased range of motion of right upper extremity; minimal edema of left lower extremity  Psyc:  alert, oriented x 3.  Mood and affect are appropriate  skin:  Warm with good turgor.  Without rash or lesion  extremities: Minimal edema of right upper extremity and left lower extremity; examination of the extremities revealed no cyanosis, clubbing     Results Review:       Results from last 7 days   Lab Units  04/18/19  0326 04/17/19  1221   CRP mg/dL 6.44* 7.10*   LACTATE mmol/L  --  1.9   WBC 10*3/mm3 3.66 4.26   HEMOGLOBIN g/dL 8.0* 6.6*   HEMATOCRIT % 25.7* 22.3*   PLATELETS 10*3/mm3 210 229   INR   --  1.29*     Results from last 7 days   Lab Units 04/17/19  1206   PH, ARTERIAL pH units 7.525*   PO2 ART mm Hg 58.3*   PCO2, ARTERIAL mm Hg 37.6   HCO3 ART mmol/L 30.4*     Results from last 7 days   Lab Units 04/18/19  0854 04/18/19  0326 04/17/19  1221   SODIUM mmol/L  --  140 139   POTASSIUM mmol/L  --  4.1 3.0*   MAGNESIUM mg/dL 3.0*  --  1.6   CHLORIDE mmol/L  --  101 99   CO2 mmol/L  --  28.9 28.0   BUN mg/dL  --  9 10   CREATININE mg/dL  --  0.84 0.96   EGFR IF NONAFRICN AM mL/min/1.73  --  65 56*   CALCIUM mg/dL  --  8.1* 8.5*   GLUCOSE mg/dL  --  92 118*   ALBUMIN g/dL  --   --  3.08*   BILIRUBIN mg/dL  --   --  0.3   ALK PHOS U/L  --   --  72   AST (SGOT) U/L  --   --  18   ALT (SGPT) U/L  --   --  22   Estimated Creatinine Clearance: 44.9 mL/min (by C-G formula based on SCr of 0.84 mg/dL).  No results found for: AMMONIA      Blood Culture   Date Value Ref Range Status   04/17/2019 No growth at 24 hours  Preliminary   04/17/2019 No growth at 24 hours  Preliminary                Imaging:  Imaging Results (last 24 hours)     Procedure Component Value Units Date/Time    CT Abdomen Pelvis Without Contrast [[id number]] Collected:  04/18/19 0910     Updated:  04/18/19 0915    Narrative:       EXAM: CT ABDOMEN PELVIS WO CONTRAST-            TECHNIQUE: Multiple axial CT images were obtained from lung bases  through pubic symphysis WITHOUT administration of IV contrast.  Reformatted images in the coronal and/or sagittal plane(s) were  generated from the axial data set to facilitate diagnostic accuracy  and/or surgical planning.  Oral Contrast:NONE.     Radiation dose reduction techniques were utilized per ALARA protocol.  Automated exposure control was initiated through either or [email or web address] or  [employer] software packages  by  protocol.       DOSE: 484.75 mGy.cm     Clinical information  Nausea, vomiting, diarrhea; D64.9-Anemia, unspecified; R53.1-Weakness;  E83.39-Other disorders of phosphorus metabolism; I95.9-Hypotension,  unspecified      Comparison  Comparison: 03/04/2019     Findings  LOWER THORAX: BILATERAL PLEURAL EFFUSIONS WITH BIBASILAR AIRSPACE  DISEASE AND CARDIOMEGALY.     ABDOMEN:        LIVER: Homogeneous. No focal hepatic mass or ductal dilatation.        GALLBLADDER: PRIOR CHOLECYSTECTOMY.        PANCREAS: Unremarkable. No mass or ductal dilatation.        SPLEEN: Homogeneous. No splenomegaly.        ADRENALS: No mass.        KIDNEYS/URETERS: No mass. No obstructive uropathy.  No evidence of  urolithiasis.        GI TRACT: AIR-FLUID LEVELS THROUGHOUT THE COLON AND PORTIONS OF SMALL  BOWEL MOST CONSISTENT WITH ENTERITIS OR ENTEROCOLITIS BOWEL GAS PATTERN.  GASTRIC WALL THICKENING IN PART RELATED TO INCOMPLETE DISTENTION BUT  ALSO LIKELY ASSOCIATED WITH GASTRITIS. NO BOWEL OBSTRUCTION IDENTIFIED.  STOOL RETENTION IN THE DISTAL COLON MOST COMPATIBLE WITH CONSTIPATION.        PERITONEUM: No free air. No free fluid or loculated fluid  collections.        MESENTERY: Unremarkable.        LYMPH NODES: No lymphadenopathy.        VASCULATURE: MODERATE ATHEROSCLEROSIS IS STABLE.        ABDOMINAL WALL: No focal hernia or mass.           OTHER: None.     PELVIS:        BLADDER: No focal mass or significant wall thickening        REPRODUCTIVE: HYSTERECTOMY.        APPENDIX: Nondistended. No surrounding inflammation.     BONES: STABLE DEGENERATIVE CHANGES LUMBAR SPINE.       Impression:       Impression:  1. Gastroenteritis/enteritis bowel gas pattern. No perforation or  obstruction.  2. Diverticulosis without diverticulitis.  3. Probable changes of CHF with small pleural effusions. Otherwise  stable exam.     This report was finalized on 4/18/2019 9:12 AM by Dr. Pedrito Alejandre MD.                   Impression:  Patient  Active Problem List   Diagnosis Code   • Complete heart block S/P transvenous pacemaker placement at [facility] on 12/17 I44.2   • PAD  I73.9   • HTN I10   • History of subacute left MCA distribution ischemic CVA Z86.73   • COPD  J44.9   • Tobacco use Z72.0   • History of noncompliance with medical treatment Z91.19   • Acute kidney injury Cr 1.72 on admission  N17.9   • Hyperlipidemia E78.5   • NSTEMI  I21.4   • CAD with occluded LAD and RCA per [facility] 12/17/18 I25.10   • Right ischemic leg I99.8   • Popliteal thrombosis (CMS/[facility]) I74.3   • Pneumonia of left lower lobe due to infectious organism (CMS/[facility]) J18.1   • Anemia D64.9   • Hypokalemia E87.6   • Hypophosphatemia E83.39       Assessment:  -Anemia  -Positive Hemoccult stool  -Diverticulosis  -Strong enteritis/enteritis  -Recent diarrhea      Plan:  The patient will be scheduled for an EGD/colonoscopy by Dr. Zimmerman for tomorrow.  Procedures were explained to the patient who voiced understanding and agreement.  Thank you for allowing me to participate in the care of this patient. If scope negative may consider pill cam      GATITO Jimenez  04/18/19  2:13 PM    Time: Time spent:

## 2019-04-19 ENCOUNTER — ANESTHESIA EVENT (OUTPATIENT)
Dept: PERIOP | Facility: HOSPITAL | Age: 84
End: 2019-04-19

## 2019-04-19 ENCOUNTER — ANESTHESIA (OUTPATIENT)
Dept: PERIOP | Facility: HOSPITAL | Age: 84
End: 2019-04-19

## 2019-04-19 VITALS
DIASTOLIC BLOOD PRESSURE: 85 MMHG | TEMPERATURE: 97.1 F | OXYGEN SATURATION: 94 % | HEART RATE: 71 BPM | WEIGHT: 119.5 LBS | RESPIRATION RATE: 16 BRPM | BODY MASS INDEX: 23.46 KG/M2 | SYSTOLIC BLOOD PRESSURE: 170 MMHG | HEIGHT: 60 IN

## 2019-04-19 LAB
ANION GAP SERPL CALCULATED.3IONS-SCNC: 13.3 MMOL/L
BASOPHILS # BLD AUTO: 0.02 10*3/MM3 (ref 0–0.2)
BASOPHILS NFR BLD AUTO: 0.5 % (ref 0–1.5)
BUN BLD-MCNC: 9 MG/DL (ref 8–23)
BUN/CREAT SERPL: 10.8 (ref 7–25)
CALCIUM SPEC-SCNC: 7.5 MG/DL (ref 8.6–10.5)
CHLORIDE SERPL-SCNC: 102 MMOL/L (ref 98–107)
CO2 SERPL-SCNC: 24.7 MMOL/L (ref 22–29)
CREAT BLD-MCNC: 0.83 MG/DL (ref 0.57–1)
DEPRECATED RDW RBC AUTO: 52.6 FL (ref 37–54)
EOSINOPHIL # BLD AUTO: 0.06 10*3/MM3 (ref 0–0.4)
EOSINOPHIL NFR BLD AUTO: 1.5 % (ref 0.3–6.2)
ERYTHROCYTE [DISTWIDTH] IN BLOOD BY AUTOMATED COUNT: 16.8 % (ref 12.3–15.4)
GFR SERPL CREATININE-BSD FRML MDRD: 66 ML/MIN/1.73
GLUCOSE BLD-MCNC: 82 MG/DL (ref 65–99)
HCT VFR BLD AUTO: 26.4 % (ref 34–46.6)
HGB BLD-MCNC: 8 G/DL (ref 12–15.9)
IMM GRANULOCYTES # BLD AUTO: 0.01 10*3/MM3 (ref 0–0.05)
IMM GRANULOCYTES NFR BLD AUTO: 0.2 % (ref 0–0.5)
LYMPHOCYTES # BLD AUTO: 0.76 10*3/MM3 (ref 0.7–3.1)
LYMPHOCYTES NFR BLD AUTO: 18.7 % (ref 19.6–45.3)
MCH RBC QN AUTO: 26.8 PG (ref 26.6–33)
MCHC RBC AUTO-ENTMCNC: 30.3 G/DL (ref 31.5–35.7)
MCV RBC AUTO: 88.6 FL (ref 79–97)
MONOCYTES # BLD AUTO: 0.39 10*3/MM3 (ref 0.1–0.9)
MONOCYTES NFR BLD AUTO: 9.6 % (ref 5–12)
NEUTROPHILS # BLD AUTO: 2.82 10*3/MM3 (ref 1.4–7)
NEUTROPHILS NFR BLD AUTO: 69.5 % (ref 42.7–76)
PHOSPHATE SERPL-MCNC: 2.7 MG/DL (ref 2.5–4.5)
PLATELET # BLD AUTO: 258 10*3/MM3 (ref 140–450)
PMV BLD AUTO: 9.1 FL (ref 6–12)
POTASSIUM BLD-SCNC: 3.6 MMOL/L (ref 3.5–5.2)
RBC # BLD AUTO: 2.98 10*6/MM3 (ref 3.77–5.28)
SODIUM BLD-SCNC: 140 MMOL/L (ref 136–145)
WBC NRBC COR # BLD: 4.06 10*3/MM3 (ref 3.4–10.8)

## 2019-04-19 PROCEDURE — 85025 COMPLETE CBC W/AUTO DIFF WBC: CPT | Performed by: PHYSICIAN ASSISTANT

## 2019-04-19 PROCEDURE — 94799 UNLISTED PULMONARY SVC/PX: CPT

## 2019-04-19 PROCEDURE — 88313 SPECIAL STAINS GROUP 2: CPT | Performed by: SURGERY

## 2019-04-19 PROCEDURE — 84100 ASSAY OF PHOSPHORUS: CPT | Performed by: INTERNAL MEDICINE

## 2019-04-19 PROCEDURE — 25010000002 FENTANYL CITRATE (PF) 100 MCG/2ML SOLUTION: Performed by: NURSE ANESTHETIST, CERTIFIED REGISTERED

## 2019-04-19 PROCEDURE — 88305 TISSUE EXAM BY PATHOLOGIST: CPT | Performed by: SURGERY

## 2019-04-19 PROCEDURE — 43239 EGD BIOPSY SINGLE/MULTIPLE: CPT | Performed by: SURGERY

## 2019-04-19 PROCEDURE — 0DB48ZX EXCISION OF ESOPHAGOGASTRIC JUNCTION, VIA NATURAL OR ARTIFICIAL OPENING ENDOSCOPIC, DIAGNOSTIC: ICD-10-PCS | Performed by: SURGERY

## 2019-04-19 PROCEDURE — 97163 PT EVAL HIGH COMPLEX 45 MIN: CPT

## 2019-04-19 PROCEDURE — 45378 DIAGNOSTIC COLONOSCOPY: CPT | Performed by: SURGERY

## 2019-04-19 PROCEDURE — 99238 HOSP IP/OBS DSCHRG MGMT 30/<: CPT | Performed by: INTERNAL MEDICINE

## 2019-04-19 PROCEDURE — 0DJD8ZZ INSPECTION OF LOWER INTESTINAL TRACT, VIA NATURAL OR ARTIFICIAL OPENING ENDOSCOPIC: ICD-10-PCS | Performed by: SURGERY

## 2019-04-19 PROCEDURE — 97530 THERAPEUTIC ACTIVITIES: CPT

## 2019-04-19 PROCEDURE — 80048 BASIC METABOLIC PNL TOTAL CA: CPT | Performed by: PHYSICIAN ASSISTANT

## 2019-04-19 PROCEDURE — 25010000002 PROPOFOL 10 MG/ML EMULSION: Performed by: NURSE ANESTHETIST, CERTIFIED REGISTERED

## 2019-04-19 PROCEDURE — 97116 GAIT TRAINING THERAPY: CPT

## 2019-04-19 PROCEDURE — 25010000002 PROPOFOL 1000 MG/ML EMULSION: Performed by: NURSE ANESTHETIST, CERTIFIED REGISTERED

## 2019-04-19 RX ORDER — FENTANYL CITRATE 50 UG/ML
INJECTION, SOLUTION INTRAMUSCULAR; INTRAVENOUS AS NEEDED
Status: DISCONTINUED | OUTPATIENT
Start: 2019-04-19 | End: 2019-04-19 | Stop reason: SURG

## 2019-04-19 RX ORDER — FERROUS SULFATE 325(65) MG
325 TABLET ORAL
Qty: 30 TABLET | Refills: 0 | Status: SHIPPED | OUTPATIENT
Start: 2019-04-19 | End: 2020-05-08

## 2019-04-19 RX ORDER — LIDOCAINE HYDROCHLORIDE 20 MG/ML
INJECTION, SOLUTION INFILTRATION; PERINEURAL AS NEEDED
Status: DISCONTINUED | OUTPATIENT
Start: 2019-04-19 | End: 2019-04-19 | Stop reason: SURG

## 2019-04-19 RX ORDER — SODIUM CHLORIDE 0.9 % (FLUSH) 0.9 %
3-10 SYRINGE (ML) INJECTION AS NEEDED
Status: DISCONTINUED | OUTPATIENT
Start: 2019-04-19 | End: 2019-04-19 | Stop reason: HOSPADM

## 2019-04-19 RX ORDER — FENTANYL CITRATE 50 UG/ML
50 INJECTION, SOLUTION INTRAMUSCULAR; INTRAVENOUS
Status: DISCONTINUED | OUTPATIENT
Start: 2019-04-19 | End: 2019-04-19 | Stop reason: HOSPADM

## 2019-04-19 RX ORDER — OXYCODONE HYDROCHLORIDE AND ACETAMINOPHEN 5; 325 MG/1; MG/1
1 TABLET ORAL ONCE AS NEEDED
Status: DISCONTINUED | OUTPATIENT
Start: 2019-04-19 | End: 2019-04-19 | Stop reason: HOSPADM

## 2019-04-19 RX ORDER — ONDANSETRON 2 MG/ML
4 INJECTION INTRAMUSCULAR; INTRAVENOUS ONCE AS NEEDED
Status: DISCONTINUED | OUTPATIENT
Start: 2019-04-19 | End: 2019-04-19 | Stop reason: HOSPADM

## 2019-04-19 RX ORDER — SODIUM CHLORIDE 0.9 % (FLUSH) 0.9 %
3 SYRINGE (ML) INJECTION EVERY 12 HOURS SCHEDULED
Status: DISCONTINUED | OUTPATIENT
Start: 2019-04-19 | End: 2019-04-19 | Stop reason: HOSPADM

## 2019-04-19 RX ORDER — SODIUM CHLORIDE, SODIUM LACTATE, POTASSIUM CHLORIDE, CALCIUM CHLORIDE 600; 310; 30; 20 MG/100ML; MG/100ML; MG/100ML; MG/100ML
125 INJECTION, SOLUTION INTRAVENOUS CONTINUOUS
Status: DISCONTINUED | OUTPATIENT
Start: 2019-04-19 | End: 2019-04-19

## 2019-04-19 RX ORDER — IPRATROPIUM BROMIDE AND ALBUTEROL SULFATE 2.5; .5 MG/3ML; MG/3ML
3 SOLUTION RESPIRATORY (INHALATION) ONCE AS NEEDED
Status: DISCONTINUED | OUTPATIENT
Start: 2019-04-19 | End: 2019-04-19 | Stop reason: HOSPADM

## 2019-04-19 RX ORDER — MEPERIDINE HYDROCHLORIDE 25 MG/ML
12.5 INJECTION INTRAMUSCULAR; INTRAVENOUS; SUBCUTANEOUS
Status: DISCONTINUED | OUTPATIENT
Start: 2019-04-19 | End: 2019-04-19 | Stop reason: HOSPADM

## 2019-04-19 RX ORDER — CILOSTAZOL 100 MG/1
100 TABLET ORAL 2 TIMES DAILY
Start: 2019-04-19

## 2019-04-19 RX ORDER — PROPOFOL 10 MG/ML
VIAL (ML) INTRAVENOUS AS NEEDED
Status: DISCONTINUED | OUTPATIENT
Start: 2019-04-19 | End: 2019-04-19 | Stop reason: SURG

## 2019-04-19 RX ORDER — PANTOPRAZOLE SODIUM 40 MG/1
40 TABLET, DELAYED RELEASE ORAL DAILY
Qty: 30 TABLET | Refills: 0 | Status: SHIPPED | OUTPATIENT
Start: 2019-04-19 | End: 2019-04-19

## 2019-04-19 RX ADMIN — SODIUM CHLORIDE, POTASSIUM CHLORIDE, SODIUM LACTATE AND CALCIUM CHLORIDE: 600; 310; 30; 20 INJECTION, SOLUTION INTRAVENOUS at 15:42

## 2019-04-19 RX ADMIN — IPRATROPIUM BROMIDE 0.5 MG: 0.5 SOLUTION RESPIRATORY (INHALATION) at 13:33

## 2019-04-19 RX ADMIN — PROPOFOL 140 MCG/KG/MIN: 10 INJECTION, EMULSION INTRAVENOUS at 15:45

## 2019-04-19 RX ADMIN — FENTANYL CITRATE 100 MCG: 50 INJECTION INTRAMUSCULAR; INTRAVENOUS at 15:42

## 2019-04-19 RX ADMIN — PROPOFOL 20 MG: 10 INJECTION, EMULSION INTRAVENOUS at 15:45

## 2019-04-19 RX ADMIN — SODIUM CHLORIDE, PRESERVATIVE FREE 3 ML: 5 INJECTION INTRAVENOUS at 09:30

## 2019-04-19 RX ADMIN — LIDOCAINE HYDROCHLORIDE 60 MG: 20 INJECTION, SOLUTION INFILTRATION; PERINEURAL at 15:45

## 2019-04-19 NOTE — DISCHARGE SUMMARY
Date of admission 4/17/2019  Date of discharge: 4/19/2019    Principal diagnosis: Chronic blood loss anemia/iron deficient  Secondary diagnosis:  Anticoagulation for previous DVT  Peripheral vascular disease  Hypertension  Nocturnal hypoxia on home O2 at night  Hypophosphatemia  History of DVT  Hearing loss    Consultants:   general surgery    Procedures:  EGD/colonoscopy performed by   Transfusion 1 unit packed red blood cell    Exam:  Assisted by DESMOND Reyes, patient was seen earlier stable, she underwent EGD and colonoscopy and she was seen post procedure.  She is awake alert eager to go home lungs are clear heart regular rate and rhythm monitor paced saturation room air 91% abdomen is soft benign nondistended nontender bowel sounds are active  Vital signs: 170/85, 16, 71, 97.1    Hospital course: Patient was admitted with anemia, Eliquis was held, she was transfused up to an adequate hemoglobin.  She was tracing positive in the emergency room and probable iron deficient.  Options were discussed with patient and she elected to proceed with endoscopy.  Endoscopy was done today by Dr. prajapati.  Findings were normal:, Hiatal hernia and signs of GERD and esophagitis without bleeding.  Hemoglobin was stable after transfusion.  She will be treated with a PPI.  She will follow-up with her primary in 1 to 2 weeks but I have had home health check receiving chemistries in 3 days and this is to be called to , she will also be started on iron.  Discussed with surgery, patient can restart her anticoagulant tomorrow.  She will return with any further problems otherwise medically stable see chart for full details this visit.    Follow-up:  Home health  Dr Kruse 1 to 2 weeks    Diet: As tolerated    Activity: As tolerated, she will will resume home O2 at night    Medications:  Iron 325 daily  Protonix 40 mg daily  Eliquis 2.5 mg twice daily  Pletal 100 mg twice daily  Atorvastatin 40 mg daily  Hydralazine  25 mg twice daily   Atrovent nebulizer home dose

## 2019-04-19 NOTE — NURSING NOTE
Spoke with Dr Flynn at this time r/t pt being on isolation precautions for C.Diff (Ordered Per DESMOND Hogan) and at this point there has not been any order for C.Diff stool sample ordered. Also,pt had bowel prep yesterday evening for procedure this morning (4/19/19). No new orders noted at this time. Will pass along to day shift Nurse for follow up.

## 2019-04-19 NOTE — THERAPY EVALUATION
Acute Care - Physical Therapy Initial Evaluation/Treatment Note   [place]     Patient Name: Rupinder Lees  : 1935  MRN: [id number]  Today's Date: 2019   Onset of Illness/Injury or Date of Surgery: 19  Date of Referral to PT: 19  Referring Physician: Danielle      Admit Date: 2019    Visit Dx:     ICD-10-CM ICD-9-CM   1. Occult blood positive stool R19.5 792.1   2. Anemia, unspecified type D64.9 285.9   3. Weakness R53.1 780.79   4. Hypophosphatemia E83.39 275.3   5. Hypotension, unspecified hypotension type I95.9 458.9   6. Iron deficiency anemia due to chronic blood loss D50.0 280.0     Patient Active Problem List   Diagnosis   • Complete heart block S/P transvenous pacemaker placement at [facility] on    • PAD    • HTN   • History of subacute left MCA distribution ischemic CVA   • COPD    • Tobacco use   • History of noncompliance with medical treatment   • Acute kidney injury Cr 1.72 on admission    • Hyperlipidemia   • NSTEMI    • CAD with occluded LAD and RCA per [facility] 18   • Right ischemic leg   • Popliteal thrombosis (CMS/HCC)   • Pneumonia of left lower lobe due to infectious organism (CMS/HCC)   • Anemia   • Hypokalemia   • Hypophosphatemia   • Occult blood positive stool   • Iron deficiency anemia due to chronic blood loss     Past Medical History:   Diagnosis Date   • COPD  2018   • DVT of lower extremity (deep venous thrombosis) (CMS/[facility])    • History of CVA 2013 2018   • HTN 2018   • PAD  2018   • Tobacco use 2018     Past Surgical History:   Procedure Laterality Date   • CARDIAC CATHETERIZATION  2018    Procedure: Left Heart Cath;  Surgeon: Imtiaz Fuentes MD;  Location:  [facility] CATH INVASIVE LOC[facility];  Service: Cardiology   • CARDIAC CATHETERIZATION N/A 2018    Procedure: Thrombolysis-peripheral;  Surgeon: Imtiaz Fuentes MD;  Location:  [facility] CATH INVASIVE LOC[facility];  Service: Cardiology   • CARDIAC  ELECTROPHYSIOLOGY PROCEDURE N/A 12/18/2018    Procedure: PACEMAKER IMPLANTATION- DC;  Surgeon: Thony Bobby MD;  Location:  [facility] EP INVASIVE LOCATION;  Service: Cardiology   • CARDIAC ELECTROPHYSIOLOGY PROCEDURE N/A 12/17/2018    Procedure: Temporary Pacemaker;  Surgeon: Imtiaz Fuentes MD;  Location:  [facility] CATH INVASIVE LOC[facility];  Service: Cardiology   • FEMORAL ARTERY STENT  2013   • FEMORAL POPLITEAL BYPASS Right 12/17/2018    Procedure: LOWER EXTREMITY EMBELECTOMY RIGHT;  Surgeon: David Beatty MD;  Location:  [facility] HYBRID OR 15;  Service: Vascular   • HYSTERECTOMY     • LEFT HEART CATH  12/17/2018    At [facility] with TV PM placement    • PACEMAKER IMPLANTATION          PT ASSESSMENT (last 12 hours)      Physical Therapy Evaluation     Row Name 04/19/19 0944          PT Evaluation Time/Intention    Subjective Information  no complaints  -CT     Document Type  evaluation;therapy note (daily note)  -CT     Mode of Treatment  individual therapy;physical therapy  -CT     Patient Effort  good  -CT     Comment  Pt tolerated evaluation and treatment session well with rest breaks provided as needed. Pt is CGA/SUP for all mobility.   -CT     Row Name 04/19/19 0944          General Information    Patient Profile Reviewed?  yes  -CT     Onset of Illness/Injury or Date of Surgery  04/17/19  -CT     Referring Physician  Danielle  -CT     Patient Observations  alert;cooperative;agree to therapy  -CT     Prior Level of Function  independent:  -CT     Equipment Currently Used at Home  none has sons RW at home that she can use   -CT     Existing Precautions/Restrictions  fall  -CT     Equipment Issued to Patient  gait belt waffle cushion  -CT     Risks Reviewed  patient:;LOB;nausea/vomiting;dizziness;increased discomfort;change in vital signs;increased drainage;lines disloged  -CT     Benefits Reviewed  patient:;improve function;increase independence;increase balance;increase strength;decrease pain;decrease risk  of DVT;improve skin integrity;increase knowledge  -CT     Barriers to Rehab  medically complex  -CT     Row Name 04/19/19 0944          Relationship/Environment    Primary Source of Support/Comfort  child(cheikh)  -CT     Lives With  alone  -CT     Family Caregiver if Needed  none  -CT     Row Name 04/19/19 0944          Resource/Environmental Concerns    Current Living Arrangements  home/apartment/condo  -CT     Row Name 04/19/19 0944          Cognitive Assessment/Intervention- PT/OT    Orientation Status (Cognition)  oriented x 4  -CT     Follows Commands (Cognition)  follows multi-step commands  -CT     Row Name 04/19/19 0944          Safety Issues, Functional Mobility    Impairments Affecting Function (Mobility)  strength  -CT     Row Name 04/19/19 0944          Bed Mobility Assessment/Treatment    Bed Mobility Assessment/Treatment  bed mobility (all) activities  -CT     [place] Level (Bed Mobility)  supervision  -CT     Row Name 04/19/19 0944          Transfer Assessment/Treatment    Transfer Assessment/Treatment  sit-stand transfer;stand-sit transfer  -CT     Sit-Stand S[place]ent (Transfers)  supervision  -CT     Stand-Sit [place]ent (Transfers)  supervision  -CT     Row Name 04/19/19 0944          Gait/Stairs Assessment/Training    [place]ent Level (Gait)  contact guard  -CT     Assistive Device (Gait)  walker, front-wheeled  -CT     Distance in Feet (Gait)  150  -CT     Pattern (Gait)  swing-through  -CT     Deviations/Abnormal Patterns (Gait)  gait speed decreased  -CT     Row Name 04/19/19 0944          General ROM    GENERAL ROM COMMENTS  BLE grossly WFL  -CT     Row Name 04/19/19 0944          MMT (Manual Muscle Testing)    General MMT Comments  BLE grossly 3+/5  -CT     Row Name 04/19/19 0944          Pain Assessment    Additional Documentation  -- no pain reported  -CT     Row Name 04/19/19 0944          Plan of Care Review    Plan of Care Reviewed With  patient  -CT     Row Name 04/19/19 0944  "         Physical Therapy Clinical Impression    Date of Referral to PT  04/18/19  -CT     PT Diagnosis (PT Clinical Impression)  decreased functional mobility  -CT     Prognosis (PT Clinical Impression)  good  -CT     Functional Level at Time of Evaluation (PT Clinical Impression)  CGA/SUP  -CT     Patient/Family Goals Statement (PT Clinical Impression)  Pt goals are to \"go home\".   -CT     Criteria for Skilled Interventions Met (PT Clinical Impression)  yes;treatment indicated  -CT     Pathology/Pathophysiology Noted (Describe Specifically for Each System)  musculoskeletal;neuromuscular  -CT     Impairments Found (describe specific impairments)  aerobic capacity/endurance;gait, locomotion, and balance  -CT     Rehab Potential (PT Clinical Summary)  good, to achieve stated therapy goals  -CT     Predicted Duration of Therapy (PT)  lenght of stay  -CT     Care Plan Review (PT)  evaluation/treatment results reviewed;care plan/treatment goals reviewed;risks/benefits reviewed;current/potential barriers reviewed;patient/other agree to care plan  -CT     Row Name 04/19/19 6863          Physical Therapy Goals    Transfer Goal Selection (PT)  transfer, PT goal 1  -CT     Gait Training Goal Selection (PT)  gait training, PT goal 1  -CT     Row Name 04/19/19 8209          Transfer Goal 1 (PT)    Activity/Assistive Device (Transfer Goal 1, PT)  sit-to-stand/stand-to-sit;bed-to-chair/chair-to-bed  -CT     [place]te Level/Cues Needed (Transfer Goal 1, PT)  independent;conditional independence  -CT     Time Frame (Transfer Goal 1, PT)  by discharge  -CT     Row Name 04/19/19 6798          Gait Training Goal 1 (PT)    Activity/Assistive Device (Gait Training Goal 1, PT)  gait (walking locomotion);assistive device use  -CT     [place]te Level (Gait Training Goal 1, PT)  independent;conditional independence  -CT     Time Frame (Gait Training Goal 1, PT)  by discharge  -CT     Row Name 04/19/19 0921          Positioning and " Restraints    Pre-Treatment Position  in bed  -CT     Post Treatment Position  chair  -CT     In Chair  sitting;call light within reach;encouraged to call for assist;notified nsg  -CT       User Key  (r) = Recorded By, (t) = Taken By, (c) = Cosigned By    Initials Name Provider Type    CT Laurie Savage PT Physical Therapist        Physical Therapy Education     Title: PT OT SLP Therapies (Done)     Topic: Physical Therapy (Done)     Point: Mobility training (Done)     Learning Progress Summary           Patient Acceptance, E,TB, VU by CT at 4/19/2019  9:59 AM                   Point: Home exercise program (Done)     Learning Progress Summary           Patient Acceptance, E,TB, VU by CT at 4/19/2019  9:59 AM                   Point: Body mechanics (Done)     Learning Progress Summary           Patient Acceptance, E,TB, VU by CT at 4/19/2019  9:59 AM                   Point: Precautions (Done)     Learning Progress Summary           Patient Acceptance, E,TB, VU by CT at 4/19/2019  9:59 AM                               User Key     Initials Effective Dates Name Provider Type Discipline    CT 04/03/18 -  Laurie Savage PT Physical Therapist PT              PT Recommendation and Plan  Anticipated Discharge Disposition (PT): home, home with home health  Planned Therapy Interventions (PT Eval): balance training, bed mobility training, gait training, home exercise program, manual therapy techniques, motor coordination training, neuromuscular re-education, patient/family education, postural re-education, stair training, strengthening, transfer training  Therapy Frequency (PT Clinical Impression): 3 times/wk(3-5 times/wk)  Outcome Summary/Treatment Plan (PT)  Anticipated Equipment Needs at Discharge (PT): front wheeled walker  Anticipated Discharge Disposition (PT): home, home with home health  Plan of Care Reviewed With: patient  Outcome Measures     Row Name 04/19/19 0900             How much help from another person  do you currently need...    Turning from your back to your side while in flat bed without using bedrails?  4  -CT      Moving from lying on back to sitting on the side of a flat bed without bedrails?  4  -CT      Moving to and from a bed to a chair (including a wheelchair)?  4  -CT      Standing up from a chair using your arms (e.g., wheelchair, bedside chair)?  4  -CT      Climbing 3-5 steps with a railing?  3  -CT      To walk in hospital room?  3  -CT      AM-PAC 6 Clicks Score  22  -CT         Functional Assessment    Outcome Measure Options  AM-PAC 6 Clicks Basic Mobility (PT)  -CT        User Key  (r) = Recorded By, (t) = Taken By, (c) = Cosigned By    Initials Name Provider Type    CT Laurie Savage, JULIO Physical Therapist         Time Calculation:   PT Charges     Row Name 04/19/19 1000             Time Calculation    PT Received On  04/19/19  -CT      PT - Next Appointment  04/22/19  -CT      PT Goal Re-Cert Due Date  05/03/19  -CT         Time Calculation- PT    Total Timed Code Minutes- PT  54 minute(s)  -CT         Timed Charges    01178 - Gait Training Minutes   15  -CT      16561 - PT Therapeutic Activity Minutes  15  -CT        User Key  (r) = Recorded By, (t) = Taken By, (c) = Cosigned By    Initials Name Provider Type    CT Laurie Savage PT Physical Therapist        Therapy Charges for Today     Code Description Service Date Service Provider Modifiers Qty    41733827897 HC GAIT TRAINING EA 15 MIN 4/19/2019 Laurie Savage, PT GP 1    55591657316 HC PT THERAPEUTIC ACT EA 15 MIN 4/19/2019 Laurie Savage, PT GP 1    28804958269 HC PT THER SUPP EA 15 MIN 4/19/2019 Laurie Savage, PT GP 2    27332012939 HC PT EVAL HIGH COMPLEXITY 2 4/19/2019 Laurie Savage, PT GP 1    65702234710 HC PT THER SUPP EA 15 MIN 4/19/2019 Laurie Savage, PT GP 2          PT G-Codes  Outcome Measure Options: AM-PAC 6 Clicks Basic Mobility (PT)  AM-PAC 6 Clicks Score: 22      Laurie Savage PT  4/19/2019

## 2019-04-19 NOTE — PROGRESS NOTES
Discharge Planning Assessment   [place]     Patient Name: Rupinder Lees  MRN: [id number]  Today's Date: 4/19/2019    Admit Date: 4/17/2019      Discharge Plan     Row Name 04/19/19 1926       Plan    Plan  SS notified via on call that pt was returning home with continued home health services via [facility].  SS notified [facility] on call.  SS will ensure all information needed is received at [facility] office in am.      Final Discharge Disposition Code  06 - home with home health care    Final Note  Pt to be discharged home with continued [facility].            Emily Roberts

## 2019-04-19 NOTE — PLAN OF CARE
Problem: Patient Care Overview  Goal: Plan of Care Review  Outcome: Ongoing (interventions implemented as appropriate)    Goal: Individualization and Mutuality  Outcome: Ongoing (interventions implemented as appropriate)    Goal: Discharge Needs Assessment  Outcome: Ongoing (interventions implemented as appropriate)    Goal: Interprofessional Rounds/Family Conf  Outcome: Ongoing (interventions implemented as appropriate)      Problem: Anemia (Adult)  Goal: Identify Related Risk Factors and Signs and Symptoms  Outcome: Ongoing (interventions implemented as appropriate)    Goal: Symptom Improvement  Outcome: Ongoing (interventions implemented as appropriate)      Problem: Fall Risk (Adult)  Goal: Identify Related Risk Factors and Signs and Symptoms  Outcome: Ongoing (interventions implemented as appropriate)    Goal: Absence of Fall  Outcome: Ongoing (interventions implemented as appropriate)      Problem: Skin Injury Risk (Adult)  Goal: Identify Related Risk Factors and Signs and Symptoms  Outcome: Ongoing (interventions implemented as appropriate)    Goal: Skin Health and Integrity  Outcome: Ongoing (interventions implemented as appropriate)   04/18/19 2127   Skin Injury Risk (Adult)   Skin Health and Integrity making progress toward outcome

## 2019-04-19 NOTE — OP NOTE
COLONOSCOPY, ESOPHAGOGASTRODUODENOSCOPY  Procedure Note    Rupinder Lees  4/17/2019 - 4/19/2019    Pre-op Diagnosis:   Occult blood positive stool [R19.5]  Iron deficiency anemia due to chronic blood loss [D50.0]    Post-op Diagnosis:   GERD, hiatal hernia, esophagitis    Indications: see above    Procedure(s):  COLONOSCOPY  ESOPHAGOGASTRODUODENOSCOPY    Surgeon(s):  Chris Zimmerman MD    Anesthesia: Choice    Staff:   Circulator: Ness Red RN  Endo Technician: Compa Sabillon    Findings: normal colon, hiatal hernia with GE junction signs of GERD and esophagitis without bleeding    Operative Procedure: The patient was taken operating suite and placed in left lateral decubitus position.  Bilateral sequential compression devices were in place and IV anesthesia was administered.  Timeout procedure was performed.  The endoscope was inserted into the oropharynx and the esophagus was intubated.  The scope was advanced to the third portion of the duodenum.  The scope was slowly withdrawn evaluating all mucosal areas.  The patient had a normal stomach and duodenum.  There was a small hiatal hernia.  No ulcer or mass.  GE jnction showed signs of GERD and reflux esophagitis without bleeding.  Biopsy of the GE junction were obtained.  The remainder of the esophageal mucosa was within normal limits. The endoscope was removed.  Digital rectal examination was negative.  The colonoscope was inserted and advanced to the cecum as evidenced by the ileocecal valve and appendiceal orifice.  The bowel prep was excellent.  The colonoscope was slowly removed and the entirety of the colonic mucosa was evaluated.  Colonoscopic findings included normal colon mucosa throughout.  The colonoscope was then removed and the patient was awakened from anesthesia and taken recovery.  They tolerated the procedure well.    Estimated Blood Loss: minimal    Specimens:   GE junction biopsy                 Drains: none    Grafts or Implants:  none    Complications: none    Recommendations: advance diet as tolerated.  PPI    Chris Zimmerman MD

## 2019-04-19 NOTE — NURSING NOTE
Spoke with Emily LERNER regarding patient is going home and has order for home health, patient was not for sure who she used for home health service looked up she utilizes [facility] , Emily states that she will fax all orders in am to [facility]. Called [facility] @ 1900 and informed that patient has been  Discharged and has lab orders for 4/22/2019 to be drawn and faxed back to dr coleman.

## 2019-04-19 NOTE — ANESTHESIA POSTPROCEDURE EVALUATION
Patient: Rupinder Lees    Procedure Summary     Date:  04/19/19 Room / Location:  [facility] OR  /  [facility] OR    Anesthesia Start:  1542 Anesthesia Stop:  1611    Procedures:       COLONOSCOPY (N/A )      ESOPHAGOGASTRODUODENOSCOPY (N/A Esophagus) Diagnosis:       Occult blood positive stool      Iron deficiency anemia due to chronic blood loss      (Occult blood positive stool [R19.5])      (Iron deficiency anemia due to chronic blood loss [D50.0])    Surgeon:  Chris Zimmerman MD Provider:  Cal Anaya MD    Anesthesia Type:  general ASA Status:  3          Anesthesia Type: general  Last vitals  BP   156/78 (04/19/19 1628)   Temp   97.1 °F (36.2 °C) (04/19/19 1613)   Pulse   71 (04/19/19 1628)   Resp   18 (04/19/19 1628)     SpO2   95 % (04/19/19 1628)     Post Anesthesia Care and Evaluation    Patient location during evaluation: PACU  Patient participation: complete - patient participated  Level of consciousness: awake and alert  Pain score: 1  Pain management: adequate  Airway patency: patent  Anesthetic complications: No anesthetic complications  PONV Status: controlled  Cardiovascular status: acceptable  Respiratory status: acceptable  Hydration status: acceptable

## 2019-04-19 NOTE — ANESTHESIA PREPROCEDURE EVALUATION
Anesthesia Evaluation     Patient summary reviewed and Nursing notes reviewed   no history of anesthetic complications:  NPO Solid Status: > 8 hours  NPO Liquid Status: > 8 hours           Airway   Mallampati: II  TM distance: >3 FB  Neck ROM: full  No difficulty expected  Dental - normal exam   (+) upper dentures and lower dentures    Pulmonary - normal exam   (+) pneumonia stable , a smoker Former, COPD, decreased breath sounds,   Cardiovascular - normal exam  Exercise tolerance: good (4-7 METS)    NYHA Classification: II  ECG reviewed    (+) pacemaker pacemaker interrogated <1 month ago, hypertension, past MI , CAD, dysrhythmias, PVD (hx RLE ischemia), DVT, hyperlipidemia,     ROS comment: [facility] Dec 2018: mild to mod nonobstructive CAD, normal LV systolic function    Neuro/Psych  (+) CVA,       ROS Comment: Hard of hearing  GI/Hepatic/Renal/Endo - negative ROS     Musculoskeletal (-) negative ROS    Abdominal  - normal exam    Bowel sounds: normal.   Substance History - negative use     OB/GYN negative ob/gyn ROS         Other - negative ROS         Other Comment: Anemia        Anesthesia Evaluation                  Airway   Mallampati: II  Dental      Pulmonary    (+) COPD,   Cardiovascular     (+) hypertension, past MI , PVD,       Neuro/Psych  GI/Hepatic/Renal/Endo      Musculoskeletal     Abdominal    Substance History      OB/GYN          Other                        Anesthesia Plan    ASA 5 - emergent     general     intravenous induction   Anesthetic plan, all risks, benefits, and alternatives have been provided, discussed and informed consent has been obtained with: patient.             Anesthesia Plan    ASA 3     general   total IV anesthesia  intravenous induction   Anesthetic plan, all risks, benefits, and alternatives have been provided, discussed and informed consent has been obtained with: patient.  Use of blood products discussed with patient  Consented to blood products.

## 2019-04-19 NOTE — PLAN OF CARE
Problem: Patient Care Overview  Goal: Plan of Care Review  Outcome: Ongoing (interventions implemented as appropriate)      Problem: Anemia (Adult)  Goal: Symptom Improvement  Outcome: Ongoing (interventions implemented as appropriate)      Problem: Fall Risk (Adult)  Goal: Identify Related Risk Factors and Signs and Symptoms  Outcome: Ongoing (interventions implemented as appropriate)      Problem: Mobility, Physical Impaired (Adult)  Goal: Identify Related Risk Factors and Signs and Symptoms  Outcome: Ongoing (interventions implemented as appropriate)

## 2019-04-19 NOTE — PROGRESS NOTES
Assisted By: Amy LOGAN    CC: F/U Anemia    Interview History/HPI: Patient was able to take her prep, no complaints she is did take three fourths of the prep, she thinks what is coming out is mostly clear.  No abdominal pain chest pain or shortness of breath.  She would like to get this done and go home later today.      Vitals:    04/19/19 0804   BP: 156/81   Pulse: 78   Resp: 18   Temp:    SpO2: 94%       Intake/Output Summary (Last 24 hours) at 4/19/2019 1016  Last data filed at 4/19/2019 0500  Gross per 24 hour   Intake 610 ml   Output 200 ml   Net 410 ml       EXAM: 99.2 temperature, lungs bilateral breath sounds are clear without rhonchi rales or wheezing, heart regular rate and rhythm without murmur gallop, abdomen is soft benign      Tele: Paced, reviewed    LABS:   Lab Results (last 48 hours)     Procedure Component Value Units Date/Time    Basic Metabolic Panel [[id number]]  (Abnormal) Collected:  04/19/19 0346    Specimen:  Blood Updated:  04/19/19 0423     Glucose 82 mg/dL      BUN 9 mg/dL      Creatinine 0.83 mg/dL      Sodium 140 mmol/L      Potassium 3.6 mmol/L      Chloride 102 mmol/L      CO2 24.7 mmol/L      Calcium 7.5 mg/dL      eGFR Non African Amer 66 mL/min/1.73      BUN/Creatinine Ratio 10.8     Anion Gap 13.3 mmol/L     Narrative:       GFR Normal >60  Chronic Kidney Disease <60  Kidney Failure <15    Phosphorus [[id number]]  (Normal) Collected:  04/19/19 0346    Specimen:  Blood Updated:  04/19/19 0423     Phosphorus 2.7 mg/dL     CBC & Differential [[id number]] Collected:  04/19/19 0346    Specimen:  Blood Updated:  04/19/19 0404    Narrative:       The following orders were created for panel order CBC & Differential.  Procedure                               Abnormality         Status                     ---------                               -----------         ------                     CBC Auto Differential[[id number]]        Abnormal            Final result                 Please view  results for these tests on the individual orders.    CBC Auto Differential [[id number]]  (Abnormal) Collected:  04/19/19 0346    Specimen:  Blood Updated:  04/19/19 0404     WBC 4.06 10*3/mm3      RBC 2.98 10*6/mm3      Hemoglobin 8.0 g/dL      Hematocrit 26.4 %      MCV 88.6 fL      MCH 26.8 pg      MCHC 30.3 g/dL      RDW 16.8 %      RDW-SD 52.6 fl      MPV 9.1 fL      Platelets 258 10*3/mm3      Neutrophil % 69.5 %      Lymphocyte % 18.7 %      Monocyte % 9.6 %      Eosinophil % 1.5 %      Basophil % 0.5 %      Immature Grans % 0.2 %      Neutrophils, Absolute 2.82 10*3/mm3      Lymphocytes, Absolute 0.76 10*3/mm3      Monocytes, Absolute 0.39 10*3/mm3      Eosinophils, Absolute 0.06 10*3/mm3      Basophils, Absolute 0.02 10*3/mm3      Immature Grans, Absolute 0.01 10*3/mm3     Blood Culture - Blood, Arm, Right [[id number]] Collected:  04/17/19 1200    Specimen:  Blood from Arm, Right Updated:  04/18/19 1315     Blood Culture No growth at 24 hours    Blood Culture - Blood, Arm, Left [[id number]] Collected:  04/17/19 1221    Specimen:  Blood from Arm, Left Updated:  04/18/19 1315     Blood Culture No growth at 24 hours    Magnesium [[id number]]  (Abnormal) Collected:  04/18/19 0854    Specimen:  Blood Updated:  04/18/19 0949     Magnesium 3.0 mg/dL     Phosphorus [[id number]]  (Abnormal) Collected:  04/18/19 0854    Specimen:  Blood Updated:  04/18/19 0949     Phosphorus 2.2 mg/dL     Basic Metabolic Panel [[id number]]  (Abnormal) Collected:  04/18/19 0326    Specimen:  Blood Updated:  04/18/19 0413     Glucose 92 mg/dL      BUN 9 mg/dL      Creatinine 0.84 mg/dL      Sodium 140 mmol/L      Potassium 4.1 mmol/L      Chloride 101 mmol/L      CO2 28.9 mmol/L      Calcium 8.1 mg/dL      eGFR Non African Amer 65 mL/min/1.73      BUN/Creatinine Ratio 10.7     Anion Gap 10.1 mmol/L     Narrative:       GFR Normal >60  Chronic Kidney Disease <60  Kidney Failure <15    C-reactive Protein [[id number]]  (Abnormal) Collected:   04/18/19 0326    Specimen:  Blood Updated:  04/18/19 0404     C-Reactive Protein 6.44 mg/dL     CBC & Differential [[id number]] Collected:  04/18/19 0326    Specimen:  Blood Updated:  04/18/19 0343    Narrative:       The following orders were created for panel order CBC & Differential.  Procedure                               Abnormality         Status                     ---------                               -----------         ------                     CBC Auto Differential[[id number]]        Abnormal            Final result                 Please view results for these tests on the individual orders.    CBC Auto Differential [[id number]]  (Abnormal) Collected:  04/18/19 0326    Specimen:  Blood Updated:  04/18/19 0343     WBC 3.66 10*3/mm3      RBC 2.93 10*6/mm3      Hemoglobin 8.0 g/dL      Hematocrit 25.7 %      MCV 87.7 fL      MCH 27.3 pg      MCHC 31.1 g/dL      RDW 16.9 %      RDW-SD 52.4 fl      MPV 9.3 fL      Platelets 210 10*3/mm3      Neutrophil % 68.6 %      Lymphocyte % 20.8 %      Monocyte % 8.7 %      Eosinophil % 1.4 %      Basophil % 0.5 %      Immature Grans % 0.0 %      Neutrophils, Absolute 2.51 10*3/mm3      Lymphocytes, Absolute 0.76 10*3/mm3      Monocytes, Absolute 0.32 10*3/mm3      Eosinophils, Absolute 0.05 10*3/mm3      Basophils, Absolute 0.02 10*3/mm3      Immature Grans, Absolute 0.00 10*3/mm3     Procalcitonin [[id number]]  (Abnormal) Collected:  04/17/19 1221    Specimen:  Blood Updated:  04/17/19 2312     Procalcitonin 0.06 ng/mL     Narrative:       As a Marker for Sepsis (Non-Neonates):   1. <0.5 ng/mL represents a low risk of severe sepsis and/or septic shock.  1. >2 ng/mL represents a high risk of severe sepsis and/or septic shock.    As a Marker for Lower Respiratory Tract Infections that require antibiotic therapy:  PCT on Admission     Antibiotic Therapy             6-12 Hrs later  > 0.5                Strongly Recommended            >0.25 - <0.5         Recommended  0.1 -  "0.25           Discouraged                   Remeasure/reassess PCT  <0.1                 Strongly Discouraged          Remeasure/reassess PCT      As 28 day mortality risk marker: \"Change in Procalcitonin Result\" (> 80 % or <=80 %) if Day 0 (or Day 1) and Day 4 values are available. Refer to [email or web address]   Change in PCT <=80 %   A decrease of PCT levels below or equal to 80 % defines a positive change in PCT test result representing a higher risk for 28-day all-cause mortality of patients diagnosed with severe sepsis or septic shock.  Change in PCT > 80 %   A decrease of PCT levels of more than 80 % defines a negative change in PCT result representing a lower risk for 28-day all-cause mortality of patients diagnosed with severe sepsis or septic shock.                POC Occult Blood Stool [[id number]]  (Abnormal) Collected:  04/17/19 1528    Specimen:  Stool Updated:  04/17/19 1530     Fecal Occult Blood Positive     Lot Number [id number]     Expiration Date 11-20     DEVELOPER LOT NUMBER [id number]     DEVELOPER EXPIRATION DATE 7-2,021     Positive Control Positive     Negative Control Negative    [place] Draw [[id number]] Collected:  04/17/19 1221    Specimen:  Blood Updated:  04/17/19 1330    Narrative:       The following orders were created for panel order [place] Draw.  Procedure                               Abnormality         Status                     ---------                               -----------         ------                     Light Blue Top[[id number]]                                   Final result               Green Top (Gel)[[id number]]                                  Final result               Lavender Top[[id number]]                                     Final result               Gold Top - SST[[id number]]                                                                Please view results for these tests on the individual orders.    Light Blue Top [[id number]] Collected:  04/17/19 " 1221    Specimen:  Blood Updated:  04/17/19 1330     Extra Tube hold for add-on     Comment: Auto resulted       Green Top (Gel) [[id number]] Collected:  04/17/19 1221    Specimen:  Blood Updated:  04/17/19 1330     Extra Tube Hold for add-ons.     Comment: Auto resulted.       Phosphorus [[id number]]  (Abnormal) Collected:  04/17/19 1221    Specimen:  Blood Updated:  04/17/19 1313     Phosphorus 1.6 mg/dL     Calcium, Ionized [[id number]]  (Abnormal) Collected:  04/17/19 1221    Specimen:  Blood Updated:  04/17/19 1310     Ionized Calcium 4.47 mg/dL     Comprehensive Metabolic Panel [[id number]]  (Abnormal) Collected:  04/17/19 1221    Specimen:  Blood Updated:  04/17/19 1304     Glucose 118 mg/dL      BUN 10 mg/dL      Creatinine 0.96 mg/dL      Sodium 139 mmol/L      Potassium 3.0 mmol/L      Chloride 99 mmol/L      CO2 28.0 mmol/L      Calcium 8.5 mg/dL      Total Protein 6.1 g/dL      Albumin 3.08 g/dL      ALT (SGPT) 22 U/L      AST (SGOT) 18 U/L      Alkaline Phosphatase 72 U/L      Total Bilirubin 0.3 mg/dL      eGFR Non African Amer 56 mL/min/1.73      Globulin 3.0 gm/dL      A/G Ratio 1.0 g/dL      BUN/Creatinine Ratio 10.4     Anion Gap 12.0 mmol/L     Narrative:       GFR Normal >60  Chronic Kidney Disease <60  Kidney Failure <15    Magnesium [[id number]]  (Normal) Collected:  04/17/19 1221    Specimen:  Blood Updated:  04/17/19 1300     Magnesium 1.6 mg/dL     C-reactive Protein [[id number]]  (Abnormal) Collected:  04/17/19 1221    Specimen:  Blood Updated:  04/17/19 1300     C-Reactive Protein 7.10 mg/dL     Lavender Top [[id number]] Collected:  04/17/19 1221    Specimen:  Blood Updated:  04/17/19 1256     Extra Tube hold for add-on     Comment: Auto resulted       Lactic Acid, Plasma [[id number]]  (Normal) Collected:  04/17/19 1221    Specimen:  Blood Updated:  04/17/19 1255     Lactate 1.9 mmol/L     Influenza Antigen, Rapid - Swab, Nasopharynx [[id number]]  (Normal) Collected:  04/17/19 1229    Specimen:   Swab from Nasopharynx Updated:  04/17/19 1253     Influenza A Ag, EIA Negative     Influenza B Ag, EIA Negative    Protime-INR [[id number]]  (Abnormal) Collected:  04/17/19 1221    Specimen:  Blood Updated:  04/17/19 1251     Protime 16.4 Seconds      INR 1.29    Narrative:       Suggested INR therapeutic range for stable oral anticoagulant therapy:    Low Intensity therapy:   1.5-2.0  Moderate Intensity therapy:   2.0-3.0  High Intensity therapy:   2.5-4.0    aPTT [[id number]]  (Normal) Collected:  04/17/19 1221    Specimen:  Blood Updated:  04/17/19 1251     PTT 32.6 seconds     Narrative:       PTT Heparin Therapeutic Range:  59 - 95 seconds    CBC & Differential [[id number]] Collected:  04/17/19 1221    Specimen:  Blood Updated:  04/17/19 1249    Narrative:       The following orders were created for panel order CBC & Differential.  Procedure                               Abnormality         Status                     ---------                               -----------         ------                     CBC Auto Differential[[id number]]        Abnormal            Final result                 Please view results for these tests on the individual orders.    CBC Auto Differential [[id number]]  (Abnormal) Collected:  04/17/19 1221    Specimen:  Blood Updated:  04/17/19 1249     WBC 4.26 10*3/mm3      RBC 2.47 10*6/mm3      Hemoglobin 6.6 g/dL      Hematocrit 22.3 %      MCV 90.3 fL      MCH 26.7 pg      MCHC 29.6 g/dL      RDW 16.7 %      RDW-SD 53.3 fl      MPV 9.7 fL      Platelets 229 10*3/mm3      Neutrophil % 74.5 %      Lymphocyte % 17.4 %      Monocyte % 7.0 %      Eosinophil % 0.7 %      Basophil % 0.2 %      Immature Grans % 0.2 %      Neutrophils, Absolute 3.17 10*3/mm3      Lymphocytes, Absolute 0.74 10*3/mm3      Monocytes, Absolute 0.30 10*3/mm3      Eosinophils, Absolute 0.03 10*3/mm3      Basophils, Absolute 0.01 10*3/mm3      Immature Grans, Absolute 0.01 10*3/mm3     Narrative:       Verified by Repeat  Analysis    Urinalysis With Culture If Indicated - Urine, Clean Catch [[id number]]  (Normal) Collected:  04/17/19 1230    Specimen:  Urine, Clean Catch Updated:  04/17/19 1244     Color, UA Yellow     Appearance, UA Clear     pH, UA 7.0     Specific Gravity, UA 1.009     Glucose, UA Negative     Ketones, UA Negative     Bilirubin, UA Negative     Blood, UA Negative     Protein, UA Negative     Leuk Esterase, UA Negative     Nitrite, UA Negative     Urobilinogen, UA 0.2 E.U./dL    Narrative:       Urine microscopic not indicated.    Blood Gas, Arterial [[id number]]  (Abnormal) Collected:  04/17/19 1206    Specimen:  Arterial Blood Updated:  04/17/19 1211     Site Arterial: right radial     Isrrael's Test Positive     pH, Arterial 7.525 pH units      pCO2, Arterial 37.6 mm Hg      pO2, Arterial 58.3 mm Hg      HCO3, Arterial 30.4 mmol/L      Base Excess, Arterial 7.0 mmol/L      O2 Saturation, Arterial 91.0 %      Hemoglobin, Blood Gas 7.1 g/dL      Hematocrit, Blood Gas 21.0 %      Oxyhemoglobin 89.1 %      Methemoglobin 0.40 %      Carboxyhemoglobin 1.7 %      A-a Gradiant 40.1 mmHg      Temperature 98.6 C      Barometric Pressure for Blood Gas 730 mmHg      Modality Room Air     FIO2 21 %           Radiology:  Imaging Results (last 72 hours)     Procedure Component Value Units Date/Time    CT Abdomen Pelvis Without Contrast [[id number]] Collected:  04/18/19 0910     Updated:  04/18/19 0915    Narrative:       EXAM: CT ABDOMEN PELVIS WO CONTRAST-            TECHNIQUE: Multiple axial CT images were obtained from lung bases  through pubic symphysis WITHOUT administration of IV contrast.  Reformatted images in the coronal and/or sagittal plane(s) were  generated from the axial data set to facilitate diagnostic accuracy  and/or surgical planning.  Oral Contrast:NONE.     Radiation dose reduction techniques were utilized per ALARA protocol.  Automated exposure control was initiated through either or CareDose or  Dosepayever  software packages by  protocol.       DOSE: 484.75 mGy.cm     Clinical information  Nausea, vomiting, diarrhea; D64.9-Anemia, unspecified; R53.1-Weakness;  E83.39-Other disorders of phosphorus metabolism; I95.9-Hypotension,  unspecified      Comparison  Comparison: 03/04/2019     Findings  LOWER THORAX: BILATERAL PLEURAL EFFUSIONS WITH BIBASILAR AIRSPACE  DISEASE AND CARDIOMEGALY.     ABDOMEN:        LIVER: Homogeneous. No focal hepatic mass or ductal dilatation.        GALLBLADDER: PRIOR CHOLECYSTECTOMY.        PANCREAS: Unremarkable. No mass or ductal dilatation.        SPLEEN: Homogeneous. No splenomegaly.        ADRENALS: No mass.        KIDNEYS/URETERS: No mass. No obstructive uropathy.  No evidence of  urolithiasis.        GI TRACT: AIR-FLUID LEVELS THROUGHOUT THE COLON AND PORTIONS OF SMALL  BOWEL MOST CONSISTENT WITH ENTERITIS OR ENTEROCOLITIS BOWEL GAS PATTERN.  GASTRIC WALL THICKENING IN PART RELATED TO INCOMPLETE DISTENTION BUT  ALSO LIKELY ASSOCIATED WITH GASTRITIS. NO BOWEL OBSTRUCTION IDENTIFIED.  STOOL RETENTION IN THE DISTAL COLON MOST COMPATIBLE WITH CONSTIPATION.        PERITONEUM: No free air. No free fluid or loculated fluid  collections.        MESENTERY: Unremarkable.        LYMPH NODES: No lymphadenopathy.        VASCULATURE: MODERATE ATHEROSCLEROSIS IS STABLE.        ABDOMINAL WALL: No focal hernia or mass.           OTHER: None.     PELVIS:        BLADDER: No focal mass or significant wall thickening        REPRODUCTIVE: HYSTERECTOMY.        APPENDIX: Nondistended. No surrounding inflammation.     BONES: STABLE DEGENERATIVE CHANGES LUMBAR SPINE.       Impression:       Impression:  1. Gastroenteritis/enteritis bowel gas pattern. No perforation or  obstruction.  2. Diverticulosis without diverticulitis.  3. Probable changes of CHF with small pleural effusions. Otherwise  stable exam.     This report was finalized on 4/18/2019 9:12 AM by Dr. ePdrito Alejandre MD.       XR Chest 1 View  [[id number]] Collected:  04/17/19 1246     Updated:  04/17/19 1248    Narrative:       EXAMINATION: XR CHEST 1 VW-      CLINICAL INDICATION:     Severe Sepsis Protocol     TECHNIQUE:  XR CHEST 1 VW-      COMPARISON: 04/16/2019      FINDINGS:      CHF improved from the previous exam. No consolidation identified.   Mild cardiac enlargement noted. Pulmonary vascularity appears within  normal limits. Stable appearance of left pacer device.   No pneumothorax.   No effusions.   No acute osseous findings.          Impression:       Improvement in CHF. Otherwise stable chest.     This report was finalized on 4/17/2019 12:46 PM by Dr. Pedrito Alejandre MD.             Assessment/Plan:   Anemia, transfused today, hemoglobin is stable doubt significant bleed, we do need to consider restarting anticoagulation therefore, scopes are to be done today, will discuss with  timing of restarting anticoagulation.    Atrial fibrillation, now paced    DVT prophylaxis, SCUDs    Low phosphorus, improved

## 2019-04-20 ENCOUNTER — READMISSION MANAGEMENT (OUTPATIENT)
Dept: CALL CENTER | Facility: HOSPITAL | Age: 84
End: 2019-04-20

## 2019-04-20 NOTE — DISCHARGE PLACEMENT REQUEST
"Rupinder Arias (83 y.o. Female)     Date of Birth Social Security Number Address Home Phone MRN    1935  [id number] [facility] [address] [phone] [id number]    Cheondoism Marital Status          Oriental orthodox        Admission Date Admission Type Admitting Provider Attending Provider Department, Room/Bed    4/17/19 Emergency Myles Santos MD  [facility] PROGRESS CARE, P208/1P    Discharge Date Discharge Disposition Discharge Destination        4/19/2019 Home-Health Care [facility]              Attending Provider:  (none)   Allergies:  No Known Allergies    Isolation:  Spore   Infection:  C.difficile (rule out) (04/18/19)   Code Status:  Prior    Ht:  152.4 cm (60\")   Wt:  54.2 kg (119 lb 8 oz)    Admission Cmt:  None   Principal Problem:  None                Active Insurance as of 4/17/2019     Primary Coverage     Payor Plan Insurance Group Employer/Plan Group    MEDICARE MEDICARE A & B      Payor Plan Address Payor Plan Phone Number Payor Plan Fax Number Effective Dates    [address] [phone]  8/1/2002 - None Entered    [facility] [place] [address]       Subscriber Name Subscriber Birth Date Member ID       RUPINDER ARIAS 1935 [id number]           Secondary Coverage     Payor Plan Insurance Group Employer/Plan Group    ANTHEM BLUE CROSS ANTHEM BLUE CROSS BLUE SHIELD PPO [id number]     Payor Plan Address Payor Plan Phone Number Payor Plan Fax Number Effective Dates    [address] [phone]  1/1/2011 - None Entered    [facility] [address]       Subscriber Name Subscriber Birth Date Member ID       RUPINDER ARIAS 1935 [id number]                 Emergency Contacts      (Rel.) Home Phone Work Phone Mobile Phone    Antony Arias (Son) [phone] -- [phone]    ARIASAGNIESZKA (Daughter) [phone] -- --    HugoMichaelle (Other) [phone] -- --            Emergency Contact Information     Name Relation Home Work Mobile    Antony Arias Son [phone]  " [phone]    AGNIESZKA ARIAS [phone]      Michaelle Arias [phone]            Insurance Information                MEDICARE/MEDICARE A & B Phone: [phone]    Subscriber: Rupinder Arias Subscriber#: [id number]    Group#:  Precert#:         ANTHEM BLUE CROSS/ANTHEM BLUE CROSS BLUE SHIELD PPO Phone: [phone]    Subscriber: Rupinder Arias Subscriber#: [id number]    Group#: [id number] Precert#:           Treatment Team     Provider Relationship Specialty Contact    Richelle Garcia, RRT Respiratory Therapist --     Yoly Alexandre Patient Care Technician --     Randolph Persaud Unit Ransom --     Myles Santos MD Physician of Record Hospitalist [phone]    [facility], RN Registered Nurse --     Chris Zimmerman MD Surgeon General Surgery [phone]    Agueda Gonzales RN Registered Nurse --     Shaneka Lopez MD Consulting Physician, Surgeon General Surgery [phone]    Aster Mukherjee, ESTEVAN Respiratory Therapist --     Kimberly Dean CNA Patient Care Technician --     Laurie Savage PT Physical Therapist Physical Therapy           Problem List           Codes Noted - Resolved       Hospital    Anemia ICD-10-CM: D64.9  ICD-9-CM: 285.9 4/17/2019 - Present    Hypokalemia ICD-10-CM: E87.6  ICD-9-CM: 276.8 4/17/2019 - Present    Hypophosphatemia ICD-10-CM: E83.39  ICD-9-CM: 275.3 4/17/2019 - Present    Occult blood positive stool ICD-10-CM: R19.5  ICD-9-CM: 792.1 4/17/2019 - Present    Iron deficiency anemia due to chronic blood loss ICD-10-CM: D50.0  ICD-9-CM: 280.0 4/17/2019 - Present       Non-Hospital    Pneumonia of left lower lobe due to infectious organism (CMS/HCC) ICD-10-CM: J18.1  ICD-9-CM: 486 3/24/2019 - Present    Popliteal thrombosis (CMS/HCC) ICD-10-CM: I74.3  ICD-9-CM: 444.22 12/18/2018 - Present    Complete heart block S/P transvenous pacemaker placement at [facility] on 12/17 ICD-10-CM: I44.2  ICD-9-CM: 426.0 12/17/2018  - Present    PAD  ICD-10-CM: I73.9  ICD-9-CM: 443.9 2018 - Present    HTN ICD-10-CM: I10  ICD-9-CM: 401.9 2018 - Present    History of subacute left MCA distribution ischemic CVA ICD-10-CM: Z86.73  ICD-9-CM: V12.54 2018 - Present    COPD  ICD-10-CM: J44.9  ICD-9-CM: 496 2018 - Present    Tobacco use ICD-10-CM: Z72.0  ICD-9-CM: 305.1 2018 - Present    History of noncompliance with medical treatment ICD-10-CM: Z91.19  ICD-9-CM: V15.81 2018 - Present    Acute kidney injury Cr 1.72 on admission  ICD-10-CM: N17.9  ICD-9-CM: 584.9 2018 - Present    Hyperlipidemia ICD-10-CM: E78.5  ICD-9-CM: 272.4 2018 - Present    NSTEMI  ICD-10-CM: I21.4  ICD-9-CM: 410.70 2018 - Present    CAD with occluded LAD and RCA per [facility] 18 ICD-10-CM: I25.10  ICD-9-CM: 414.00 2018 - Present    Right ischemic leg ICD-10-CM: I99.8  ICD-9-CM: 459.9 2018 - Present             History & Physical      Tavon Flynn MD at 2019  5:13 PM              [facility] Medicine Services  History & Physical    Patient Identification:  Name:  Rupinder Lees  Age:  83 y.o.  Sex:  female  :  1935  MRN:  [id number]   Visit Number:  [id number]  Primary Care Physician:  Gifty Kruse MD    I have seen the patient in conjunction with Cecelia Velazco PA-C and I agree with the following statements:    Subjective     Chief complaint: Sent from PCP due to low hemoglobin    History of presenting illness:      Rupinder Lees is a 83 y.o. female with past medical history significant for chronic obstructive pulmonary disease, deep vein thrombosis of the lower extremity, history of CVA in , hypertension, peripheral arterial disease, tobacco abuse, and history of pacemaker implantation.  She was contacted by her primary care physician on this date with instructions to come to the emergency department given recent abnormal lab values.  She reported generalized  fatigue and weakness in addition to recent diarrhea that has since resolved (no loose bowel movement in the last 3 days) and recent treatment for pneumonia a few weeks ago with discharge from this facility.    Mrs. Lees reports ongoing weakness since discharge.  She went home on 2L/min NC. She had ongoing diarrhea over the past week but denies blood in her stool.  Her hemoglobin upon ED presentation was 6.6.  It was 7.1 on the day of discharge on 4/1/19, any upon further review it seems her hemoglobin averaged about 7.5 during her 1 week stay. Prior to this it appears her hemoglobin ranged between 10-14. She denies any prior known EGD but states she did have a screening colonoscopy greater than 5 years ago that was reportedly unremarkable.   She reports anemia to be new and denies prior history of GI blood loss.  She reports greater than 4 watery bowel movements per day for the past week up until 3 days ago.  She did recently complete oral antibiotics for recent pneumonia.       Upon arrival to the ED, vital signs were temperature 99 °F, pulse 105, respiratory rate 18, blood pressure 65/41 and room air oxygen saturation of 87%. Blood pressures improved with IV fluid hydration.  She denies overt bleeding.  She has been admitted to PCU for close monitoring.     ---------------------------------------------------------------------------------------------------------------------   Review of Systems   Constitutional: Positive for activity change and fatigue. Negative for appetite change, chills and fever.   HENT: Negative for congestion, drooling, postnasal drip, rhinorrhea, sinus pressure, sinus pain and sore throat.         Some difficulty swallowing meat up until 3 days ago, now resolved   Eyes: Negative for photophobia, pain, discharge, redness, itching and visual disturbance.   Respiratory: Negative for cough, shortness of breath and wheezing.    Cardiovascular: Positive for leg swelling (per patient and son,  swelling is improving since leaving the hospital earlier this month). Negative for chest pain and palpitations.   Gastrointestinal: Positive for diarrhea. Negative for nausea and vomiting.        Abdominal cramping   Endocrine: Negative for cold intolerance, heat intolerance, polydipsia, polyphagia and polyuria.   Genitourinary: Negative for difficulty urinating, dysuria, flank pain, frequency and hematuria.   Musculoskeletal: Positive for arthralgias. Negative for back pain, joint swelling, myalgias, neck pain and neck stiffness.   Skin: Positive for pallor and wound (scattered scrapes/abrasions on lower extremities from frequent falls (once per week according to son)). Negative for color change and rash.   Allergic/Immunologic: Negative for environmental allergies, food allergies and immunocompromised state.   Neurological: Positive for weakness (generalized). Negative for dizziness, tremors, seizures, syncope, numbness and headaches.   Hematological: Negative for adenopathy. Bruises/bleeds easily.   Psychiatric/Behavioral: Negative for agitation and confusion.      ---------------------------------------------------------------------------------------------------------------------   Past Medical History:   Diagnosis Date   • COPD  12/17/2018   • DVT of lower extremity (deep venous thrombosis) (CMS/[facility])    • History of CVA 2013 12/17/2018   • HTN 12/17/2018   • PAD  12/17/2018   • Tobacco use 12/17/2018     Past Surgical History:   Procedure Laterality Date   • CARDIAC CATHETERIZATION  12/17/2018    Procedure: Left Heart Cath;  Surgeon: Imtiaz Fuentes MD;  Location: [facility] CATH INVASIVE LOCATION;  Service: Cardiology   • CARDIAC CATHETERIZATION N/A 12/17/2018    Procedure: Thrombolysis-peripheral;  Surgeon: Imtiaz Fuentes MD;  Location:  [facility] CATH INVASIVE LOCATION;  Service: Cardiology   • CARDIAC ELECTROPHYSIOLOGY PROCEDURE N/A 12/18/2018    Procedure: PACEMAKER IMPLANTATION- DC;   Surgeon: Thony Bobby MD;  Location:  [facility] EP INVASIVE LOCATION;  Service: Cardiology   • CARDIAC ELECTROPHYSIOLOGY PROCEDURE N/A 2018    Procedure: Temporary Pacemaker;  Surgeon: Imtiaz Fuentes MD;  Location:  [facility] CATH INVASIVE LOCATION;  Service: Cardiology   • FEMORAL ARTERY STENT  2013   • FEMORAL POPLITEAL BYPASS Right 2018    Procedure: LOWER EXTREMITY EMBELECTOMY RIGHT;  Surgeon: David Beatty MD;  Location:  [facility] HYBRID OR 15;  Service: Vascular   • HYSTERECTOMY     • LEFT HEART CATH  2018    At [facility] with TV PM placement    • PACEMAKER IMPLANTATION       Family History   Problem Relation Age of Onset   • Cancer Mother    • Cancer Father    • Heart attack Brother    • Heart attack Brother      Social History     Socioeconomic History   • Marital status:      Spouse name: Not on file   • Number of children: 3   • Years of education: Not on file   • Highest education level: Not on file   Occupational History   • Occupation: Homemaker   Tobacco Use   • Smoking status: Former Smoker     Packs/day: 0.50     Years: 55.00     Pack years: 27.50     Types: Cigarettes     Last attempt to quit: 3/25/2019     Years since quittin.0   • Smokeless tobacco: Never Used   Substance and Sexual Activity   • Alcohol use: No     Frequency: Never   • Drug use: No   • Sexual activity: Defer   Social History Narrative    Lives in [place] alone     ---------------------------------------------------------------------------------------------------------------------   Allergies:  Patient has no known allergies.  ---------------------------------------------------------------------------------------------------------------------   Home medications:    Medications below are reported home medications pulling from within the system; at this time, these medications have not been reconciled unless otherwise specified and are in the verification process for further verifcation as current  home medications.  Medications Prior to Admission   Medication Sig Dispense Refill Last Dose   • amiodarone (PACERONE) 200 MG tablet Take 1 tablet by mouth Every 12 (Twelve) Hours. 60 tablet 6 Taking   • apixaban (ELIQUIS) 2.5 MG tablet tablet Take 1 tablet by mouth Every 12 (Twelve) Hours. 60 tablet 6 Taking   • atorvastatin (LIPITOR) 40 MG tablet Take 40 mg by mouth Daily.   Taking   • cilostazol (PLETAL) 100 MG tablet Take 100 mg by mouth 2 (Two) Times a Day.   Taking   • dexlansoprazole (DEXILANT) 60 MG capsule Take 1 capsule by mouth Daily. 30 capsule 0    • ferrous gluconate (FERGON) 324 MG tablet Take 1 tablet by mouth Daily With Breakfast. 30 tablet 0 Taking   • furosemide (LASIX) 20 MG tablet Take 20 mg by mouth.   Taking   • hydrALAZINE (APRESOLINE) 50 MG tablet Take 50 mg by mouth 3 (Three) Times a Day.   Taking   • ipratropium (ATROVENT) 0.02 % nebulizer solution Take 2.5 mL by nebulization Every 6 (Six) Hours for 30 days. 300 mL 0 Taking   • metoprolol tartrate (LOPRESSOR) 100 MG tablet Take 1 tablet by mouth Every 12 (Twelve) Hours for 30 days. 60 tablet 0 Taking   • olmesartan (BENICAR) 40 MG tablet Take 40 mg by mouth Daily.   Taking   • ondansetron ODT (ZOFRAN-ODT) 4 MG disintegrating tablet Take 1 tablet by mouth Every 8 (Eight) Hours As Needed for Nausea or Vomiting. 42 tablet 0    • potassium chloride (MICRO-K) 10 MEQ CR capsule    Taking       Hospital Scheduled Meds:    magnesium sulfate 4 g Intravenous Once   potassium chloride 40 mEq Oral Q4H   sodium chloride 3 mL Intravenous Q12H   sodium chloride          pantoprazole 8 mg/hr       Current listed hospital scheduled medications may not yet reflect those currently placed in orders that are signed and held awaiting patient's arrival to floor.   ---------------------------------------------------------------------------------------------------------------------     Objective     Vital Signs:  Temp:  [99 °F (37.2 °C)-99.1 °F (37.3 °C)] 99.1 °F  (37.3 °C)  Heart Rate:  [] 75  Resp:  [18] 18  BP: ()/(41-85) 99/59      04/17/19  1120 04/17/19  1802   Weight: 49.9 kg (110 lb) 54 kg (119 lb)     Body mass index is 23.24 kg/m².  ---------------------------------------------------------------------------------------------------------------------       Physical Exam    Physical Exam:    Constitutional: Awake, alert, well-nourished, well-developed, nontoxic appearing. Pale. No acute distress.  HEENT: Normocephalic, atraumatic. PERRLA, EOMI, sclerae anicteric, conjunctivae without injection, mucous membranes moist, no oropharyngeal erythema appreciated.    Neck: Supple. No JVD appreciated.  Pulmonary: Clear to auscultation bilaterally, nonlabored respirations, no wheezing appreciated.   CV: Irregularly irregular. Rate controlled. Normal s1/s2 with no murmur appreciated.   Abdominal: Soft, No distension or tenderness appreciated. Bowel sounds appreciated in all four quadrants, no guarding or rebound  Musculoskeletal: No erythema or swelling to joints of upper and lower extremities   Extremities: No clubbing, cyanosis. 1+ edema left lower ext, trace edema right lower extremity.   Skin: Skin is warm and dry. No truncal or extremity rash on limited exam. Scattered bruising noted. Also scattered scrapes/abrasions on lower extremities from frequent falls. Small dark spot on tip of left 1st toe.  Neuro: Alert and oriented to person, place, and time but somewhat forgetful. Strength symmetric in all extremities, Cranial Nerves grossly intact to confrontation, speech clear, sensation intact to fine touch throughout.  No slurred speech.  No facial droop.    Psych: Appropriate mood and affect.  Judgement and thought content appropriate.       ---------------------------------------------------------------------------------------------------------------------  EKG:  Atrial fibrillation, HR 97, QTc 543 (prolonged), nonspecific T wave abnormality in lateral leads.              I have personally reviewed this EKG.   ---------------------------------------------------------------------------------------------------------------------   Results from last 7 days   Lab Units 04/17/19  1221   CRP mg/dL 7.10*   LACTATE mmol/L 1.9   WBC 10*3/mm3 4.26   HEMOGLOBIN g/dL 6.6*   HEMATOCRIT % 22.3*   MCV fL 90.3   MCHC g/dL 29.6*   PLATELETS 10*3/mm3 229   INR  1.29*     Results from last 7 days   Lab Units 04/17/19  1206   PH, ARTERIAL pH units 7.525*   PO2 ART mm Hg 58.3*   PCO2, ARTERIAL mm Hg 37.6   HCO3 ART mmol/L 30.4*     Results from last 7 days   Lab Units 04/17/19  1221   SODIUM mmol/L 139   POTASSIUM mmol/L 3.0*   MAGNESIUM mg/dL 1.6   CHLORIDE mmol/L 99   CO2 mmol/L 28.0   BUN mg/dL 10   CREATININE mg/dL 0.96   EGFR IF NONAFRICN AM mL/min/1.73 56*   CALCIUM mg/dL 8.5*   GLUCOSE mg/dL 118*   ALBUMIN g/dL 3.08*   BILIRUBIN mg/dL 0.3   ALK PHOS U/L 72   AST (SGOT) U/L 18   ALT (SGPT) U/L 22   Estimated Creatinine Clearance: 39.2 mL/min (by C-G formula based on SCr of 0.96 mg/dL).  No results found for: AMMONIA          Lab Results   Component Value Date    HGBA1C 6.1 (H) 05/31/2015     Lab Results   Component Value Date    TSH 0.762 12/17/2018    FREET4 1.47 05/31/2015     No results found for: PREGTESTUR, PREGSERUM, HCG, HCGQUANT  Pain Management Panel     Pain Management Panel Latest Ref Rng & Units 5/31/2015    AMPHETAMINES SCREEN, URINE NEGATIVE Negative    BARBITURATES SCREEN NEGATIVE Negative    BENZODIAZEPINE SCREEN, URINE NEGATIVE Negative    COCAINE SCREEN, URINE NEGATIVE Negative    METHADONE SCREEN, URINE NEGATIVE Negative                        ---------------------------------------------------------------------------------------------------------------------  Imaging Results (last 7 days)     Procedure Component Value Units Date/Time    XR Chest 1 View [[id number]] Collected:  04/17/19 1246     Updated:  04/17/19 1248    Narrative:       EXAMINATION: XR CHEST 1 VW-       CLINICAL INDICATION:     Severe Sepsis Protocol     TECHNIQUE:  XR CHEST 1 VW-      COMPARISON: 2019      FINDINGS:      CHF improved from the previous exam. No consolidation identified.   Mild cardiac enlargement noted. Pulmonary vascularity appears within  normal limits. Stable appearance of left pacer device.   No pneumothorax.   No effusions.   No acute osseous findings.          Impression:       Improvement in CHF. Otherwise stable chest.     This report was finalized on 2019 12:46 PM by Dr. Pedrito Alejandre MD.                     CHADS-VASc Risk Assessment            7       Total Score        1 Hypertension    2 Age >/= 75    2 PRIOR STROKE/TIA/THROMBO    1 Vascular Disease    1 Sex: Female            I have personally reviewed the radiology images and read the final radiology report.  ---------------------------------------------------------------------------------------------------------------------  Assessment / Plan     Active Hospital Problems    Diagnosis POA   • Anemia [D64.9] Yes   • Hypokalemia [E87.6] Yes   • Hypophosphatemia [E83.39] Yes       ASSESSMENT/PLAN:  · Symptomatic acute on chronic anemia with possible GI Bleedin unit of PRBCs ordered in the ED.    Recent anemia studies from 3/24/19 revealing iron deficiency anemia.  Fecal occult blood faintly positive.  She is on Eliquis at home for atrial fibrillation. This will be held for now. IV protonix idrip on board. Consult general surgery for consideration of endoscopy.    · Diarrhea with concern for underlying colitis: CT abdomen/pelvis ordered. C. diff testing ordered from ED given recent oral abx and watery stool with abdominal cramping.  Will follow stool studies.  WBC WNL though CRP is elevated.  HR>90.  RR WNL.  Lactate WNL @ 1.9. Continue to trend CRP.    · Hypotension, improved: Likely multifactorial secondary to volume depletion and multiple home blood pressure medications. Will continue gentle hydration.       · Hypokalemia, hypophosphatemia, low normal magnesium: Electrolyte replacement per protocol has been ordered. We will continue utlizing protocol.    · Chronic atrial fibrillation:  Will hold Eliquis at present secondary to concern for possible GI bleeding.  Will review home medications once reconciled by pharmacy.  [id number] at least 7.     · Chronic hypoxic respiratory failure: ABG and low pulse oximetry were on room air.  She has been on 2L/min chronically for history of COPD.     · CKD stage III: creatinine within baseline range. Continue to monitor closely.       ----------  -DVT prophylaxis: SCDs  -Activity: Up with assist   -Expected length of stay:  INPATIENT status due to the need for care which can only be reasonably provided in an hospital setting such as aggressive/expedited ancillary services and/or consultation services, the necessity for IV medications, close physician monitoring and/or the possible need for procedures.  In such, I feel patient’s risk for adverse outcomes and need for care warrant INPATIENT evaluation and predict the patient’s care encounter to likely last beyond 2 midnights.    Plan of care discussed with patient, her son at bedside, and her RN Veronique who was present during my interview and exam in the PCU.    * Patient is high risk due to acute on chronic anemia, possible GI bleed, chronic afib with chronic anticoagulation, advanced age    Cecelia Velazco PA-C  04/17/19  6:08 PM  Pager # [phone]  ---------------------------------------------------------------------------------------------------------------------   * I have seen the patient in conjunction with Cecelia Velazco PA-C, and have amended her note to reflect my own findings, assessment and plan.          Electronically signed by Tavon Flynn MD at 4/17/2019  7:45 PM       Lines, Drains & Airways    Active LDAs     None                Hospital Medications (active)       Dose Frequency Start End     atorvastatin (LIPITOR) tablet 40 mg (Discontinued) 40 mg Nightly 4/18/2019 4/19/2019    Sig - Route: Take 1 tablet by mouth Every Night. - Oral    Reason for Discontinue: Patient Discharge    fentaNYL citrate (PF) (SUBLIMAZE) injection 50 mcg (Discontinued) 50 mcg Every 5 Minutes PRN 4/19/2019 4/19/2019    Sig - Route: Infuse 1 mL into a venous catheter Every 5 (Five) Minutes As Needed for Moderate Pain  or Severe Pain . - Intravenous    Reason for Discontinue: Patient Transfer    fentaNYL citrate (PF) (SUBLIMAZE) injection (Discontinued)  As Needed 4/19/2019 4/19/2019    Sig - Route: Infuse  into a venous catheter As Needed. - Intravenous    Reason for Discontinue: Anesthesia Stop    ipratropium (ATROVENT) nebulizer solution 0.5 mg (Discontinued) 500 mcg Every 6 Hours - RT 4/18/2019 4/19/2019    Sig - Route: Take 2.5 mL by nebulization Every 6 (Six) Hours. - Nebulization    Reason for Discontinue: Patient Discharge    ipratropium-albuterol (DUO-NEB) nebulizer solution 3 mL (Discontinued) 3 mL Once As Needed 4/19/2019 4/19/2019    Sig - Route: Take 3 mL by nebulization 1 (One) Time As Needed for Wheezing or Shortness of Air (bronchospasm). - Nebulization    Reason for Discontinue: Patient Transfer    lactated ringers infusion (Discontinued) 125 mL/hr Continuous 4/19/2019 4/19/2019    Sig - Route: Infuse 125 mL/hr into a venous catheter Continuous. - Intravenous    lactated ringers infusion (Discontinued) 125 mL/hr Continuous 4/19/2019 4/19/2019    Sig - Route: Infuse 125 mL/hr into a venous catheter Continuous. - Intravenous    lidocaine (XYLOCAINE) 2% injection (Discontinued)  As Needed 4/19/2019 4/19/2019    Sig: As Needed.    Reason for Discontinue: Anesthesia Stop    Magnesium Sulfate 2 gram / 50mL Infusion (GIVE X 3 BAGS TO EQUAL 6GM TOTAL DOSE) - Mg 1.1 - 1.5 mg/dl (Discontinued) 2 g As Needed 4/17/2019 4/19/2019    Sig - Route: Infuse 50 mL into a venous catheter As Needed (See Administration Instructions). -  "Intravenous    Reason for Discontinue: Patient Discharge    Cosign for Ordering: Accepted by Myles Santos MD on 4/18/2019  9:53 AM    Linked Group 1:  \"Or\" Linked Group Details        Magnesium Sulfate 2 gram Bolus, followed by 8 gram infusion (total Mg dose 10 grams)- Mg less than or equal to 1mg/dL (Discontinued) 2 g As Needed 4/17/2019 4/19/2019    Sig - Route: Infuse 50 mL into a venous catheter As Needed (See Administration Instructions). - Intravenous    Reason for Discontinue: Patient Discharge    Cosign for Ordering: Accepted by Myles Santos MD on 4/18/2019  9:53 AM    Linked Group 1:  \"Or\" Linked Group Details        Magnesium Sulfate 4 gram infusion- Mg 1.6-1.9 mg/dL (Discontinued) 4 g As Needed 4/17/2019 4/19/2019    Sig - Route: Infuse 100 mL into a venous catheter As Needed (See Administration Instructions). - Intravenous    Reason for Discontinue: Patient Discharge    Cosign for Ordering: Accepted by Myles Santos MD on 4/18/2019  9:53 AM    Linked Group 1:  \"Or\" Linked Group Details        meperidine (DEMEROL) injection 12.5 mg (Discontinued) 12.5 mg Every 5 Minutes PRN 4/19/2019 4/19/2019    Sig - Route: Infuse 0.5 mL into a venous catheter Every 5 (Five) Minutes As Needed for Shivering (May repeat x 1). - Intravenous    Reason for Discontinue: Patient Transfer    nitroglycerin (NITROSTAT) SL tablet 0.4 mg (Discontinued) 0.4 mg Every 5 Minutes PRN 4/17/2019 4/19/2019    Sig - Route: Place 1 tablet under the tongue Every 5 (Five) Minutes As Needed for Chest Pain (Chest Pain With Systolic Blood Pressure Greater Than 100). - Sublingual    Reason for Discontinue: Patient Discharge    Cosign for Ordering: Accepted by Myles Santos MD on 4/18/2019  9:53 AM    ondansetron (ZOFRAN) injection 4 mg (Discontinued) 4 mg Once As Needed 4/19/2019 4/19/2019    Sig - Route: Infuse 2 mL into a venous catheter 1 (One) Time As Needed for Nausea or Vomiting (may repeat times one dose). - Intravenous    Reason " "for Discontinue: Patient Transfer    oxyCODONE-acetaminophen (PERCOCET) 5-325 MG per tablet 1 tablet (Discontinued) 1 tablet Once As Needed 4/19/2019 4/19/2019    Sig - Route: Take 1 tablet by mouth 1 (One) Time As Needed for Moderate Pain . - Oral    Reason for Discontinue: Patient Transfer    potassium & sodium phosphates (PHOS-NAK) 280-160-250 MG packet - for Phosphorus 1.25 - 2.5 mg/dL (Discontinued) 2 packet Once As Needed 4/17/2019 4/19/2019    Sig - Route: Take 2 packets by mouth 1 (One) Time As Needed (Phosphorus 1.25 - 2.5 mg/dL). - Oral    Reason for Discontinue: Patient Discharge    Cosign for Ordering: Accepted by Myles Santos MD on 4/18/2019  9:53 AM    Linked Group 2:  \"Or\" Linked Group Details        potassium & sodium phosphates (PHOS-NAK) 280-160-250 MG packet - for Phosphorus less than 1.25 mg/dL (Discontinued) 2 packet Every 6 Hours PRN 4/17/2019 4/19/2019    Sig - Route: Take 2 packets by mouth Every 6 (Six) Hours As Needed (Phosphorus less than 1.25 mg/dL). - Oral    Reason for Discontinue: Patient Discharge    Cosign for Ordering: Accepted by Myles Santos MD on 4/18/2019  9:53 AM    Linked Group 2:  \"Or\" Linked Group Details        potassium chloride (K-DUR,KLOR-CON) ER tablet 40 mEq (Discontinued) 40 mEq As Needed 4/17/2019 4/19/2019    Sig - Route: Take 4 tablets by mouth As Needed (Potassium Replacement.  See Admin Instructions). - Oral    Reason for Discontinue: Patient Discharge    Cosign for Ordering: Accepted by Myles Santos MD on 4/18/2019  9:53 AM    Linked Group 3:  \"Or\" Linked Group Details        potassium chloride (KLOR-CON) packet 40 mEq (Discontinued) 40 mEq As Needed 4/17/2019 4/19/2019    Sig - Route: Take 40 mEq by mouth As Needed (potassium replacement, see admin instructions). - Oral    Reason for Discontinue: Patient Discharge    Cosign for Ordering: Accepted by Myles Santos MD on 4/18/2019  9:53 AM    Linked Group 3:  \"Or\" Linked Group Details        potassium " "chloride 10 mEq in 100 mL IVPB (Discontinued) 10 mEq Every 1 Hour PRN 4/17/2019 4/19/2019    Sig - Route: Infuse 100 mL into a venous catheter Every 1 (One) Hour As Needed (Potassium Replacement - See Admin Instructions). - Intravenous    Reason for Discontinue: Patient Discharge    Cosign for Ordering: Accepted by Myles Santos MD on 4/18/2019  9:53 AM    Linked Group 3:  \"Or\" Linked Group Details        propofol (DIPRIVAN) infusion 10 mg/mL 100 mL (Discontinued)  Continuous PRN 4/19/2019 4/19/2019    Sig - Route: Infuse  into a venous catheter Continuous As Needed. - Intravenous    Reason for Discontinue: Anesthesia Stop    Propofol (DIPRIVAN) injection (Discontinued)  As Needed 4/19/2019 4/19/2019    Sig - Route: Infuse  into a venous catheter As Needed. - Intravenous    Reason for Discontinue: Anesthesia Stop    sodium chloride 0.9 % flush 1-10 mL (Discontinued) 1-10 mL As Needed 4/17/2019 4/19/2019    Sig - Route: Infuse 1-10 mL into a venous catheter As Needed for Line Care. - Intravenous    Reason for Discontinue: Patient Discharge    Cosign for Ordering: Accepted by Myles Santos MD on 4/18/2019  9:53 AM    sodium chloride 0.9 % flush 10 mL (Discontinued) 10 mL As Needed 4/17/2019 4/19/2019    Sig - Route: Infuse 10 mL into a venous catheter As Needed for Line Care. - Intravenous    Reason for Discontinue: Patient Discharge    Cosign for Ordering: Accepted by Marcus Guaman MD on 4/17/2019  7:13 PM    sodium chloride 0.9 % flush 3 mL (Discontinued) 3 mL Every 12 Hours Scheduled 4/17/2019 4/19/2019    Sig - Route: Infuse 3 mL into a venous catheter Every 12 (Twelve) Hours. - Intravenous    Reason for Discontinue: Patient Discharge    Cosign for Ordering: Accepted by Myles Santos MD on 4/18/2019  9:53 AM    sodium chloride 0.9 % flush 3 mL (Discontinued) 3 mL Every 12 Hours Scheduled 4/19/2019 4/19/2019    Sig - Route: Infuse 3 mL into a venous catheter Every 12 (Twelve) Hours. - Intravenous    " Reason for Discontinue: Patient Transfer    sodium chloride 0.9 % flush 3 mL (Discontinued) 3 mL Every 12 Hours Scheduled 4/19/2019 4/19/2019    Sig - Route: Infuse 3 mL into a venous catheter Every 12 (Twelve) Hours. - Intravenous    Reason for Discontinue: Patient Transfer    sodium chloride 0.9 % flush 3-10 mL (Discontinued) 3-10 mL As Needed 4/19/2019 4/19/2019    Sig - Route: Infuse 3-10 mL into a venous catheter As Needed for Line Care. - Intravenous    Reason for Discontinue: Patient Transfer    sodium chloride 0.9 % flush 3-10 mL (Discontinued) 3-10 mL As Needed 4/19/2019 4/19/2019    Sig - Route: Infuse 3-10 mL into a venous catheter As Needed for Line Care. - Intravenous    Reason for Discontinue: Patient Transfer            Lab Results (last 24 hours)     Procedure Component Value Units Date/Time    Tissue Pathology Exam [[id number]] Collected:  04/19/19 1548    Specimen:  Tissue from Esophagus Updated:  04/19/19 1633    Blood Culture - Blood, Arm, Right [[id number]] Collected:  04/17/19 1200    Specimen:  Blood from Arm, Right Updated:  04/19/19 1315     Blood Culture No growth at 2 days    Blood Culture - Blood, Arm, Left [[id number]] Collected:  04/17/19 1221    Specimen:  Blood from Arm, Left Updated:  04/19/19 1315     Blood Culture No growth at 2 days    Basic Metabolic Panel [[id number]]  (Abnormal) Collected:  04/19/19 0346    Specimen:  Blood Updated:  04/19/19 0423     Glucose 82 mg/dL      BUN 9 mg/dL      Creatinine 0.83 mg/dL      Sodium 140 mmol/L      Potassium 3.6 mmol/L      Chloride 102 mmol/L      CO2 24.7 mmol/L      Calcium 7.5 mg/dL      eGFR Non African Amer 66 mL/min/1.73      BUN/Creatinine Ratio 10.8     Anion Gap 13.3 mmol/L     Narrative:       GFR Normal >60  Chronic Kidney Disease <60  Kidney Failure <15    Phosphorus [[id number]]  (Normal) Collected:  04/19/19 0346    Specimen:  Blood Updated:  04/19/19 0423     Phosphorus 2.7 mg/dL     CBC & Differential [[id number]] Collected:   04/19/19 0346    Specimen:  Blood Updated:  04/19/19 0404    Narrative:       The following orders were created for panel order CBC & Differential.  Procedure                               Abnormality         Status                     ---------                               -----------         ------                     CBC Auto Differential[[id number]]        Abnormal            Final result                 Please view results for these tests on the individual orders.    CBC Auto Differential [[id number]]  (Abnormal) Collected:  04/19/19 0346    Specimen:  Blood Updated:  04/19/19 0404     WBC 4.06 10*3/mm3      RBC 2.98 10*6/mm3      Hemoglobin 8.0 g/dL      Hematocrit 26.4 %      MCV 88.6 fL      MCH 26.8 pg      MCHC 30.3 g/dL      RDW 16.8 %      RDW-SD 52.6 fl      MPV 9.1 fL      Platelets 258 10*3/mm3      Neutrophil % 69.5 %      Lymphocyte % 18.7 %      Monocyte % 9.6 %      Eosinophil % 1.5 %      Basophil % 0.5 %      Immature Grans % 0.2 %      Neutrophils, Absolute 2.82 10*3/mm3      Lymphocytes, Absolute 0.76 10*3/mm3      Monocytes, Absolute 0.39 10*3/mm3      Eosinophils, Absolute 0.06 10*3/mm3      Basophils, Absolute 0.02 10*3/mm3      Immature Grans, Absolute 0.01 10*3/mm3         Orders (last 24 hrs)     Start     Ordered    04/20/19 0000  apixaban (ELIQUIS) 2.5 MG tablet tablet  Every 12 Hours Scheduled      04/19/19 1747    04/19/19 2100  sodium chloride 0.9 % flush 3 mL  Every 12 Hours Scheduled,   Status:  Discontinued      04/19/19 1529    04/19/19 2100  sodium chloride 0.9 % flush 3 mL  Every 12 Hours Scheduled,   Status:  Discontinued      04/19/19 1510    04/19/19 1745  Discontinue IV  Once,   Status:  Canceled      04/19/19 1747    04/19/19 1743  Discharge patient  Once      04/19/19 1747    04/19/19 1710  Advance Diet As Tolerated  Until Discontinued,   Status:  Canceled      04/19/19 1709    04/19/19 1633  Vital signs every 5 minutes for 15 minutes, every 15 minutes thereafter.  Once,    Status:  Canceled      04/19/19 1632    04/19/19 1633  Call Anesthesiologist for additional IV Fluid bolus for Hypotension/Tachycardia  Until Discontinued,   Status:  Canceled      04/19/19 1632    04/19/19 1633  Notify Anesthesia of Any Acute Changes in Patient Condition  Until Discontinued,   Status:  Canceled      04/19/19 1632    04/19/19 1633  Notify Anesthesia for Unrelieved Pain  Until Discontinued,   Status:  Canceled      04/19/19 1632    04/19/19 1633  Oxygen Therapy- Nasal Cannula; Titrate for SPO2: equal to or greater than, 96%, per policy  Continuous,   Status:  Canceled      04/19/19 1632    04/19/19 1633  Pulse Oximetry, Continuous  Continuous,   Status:  Canceled      04/19/19 1632    04/19/19 1633  POC Glucose STAT  STAT,   Status:  Canceled     Comments:  Notify Anesthesia if blood sugar is less than 80 mg/dL or greater than 180mg/dL      04/19/19 1632    04/19/19 1633  Once DC criteria to floor met, follow surgeon's orders.  Until Discontinued,   Status:  Canceled      04/19/19 1632    04/19/19 1633  Discharge patient from PACU when discharge criteria is met.  Until Discontinued,   Status:  Canceled      04/19/19 1632    04/19/19 1632  oxyCODONE-acetaminophen (PERCOCET) 5-325 MG per tablet 1 tablet  Once As Needed,   Status:  Discontinued      04/19/19 1632    04/19/19 1632  fentaNYL citrate (PF) (SUBLIMAZE) injection 50 mcg  Every 5 Minutes PRN,   Status:  Discontinued      04/19/19 1632    04/19/19 1632  ipratropium-albuterol (DUO-NEB) nebulizer solution 3 mL  Once As Needed,   Status:  Discontinued      04/19/19 1632    04/19/19 1632  ondansetron (ZOFRAN) injection 4 mg  Once As Needed,   Status:  Discontinued      04/19/19 1632    04/19/19 1632  meperidine (DEMEROL) injection 12.5 mg  Every 5 Minutes PRN,   Status:  Discontinued      04/19/19 1632    04/19/19 1600  lactated ringers infusion  Continuous,   Status:  Discontinued      04/19/19 1529    04/19/19 1600  lactated ringers infusion   Continuous,   Status:  Discontinued      04/19/19 1510    04/19/19 1549  Auto Discontinue in 48 Hours if not Collected  ONCE CDIFF      04/17/19 1549    04/19/19 1548  Tissue Pathology Exam      04/19/19 1548    04/19/19 1530  Oxygen Therapy- Nasal Cannula; Titrate for SPO2: equal to or greater than, 90%  Continuous,   Status:  Canceled      04/19/19 1529    04/19/19 1530  POC Glucose Once  Once,   Status:  Canceled     Comments:  For all diabetic patients and all patients who are to be admitted. Notify Anesthesiologist for blood sugar > 180.      04/19/19 1529    04/19/19 1530  Insert Peripheral IV  Once,   Status:  Canceled      04/19/19 1529    04/19/19 1530  Saline Lock & Maintain IV Access  Continuous,   Status:  Canceled      04/19/19 1529    04/19/19 1530  May take Beta Blocker from home with sip of water.  Once,   Status:  Canceled      04/19/19 1529    04/19/19 1529  Vital Signs - Per Anesthesia Protocol  As Needed,   Status:  Canceled      04/19/19 1529    04/19/19 1529  Pulse Oximetry, Continuous  As Needed,   Status:  Canceled      04/19/19 1529    04/19/19 1529  sodium chloride 0.9 % flush 3-10 mL  As Needed,   Status:  Discontinued      04/19/19 1529    04/19/19 1511  POC Glucose Once  Once,   Status:  Canceled     Comments:  For all diabetic patients and all patients who are to be admitted. Notify Anesthesiologist for blood sugar > 180.      04/19/19 1510    04/19/19 1511  Insert Peripheral IV  Once,   Status:  Canceled      04/19/19 1510    04/19/19 1511  Saline Lock & Maintain IV Access  Continuous,   Status:  Canceled      04/19/19 1510    04/19/19 1511  May take Beta Blocker from home with sip of water.  Once,   Status:  Canceled      04/19/19 1510    04/19/19 1510  Vital Signs - Per Anesthesia Protocol  As Needed,   Status:  Canceled      04/19/19 1510    04/19/19 1510  Pulse Oximetry, Continuous  As Needed,   Status:  Canceled      04/19/19 1510    04/19/19 1510  sodium chloride 0.9 % flush 3-10  mL  As Needed,   Status:  Discontinued      04/19/19 1510    04/19/19 0400  Extravasation Protocol - Encourage Active Range of Motion After 48 Hours  As Needed,   Status:  Canceled      04/17/19 1800    04/19/19 0000  cilostazol (PLETAL) 100 MG tablet  2 Times Daily      04/19/19 1747    04/19/19 0000  Discharge Follow-up with PCP      04/19/19 1747    04/19/19 0000  Referral to Home Health      04/19/19 1747 04/19/19 0000  Diet: Regular      04/19/19 1747 04/19/19 0000  Resume Oxygen Therapy After Discharge      04/19/19 1747 04/19/19 0000  ferrous sulfate (IRON SUPPLEMENT) 325 (65 FE) MG tablet  Daily With Breakfast      04/19/19 1747 04/19/19 0000  pantoprazole (PROTONIX) 40 MG EC tablet  Daily,   Status:  Discontinued      04/19/19 1756 04/18/19 2100  atorvastatin (LIPITOR) tablet 40 mg  Nightly,   Status:  Discontinued      04/18/19 1018    04/18/19 0100  ipratropium (ATROVENT) nebulizer solution 0.5 mg  Every 6 Hours - RT,   Status:  Discontinued      04/17/19 2003 04/17/19 2100  sodium chloride 0.9 % flush 3 mL  Every 12 Hours Scheduled,   Status:  Discontinued      04/17/19 1758 04/17/19 1900  potassium chloride (K-DUR,KLOR-CON) ER tablet 40 mEq  As Needed,   Status:  Discontinued      04/17/19 1758 04/17/19 1900  potassium chloride (KLOR-CON) packet 40 mEq  As Needed,   Status:  Discontinued      04/17/19 1758 04/17/19 1900  potassium chloride 10 mEq in 100 mL IVPB  Every 1 Hour PRN,   Status:  Discontinued      04/17/19 1758 04/17/19 1900  Magnesium Sulfate 2 gram Bolus, followed by 8 gram infusion (total Mg dose 10 grams)- Mg less than or equal to 1mg/dL  As Needed,   Status:  Discontinued      04/17/19 1758 04/17/19 1900  Magnesium Sulfate 2 gram / 50mL Infusion (GIVE X 3 BAGS TO EQUAL 6GM TOTAL DOSE) - Mg 1.1 - 1.5 mg/dl  As Needed,   Status:  Discontinued      04/17/19 1758 04/17/19 1900  Magnesium Sulfate 4 gram infusion- Mg 1.6-1.9 mg/dL  As Needed,   Status:   Discontinued      04/17/19 1758    04/17/19 1900  potassium & sodium phosphates (PHOS-NAK) 280-160-250 MG packet - for Phosphorus less than 1.25 mg/dL  Every 6 Hours PRN,   Status:  Discontinued      04/17/19 1804 04/17/19 1900  potassium & sodium phosphates (PHOS-NAK) 280-160-250 MG packet - for Phosphorus 1.25 - 2.5 mg/dL  Once As Needed,   Status:  Discontinued      04/17/19 1804 04/17/19 1803  Magnesium  As Needed,   Status:  Canceled      04/17/19 1804 04/17/19 1803  Potassium  As Needed,   Status:  Canceled      04/17/19 1804 04/17/19 1800  nitroglycerin (NITROSTAT) SL tablet 0.4 mg  Every 5 Minutes PRN,   Status:  Discontinued      04/17/19 1758 04/17/19 1758  sodium chloride 0.9 % flush 1-10 mL  As Needed,   Status:  Discontinued      04/17/19 1758 04/17/19 1758  Potassium  As Needed,   Status:  Canceled     Comments:  For Ventricular Arrhythmias      04/17/19 1758    04/17/19 1758  Magnesium  As Needed,   Status:  Canceled     Comments:  For Ventricular Arrhythmias      04/17/19 1758    04/17/19 1758  Troponin  As Needed,   Status:  Canceled     Comments:  For Chest Pain      04/17/19 1758    04/17/19 1757  ECG 12 Lead  As Needed,   Status:  Canceled     Comments:  Nurse to Release if Patient Expericences Acute Chest Pain or Dysrhythmias    04/17/19 1758    04/17/19 1757  Digoxin Level  As Needed,   Status:  Canceled     Comments:  For Atrial Arrhythmias      04/17/19 1758    04/17/19 1757  Blood Gas, Arterial  As Needed,   Status:  Canceled     Comments:  Per O2 PolicyNotify Physician      04/17/19 1758    04/17/19 1757  Magnesium  As Needed,   Status:  Canceled      04/17/19 1758    04/17/19 1757  Potassium  As Needed,   Status:  Canceled      04/17/19 1758    04/17/19 1437  Potassium  As Needed,   Status:  Canceled      04/17/19 1437    04/17/19 1151  sodium chloride 0.9 % flush 10 mL  As Needed,   Status:  Discontinued      04/17/19 1151    --  furosemide (LASIX) 40 MG tablet  Daily,    Status:  Discontinued      04/17/19 1852    --  hydrALAZINE (APRESOLINE) 25 MG tablet  2 Times Daily      04/17/19 1852    --  potassium chloride (K-DUR,KLOR-CON) 20 MEQ CR tablet  Daily,   Status:  Discontinued      04/17/19 1852    --  SCANNED - TELEMETRY        04/17/19 0000    --  SCANNED - TELEMETRY        04/17/19 0000    --  apixaban (ELIQUIS) 2.5 MG tablet tablet  2 Times Daily,   Status:  Discontinued      04/19/19 1734             Operative/Procedure Notes (last 24 hours) (Notes from 4/19/2019  8:14 AM through 4/20/2019  8:14 AM)      Chris Zimmerman MD at 4/19/2019  3:46 PM        COLONOSCOPY, ESOPHAGOGASTRODUODENOSCOPY  Procedure Note    Rupinder HO Nabeel  4/17/2019 - 4/19/2019    Pre-op Diagnosis:   Occult blood positive stool [R19.5]  Iron deficiency anemia due to chronic blood loss [D50.0]    Post-op Diagnosis:   GERD, hiatal hernia, esophagitis    Indications: see above    Procedure(s):  COLONOSCOPY  ESOPHAGOGASTRODUODENOSCOPY    Surgeon(s):  Chris Zimmerman MD    Anesthesia: Choice    Staff:   Circulator: Ness Red RN  Endo Technician: Compa Sabillon    Findings: normal colon, hiatal hernia with GE junction signs of GERD and esophagitis without bleeding    Operative Procedure: The patient was taken operating suite and placed in left lateral decubitus position.  Bilateral sequential compression devices were in place and IV anesthesia was administered.  Timeout procedure was performed.  The endoscope was inserted into the oropharynx and the esophagus was intubated.  The scope was advanced to the third portion of the duodenum.  The scope was slowly withdrawn evaluating all mucosal areas.  The patient had a normal stomach and duodenum.  There was a small hiatal hernia.  No ulcer or mass.  GE jnction showed signs of GERD and reflux esophagitis without bleeding.  Biopsy of the GE junction were obtained.  The remainder of the esophageal mucosa was within normal limits. The endoscope was  removed.  Digital rectal examination was negative.  The colonoscope was inserted and advanced to the cecum as evidenced by the ileocecal valve and appendiceal orifice.  The bowel prep was excellent.  The colonoscope was slowly removed and the entirety of the colonic mucosa was evaluated.  Colonoscopic findings included normal colon mucosa throughout.  The colonoscope was then removed and the patient was awakened from anesthesia and taken recovery.  They tolerated the procedure well.    Estimated Blood Loss: minimal    Specimens:   GE junction biopsy                 Drains: none    Grafts or Implants: none    Complications: none    Recommendations: advance diet as tolerated.  PPI    Chris Zimmerman MD     Electronically signed by Chris Zimmerman MD at 4/19/2019  4:23 PM          Physician Progress Notes (last 24 hours) (Notes from 4/19/2019  8:14 AM through 4/20/2019  8:14 AM)      Myles Santos MD at 4/19/2019 10:16 AM            Assisted By: Amy LOGAN    CC: F/U Anemia    Interview History/HPI: Patient was able to take her prep, no complaints she is did take three fourths of the prep, she thinks what is coming out is mostly clear.  No abdominal pain chest pain or shortness of breath.  She would like to get this done and go home later today.      Vitals:    04/19/19 0804   BP: 156/81   Pulse: 78   Resp: 18   Temp:    SpO2: 94%       Intake/Output Summary (Last 24 hours) at 4/19/2019 1016  Last data filed at 4/19/2019 0500  Gross per 24 hour   Intake 610 ml   Output 200 ml   Net 410 ml       EXAM: 99.2 temperature, lungs bilateral breath sounds are clear without rhonchi rales or wheezing, heart regular rate and rhythm without murmur gallop, abdomen is soft benign      Tele: Paced, reviewed    LABS:   Lab Results (last 48 hours)     Procedure Component Value Units Date/Time    Basic Metabolic Panel [[id number]]  (Abnormal) Collected:  04/19/19 0346    Specimen:  Blood Updated:  04/19/19 0423     Glucose 82  mg/dL      BUN 9 mg/dL      Creatinine 0.83 mg/dL      Sodium 140 mmol/L      Potassium 3.6 mmol/L      Chloride 102 mmol/L      CO2 24.7 mmol/L      Calcium 7.5 mg/dL      eGFR Non African Amer 66 mL/min/1.73      BUN/Creatinine Ratio 10.8     Anion Gap 13.3 mmol/L     Narrative:       GFR Normal >60  Chronic Kidney Disease <60  Kidney Failure <15    Phosphorus [[id number]]  (Normal) Collected:  04/19/19 0346    Specimen:  Blood Updated:  04/19/19 0423     Phosphorus 2.7 mg/dL     CBC & Differential [[id number]] Collected:  04/19/19 0346    Specimen:  Blood Updated:  04/19/19 0404    Narrative:       The following orders were created for panel order CBC & Differential.  Procedure                               Abnormality         Status                     ---------                               -----------         ------                     CBC Auto Differential[[id number]]        Abnormal            Final result                 Please view results for these tests on the individual orders.    CBC Auto Differential [[id number]]  (Abnormal) Collected:  04/19/19 0346    Specimen:  Blood Updated:  04/19/19 0404     WBC 4.06 10*3/mm3      RBC 2.98 10*6/mm3      Hemoglobin 8.0 g/dL      Hematocrit 26.4 %      MCV 88.6 fL      MCH 26.8 pg      MCHC 30.3 g/dL      RDW 16.8 %      RDW-SD 52.6 fl      MPV 9.1 fL      Platelets 258 10*3/mm3      Neutrophil % 69.5 %      Lymphocyte % 18.7 %      Monocyte % 9.6 %      Eosinophil % 1.5 %      Basophil % 0.5 %      Immature Grans % 0.2 %      Neutrophils, Absolute 2.82 10*3/mm3      Lymphocytes, Absolute 0.76 10*3/mm3      Monocytes, Absolute 0.39 10*3/mm3      Eosinophils, Absolute 0.06 10*3/mm3      Basophils, Absolute 0.02 10*3/mm3      Immature Grans, Absolute 0.01 10*3/mm3     Blood Culture - Blood, Arm, Right [[id number]] Collected:  04/17/19 1200    Specimen:  Blood from Arm, Right Updated:  04/18/19 1315     Blood Culture No growth at 24 hours    Blood Culture - Blood, Arm,  Left [[id number]] Collected:  04/17/19 1221    Specimen:  Blood from Arm, Left Updated:  04/18/19 1315     Blood Culture No growth at 24 hours    Magnesium [[id number]]  (Abnormal) Collected:  04/18/19 0854    Specimen:  Blood Updated:  04/18/19 0949     Magnesium 3.0 mg/dL     Phosphorus [[id number]]  (Abnormal) Collected:  04/18/19 0854    Specimen:  Blood Updated:  04/18/19 0949     Phosphorus 2.2 mg/dL     Basic Metabolic Panel [[id number]]  (Abnormal) Collected:  04/18/19 0326    Specimen:  Blood Updated:  04/18/19 0413     Glucose 92 mg/dL      BUN 9 mg/dL      Creatinine 0.84 mg/dL      Sodium 140 mmol/L      Potassium 4.1 mmol/L      Chloride 101 mmol/L      CO2 28.9 mmol/L      Calcium 8.1 mg/dL      eGFR Non African Amer 65 mL/min/1.73      BUN/Creatinine Ratio 10.7     Anion Gap 10.1 mmol/L     Narrative:       GFR Normal >60  Chronic Kidney Disease <60  Kidney Failure <15    C-reactive Protein [[id number]]  (Abnormal) Collected:  04/18/19 0326    Specimen:  Blood Updated:  04/18/19 0404     C-Reactive Protein 6.44 mg/dL     CBC & Differential [[id number]] Collected:  04/18/19 0326    Specimen:  Blood Updated:  04/18/19 0343    Narrative:       The following orders were created for panel order CBC & Differential.  Procedure                               Abnormality         Status                     ---------                               -----------         ------                     CBC Auto Differential[[id number]]        Abnormal            Final result                 Please view results for these tests on the individual orders.    CBC Auto Differential [[id number]]  (Abnormal) Collected:  04/18/19 0326    Specimen:  Blood Updated:  04/18/19 0343     WBC 3.66 10*3/mm3      RBC 2.93 10*6/mm3      Hemoglobin 8.0 g/dL      Hematocrit 25.7 %      MCV 87.7 fL      MCH 27.3 pg      MCHC 31.1 g/dL      RDW 16.9 %      RDW-SD 52.4 fl      MPV 9.3 fL      Platelets 210 10*3/mm3      Neutrophil % 68.6 %       "Lymphocyte % 20.8 %      Monocyte % 8.7 %      Eosinophil % 1.4 %      Basophil % 0.5 %      Immature Grans % 0.0 %      Neutrophils, Absolute 2.51 10*3/mm3      Lymphocytes, Absolute 0.76 10*3/mm3      Monocytes, Absolute 0.32 10*3/mm3      Eosinophils, Absolute 0.05 10*3/mm3      Basophils, Absolute 0.02 10*3/mm3      Immature Grans, Absolute 0.00 10*3/mm3     Procalcitonin [[id number]]  (Abnormal) Collected:  04/17/19 1221    Specimen:  Blood Updated:  04/17/19 2312     Procalcitonin 0.06 ng/mL     Narrative:       As a Marker for Sepsis (Non-Neonates):   1. <0.5 ng/mL represents a low risk of severe sepsis and/or septic shock.  1. >2 ng/mL represents a high risk of severe sepsis and/or septic shock.    As a Marker for Lower Respiratory Tract Infections that require antibiotic therapy:  PCT on Admission     Antibiotic Therapy             6-12 Hrs later  > 0.5                Strongly Recommended            >0.25 - <0.5         Recommended  0.1 - 0.25           Discouraged                   Remeasure/reassess PCT  <0.1                 Strongly Discouraged          Remeasure/reassess PCT      As 28 day mortality risk marker: \"Change in Procalcitonin Result\" (> 80 % or <=80 %) if Day 0 (or Day 1) and Day 4 values are available. Refer to http://www.[employer]-pct-calculator.com/   Change in PCT <=80 %   A decrease of PCT levels below or equal to 80 % defines a positive change in PCT test result representing a higher risk for 28-day all-cause mortality of patients diagnosed with severe sepsis or septic shock.  Change in PCT > 80 %   A decrease of PCT levels of more than 80 % defines a negative change in PCT result representing a lower risk for 28-day all-cause mortality of patients diagnosed with severe sepsis or septic shock.                POC Occult Blood Stool [[id number]]  (Abnormal) Collected:  04/17/19 1528    Specimen:  Stool Updated:  04/17/19 1530     Fecal Occult Blood Positive     Lot Number [id number]     Expiration " Date 11-20     DEVELOPER LOT NUMBER [id number]     DEVELOPER EXPIRATION DATE 7-2,021     Positive Control Positive     Negative Control Negative    [place] Draw [[id number]] Collected:  04/17/19 1221    Specimen:  Blood Updated:  04/17/19 1330    Narrative:       The following orders were created for panel order [place] Draw.  Procedure                               Abnormality         Status                     ---------                               -----------         ------                     Light Blue Top[[id number]]                                   Final result               Green Top (Gel)[[id number]]                                  Final result               Lavender Top[[id number]]                                     Final result               Gold Top - SST[[id number]]                                                                Please view results for these tests on the individual orders.    Light Blue Top [[id number]] Collected:  04/17/19 1221    Specimen:  Blood Updated:  04/17/19 1330     Extra Tube hold for add-on     Comment: Auto resulted       Green Top (Gel) [[id number]] Collected:  04/17/19 1221    Specimen:  Blood Updated:  04/17/19 1330     Extra Tube Hold for add-ons.     Comment: Auto resulted.       Phosphorus [[id number]]  (Abnormal) Collected:  04/17/19 1221    Specimen:  Blood Updated:  04/17/19 1313     Phosphorus 1.6 mg/dL     Calcium, Ionized [[id number]]  (Abnormal) Collected:  04/17/19 1221    Specimen:  Blood Updated:  04/17/19 1310     Ionized Calcium 4.47 mg/dL     Comprehensive Metabolic Panel [[id number]]  (Abnormal) Collected:  04/17/19 1221    Specimen:  Blood Updated:  04/17/19 1304     Glucose 118 mg/dL      BUN 10 mg/dL      Creatinine 0.96 mg/dL      Sodium 139 mmol/L      Potassium 3.0 mmol/L      Chloride 99 mmol/L      CO2 28.0 mmol/L      Calcium 8.5 mg/dL      Total Protein 6.1 g/dL      Albumin 3.08 g/dL      ALT (SGPT) 22 U/L      AST (SGOT) 18 U/L      Alkaline Phosphatase  72 U/L      Total Bilirubin 0.3 mg/dL      eGFR Non African Amer 56 mL/min/1.73      Globulin 3.0 gm/dL      A/G Ratio 1.0 g/dL      BUN/Creatinine Ratio 10.4     Anion Gap 12.0 mmol/L     Narrative:       GFR Normal >60  Chronic Kidney Disease <60  Kidney Failure <15    Magnesium [[id number]]  (Normal) Collected:  04/17/19 1221    Specimen:  Blood Updated:  04/17/19 1300     Magnesium 1.6 mg/dL     C-reactive Protein [[id number]]  (Abnormal) Collected:  04/17/19 1221    Specimen:  Blood Updated:  04/17/19 1300     C-Reactive Protein 7.10 mg/dL     Lavender Top [[id number]] Collected:  04/17/19 1221    Specimen:  Blood Updated:  04/17/19 1256     Extra Tube hold for add-on     Comment: Auto resulted       Lactic Acid, Plasma [[id number]]  (Normal) Collected:  04/17/19 1221    Specimen:  Blood Updated:  04/17/19 1255     Lactate 1.9 mmol/L     Influenza Antigen, Rapid - Swab, Nasopharynx [[id number]]  (Normal) Collected:  04/17/19 1229    Specimen:  Swab from Nasopharynx Updated:  04/17/19 1253     Influenza A Ag, EIA Negative     Influenza B Ag, EIA Negative    Protime-INR [[id number]]  (Abnormal) Collected:  04/17/19 1221    Specimen:  Blood Updated:  04/17/19 1251     Protime 16.4 Seconds      INR 1.29    Narrative:       Suggested INR therapeutic range for stable oral anticoagulant therapy:    Low Intensity therapy:   1.5-2.0  Moderate Intensity therapy:   2.0-3.0  High Intensity therapy:   2.5-4.0    aPTT [[id number]]  (Normal) Collected:  04/17/19 1221    Specimen:  Blood Updated:  04/17/19 1251     PTT 32.6 seconds     Narrative:       PTT Heparin Therapeutic Range:  59 - 95 seconds    CBC & Differential [[id number]] Collected:  04/17/19 1221    Specimen:  Blood Updated:  04/17/19 1249    Narrative:       The following orders were created for panel order CBC & Differential.  Procedure                               Abnormality         Status                     ---------                               -----------          ------                     CBC Auto Differential[[id number]]        Abnormal            Final result                 Please view results for these tests on the individual orders.    CBC Auto Differential [[id number]]  (Abnormal) Collected:  04/17/19 1221    Specimen:  Blood Updated:  04/17/19 1249     WBC 4.26 10*3/mm3      RBC 2.47 10*6/mm3      Hemoglobin 6.6 g/dL      Hematocrit 22.3 %      MCV 90.3 fL      MCH 26.7 pg      MCHC 29.6 g/dL      RDW 16.7 %      RDW-SD 53.3 fl      MPV 9.7 fL      Platelets 229 10*3/mm3      Neutrophil % 74.5 %      Lymphocyte % 17.4 %      Monocyte % 7.0 %      Eosinophil % 0.7 %      Basophil % 0.2 %      Immature Grans % 0.2 %      Neutrophils, Absolute 3.17 10*3/mm3      Lymphocytes, Absolute 0.74 10*3/mm3      Monocytes, Absolute 0.30 10*3/mm3      Eosinophils, Absolute 0.03 10*3/mm3      Basophils, Absolute 0.01 10*3/mm3      Immature Grans, Absolute 0.01 10*3/mm3     Narrative:       Verified by Repeat Analysis    Urinalysis With Culture If Indicated - Urine, Clean Catch [[id number]]  (Normal) Collected:  04/17/19 1230    Specimen:  Urine, Clean Catch Updated:  04/17/19 1244     Color, UA Yellow     Appearance, UA Clear     pH, UA 7.0     Specific Gravity, UA 1.009     Glucose, UA Negative     Ketones, UA Negative     Bilirubin, UA Negative     Blood, UA Negative     Protein, UA Negative     Leuk Esterase, UA Negative     Nitrite, UA Negative     Urobilinogen, UA 0.2 E.U./dL    Narrative:       Urine microscopic not indicated.    Blood Gas, Arterial [[id number]]  (Abnormal) Collected:  04/17/19 1206    Specimen:  Arterial Blood Updated:  04/17/19 1211     Site Arterial: right radial     Isrrael's Test Positive     pH, Arterial 7.525 pH units      pCO2, Arterial 37.6 mm Hg      pO2, Arterial 58.3 mm Hg      HCO3, Arterial 30.4 mmol/L      Base Excess, Arterial 7.0 mmol/L      O2 Saturation, Arterial 91.0 %      Hemoglobin, Blood Gas 7.1 g/dL      Hematocrit, Blood Gas 21.0 %       Oxyhemoglobin 89.1 %      Methemoglobin 0.40 %      Carboxyhemoglobin 1.7 %      A-a Gradiant 40.1 mmHg      Temperature 98.6 C      Barometric Pressure for Blood Gas 730 mmHg      Modality Room Air     FIO2 21 %           Radiology:  Imaging Results (last 72 hours)     Procedure Component Value Units Date/Time    CT Abdomen Pelvis Without Contrast [[id number]] Collected:  04/18/19 0910     Updated:  04/18/19 0915    Narrative:       EXAM: CT ABDOMEN PELVIS WO CONTRAST-            TECHNIQUE: Multiple axial CT images were obtained from lung bases  through pubic symphysis WITHOUT administration of IV contrast.  Reformatted images in the coronal and/or sagittal plane(s) were  generated from the axial data set to facilitate diagnostic accuracy  and/or surgical planning.  Oral Contrast:NONE.     Radiation dose reduction techniques were utilized per ALARA protocol.  Automated exposure control was initiated through either or Catarizm or  DoseRight software packages by  protocol.       DOSE: 484.75 mGy.cm     Clinical information  Nausea, vomiting, diarrhea; D64.9-Anemia, unspecified; R53.1-Weakness;  E83.39-Other disorders of phosphorus metabolism; I95.9-Hypotension,  unspecified      Comparison  Comparison: 03/04/2019     Findings  LOWER THORAX: BILATERAL PLEURAL EFFUSIONS WITH BIBASILAR AIRSPACE  DISEASE AND CARDIOMEGALY.     ABDOMEN:        LIVER: Homogeneous. No focal hepatic mass or ductal dilatation.        GALLBLADDER: PRIOR CHOLECYSTECTOMY.        PANCREAS: Unremarkable. No mass or ductal dilatation.        SPLEEN: Homogeneous. No splenomegaly.        ADRENALS: No mass.        KIDNEYS/URETERS: No mass. No obstructive uropathy.  No evidence of  urolithiasis.        GI TRACT: AIR-FLUID LEVELS THROUGHOUT THE COLON AND PORTIONS OF SMALL  BOWEL MOST CONSISTENT WITH ENTERITIS OR ENTEROCOLITIS BOWEL GAS PATTERN.  GASTRIC WALL THICKENING IN PART RELATED TO INCOMPLETE DISTENTION BUT  ALSO LIKELY ASSOCIATED WITH  GASTRITIS. NO BOWEL OBSTRUCTION IDENTIFIED.  STOOL RETENTION IN THE DISTAL COLON MOST COMPATIBLE WITH CONSTIPATION.        PERITONEUM: No free air. No free fluid or loculated fluid  collections.        MESENTERY: Unremarkable.        LYMPH NODES: No lymphadenopathy.        VASCULATURE: MODERATE ATHEROSCLEROSIS IS STABLE.        ABDOMINAL WALL: No focal hernia or mass.           OTHER: None.     PELVIS:        BLADDER: No focal mass or significant wall thickening        REPRODUCTIVE: HYSTERECTOMY.        APPENDIX: Nondistended. No surrounding inflammation.     BONES: STABLE DEGENERATIVE CHANGES LUMBAR SPINE.       Impression:       Impression:  1. Gastroenteritis/enteritis bowel gas pattern. No perforation or  obstruction.  2. Diverticulosis without diverticulitis.  3. Probable changes of CHF with small pleural effusions. Otherwise  stable exam.     This report was finalized on 4/18/2019 9:12 AM by Dr. Pedrito Alejandre MD.       XR Chest 1 View [[id number]] Collected:  04/17/19 1246     Updated:  04/17/19 1248    Narrative:       EXAMINATION: XR CHEST 1 VW-      CLINICAL INDICATION:     Severe Sepsis Protocol     TECHNIQUE:  XR CHEST 1 VW-      COMPARISON: 04/16/2019      FINDINGS:      CHF improved from the previous exam. No consolidation identified.   Mild cardiac enlargement noted. Pulmonary vascularity appears within  normal limits. Stable appearance of left pacer device.   No pneumothorax.   No effusions.   No acute osseous findings.          Impression:       Improvement in CHF. Otherwise stable chest.     This report was finalized on 4/17/2019 12:46 PM by Dr. Pedrito Alejandre MD.             Assessment/Plan:   Anemia, transfused today, hemoglobin is stable doubt significant bleed, we do need to consider restarting anticoagulation therefore, scopes are to be done today, will discuss with  timing of restarting anticoagulation.    Atrial fibrillation, now paced    DVT prophylaxis, SCUDs    Low phosphorus,  improved    Electronically signed by Myles Santos MD at 2019 10:21 AM       Consult Notes (last 24 hours) (Notes from 2019  8:14 AM through 2019  8:14 AM)     No notes of this type exist for this encounter.           Physical Therapy Notes (last 24 hours) (Notes from 2019  8:14 AM through 2019  8:14 AM)      Laurie Savage, PT at 2019 10:01 AM  Version 1 of 1         Acute Care - Physical Therapy Initial Evaluation/Treatment Note  [facility]     Patient Name: Rupinder Lees  : 1935  MRN: [id number]  Today's Date: 2019   Onset of Illness/Injury or Date of Surgery: 19  Date of Referral to PT: 19  Referring Physician: Danielle      Admit Date: 2019    Visit Dx:     ICD-10-CM ICD-9-CM   1. Occult blood positive stool R19.5 792.1   2. Anemia, unspecified type D64.9 285.9   3. Weakness R53.1 780.79   4. Hypophosphatemia E83.39 275.3   5. Hypotension, unspecified hypotension type I95.9 458.9   6. Iron deficiency anemia due to chronic blood loss D50.0 280.0     Patient Active Problem List   Diagnosis   • Complete heart block S/P transvenous pacemaker placement at [facility] on    • PAD    • HTN   • History of subacute left MCA distribution ischemic CVA   • COPD    • Tobacco use   • History of noncompliance with medical treatment   • Acute kidney injury Cr 1.72 on admission    • Hyperlipidemia   • NSTEMI    • CAD with occluded LAD and RCA per [facility] 18   • Right ischemic leg   • Popliteal thrombosis (CMS/HCC)   • Pneumonia of left lower lobe due to infectious organism (CMS/HCC)   • Anemia   • Hypokalemia   • Hypophosphatemia   • Occult blood positive stool   • Iron deficiency anemia due to chronic blood loss     Past Medical History:   Diagnosis Date   • COPD  2018   • DVT of lower extremity (deep venous thrombosis) (CMS/HCC)    • History of CVA 2013 2018   • HTN 2018   • PAD  2018   • Tobacco use 2018     Past Surgical History:    Procedure Laterality Date   • CARDIAC CATHETERIZATION  12/17/2018    Procedure: Left Heart Cath;  Surgeon: Imtiaz Fuentes MD;  Location:  [facility]ATH [facility];  Service: Cardiology   • CARDIAC CATHETERIZATION N/A 12/17/2018    Procedure: Thrombolysis-peripheral;  Surgeon: Imtiaz Fuentes MD;  Location:  [facility]ATH [facility];  Service: Cardiology   • CARDIAC ELECTROPHYSIOLOGY PROCEDURE N/A 12/18/2018    Procedure: PACEMAKER IMPLANTATION- DC;  Surgeon: Thony Bobby MD;  Location:  [facility];  Service: Cardiology   • CARDIAC ELECTROPHYSIOLOGY PROCEDURE N/A 12/17/2018    Procedure: Temporary Pacemaker;  Surgeon: Imtiaz Fuentes MD;  Location:  [facility] CATH [facility];  Service: Cardiology   • FEMORAL ARTERY STENT  2013   • FEMORAL POPLITEAL BYPASS Right 12/17/2018    Procedure: LOWER EXTREMITY EMBELECTOMY RIGHT;  Surgeon: David Beatty MD;  Location:  [facility] HYBRID OR 15;  Service: Vascular   • HYSTERECTOMY     • LEFT HEART CATH  12/17/2018    At [facility] with TV PM placement    • PACEMAKER IMPLANTATION          PT ASSESSMENT (last 12 hours)      Physical Therapy Evaluation     Row Name 04/19/19 0944          PT Evaluation Time/Intention    Subjective Information  no complaints  -CT     Document Type  evaluation;therapy note (daily note)  -CT     Mode of Treatment  individual therapy;physical therapy  -CT     Patient Effort  good  -CT     Comment  Pt tolerated evaluation and treatment session well with rest breaks provided as needed. Pt is CGA/SUP for all mobility.   -CT     Row Name 04/19/19 0944          General Information    Patient Profile Reviewed?  yes  -CT     Onset of Illness/Injury or Date of Surgery  04/17/19  -CT     Referring Physician  Isas  -CT     Patient Observations  alert;cooperative;agree to therapy  -CT     Prior Level of Function  independent:  -CT     Equipment Currently Used at Home  none has sons RW at home that  she can use   -CT     Existing Precautions/Restrictions  fall  -CT     Equipment Issued to Patient  gait belt waffle cushion  -CT     Risks Reviewed  patient:;LOB;nausea/vomiting;dizziness;increased discomfort;change in vital signs;increased drainage;lines disloged  -CT     Benefits Reviewed  patient:;improve function;increase independence;increase balance;increase strength;decrease pain;decrease risk of DVT;improve skin integrity;increase knowledge  -CT     Barriers to Rehab  medically complex  -CT     Row Name 04/19/19 0921          Relationship/Environment    Primary Source of Support/Comfort  child(cheikh)  -CT     Lives With  alone  -CT     Family Caregiver if Needed  none  -CT     Row Name 04/19/19 0944          Resource/Environmental Concerns    Current Living Arrangements  home/apartment/condo  -CT     Row Name 04/19/19 0922          Cognitive Assessment/Intervention- PT/OT    Orientation Status (Cognition)  oriented x 4  -CT     Follows Commands (Cognition)  follows multi-step commands  -CT     Row Name 04/19/19 0981          Safety Issues, Functional Mobility    Impairments Affecting Function (Mobility)  strength  -CT     Row Name 04/19/19 0907          Bed Mobility Assessment/Treatment    Bed Mobility Assessment/Treatment  bed mobility (all) activities  -CT     [place] Level (Bed Mobility)  supervision  -CT     Row Name 04/19/19 0945          Transfer Assessment/Treatment    Transfer Assessment/Treatment  sit-stand transfer;stand-sit transfer  -CT     Sit-Stand [place] (Transfers)  supervision  -CT     Stand-Sit [place] (Transfers)  supervision  -CT     Row Name 04/19/19 0973          Gait/Stairs Assessment/Training    [place] Level (Gait)  contact guard  -CT     Assistive Device (Gait)  walker, front-wheeled  -CT     Distance in Feet (Gait)  150  -CT     Pattern (Gait)  swing-through  -CT     Deviations/Abnormal Patterns (Gait)  gait speed decreased  -CT     Row Name 04/19/19 09        "   General ROM    GENERAL ROM COMMENTS  BLE grossly WFL  -CT     Row Name 04/19/19 0944          MMT (Manual Muscle Testing)    General MMT Comments  BLE grossly 3+/5  -CT     Row Name 04/19/19 0944          Pain Assessment    Additional Documentation  -- no pain reported  -CT     Row Name 04/19/19 0944          Plan of Care Review    Plan of Care Reviewed With  patient  -CT     Row Name 04/19/19 0944          Physical Therapy Clinical Impression    Date of Referral to PT  04/18/19  -CT     PT Diagnosis (PT Clinical Impression)  decreased functional mobility  -CT     Prognosis (PT Clinical Impression)  good  -CT     Functional Level at Time of Evaluation (PT Clinical Impression)  CGA/SUP  -CT     Patient/Family Goals Statement (PT Clinical Impression)  Pt goals are to \"go home\".   -CT     Criteria for Skilled Interventions Met (PT Clinical Impression)  yes;treatment indicated  -CT     Pathology/Pathophysiology Noted (Describe Specifically for Each System)  musculoskeletal;neuromuscular  -CT     Impairments Found (describe specific impairments)  aerobic capacity/endurance;gait, locomotion, and balance  -CT     Rehab Potential (PT Clinical Summary)  good, to achieve stated therapy goals  -CT     Predicted Duration of Therapy (PT)  lenght of stay  -CT     Care Plan Review (PT)  evaluation/treatment results reviewed;care plan/treatment goals reviewed;risks/benefits reviewed;current/potential barriers reviewed;patient/other agree to care plan  -CT     Row Name 04/19/19 0944          Physical Therapy Goals    Transfer Goal Selection (PT)  transfer, PT goal 1  -CT     Gait Training Goal Selection (PT)  gait training, PT goal 1  -CT     Row Name 04/19/19 0944          Transfer Goal 1 (PT)    Activity/Assistive Device (Transfer Goal 1, PT)  sit-to-stand/stand-to-sit;bed-to-chair/chair-to-bed  -CT     [place] Level/Cues Needed (Transfer Goal 1, PT)  independent;conditional independence  -CT     Time Frame (Transfer Goal " 1, PT)  by discharge  -CT     Row Name 04/19/19 0944          Gait Training Goal 1 (PT)    Activity/Assistive Device (Gait Training Goal 1, PT)  gait (walking locomotion);assistive device use  -CT     [place] Level (Gait Training Goal 1, PT)  independent;conditional independence  -CT     Time Frame (Gait Training Goal 1, PT)  by discharge  -CT     Row Name 04/19/19 0944          Positioning and Restraints    Pre-Treatment Position  in bed  -CT     Post Treatment Position  chair  -CT     In Chair  sitting;call light within reach;encouraged to call for assist;notified nsg  -CT       User Key  (r) = Recorded By, (t) = Taken By, (c) = Cosigned By    Initials Name Provider Type    CT Laurie Savage PT Physical Therapist        Physical Therapy Education     Title: PT OT SLP Therapies (Done)     Topic: Physical Therapy (Done)     Point: Mobility training (Done)     Learning Progress Summary           Patient Acceptance, E,TB, VU by CT at 4/19/2019  9:59 AM                   Point: Home exercise program (Done)     Learning Progress Summary           Patient Acceptance, E,TB, VU by CT at 4/19/2019  9:59 AM                   Point: Body mechanics (Done)     Learning Progress Summary           Patient Acceptance, E,TB, VU by CT at 4/19/2019  9:59 AM                   Point: Precautions (Done)     Learning Progress Summary           Patient Acceptance, E,TB, VU by CT at 4/19/2019  9:59 AM                               User Key     Initials Effective Dates Name Provider Type [facility]    CT 04/03/18 -  Laurie Savage PT Physical Therapist PT              PT Recommendation and Plan  Anticipated Discharge Disposition (PT): home, home with home health  Planned Therapy Interventions (PT Eval): balance training, bed mobility training, gait training, home exercise program, manual therapy techniques, motor coordination training, neuromuscular re-education, patient/family education, postural re-education, stair training,  strengthening, transfer training  Therapy Frequency (PT Clinical Impression): 3 times/wk(3-5 times/wk)  Outcome Summary/Treatment Plan (PT)  Anticipated Equipment Needs at Discharge (PT): front wheeled walker  Anticipated Discharge Disposition (PT): home, home with home health  Plan of Care Reviewed With: patient  Outcome Measures     Row Name 04/19/19 0900             How much help from another person do you currently need...    Turning from your back to your side while in flat bed without using bedrails?  4  -CT      Moving from lying on back to sitting on the side of a flat bed without bedrails?  4  -CT      Moving to and from a bed to a chair (including a wheelchair)?  4  -CT      Standing up from a chair using your arms (e.g., wheelchair, bedside chair)?  4  -CT      Climbing 3-5 steps with a railing?  3  -CT      To walk in hospital room?  3  -CT      AM-PAC 6 Clicks Score  22  -CT         Functional Assessment    Outcome Measure Options  AM-PAC 6 Clicks Basic Mobility (PT)  -CT        User Key  (r) = Recorded By, (t) = Taken By, (c) = Cosigned By    Initials Name Provider Type    CT Laurie Savage PT Physical Therapist         Time Calculation:   PT Charges     Row Name 04/19/19 1000             Time Calculation    PT Received On  04/19/19  -CT      PT - Next Appointment  04/22/19  -CT      PT Goal Re-Cert Due Date  05/03/19  -CT         Time Calculation- PT    Total Timed Code Minutes- PT  54 minute(s)  -CT         Timed Charges    16460 - Gait Training Minutes   15  -CT      87292 - PT Therapeutic Activity Minutes  15  -CT        User Key  (r) = Recorded By, (t) = Taken By, (c) = Cosigned By    Initials Name Provider Type    CT Laurie Savage PT Physical Therapist        Therapy Charges for Today     Code Description Service Date Service Provider Modifiers Qty    89143219456 HC GAIT TRAINING EA 15 MIN 4/19/2019 Laurie Savage PT GP 1    14072027893 HC PT THERAPEUTIC ACT EA 15 MIN 4/19/2019 Hussein  Laurie, PT GP 1    43360064026 HC PT THER SUPP EA 15 MIN 4/19/2019 Laurie Savage, PT GP 2    44130028202 HC PT EVAL HIGH COMPLEXITY 2 4/19/2019 Laurie Savage, PT GP 1    [id number] HC PT THER SUPP EA 15 MIN 4/19/2019 Laurie Savage, PT GP 2          PT G-Codes  Outcome Measure Options: AM-PAC 6 Clicks Basic Mobility (PT)  AM-PAC 6 Clicks Score: 22      Laurie Savage, PT  4/19/2019         Electronically signed by Laurie Savage PT at 4/19/2019 10:01 AM       Occupational Therapy Notes (last 24 hours) (Notes from 4/19/2019  8:14 AM through 4/20/2019  8:14 AM)     No notes of this type exist for this encounter.             Discharge Summary      Myles Santos MD at 4/19/2019  5:48 PM        Date of admission 4/17/2019  Date of discharge: 4/19/2019    Principal diagnosis: Chronic blood loss anemia/iron deficient  Secondary diagnosis:  Anticoagulation for previous DVT  Peripheral vascular disease  Hypertension  Nocturnal hypoxia on home O2 at night  Hypophosphatemia  History of DVT  Hearing loss    Consultants:   general surgery    Procedures:  EGD/colonoscopy performed by   Transfusion 1 unit packed red blood cell    Exam:  Assisted by DESMOND Reyes, patient was seen earlier stable, she underwent EGD and colonoscopy and she was seen post procedure.  She is awake alert eager to go home lungs are clear heart regular rate and rhythm monitor paced saturation room air 91% abdomen is soft benign nondistended nontender bowel sounds are active  Vital signs: 170/85, 16, 71, 97.1    Hospital course: Patient was admitted with anemia, Eliquis was held, she was transfused up to an adequate hemoglobin.  She was tracing positive in the emergency room and probable iron deficient.  Options were discussed with patient and she elected to proceed with endoscopy.  Endoscopy was done today by Dr. prajapati.  Findings were normal:, Hiatal hernia and signs of GERD and esophagitis without bleeding.  Hemoglobin was stable  after transfusion.  She will be treated with a PPI.  She will follow-up with her primary in 1 to 2 weeks but I have had home health check receiving chemistries in 3 days and this is to be called to , she will also be started on iron.  Discussed with surgery, patient can restart her anticoagulant tomorrow.  She will return with any further problems otherwise medically stable see chart for full details this visit.    Follow-up:  Home health  Dr Kruse 1 to 2 weeks    Diet: As tolerated    Activity: As tolerated, she will will resume home O2 at night    Medications:  Iron 325 daily  Protonix 40 mg daily  Eliquis 2.5 mg twice daily  Pletal 100 mg twice daily  Atorvastatin 40 mg daily  Hydralazine 25 mg twice daily   Atrovent nebulizer home dose              Electronically signed by Myles Sy MD at 4/19/2019  6:00 PM       Discharge Order (From admission, onward)    Start     Ordered    04/19/19 1744  Discharge patient  Once     Expected Discharge Date:  04/19/19    Discharge Disposition:  Home or Self Care    Physician of Record for Attribution - Please select from Treatment Team:  MYLES SY [[id number]]    Review needed by CMO to determine Physician of Record:  No       Question Answer Comment   Physician of Record for Attribution - Please select from Treatment Team MYLES SY    Review needed by CMO to determine Physician of Record No        04/19/19 1748

## 2019-04-20 NOTE — OUTREACH NOTE
Prep Survey      Responses   Facility patient discharged from?  [facility]   Is patient eligible?  Yes   Discharge diagnosis  anemia   Does the patient have one of the following disease processes/diagnoses(primary or secondary)?  Other   Does the patient have Home health ordered?  Yes   What is the Home health agency?   [facility]   Is there a DME ordered?  No   Comments regarding appointments  see AVS   Medication alerts for this patient  stop lasix   Prep survey completed?  Yes          Judith Gutierrez RN

## 2019-04-20 NOTE — PROGRESS NOTES
Discharge Planning Assessment   [facility]     Patient Name: Rupinder Lees  MRN: [id number]  Today's Date: 4/20/2019    Admit Date: 4/17/2019      Discharge Plan     Row Name 04/20/19 0816       Plan    Plan   faxed pt information to [facility] office at [phone] on this date.              Emily Roberts

## 2019-04-20 NOTE — NURSING NOTE
[facility]  Notified but RN to call facility but have not received return call

## 2019-04-22 LAB
BACTERIA SPEC AEROBE CULT: NORMAL
BACTERIA SPEC AEROBE CULT: NORMAL

## 2019-04-23 ENCOUNTER — READMISSION MANAGEMENT (OUTPATIENT)
Dept: CALL CENTER | Facility: HOSPITAL | Age: 84
End: 2019-04-23

## 2019-04-23 NOTE — OUTREACH NOTE
Medical Week 1 Survey      Responses   Facility patient discharged from?  [facility]   Does the patient have one of the following disease processes/diagnoses(primary or secondary)?  Other   Is there a successful TCM telephone encounter documented?  No   Week 1 attempt successful?  Yes   Call start time  1059   Call end time  1100   Discharge diagnosis  anemia   Is patient permission given to speak with other caregiver?  No [Patient only please ]   Meds reviewed with patient/caregiver?  Yes   Is the patient having any side effects they believe may be caused by any medication additions or changes?  No   Does the patient have all medications ordered at discharge?  Yes   Is the patient taking all medications as directed (includes completed medication regime)?  Yes   Does the patient have a primary care provider?   Yes   Has the patient kept scheduled appointments due by today?  Yes   What is the Home health agency?   [facility]   Psychosocial issues?  No   Did the patient receive a copy of their discharge instructions?  Yes   Nursing interventions  Reviewed instructions with patient   What is the patient's perception of their health status since discharge?  Improving   Is the patient/caregiver able to teach back signs and symptoms related to disease process for when to call PCP?  Yes   Is the patient/caregiver able to teach back signs and symptoms related to disease process for when to call 911?  Yes   Is the patient/caregiver able to teach back the hierarchy of who to call/visit for symptoms/problems? PCP, Specialist, Home health nurse, Urgent Care, ED, 911  Yes   Week 1 call completed?  Yes          Terri Dugan RN

## 2019-04-25 LAB
LAB AP CASE REPORT: NORMAL
PATH REPORT.FINAL DX SPEC: NORMAL

## 2019-05-01 ENCOUNTER — READMISSION MANAGEMENT (OUTPATIENT)
Dept: CALL CENTER | Facility: HOSPITAL | Age: 84
End: 2019-05-01

## 2019-05-01 NOTE — OUTREACH NOTE
Medical Week 2 Survey      Responses   Facility patient discharged from?  [facility]   Does the patient have one of the following disease processes/diagnoses(primary or secondary)?  Other   Week 2 attempt successful?  Yes   Call start time  1426   Discharge diagnosis  anemia   Call end time  1438   Meds reviewed with patient/caregiver?  Yes   Is the patient having any side effects they believe may be caused by any medication additions or changes?  No   Does the patient have all medications ordered at discharge?  Yes   Is the patient taking all medications as directed (includes completed medication regime)?  Yes   Medication comments  Reports she does have BMs.   Does the patient have a primary care provider?   Yes   Does the patient have an appointment with their PCP within 7 days of discharge?  Yes   Has the patient kept scheduled appointments due by today?  Yes   Comments  States she has no transportation to appt tomorrow.   What is the Home health agency?   [facility]   Has home health visited the patient within 72 hours of discharge?  Yes   Psychosocial issues?  Yes   Psychosocial comments  Pt has an appt with CT surgery tomorrow in [facility]. She states that all of her family lives in [place] and there is no one to take her to appt tomorrow.   Notified Case Management  Psychosocial (Non Urgent)   Did the patient receive a copy of their discharge instructions?  Yes   Nursing interventions  Reviewed instructions with patient   What is the patient's perception of their health status since discharge?  Improving   Is the patient/caregiver able to teach back signs and symptoms related to disease process for when to call PCP?  Yes   Is the patient/caregiver able to teach back signs and symptoms related to disease process for when to call 911?  Yes   Is the patient/caregiver able to teach back the hierarchy of who to call/visit for symptoms/problems? PCP, Specialist, Home health nurse, Urgent Care, ED, 911  Yes   Week 2 Call  Completed?  Yes          Mike Cabezas RN

## 2019-05-09 ENCOUNTER — READMISSION MANAGEMENT (OUTPATIENT)
Dept: CALL CENTER | Facility: HOSPITAL | Age: 84
End: 2019-05-09

## 2019-05-09 NOTE — OUTREACH NOTE
Medical Week 3 Survey      Responses   Facility patient discharged from?  [facility]   Does the patient have one of the following disease processes/diagnoses(primary or secondary)?  Other   Week 3 attempt successful?  No   Unsuccessful attempts  Attempt 1          Karrie Layne RN

## 2019-05-10 ENCOUNTER — READMISSION MANAGEMENT (OUTPATIENT)
Dept: CALL CENTER | Facility: HOSPITAL | Age: 84
End: 2019-05-10

## 2019-05-10 NOTE — OUTREACH NOTE
Medical Week 3 Survey      Responses   Facility patient discharged from?  [facility]   Does the patient have one of the following disease processes/diagnoses(primary or secondary)?  Other   Week 3 attempt successful?  Yes   Call start time  0747   Call end time  0751   Meds reviewed with patient/caregiver?  Yes   Is the patient taking all medications as directed (includes completed medication regime)?  Yes   Has the patient kept scheduled appointments due by today?  Yes   Comments  Has been going to appts. having surgery Thursday for vascular   What is the patient's perception of their health status since discharge?  New symptoms unrelated to diagnosis   Additional teach back comments  having vascular surgery Thursday   Week 3 Call Completed?  Yes   Graduated  Yes   Did the patient feel the follow up calls were helpful during their recovery period?  Yes   Was the number of calls appropriate?  Yes          Erica Gonzalez RN

## 2019-05-16 ENCOUNTER — OFFICE VISIT (OUTPATIENT)
Dept: CARDIAC SURGERY | Facility: CLINIC | Age: 84
End: 2019-05-16

## 2019-05-16 VITALS
TEMPERATURE: 98 F | OXYGEN SATURATION: 98 % | WEIGHT: 112.6 LBS | SYSTOLIC BLOOD PRESSURE: 159 MMHG | BODY MASS INDEX: 22.1 KG/M2 | HEART RATE: 71 BPM | DIASTOLIC BLOOD PRESSURE: 58 MMHG | HEIGHT: 60 IN

## 2019-05-16 DIAGNOSIS — I73.9 PAD (PERIPHERAL ARTERY DISEASE) (HCC): Primary | ICD-10-CM

## 2019-05-16 PROCEDURE — 99213 OFFICE O/P EST LOW 20 MIN: CPT | Performed by: PHYSICIAN ASSISTANT

## 2019-05-16 RX ORDER — AMIODARONE HYDROCHLORIDE 200 MG/1
200 TABLET ORAL 2 TIMES DAILY
COMMUNITY
Start: 2019-05-06 | End: 2022-11-04 | Stop reason: ALTCHOICE

## 2019-05-16 RX ORDER — POTASSIUM CHLORIDE 750 MG/1
TABLET, FILM COATED, EXTENDED RELEASE ORAL
COMMUNITY
Start: 2019-04-30 | End: 2020-05-08

## 2019-05-16 RX ORDER — POTASSIUM CHLORIDE 1500 MG/1
TABLET, FILM COATED, EXTENDED RELEASE ORAL
COMMUNITY
Start: 2019-04-16 | End: 2020-05-08

## 2019-05-16 RX ORDER — PANTOPRAZOLE SODIUM 40 MG/1
40 TABLET, DELAYED RELEASE ORAL AS NEEDED
COMMUNITY
Start: 2019-04-30

## 2019-05-16 NOTE — PROGRESS NOTES
"05/16/2019  Patient Information  Rupinder Lees                                                                                          [address] [place] [address]   1935  'PCP/Referring Physician'  Gifty Kruse MD  [phone]  No ref. provider found    Chief Complaint   Patient presents with   • Follow-up     6 wk f/u pt right foot has been cold for the past 3 weeks. pt right leg is really swollen. pt right 4th toe has a sore on it that has been hurting her for a couple weeks   • Peripheral Vascular Disease       History of Present Illness:  Patient is an 83-year-old  female with history of hypertension, COPD, remote tobacco abuse (quit March 2019), hyperlipidemia and peripheral arterial disease who presents to office for evaluation and discussion of possible surgical intervention for claudication pain.  The patient is a known history of being relatively noncompliant with her medical regimen, but has discontinued tobacco abuse approximately 2 months ago.  The patient was last seen on 3/24/2019 and states that her symptoms are unchanged, and she continues to have bilateral calf and thigh pain after ambulating approximately 50 feet, denying rest pain.  The patient states that she did take the Pletal that was prescribed to her x 3 months, but only took it for 1 month, and then stopped because she felt that \"it was not helping\".  The patient presents to office 6 weeks early for her scheduled appointment, because she would like surgical intervention to help with her lower extremity pain.    Patient Active Problem List   Diagnosis   • Complete heart block S/P transvenous pacemaker placement at [facility] on 12/17   • PAD    • HTN   • History of subacute left MCA distribution ischemic CVA   • COPD    • Tobacco use   • History of noncompliance with medical treatment   • Acute kidney injury Cr 1.72 on admission    • Hyperlipidemia   • NSTEMI    • CAD with occluded LAD and RCA per [facility] 12/17/18   • " Right ischemic leg   • Popliteal thrombosis (CMS/HCC)   • Pneumonia of left lower lobe due to infectious organism (CMS/HCC)   • Anemia   • Hypokalemia   • Hypophosphatemia   • Occult blood positive stool   • Iron deficiency anemia due to chronic blood loss     Past Medical History:   Diagnosis Date   • COPD  12/17/2018   • DVT of lower extremity (deep venous thrombosis) (CMS/HCC)    • History of CVA 2013 12/17/2018   • HTN 12/17/2018   • PAD  12/17/2018   • Tobacco use 12/17/2018     Past Surgical History:   Procedure Laterality Date   • CARDIAC CATHETERIZATION  12/17/2018    Procedure: Left Heart Cath;  Surgeon: Imtiaz Fuentes MD;  Location:  [facility] CATH [facility]VASIVE [facility];  Service: Cardiology   • CARDIAC CATHETERIZATION N/A 12/17/2018    Procedure: Thrombolysis-peripheral;  Surgeon: Imtiaz Fuentes MD;  Location:  [facility]ATH [facility];  Service: Cardiology   • CARDIAC ELECTROPHYSIOLOGY PROCEDURE N/A 12/18/2018    Procedure: PACEMAKER IMPLANTATION- DC;  Surgeon: Thony Bobby MD;  Location:  [facility] INVASIVE LOCATION;  Service: Cardiology   • CARDIAC ELECTROPHYSIOLOGY PROCEDURE N/A 12/17/2018    Procedure: Temporary Pacemaker;  Surgeon: Imtiaz Fuentes MD;  Location:  [facility] CATH INVASIVE [facility];  Service: Cardiology   • COLONOSCOPY N/A 4/19/2019    Procedure: COLONOSCOPY;  Surgeon: Chris Zimmerman MD;  Location: [facility] OR;  Service: Gastroenterology   • ENDOSCOPY N/A 4/19/2019    Procedure: ESOPHAGOGASTRODUODENOSCOPY;  Surgeon: Chris Zimmerman MD;  Location: [facility] OR;  Service: Gastroenterology   • FEMORAL ARTERY STENT  2013   • FEMORAL POPLITEAL BYPASS Right 12/17/2018    Procedure: LOWER EXTREMITY EMBELECTOMY RIGHT;  Surgeon: David Beatty MD;  Location:  [facility];  Service: Vascular   • HYSTERECTOMY     • LEFT HEART CATH  12/17/2018    At [facility] with TV PM placement    • PACEMAKER IMPLANTATION         Current Outpatient Medications:   •   apixaban (ELIQUIS) 2.5 MG tablet tablet, Take 1 tablet by mouth Every 12 (Twelve) Hours., Disp: , Rfl:   •  atorvastatin (LIPITOR) 40 MG tablet, Take 40 mg by mouth Daily., Disp: , Rfl:   •  cilostazol (PLETAL) 100 MG tablet, Take 1 tablet by mouth 2 (Two) Times a Day., Disp: , Rfl:   •  ferrous sulfate (IRON SUPPLEMENT) 325 (65 FE) MG tablet, Take 1 tablet by mouth Daily With Breakfast., Disp: 30 tablet, Rfl: 0  •  hydrALAZINE (APRESOLINE) 25 MG tablet, Take 25 mg by mouth 2 (Two) Times a Day., Disp: , Rfl:   •  amiodarone (PACERONE) 200 MG tablet, , Disp: , Rfl:   •  pantoprazole (PROTONIX) 40 MG EC tablet, , Disp: , Rfl:   •  potassium chloride (K-DUR) 10 MEQ CR tablet, , Disp: , Rfl:   •  potassium chloride ER (K-TAB) 20 MEQ tablet controlled-release ER tablet, , Disp: , Rfl:   No Known Allergies  Social History     Socioeconomic History   • Marital status:      Spouse name: Not on file   • Number of children: 3   • Years of education: Not on file   • Highest education level: Not on file   Occupational History   • Occupation: Homemaker   Tobacco Use   • Smoking status: Former Smoker     Packs/day: 0.50     Years: 55.00     Pack years: 27.50     Types: Cigarettes     Last attempt to quit: 3/25/2019     Years since quittin.1   • Smokeless tobacco: Never Used   Substance and Sexual Activity   • Alcohol use: No     Frequency: Never   • Drug use: No   • Sexual activity: Defer   Social History Narrative    Lives in [place], [place] alone     Family History   Problem Relation Age of Onset   • Cancer Mother    • Cancer Father    • Heart attack Brother    • Heart attack Brother      Review of Systems   Constitution: Positive for malaise/fatigue and weight loss. Negative for chills, diaphoresis, fever, weakness, night sweats and weight gain.   HENT: Negative for congestion, ear pain, hearing loss, nosebleeds and sore throat.    Eyes: Negative for blurred vision and double vision.   Cardiovascular: Positive for  "claudication and leg swelling. Negative for chest pain, dyspnea on exertion, near-syncope, palpitations and syncope.   Respiratory: Negative for cough, hemoptysis, shortness of breath and wheezing.    Hematologic/Lymphatic: Does not bruise/bleed easily.   Skin: Negative for itching, poor wound healing and rash.   Musculoskeletal: Positive for falls (couple weeks ago). Negative for arthritis, back pain, joint pain, joint swelling, muscle cramps, muscle weakness and neck pain.   Gastrointestinal: Negative for abdominal pain, constipation, diarrhea, dysphagia, hematochezia, nausea and vomiting.   Genitourinary: Negative for frequency, hematuria, hesitancy, incomplete emptying and urgency.   Neurological: Positive for dizziness, light-headedness, loss of balance (when walking alot) and numbness. Negative for headaches, seizures and tremors.   Psychiatric/Behavioral: Negative for depression and suicidal ideas. The patient is not nervous/anxious.    Allergic/Immunologic: Negative for environmental allergies and HIV exposure.     Vitals:    05/16/19 1020   BP: 159/58   BP Location: Right arm   Patient Position: Sitting   Pulse: 71   Temp: 98 °F (36.7 °C)   SpO2: 98%   Weight: 51.1 kg (112 lb 9.6 oz)   Height: 152.4 cm (60\")      Physical Exam:  Gen - NAD, pleasant, cooperative  CV - Regular rate and rhythm, no murmur gallop or rub  Pulm - Lungs clear to auscultation without wheeze or rhonchi   GI - Soft, normoactive bowel sounds, non-tender  Ext - Without edema, no evidence of ulceration or cyanosis present bilaterally, feet are slightly reddened with brisk capillary refill present  Pulses - The patient's dorsalis pedis and posterior tibialis pulses are present bilaterally on Doppler; femoral pulses also present  Incision - Sternum stable, no evidence of incisional dehiscence or cellulitis  Lymph - Without palpable axillary, supraclavicular or anterior chain lymphadenopathy present   Neuro - CN II - XII grossly intact, " "tongue midline, voice normal    Labs/Imaging: 3/4/2019 CTA with runoff  1. Diffuse peripheral vascular disease right greater than left lower  extremity.  2. Specifically, occlusion of the right posterior tibial artery and  peroneal artery to the level of the ankle.  3. Occlusion of the left peroneal artery to the level of the ankle.  4. Endovascular stent graft left common iliac artery with mild stenosis  and plaque formation.  5. Low-grade aneurysmal dilatation of the abdominal aorta with maximal  diameter of 3.0 cm.  6. Other nonacute findings as above.    Assessment/Plan:  Patient is an 83-year-old  female with history of hypertension, COPD, remote tobacco abuse (quit March 2019), hyperlipidemia and peripheral arterial disease who presents to office for evaluation and discussion of possible surgical intervention for claudication pain.  The patient presents to office approximately 6 weeks prior to her next scheduled visit to discuss surgical intervention.  The patient is adamant that she will require surgical intervention, but after a lengthy discussion, I explained that due to her significant microvascular disease and occlusion of the vessels of the distal aspect of her lower extremities, there are not any feasible options to help significantly improve blood flow to her distal lower extremities.  She then stated \"I guess I do not need to come back here.\" I recommended referral to Dr. Xiao or the [facility] vascular team, but the patient denied this offer.  I do not believe that this patient is a surgical candidate due to her advanced age, multiple medical comorbidities and the absence of a viable intervention that would significantly increase her peripheral blood flow.  The patient may follow-up with our office as needed, but should call with any questions or concerns, should any arise during the interval.  Should the patient develop acutely worsening symptoms she will need to present to the " nearest emergency department immediately.    Patient Active Problem List   Diagnosis   • Complete heart block S/P transvenous pacemaker placement at [facility] on 12/17   • PAD    • HTN   • History of subacute left MCA distribution ischemic CVA   • COPD    • Tobacco use   • History of noncompliance with medical treatment   • Acute kidney injury Cr 1.72 on admission    • Hyperlipidemia   • NSTEMI    • CAD with occluded LAD and RCA per [facility] 12/17/18   • Right ischemic leg   • Popliteal thrombosis (CMS/HCC)   • Pneumonia of left lower lobe due to infectious organism (CMS/HCC)   • Anemia   • Hypokalemia   • Hypophosphatemia   • Occult blood positive stool   • Iron deficiency anemia due to chronic blood loss

## 2019-05-30 ENCOUNTER — HOSPITAL ENCOUNTER (EMERGENCY)
Facility: HOSPITAL | Age: 84
Discharge: HOME | End: 2019-05-30
Attending: FAMILY MEDICINE | Admitting: FAMILY MEDICINE

## 2019-05-30 ENCOUNTER — APPOINTMENT (OUTPATIENT)
Dept: ULTRASOUND IMAGING | Facility: HOSPITAL | Age: 84
End: 2019-05-30

## 2019-05-30 VITALS
DIASTOLIC BLOOD PRESSURE: 92 MMHG | SYSTOLIC BLOOD PRESSURE: 189 MMHG | HEIGHT: 60 IN | TEMPERATURE: 98.1 F | BODY MASS INDEX: 21.6 KG/M2 | OXYGEN SATURATION: 98 % | RESPIRATION RATE: 18 BRPM | WEIGHT: 110 LBS | HEART RATE: 71 BPM

## 2019-05-30 DIAGNOSIS — R79.89 ELEVATED SERUM CREATININE: ICD-10-CM

## 2019-05-30 DIAGNOSIS — I73.9 PERIPHERAL VASCULAR DISEASE (HCC): Primary | ICD-10-CM

## 2019-05-30 LAB
ALBUMIN SERPL-MCNC: 4.06 G/DL (ref 3.5–5.2)
ALBUMIN/GLOB SERPL: 1.4 G/DL
ALP SERPL-CCNC: 80 U/L (ref 39–117)
ALT SERPL W P-5'-P-CCNC: 33 U/L (ref 1–33)
ANION GAP SERPL CALCULATED.3IONS-SCNC: 13 MMOL/L
ANISOCYTOSIS BLD QL: ABNORMAL
APTT PPP: 20 SECONDS (ref 23.8–36.1)
AST SERPL-CCNC: 30 U/L (ref 1–32)
BILIRUB SERPL-MCNC: 0.2 MG/DL (ref 0.2–1.2)
BUN BLD-MCNC: 18 MG/DL (ref 8–23)
BUN/CREAT SERPL: 12 (ref 7–25)
CALCIUM SPEC-SCNC: 9.3 MG/DL (ref 8.6–10.5)
CHLORIDE SERPL-SCNC: 103 MMOL/L (ref 98–107)
CO2 SERPL-SCNC: 19 MMOL/L (ref 22–29)
CREAT BLD-MCNC: 1.5 MG/DL (ref 0.57–1)
DEPRECATED RDW RBC AUTO: 59.4 FL (ref 37–54)
ELLIPTOCYTES BLD QL SMEAR: ABNORMAL
EOSINOPHIL # BLD MANUAL: 0.23 10*3/MM3 (ref 0–0.4)
EOSINOPHIL NFR BLD MANUAL: 4 % (ref 0.3–6.2)
ERYTHROCYTE [DISTWIDTH] IN BLOOD BY AUTOMATED COUNT: 17.2 % (ref 12.3–15.4)
GFR SERPL CREATININE-BSD FRML MDRD: 33 ML/MIN/1.73
GLOBULIN UR ELPH-MCNC: 2.8 GM/DL
GLUCOSE BLD-MCNC: 107 MG/DL (ref 65–99)
HCT VFR BLD AUTO: 34.6 % (ref 34–46.6)
HGB BLD-MCNC: 9.7 G/DL (ref 12–15.9)
HYPOCHROMIA BLD QL: ABNORMAL
INR PPP: 0.9 (ref 0.9–1.1)
LYMPHOCYTES # BLD MANUAL: 1.52 10*3/MM3 (ref 0.7–3.1)
LYMPHOCYTES NFR BLD MANUAL: 2 % (ref 5–12)
LYMPHOCYTES NFR BLD MANUAL: 26 % (ref 19.6–45.3)
MCH RBC QN AUTO: 26.4 PG (ref 26.6–33)
MCHC RBC AUTO-ENTMCNC: 28 G/DL (ref 31.5–35.7)
MCV RBC AUTO: 94.3 FL (ref 79–97)
MONOCYTES # BLD AUTO: 0.12 10*3/MM3 (ref 0.1–0.9)
NEUTROPHILS # BLD AUTO: 3.97 10*3/MM3 (ref 1.7–7)
NEUTROPHILS NFR BLD MANUAL: 68 % (ref 42.7–76)
PLAT MORPH BLD: NORMAL
PLATELET # BLD AUTO: 238 10*3/MM3 (ref 140–450)
PMV BLD AUTO: 9.7 FL (ref 6–12)
POTASSIUM BLD-SCNC: 4.8 MMOL/L (ref 3.5–5.2)
PROT SERPL-MCNC: 6.9 G/DL (ref 6–8.5)
PROTHROMBIN TIME: 12.6 SECONDS (ref 11–15.4)
RBC # BLD AUTO: 3.67 10*6/MM3 (ref 3.77–5.28)
SCAN SLIDE: NORMAL
SODIUM BLD-SCNC: 135 MMOL/L (ref 136–145)
WBC NRBC COR # BLD: 5.84 10*3/MM3 (ref 3.4–10.8)

## 2019-05-30 PROCEDURE — 85730 THROMBOPLASTIN TIME PARTIAL: CPT | Performed by: FAMILY MEDICINE

## 2019-05-30 PROCEDURE — 93926 LOWER EXTREMITY STUDY: CPT | Performed by: RADIOLOGY

## 2019-05-30 PROCEDURE — 36415 COLL VENOUS BLD VENIPUNCTURE: CPT

## 2019-05-30 PROCEDURE — 85025 COMPLETE CBC W/AUTO DIFF WBC: CPT | Performed by: FAMILY MEDICINE

## 2019-05-30 PROCEDURE — 93970 EXTREMITY STUDY: CPT

## 2019-05-30 PROCEDURE — 93970 EXTREMITY STUDY: CPT | Performed by: RADIOLOGY

## 2019-05-30 PROCEDURE — 99284 EMERGENCY DEPT VISIT MOD MDM: CPT

## 2019-05-30 PROCEDURE — 93926 LOWER EXTREMITY STUDY: CPT

## 2019-05-30 PROCEDURE — 85007 BL SMEAR W/DIFF WBC COUNT: CPT | Performed by: FAMILY MEDICINE

## 2019-05-30 PROCEDURE — 80053 COMPREHEN METABOLIC PANEL: CPT | Performed by: FAMILY MEDICINE

## 2019-05-30 PROCEDURE — 85610 PROTHROMBIN TIME: CPT | Performed by: FAMILY MEDICINE

## 2019-05-30 RX ORDER — SODIUM CHLORIDE 0.9 % (FLUSH) 0.9 %
10 SYRINGE (ML) INJECTION AS NEEDED
Status: DISCONTINUED | OUTPATIENT
Start: 2019-05-30 | End: 2019-05-30 | Stop reason: HOSPADM

## 2019-05-30 RX ADMIN — SODIUM CHLORIDE 1000 ML: 9 INJECTION, SOLUTION INTRAVENOUS at 06:12

## 2019-06-12 ENCOUNTER — LAB (OUTPATIENT)
Dept: LAB | Facility: HOSPITAL | Age: 84
End: 2019-06-12

## 2019-06-12 ENCOUNTER — TRANSCRIBE ORDERS (OUTPATIENT)
Dept: ADMINISTRATIVE | Facility: HOSPITAL | Age: 84
End: 2019-06-12

## 2019-06-12 DIAGNOSIS — E78.41 ELEVATED LIPOPROTEIN A LEVEL: Primary | ICD-10-CM

## 2019-06-12 DIAGNOSIS — E78.41 ELEVATED LIPOPROTEIN A LEVEL: ICD-10-CM

## 2019-06-12 LAB
ALBUMIN SERPL-MCNC: 4.4 G/DL (ref 3.5–5.2)
ALBUMIN/GLOB SERPL: 1.6 G/DL
ALP SERPL-CCNC: 82 U/L (ref 39–117)
ALT SERPL W P-5'-P-CCNC: 38 U/L (ref 1–33)
ANION GAP SERPL CALCULATED.3IONS-SCNC: 16.2 MMOL/L
AST SERPL-CCNC: 29 U/L (ref 1–32)
BILIRUB SERPL-MCNC: 0.3 MG/DL (ref 0.2–1.2)
BUN BLD-MCNC: 16 MG/DL (ref 8–23)
BUN/CREAT SERPL: 8.8 (ref 7–25)
CALCIUM SPEC-SCNC: 9.7 MG/DL (ref 8.6–10.5)
CHLORIDE SERPL-SCNC: 100 MMOL/L (ref 98–107)
CO2 SERPL-SCNC: 19.8 MMOL/L (ref 22–29)
CREAT BLD-MCNC: 1.81 MG/DL (ref 0.57–1)
GFR SERPL CREATININE-BSD FRML MDRD: 27 ML/MIN/1.73
GLOBULIN UR ELPH-MCNC: 2.8 GM/DL
GLUCOSE BLD-MCNC: 89 MG/DL (ref 65–99)
POTASSIUM BLD-SCNC: 4.4 MMOL/L (ref 3.5–5.2)
PROT SERPL-MCNC: 7.2 G/DL (ref 6–8.5)
SODIUM BLD-SCNC: 136 MMOL/L (ref 136–145)

## 2019-06-12 PROCEDURE — 36415 COLL VENOUS BLD VENIPUNCTURE: CPT

## 2019-06-12 PROCEDURE — 80053 COMPREHEN METABOLIC PANEL: CPT

## 2019-07-08 ENCOUNTER — HOSPITAL ENCOUNTER (EMERGENCY)
Facility: HOSPITAL | Age: 84
Discharge: ANOTHER HOSPITAL | End: 2019-07-08
Attending: EMERGENCY MEDICINE | Admitting: EMERGENCY MEDICINE

## 2019-07-08 ENCOUNTER — APPOINTMENT (OUTPATIENT)
Dept: ULTRASOUND IMAGING | Facility: HOSPITAL | Age: 84
End: 2019-07-08

## 2019-07-08 VITALS
HEIGHT: 60 IN | OXYGEN SATURATION: 98 % | BODY MASS INDEX: 21.6 KG/M2 | SYSTOLIC BLOOD PRESSURE: 175 MMHG | HEART RATE: 70 BPM | WEIGHT: 110 LBS | RESPIRATION RATE: 18 BRPM | TEMPERATURE: 98.5 F | DIASTOLIC BLOOD PRESSURE: 60 MMHG

## 2019-07-08 DIAGNOSIS — I70.90 ARTERIAL OCCLUSION: Primary | ICD-10-CM

## 2019-07-08 LAB
ALBUMIN SERPL-MCNC: 4.12 G/DL (ref 3.5–5.2)
ALBUMIN/GLOB SERPL: 1.2 G/DL
ALP SERPL-CCNC: 101 U/L (ref 39–117)
ALT SERPL W P-5'-P-CCNC: 36 U/L (ref 1–33)
ANION GAP SERPL CALCULATED.3IONS-SCNC: 13.2 MMOL/L (ref 5–15)
APTT PPP: >100 SECONDS (ref 23.8–36.1)
AST SERPL-CCNC: 44 U/L (ref 1–32)
BASOPHILS # BLD AUTO: 0.03 10*3/MM3 (ref 0–0.2)
BASOPHILS NFR BLD AUTO: 0.4 % (ref 0–1.5)
BILIRUB SERPL-MCNC: 0.3 MG/DL (ref 0.2–1.2)
BUN BLD-MCNC: 15 MG/DL (ref 8–23)
BUN/CREAT SERPL: 13.6 (ref 7–25)
CALCIUM SPEC-SCNC: 9.4 MG/DL (ref 8.6–10.5)
CHLORIDE SERPL-SCNC: 103 MMOL/L (ref 98–107)
CO2 SERPL-SCNC: 23.8 MMOL/L (ref 22–29)
CREAT BLD-MCNC: 1.1 MG/DL (ref 0.57–1)
CRP SERPL-MCNC: 1.07 MG/DL (ref 0–0.5)
D-LACTATE SERPL-SCNC: 1.5 MMOL/L (ref 0.5–2)
DEPRECATED RDW RBC AUTO: 54.5 FL (ref 37–54)
EOSINOPHIL # BLD AUTO: 0.08 10*3/MM3 (ref 0–0.4)
EOSINOPHIL NFR BLD AUTO: 1.1 % (ref 0.3–6.2)
ERYTHROCYTE [DISTWIDTH] IN BLOOD BY AUTOMATED COUNT: 17.7 % (ref 12.3–15.4)
ERYTHROCYTE [SEDIMENTATION RATE] IN BLOOD: 41 MM/HR (ref 0–30)
GFR SERPL CREATININE-BSD FRML MDRD: 48 ML/MIN/1.73
GLOBULIN UR ELPH-MCNC: 3.5 GM/DL
GLUCOSE BLD-MCNC: 115 MG/DL (ref 65–99)
HCT VFR BLD AUTO: 34.9 % (ref 34–46.6)
HGB BLD-MCNC: 10.7 G/DL (ref 12–15.9)
IMM GRANULOCYTES # BLD AUTO: 0.02 10*3/MM3 (ref 0–0.05)
IMM GRANULOCYTES NFR BLD AUTO: 0.3 % (ref 0–0.5)
INR PPP: 1.31 (ref 0.9–1.1)
LYMPHOCYTES # BLD AUTO: 1.32 10*3/MM3 (ref 0.7–3.1)
LYMPHOCYTES NFR BLD AUTO: 18.9 % (ref 19.6–45.3)
MCH RBC QN AUTO: 25.8 PG (ref 26.6–33)
MCHC RBC AUTO-ENTMCNC: 30.7 G/DL (ref 31.5–35.7)
MCV RBC AUTO: 84.3 FL (ref 79–97)
MONOCYTES # BLD AUTO: 0.81 10*3/MM3 (ref 0.1–0.9)
MONOCYTES NFR BLD AUTO: 11.6 % (ref 5–12)
NEUTROPHILS # BLD AUTO: 4.73 10*3/MM3 (ref 1.7–7)
NEUTROPHILS NFR BLD AUTO: 67.7 % (ref 42.7–76)
NRBC BLD AUTO-RTO: 0 /100 WBC (ref 0–0.2)
PLATELET # BLD AUTO: 356 10*3/MM3 (ref 140–450)
PMV BLD AUTO: 9.7 FL (ref 6–12)
POTASSIUM BLD-SCNC: 4.5 MMOL/L (ref 3.5–5.2)
PROT SERPL-MCNC: 7.6 G/DL (ref 6–8.5)
PROTHROMBIN TIME: 17 SECONDS (ref 11–15.4)
RBC # BLD AUTO: 4.14 10*6/MM3 (ref 3.77–5.28)
SODIUM BLD-SCNC: 140 MMOL/L (ref 136–145)
WBC NRBC COR # BLD: 6.99 10*3/MM3 (ref 3.4–10.8)

## 2019-07-08 PROCEDURE — 83605 ASSAY OF LACTIC ACID: CPT | Performed by: PHYSICIAN ASSISTANT

## 2019-07-08 PROCEDURE — 96365 THER/PROPH/DIAG IV INF INIT: CPT

## 2019-07-08 PROCEDURE — 87040 BLOOD CULTURE FOR BACTERIA: CPT | Performed by: PHYSICIAN ASSISTANT

## 2019-07-08 PROCEDURE — 36415 COLL VENOUS BLD VENIPUNCTURE: CPT

## 2019-07-08 PROCEDURE — 85730 THROMBOPLASTIN TIME PARTIAL: CPT | Performed by: PHYSICIAN ASSISTANT

## 2019-07-08 PROCEDURE — 85610 PROTHROMBIN TIME: CPT | Performed by: PHYSICIAN ASSISTANT

## 2019-07-08 PROCEDURE — 96376 TX/PRO/DX INJ SAME DRUG ADON: CPT

## 2019-07-08 PROCEDURE — 99284 EMERGENCY DEPT VISIT MOD MDM: CPT

## 2019-07-08 PROCEDURE — 93971 EXTREMITY STUDY: CPT

## 2019-07-08 PROCEDURE — 86140 C-REACTIVE PROTEIN: CPT | Performed by: PHYSICIAN ASSISTANT

## 2019-07-08 PROCEDURE — 25010000002 HEPARIN (PORCINE) PER 1000 UNITS: Performed by: PHYSICIAN ASSISTANT

## 2019-07-08 PROCEDURE — 93926 LOWER EXTREMITY STUDY: CPT

## 2019-07-08 PROCEDURE — 80053 COMPREHEN METABOLIC PANEL: CPT | Performed by: PHYSICIAN ASSISTANT

## 2019-07-08 PROCEDURE — 93926 LOWER EXTREMITY STUDY: CPT | Performed by: RADIOLOGY

## 2019-07-08 PROCEDURE — 85652 RBC SED RATE AUTOMATED: CPT | Performed by: PHYSICIAN ASSISTANT

## 2019-07-08 PROCEDURE — 85025 COMPLETE CBC W/AUTO DIFF WBC: CPT | Performed by: PHYSICIAN ASSISTANT

## 2019-07-08 PROCEDURE — 93971 EXTREMITY STUDY: CPT | Performed by: RADIOLOGY

## 2019-07-08 RX ORDER — HEPARIN SODIUM 10000 [USP'U]/100ML
12 INJECTION, SOLUTION INTRAVENOUS
Status: DISCONTINUED | OUTPATIENT
Start: 2019-07-08 | End: 2019-07-08 | Stop reason: HOSPADM

## 2019-07-08 RX ORDER — HEPARIN SODIUM 5000 [USP'U]/ML
60 INJECTION, SOLUTION INTRAVENOUS; SUBCUTANEOUS ONCE
Status: COMPLETED | OUTPATIENT
Start: 2019-07-08 | End: 2019-07-08

## 2019-07-08 RX ORDER — HEPARIN SODIUM 10000 [USP'U]/100ML
INJECTION, SOLUTION INTRAVENOUS
Status: DISCONTINUED
Start: 2019-07-08 | End: 2019-07-08 | Stop reason: HOSPADM

## 2019-07-08 RX ORDER — SODIUM CHLORIDE 0.9 % (FLUSH) 0.9 %
10 SYRINGE (ML) INJECTION AS NEEDED
Status: DISCONTINUED | OUTPATIENT
Start: 2019-07-08 | End: 2019-07-08 | Stop reason: HOSPADM

## 2019-07-08 RX ADMIN — HEPARIN SODIUM 3000 UNITS: 5000 INJECTION INTRAVENOUS; SUBCUTANEOUS at 13:41

## 2019-07-08 RX ADMIN — HEPARIN SODIUM 12 UNITS/KG/HR: 10000 INJECTION, SOLUTION INTRAVENOUS at 13:42

## 2019-07-08 NOTE — ED NOTES
Air evac contacted for transfer to [facility] , checking with 109 for weather      Juan Camarillo  07/08/19 5272

## 2019-07-08 NOTE — ED NOTES
Contacted [facility] for ek er , face sheet faxed ,  connected to provider             Juan Camarillo  07/08/19 5795

## 2019-07-08 NOTE — ED PROVIDER NOTES
"Subjective   -year-old female patient presents to the emergency room today with complaints of chronic right leg and foot pain.  Patient states that she has had this pain for months now.  Patient states she has been followed by Dr. Kruse for this and has been told that she has \"blockages.\"  Patient states that she is scheduled to see a doctor in [place] regarding this problem.  Patient states that at times her right fourth toe will turn black.  Patient states that she is having worsening pain to the point that she cannot sleep at night.  She denies having any fevers.  States that this morning her foot and toe is red and swollen.  Patient cannot stand for anything to touch it.  Patient also states that she has a wound on the tip of her right fourth toe.        History provided by:  Patient   used: No    Leg Injury   Duration:  4 months  Timing:  Constant  Progression:  Worsening  Chronicity:  Chronic  Ineffective treatments:  \"Pain pill\" - 800mg       Review of Systems   Constitutional: Negative.    HENT: Negative.    Eyes: Negative.    Respiratory: Negative.    Cardiovascular: Positive for leg swelling.   Gastrointestinal: Negative.    Endocrine: Negative.    Genitourinary: Negative.    Musculoskeletal: Negative.    Skin: Negative.    Allergic/Immunologic: Negative.    Neurological: Negative.    Hematological: Negative.    Psychiatric/Behavioral: Negative.    All other systems reviewed and are negative.      Past Medical History:   Diagnosis Date   • COPD  12/17/2018   • DVT of lower extremity (deep venous thrombosis) (CMS/HCC)    • History of CVA 2013 12/17/2018   • HTN 12/17/2018   • PAD  12/17/2018   • Tobacco use 12/17/2018       No Known Allergies    Past Surgical History:   Procedure Laterality Date   • CARDIAC CATHETERIZATION  12/17/2018    Procedure: Left Heart Cath;  Surgeon: Imtiaz Fuentes MD;  Location: [facility] CATH INVASIVE LOCATION;  Service: Cardiology   • CARDIAC " CATHETERIZATION N/A 2018    Procedure: Thrombolysis-peripheral;  Surgeon: Imtiaz Fuentes MD;  Location:  [facility] CATH INVASIVE LOCATION;  Service: Cardiology   • CARDIAC ELECTROPHYSIOLOGY PROCEDURE N/A 2018    Procedure: PACEMAKER IMPLANTATION- DC;  Surgeon: Thony Bobby MD;  Location:  [facility] EP INVASIVE LOCATION;  Service: Cardiology   • CARDIAC ELECTROPHYSIOLOGY PROCEDURE N/A 2018    Procedure: Temporary Pacemaker;  Surgeon: Imtiaz Fuentes MD;  Location:  [facility] CATH INVASIVE LOCATION;  Service: Cardiology   • COLONOSCOPY N/A 2019    Procedure: COLONOSCOPY;  Surgeon: Chris Zimmerman MD;  Location: [facility] OR;  Service: Gastroenterology   • ENDOSCOPY N/A 2019    Procedure: ESOPHAGOGASTRODUODENOSCOPY;  Surgeon: Chris Zimmerman MD;  Location: [facility] OR;  Service: Gastroenterology   • FEMORAL ARTERY STENT     • FEMORAL POPLITEAL BYPASS Right 2018    Procedure: LOWER EXTREMITY EMBELECTOMY RIGHT;  Surgeon: David Beatty MD;  Location:  [facility] HYBRID OR 15;  Service: Vascular   • HYSTERECTOMY     • LEFT HEART CATH  2018    At [facility] with TV PM placement    • PACEMAKER IMPLANTATION         Family History   Problem Relation Age of Onset   • Cancer Mother    • Cancer Father    • Heart attack Brother    • Heart attack Brother        Social History     Socioeconomic History   • Marital status:      Spouse name: Not on file   • Number of children: 3   • Years of education: Not on file   • Highest education level: Not on file   Occupational History   • Occupation: Homemaker   Tobacco Use   • Smoking status: Former Smoker     Packs/day: 0.50     Years: 55.00     Pack years: 27.50     Types: Cigarettes     Last attempt to quit: 3/25/2019     Years since quittin.2   • Smokeless tobacco: Never Used   Substance and Sexual Activity   • Alcohol use: No     Frequency: Never   • Drug use: No   • Sexual activity: Defer   Social History  Tammy    Lives in [place], [place] alone           Objective   Physical Exam   Constitutional: She is oriented to person, place, and time. She appears well-developed and well-nourished.   HENT:   Head: Normocephalic and atraumatic.   Right Ear: External ear normal.   Left Ear: External ear normal.   Nose: Nose normal.   Mouth/Throat: Oropharynx is clear and moist.   Eyes: Conjunctivae and EOM are normal. Pupils are equal, round, and reactive to light.   Neck: Normal range of motion. Neck supple.   Cardiovascular: Normal rate, regular rhythm, normal heart sounds and intact distal pulses.   Pulmonary/Chest: Effort normal and breath sounds normal.   Abdominal: Soft. Bowel sounds are normal.   Musculoskeletal:   Right fourth toe noted to be discolored with necrotic tip. Right dorsum of foot is edematous and erythematous.  No palpable pulse. No pulses located with doppler.     Neurological: She is alert and oriented to person, place, and time.   Skin: Skin is warm and dry.   Nursing note and vitals reviewed.      Procedures           ED Course  ED Course as of Jul 09 0859   Mon Jul 08, 2019   1249 D/W Dr. Cano at  vascular - will accept pt.     notified me that they are at maximum capacity and would have to confirm if they could accept this or not.    [ML]   1300 Contacted [facility] to confirm if they could take the patient or not. No word at this time. Dr. Guaman recommends contacting [facility]   [ML]   1313 Dr. Guaman has reassessed the patient. No further recommendations.  Continue with current plan.    [ML]   1320 Contacted Dr. Nance will consult at [facility].  Must speak to hospitalist first.    [ML]   1321  ER has accepted patient to Dr. Cano.  Will get transport.    [ML]      ED Course User Index  [ML] Ammy Bradley PA                  [facility]      Final diagnoses:   Arterial occlusion            Ammy Bradley PA  07/09/19 0859

## 2019-07-08 NOTE — ED NOTES
Contacted [facility] for transfer,  being paged for call back     Juan Camarillo  07/08/19 7477

## 2019-07-08 NOTE — ED NOTES
Air evac advised base  109 doing extended weather check, will call back       Juan Camarillo  07/08/19 4107

## 2019-07-13 LAB
BACTERIA SPEC AEROBE CULT: NORMAL
BACTERIA SPEC AEROBE CULT: NORMAL

## 2019-07-20 ENCOUNTER — APPOINTMENT (OUTPATIENT)
Dept: LAB | Facility: HOSPITAL | Age: 84
End: 2019-07-20

## 2019-07-20 ENCOUNTER — TRANSCRIBE ORDERS (OUTPATIENT)
Dept: ADMINISTRATIVE | Facility: HOSPITAL | Age: 84
End: 2019-07-20

## 2019-07-20 DIAGNOSIS — I77.1 STRICTURE OF ARTERY (HCC): ICD-10-CM

## 2019-07-20 DIAGNOSIS — E78.5 HYPERLIPIDEMIA, UNSPECIFIED HYPERLIPIDEMIA TYPE: ICD-10-CM

## 2019-07-20 DIAGNOSIS — I48.91 ATRIAL FIBRILLATION, UNSPECIFIED TYPE (HCC): ICD-10-CM

## 2019-07-20 DIAGNOSIS — D50.9 IRON DEFICIENCY ANEMIA, UNSPECIFIED IRON DEFICIENCY ANEMIA TYPE: Primary | ICD-10-CM

## 2019-07-20 LAB
ANION GAP SERPL CALCULATED.3IONS-SCNC: 12.8 MMOL/L (ref 5–15)
BUN BLD-MCNC: 13 MG/DL (ref 8–23)
BUN/CREAT SERPL: 12.9 (ref 7–25)
CALCIUM SPEC-SCNC: 8.9 MG/DL (ref 8.6–10.5)
CHLORIDE SERPL-SCNC: 105 MMOL/L (ref 98–107)
CHOLEST SERPL-MCNC: 132 MG/DL (ref 0–200)
CO2 SERPL-SCNC: 20.2 MMOL/L (ref 22–29)
CREAT BLD-MCNC: 1.01 MG/DL (ref 0.57–1)
DEPRECATED RDW RBC AUTO: 59.3 FL (ref 37–54)
ERYTHROCYTE [DISTWIDTH] IN BLOOD BY AUTOMATED COUNT: 18.7 % (ref 12.3–15.4)
GFR SERPL CREATININE-BSD FRML MDRD: 53 ML/MIN/1.73
GLUCOSE BLD-MCNC: 92 MG/DL (ref 65–99)
HCT VFR BLD AUTO: 26.8 % (ref 34–46.6)
HDLC SERPL-MCNC: 52 MG/DL (ref 40–60)
HGB BLD-MCNC: 8 G/DL (ref 12–15.9)
IRON 24H UR-MRATE: 32 MCG/DL (ref 37–145)
IRON SATN MFR SERPL: 8 % (ref 20–50)
LDLC SERPL CALC-MCNC: 59 MG/DL (ref 0–100)
LDLC/HDLC SERPL: 1.13 {RATIO}
MCH RBC QN AUTO: 26 PG (ref 26.6–33)
MCHC RBC AUTO-ENTMCNC: 29.9 G/DL (ref 31.5–35.7)
MCV RBC AUTO: 87 FL (ref 79–97)
PLATELET # BLD AUTO: 331 10*3/MM3 (ref 140–450)
PMV BLD AUTO: 9.5 FL (ref 6–12)
POTASSIUM BLD-SCNC: 4.1 MMOL/L (ref 3.5–5.2)
RBC # BLD AUTO: 3.08 10*6/MM3 (ref 3.77–5.28)
SODIUM BLD-SCNC: 138 MMOL/L (ref 136–145)
TIBC SERPL-MCNC: 407 MCG/DL (ref 298–536)
TRANSFERRIN SERPL-MCNC: 273 MG/DL (ref 200–360)
TRIGL SERPL-MCNC: 107 MG/DL (ref 0–150)
VIT B12 BLD-MCNC: 630 PG/ML (ref 211–946)
VLDLC SERPL-MCNC: 21.4 MG/DL (ref 5–40)
WBC NRBC COR # BLD: 5.59 10*3/MM3 (ref 3.4–10.8)

## 2019-07-20 PROCEDURE — 83540 ASSAY OF IRON: CPT | Performed by: INTERNAL MEDICINE

## 2019-07-20 PROCEDURE — 84466 ASSAY OF TRANSFERRIN: CPT | Performed by: INTERNAL MEDICINE

## 2019-07-20 PROCEDURE — 80048 BASIC METABOLIC PNL TOTAL CA: CPT | Performed by: INTERNAL MEDICINE

## 2019-07-20 PROCEDURE — 36415 COLL VENOUS BLD VENIPUNCTURE: CPT | Performed by: INTERNAL MEDICINE

## 2019-07-20 PROCEDURE — 85027 COMPLETE CBC AUTOMATED: CPT | Performed by: INTERNAL MEDICINE

## 2019-07-20 PROCEDURE — 80061 LIPID PANEL: CPT | Performed by: INTERNAL MEDICINE

## 2019-07-20 PROCEDURE — 82607 VITAMIN B-12: CPT | Performed by: INTERNAL MEDICINE

## 2019-07-24 ENCOUNTER — TRANSCRIBE ORDERS (OUTPATIENT)
Dept: INFUSION THERAPY | Facility: HOSPITAL | Age: 84
End: 2019-07-24

## 2019-07-24 DIAGNOSIS — D50.9 IRON DEFICIENCY ANEMIA, UNSPECIFIED IRON DEFICIENCY ANEMIA TYPE: Primary | ICD-10-CM

## 2019-07-24 DIAGNOSIS — K90.9 INTESTINAL MALABSORPTION, UNSPECIFIED TYPE: ICD-10-CM

## 2019-08-18 ENCOUNTER — APPOINTMENT (OUTPATIENT)
Dept: CT IMAGING | Facility: HOSPITAL | Age: 84
End: 2019-08-18

## 2019-08-18 ENCOUNTER — APPOINTMENT (OUTPATIENT)
Dept: GENERAL RADIOLOGY | Facility: HOSPITAL | Age: 84
End: 2019-08-18

## 2019-08-18 ENCOUNTER — HOSPITAL ENCOUNTER (EMERGENCY)
Facility: HOSPITAL | Age: 84
Discharge: HOME | End: 2019-08-18
Attending: EMERGENCY MEDICINE | Admitting: EMERGENCY MEDICINE

## 2019-08-18 VITALS
DIASTOLIC BLOOD PRESSURE: 55 MMHG | TEMPERATURE: 99.1 F | RESPIRATION RATE: 18 BRPM | WEIGHT: 110 LBS | HEART RATE: 72 BPM | OXYGEN SATURATION: 99 % | BODY MASS INDEX: 21.6 KG/M2 | HEIGHT: 60 IN | SYSTOLIC BLOOD PRESSURE: 139 MMHG

## 2019-08-18 DIAGNOSIS — D64.9 CHRONIC ANEMIA: ICD-10-CM

## 2019-08-18 DIAGNOSIS — R53.1 GENERALIZED WEAKNESS: Primary | ICD-10-CM

## 2019-08-18 DIAGNOSIS — E86.0 DEHYDRATION: ICD-10-CM

## 2019-08-18 LAB
6-ACETYL MORPHINE: NEGATIVE
A-A DO2: 27.8 MMHG (ref 0–300)
ALBUMIN SERPL-MCNC: 4.13 G/DL (ref 3.5–5.2)
ALBUMIN/GLOB SERPL: 1.6 G/DL
ALP SERPL-CCNC: 73 U/L (ref 39–117)
ALT SERPL W P-5'-P-CCNC: 32 U/L (ref 1–33)
AMPHET+METHAMPHET UR QL: NEGATIVE
ANION GAP SERPL CALCULATED.3IONS-SCNC: 16 MMOL/L (ref 5–15)
ARTERIAL PATENCY WRIST A: ABNORMAL
AST SERPL-CCNC: 30 U/L (ref 1–32)
ATMOSPHERIC PRESS: 727 MMHG
BARBITURATES UR QL SCN: NEGATIVE
BASE EXCESS BLDA CALC-SCNC: -4.1 MMOL/L
BASOPHILS # BLD AUTO: 0.01 10*3/MM3 (ref 0–0.2)
BASOPHILS NFR BLD AUTO: 0.1 % (ref 0–1.5)
BDY SITE: ABNORMAL
BENZODIAZ UR QL SCN: NEGATIVE
BILIRUB SERPL-MCNC: 0.3 MG/DL (ref 0.2–1.2)
BILIRUB UR QL STRIP: NEGATIVE
BODY TEMPERATURE: 98.6 C
BUN BLD-MCNC: 28 MG/DL (ref 8–23)
BUN/CREAT SERPL: 20.3 (ref 7–25)
BUPRENORPHINE SERPL-MCNC: NEGATIVE NG/ML
CALCIUM SPEC-SCNC: 9.3 MG/DL (ref 8.6–10.5)
CANNABINOIDS SERPL QL: NEGATIVE
CHLORIDE SERPL-SCNC: 99 MMOL/L (ref 98–107)
CLARITY UR: CLEAR
CO2 SERPL-SCNC: 19 MMOL/L (ref 22–29)
COCAINE UR QL: NEGATIVE
COHGB MFR BLD: 2.8 % (ref 0–5)
COLOR UR: YELLOW
CREAT BLD-MCNC: 1.38 MG/DL (ref 0.57–1)
DEPRECATED RDW RBC AUTO: 54 FL (ref 37–54)
EOSINOPHIL # BLD AUTO: 0.03 10*3/MM3 (ref 0–0.4)
EOSINOPHIL NFR BLD AUTO: 0.4 % (ref 0.3–6.2)
ERYTHROCYTE [DISTWIDTH] IN BLOOD BY AUTOMATED COUNT: 17.1 % (ref 12.3–15.4)
GFR SERPL CREATININE-BSD FRML MDRD: 37 ML/MIN/1.73
GLOBULIN UR ELPH-MCNC: 2.6 GM/DL
GLUCOSE BLD-MCNC: 103 MG/DL (ref 65–99)
GLUCOSE UR STRIP-MCNC: NEGATIVE MG/DL
HCO3 BLDA-SCNC: 19.5 MMOL/L (ref 22–26)
HCT VFR BLD AUTO: 28.7 % (ref 34–46.6)
HCT VFR BLD CALC: 27 % (ref 37–47)
HGB BLD-MCNC: 8.4 G/DL (ref 12–15.9)
HGB BLDA-MCNC: 9.2 G/DL (ref 12–16)
HGB UR QL STRIP.AUTO: NEGATIVE
HOROWITZ INDEX BLD+IHG-RTO: 21 %
IMM GRANULOCYTES # BLD AUTO: 0.01 10*3/MM3 (ref 0–0.05)
IMM GRANULOCYTES NFR BLD AUTO: 0.1 % (ref 0–0.5)
KETONES UR QL STRIP: NEGATIVE
LEUKOCYTE ESTERASE UR QL STRIP.AUTO: NEGATIVE
LYMPHOCYTES # BLD AUTO: 0.79 10*3/MM3 (ref 0.7–3.1)
LYMPHOCYTES NFR BLD AUTO: 11.2 % (ref 19.6–45.3)
MCH RBC QN AUTO: 25.7 PG (ref 26.6–33)
MCHC RBC AUTO-ENTMCNC: 29.3 G/DL (ref 31.5–35.7)
MCV RBC AUTO: 87.8 FL (ref 79–97)
METHADONE UR QL SCN: NEGATIVE
METHGB BLD QL: 0.5 % (ref 0–3)
MODALITY: ABNORMAL
MONOCYTES # BLD AUTO: 0.57 10*3/MM3 (ref 0.1–0.9)
MONOCYTES NFR BLD AUTO: 8.1 % (ref 5–12)
NEUTROPHILS # BLD AUTO: 5.66 10*3/MM3 (ref 1.7–7)
NEUTROPHILS NFR BLD AUTO: 80.1 % (ref 42.7–76)
NITRITE UR QL STRIP: NEGATIVE
OPIATES UR QL: NEGATIVE
OXYCODONE UR QL SCN: NEGATIVE
OXYHGB MFR BLDV: 92.5 % (ref 85–100)
PCO2 BLDA: 30.1 MM HG (ref 35–45)
PCP UR QL SCN: NEGATIVE
PH BLDA: 7.43 PH UNITS (ref 7.35–7.45)
PH UR STRIP.AUTO: <=5 [PH] (ref 5–8)
PLATELET # BLD AUTO: 247 10*3/MM3 (ref 140–450)
PMV BLD AUTO: 9.3 FL (ref 6–12)
PO2 BLDA: 79 MM HG (ref 80–100)
POTASSIUM BLD-SCNC: 3.8 MMOL/L (ref 3.5–5.2)
PROT SERPL-MCNC: 6.7 G/DL (ref 6–8.5)
PROT UR QL STRIP: NEGATIVE
RBC # BLD AUTO: 3.27 10*6/MM3 (ref 3.77–5.28)
SAO2 % BLDCOA: 95.7 % (ref 90–100)
SODIUM BLD-SCNC: 134 MMOL/L (ref 136–145)
SP GR UR STRIP: 1.01 (ref 1–1.03)
TROPONIN T SERPL-MCNC: <0.01 NG/ML (ref 0–0.03)
UROBILINOGEN UR QL STRIP: NORMAL
WBC NRBC COR # BLD: 7.07 10*3/MM3 (ref 3.4–10.8)

## 2019-08-18 PROCEDURE — 70450 CT HEAD/BRAIN W/O DYE: CPT

## 2019-08-18 PROCEDURE — 80307 DRUG TEST PRSMV CHEM ANLYZR: CPT | Performed by: EMERGENCY MEDICINE

## 2019-08-18 PROCEDURE — 93010 ELECTROCARDIOGRAM REPORT: CPT | Performed by: INTERNAL MEDICINE

## 2019-08-18 PROCEDURE — 80053 COMPREHEN METABOLIC PANEL: CPT | Performed by: EMERGENCY MEDICINE

## 2019-08-18 PROCEDURE — 36600 WITHDRAWAL OF ARTERIAL BLOOD: CPT | Performed by: EMERGENCY MEDICINE

## 2019-08-18 PROCEDURE — 82805 BLOOD GASES W/O2 SATURATION: CPT | Performed by: EMERGENCY MEDICINE

## 2019-08-18 PROCEDURE — 82375 ASSAY CARBOXYHB QUANT: CPT | Performed by: EMERGENCY MEDICINE

## 2019-08-18 PROCEDURE — 85025 COMPLETE CBC W/AUTO DIFF WBC: CPT | Performed by: EMERGENCY MEDICINE

## 2019-08-18 PROCEDURE — 83050 HGB METHEMOGLOBIN QUAN: CPT | Performed by: EMERGENCY MEDICINE

## 2019-08-18 PROCEDURE — 99285 EMERGENCY DEPT VISIT HI MDM: CPT

## 2019-08-18 PROCEDURE — 93005 ELECTROCARDIOGRAM TRACING: CPT | Performed by: EMERGENCY MEDICINE

## 2019-08-18 PROCEDURE — 81003 URINALYSIS AUTO W/O SCOPE: CPT | Performed by: EMERGENCY MEDICINE

## 2019-08-18 PROCEDURE — 84484 ASSAY OF TROPONIN QUANT: CPT | Performed by: EMERGENCY MEDICINE

## 2019-08-18 PROCEDURE — 71045 X-RAY EXAM CHEST 1 VIEW: CPT

## 2019-08-18 RX ORDER — SODIUM CHLORIDE 0.9 % (FLUSH) 0.9 %
10 SYRINGE (ML) INJECTION AS NEEDED
Status: DISCONTINUED | OUTPATIENT
Start: 2019-08-18 | End: 2019-08-18 | Stop reason: HOSPADM

## 2019-08-18 RX ADMIN — SODIUM CHLORIDE 1000 ML: 9 INJECTION, SOLUTION INTRAVENOUS at 19:24

## 2019-08-18 NOTE — ED PROVIDER NOTES
"Subjective   83-year-old white female here for weakness.  Patient was shopping today when she felt weak.  She complained of generalized weakness and leg weakness.  She was unable to walk.  She says that she felt \"funny\" in her head.  She denied any headache, shortness of breath, chest pain, abdominal pain, nausea, vomiting or other complaints.  Patient had 2 previous CVAs and peripheral vascular disease status post right iliac/femoral stent.            Review of Systems   All other systems reviewed and are negative.      Past Medical History:   Diagnosis Date   • COPD  12/17/2018   • DVT of lower extremity (deep venous thrombosis) (CMS/[facility])    • History of CVA 2013 12/17/2018   • HTN 12/17/2018   • PAD  12/17/2018   • Tobacco use 12/17/2018       No Known Allergies    Past Surgical History:   Procedure Laterality Date   • CARDIAC CATHETERIZATION  12/17/2018    Procedure: Left Heart Cath;  Surgeon: Imtiaz Fuentes MD;  Location:  [facility] CATH INVASIVE LOCATION;  Service: Cardiology   • CARDIAC CATHETERIZATION N/A 12/17/2018    Procedure: Thrombolysis-peripheral;  Surgeon: Imtiaz Fuentes MD;  Location:  [facility] CATH INVASIVE LOCATION;  Service: Cardiology   • CARDIAC ELECTROPHYSIOLOGY PROCEDURE N/A 12/18/2018    Procedure: PACEMAKER IMPLANTATION- DC;  Surgeon: Thony Bobby MD;  Location:  [facility] EP INVASIVE LOCATION;  Service: Cardiology   • CARDIAC ELECTROPHYSIOLOGY PROCEDURE N/A 12/17/2018    Procedure: Temporary Pacemaker;  Surgeon: Imtiaz Fuentes MD;  Location:  [facility] CATH INVASIVE LOCATION;  Service: Cardiology   • COLONOSCOPY N/A 4/19/2019    Procedure: COLONOSCOPY;  Surgeon: Chris Zimmerman MD;  Location: [facility] OR;  Service: Gastroenterology   • ENDOSCOPY N/A 4/19/2019    Procedure: ESOPHAGOGASTRODUODENOSCOPY;  Surgeon: Chris Zimmerman MD;  Location: [facility] OR;  Service: Gastroenterology   • FEMORAL ARTERY STENT  2013   • FEMORAL POPLITEAL BYPASS Right " 2018    Procedure: LOWER EXTREMITY EMBELECTOMY RIGHT;  Surgeon: David Beatty MD;  Location: [facility] OR ;  Service: Vascular   • HYSTERECTOMY     • LEFT HEART CATH  2018    At [facility] with TV PM placement    • PACEMAKER IMPLANTATION         Family History   Problem Relation Age of Onset   • Cancer Mother    • Cancer Father    • Heart attack Brother    • Heart attack Brother        Social History     Socioeconomic History   • Marital status:      Spouse name: Not on file   • Number of children: 3   • Years of education: Not on file   • Highest education level: Not on file   Occupational History   • Occupation: Homemaker   Tobacco Use   • Smoking status: Light Tobacco Smoker     Packs/day: 0.25     Years: 55.00     Pack years: 13.75     Types: Cigarettes     Last attempt to quit: 3/25/2019     Years since quittin.4   • Smokeless tobacco: Never Used   Substance and Sexual Activity   • Alcohol use: No     Frequency: Never   • Drug use: No   • Sexual activity: Defer   Social History Narrative    Lives in [place], [place] alone           Objective   Physical Exam   Constitutional: She is oriented to person, place, and time. She appears well-developed and well-nourished.   HENT:   Head: Normocephalic and atraumatic.   Mouth/Throat: Oropharynx is clear and moist.   Neck: Normal range of motion. Neck supple.   Cardiovascular: Normal rate, regular rhythm and normal heart sounds. Exam reveals no gallop and no friction rub.   No murmur heard.  Pulmonary/Chest: Effort normal and breath sounds normal. No respiratory distress. She has no wheezes. She has no rales.   Abdominal: Soft. Bowel sounds are normal. She exhibits no distension. There is no tenderness.   Musculoskeletal: Normal range of motion. She exhibits no edema.   Neurological: She is alert and oriented to person, place, and time.   Skin: Skin is warm and dry. Capillary refill takes less than 2 seconds.   Psychiatric: She has a normal mood and  affect.   Nursing note and vitals reviewed.      Procedures  Results for orders placed or performed during the hospital encounter of 08/18/19   Comprehensive Metabolic Panel   Result Value Ref Range    Glucose 103 (H) 65 - 99 mg/dL    BUN 28 (H) 8 - 23 mg/dL    Creatinine 1.38 (H) 0.57 - 1.00 mg/dL    Sodium 134 (L) 136 - 145 mmol/L    Potassium 3.8 3.5 - 5.2 mmol/L    Chloride 99 98 - 107 mmol/L    CO2 19.0 (L) 22.0 - 29.0 mmol/L    Calcium 9.3 8.6 - 10.5 mg/dL    Total Protein 6.7 6.0 - 8.5 g/dL    Albumin 4.13 3.50 - 5.20 g/dL    ALT (SGPT) 32 1 - 33 U/L    AST (SGOT) 30 1 - 32 U/L    Alkaline Phosphatase 73 39 - 117 U/L    Total Bilirubin 0.3 0.2 - 1.2 mg/dL    eGFR Non African Amer 37 (L) >60 mL/min/1.73    Globulin 2.6 gm/dL    A/G Ratio 1.6 g/dL    BUN/Creatinine Ratio 20.3 7.0 - 25.0    Anion Gap 16.0 (H) 5.0 - 15.0 mmol/L   Urinalysis With Culture If Indicated - Urine, Clean Catch   Result Value Ref Range    Color, UA Yellow Yellow, Straw    Appearance, UA Clear Clear    pH, UA <=5.0 5.0 - 8.0    Specific Gravity, UA 1.011 1.005 - 1.030    Glucose, UA Negative Negative    Ketones, UA Negative Negative    Bilirubin, UA Negative Negative    Blood, UA Negative Negative    Protein, UA Negative Negative    Leuk Esterase, UA Negative Negative    Nitrite, UA Negative Negative    Urobilinogen, UA 0.2 E.U./dL 0.2 - 1.0 E.U./dL   Blood Gas, Arterial   Result Value Ref Range    Site Arterial: right brachial     Isrrael's Test N/A     pH, Arterial 7.429 7.350 - 7.450 pH units    pCO2, Arterial 30.1 (L) 35.0 - 45.0 mm Hg    pO2, Arterial 79.0 (L) 80.0 - 100.0 mm Hg    HCO3, Arterial 19.5 (C) 22.0 - 26.0 mmol/L    Base Excess, Arterial -4.1 mmol/L    O2 Saturation, Arterial 95.7 90.0 - 100.0 %    Hemoglobin, Blood Gas 9.2 (L) 12 - 16 g/dL    Hematocrit, Blood Gas 27.0 (L) 37.0 - 47.0 %    Oxyhemoglobin 92.5 85 - 100 %    Methemoglobin 0.50 0.00 - 3.00 %    Carboxyhemoglobin 2.8 0 - 5 %    A-a Gradiant 27.8 0.0 - 300.0 mmHg     Temperature 98.6 C    Barometric Pressure for Blood Gas 727 mmHg    Modality Room Air     FIO2 21 %   Troponin   Result Value Ref Range    Troponin T <0.010 0.000 - 0.030 ng/mL   Urine Drug Screen - Urine, Clean Catch   Result Value Ref Range    Amphetamine Screen, Urine Negative Negative    Barbiturates Screen, Urine Negative Negative    Benzodiazepine Screen, Urine Negative Negative    Cocaine Screen, Urine Negative Negative    Methadone Screen, Urine Negative Negative    Opiate Screen Negative Negative    Phencyclidine (PCP), Urine Negative Negative    THC, Screen, Urine Negative Negative    6-ACETYL MORPHINE Negative Negative    Buprenorphine, Screen, Urine Negative Negative    Oxycodone Screen, Urine Negative Negative   CBC Auto Differential   Result Value Ref Range    WBC 7.07 3.40 - 10.80 10*3/mm3    RBC 3.27 (L) 3.77 - 5.28 10*6/mm3    Hemoglobin 8.4 (L) 12.0 - 15.9 g/dL    Hematocrit 28.7 (L) 34.0 - 46.6 %    MCV 87.8 79.0 - 97.0 fL    MCH 25.7 (L) 26.6 - 33.0 pg    MCHC 29.3 (L) 31.5 - 35.7 g/dL    RDW 17.1 (H) 12.3 - 15.4 %    RDW-SD 54.0 37.0 - 54.0 fl    MPV 9.3 6.0 - 12.0 fL    Platelets 247 140 - 450 10*3/mm3    Neutrophil % 80.1 (H) 42.7 - 76.0 %    Lymphocyte % 11.2 (L) 19.6 - 45.3 %    Monocyte % 8.1 5.0 - 12.0 %    Eosinophil % 0.4 0.3 - 6.2 %    Basophil % 0.1 0.0 - 1.5 %    Immature Grans % 0.1 0.0 - 0.5 %    Neutrophils, Absolute 5.66 1.70 - 7.00 10*3/mm3    Lymphocytes, Absolute 0.79 0.70 - 3.10 10*3/mm3    Monocytes, Absolute 0.57 0.10 - 0.90 10*3/mm3    Eosinophils, Absolute 0.03 0.00 - 0.40 10*3/mm3    Basophils, Absolute 0.01 0.00 - 0.20 10*3/mm3    Immature Grans, Absolute 0.01 0.00 - 0.05 10*3/mm3     Xr Chest 1 View    Result Date: 8/18/2019  Narrative: CR Chest 1 Vw INDICATION: Chest x-ray, altered mental status, emergency room evaluation COMPARISON:  Chest x-ray 4/17/2019 FINDINGS: Single portable AP view(s) of the chest.  There is mild cardiac silhouette enlargement with a pacer,  not appreciably changed. There is underlying chronic lung disease with areas of parenchymal scarring but no acute infiltrate or acute congestive failure is appreciated. No pleural effusion or pneumothorax.     Impression: No change in the appearance of the chest from April 17. Redemonstrated is cardiac enlargement with a pacemaker and underlying chronic lung disease. Signer Name: Suzy Bess MD  Signed: 8/18/2019 6:17 PM  Workstation Name: [email or web address]  [facility]    Ct Head Without Contrast Stroke Protocol    Result Date: 8/18/2019  Narrative: HISTORY: Confusion and leg weakness started earlier today evaluate for stroke TECHNIQUE: CT head without contrast. Radiation dose reduction techniques included automated exposure control or exposure modulation based on body size. Radiation audit for number of CT and nuclear cardiology exams performed in the last year: 0.  COMPARISON: Head CT from 8/28/2017 FINDINGS: There is no displaced calvarial fracture. The mastoid air cells are clear. There are chronic osteitis changes visualized right maxillary sinus. No sinus air-fluid level. Mild vascular calcifications at the base of the brain. There is white matter low attenuation which is fairly extensive and most focal in the left frontal white matter extending into the left anterior basal ganglia with old left basal ganglionic lacunar type insults. There is malacic change extending into the left inferolateral frontal lobe and involving left anterior insula. These findings are all chronic. There is however overall progression of white matter low attenuation when comparison is made to 2017 likely due to progression of small vessel disease. There is no extra-axial fluid collection. There is no evidence for acute intracranial hemorrhage. The basilar cisterns are patent. No intracranial mass effect. No acute cortical infarct is appreciated.     Impression: 1. No acute intracranial abnormality. 2. Chronic  ischemic insults including focal encephalomalacia left inferolateral frontal lobe and anterior insula. 3. Probable sequelae of small vessel disease showing overall progression since 2017. 4. If there is clinical concern for acute CVA, follow-up imaging is recommended. Signer Name: Suzy Bess MD  Signed: 8/18/2019 4:42 PM  Workstation Name: [email or web address]  [facility]             ED Course  ED Course as of Aug 18 1922   Sun Aug 18, 2019   1811 Atrial paced rhythm.  Rate 60.  Left axis deviation.  Early repolarization changes.  Q waves in lead V1 and V2.  No acute ischemic changes.  Compared to 5/19 previously was in atrial fibrillation, otherwise unchanged ECG 12 Lead [BC]      ED Course User Index  [BC] Baldomero Rock MD                  MDM  Number of Diagnoses or Management Options  Chronic anemia:   Dehydration:   Generalized weakness:      Amount and/or Complexity of Data Reviewed  Clinical lab tests: reviewed  Tests in the radiology section of CPT®: reviewed  Tests in the medicine section of CPT®: reviewed  Decide to obtain previous medical records or to obtain history from someone other than the patient: yes    Risk of Complications, Morbidity, and/or Mortality  Presenting problems: high  Diagnostic procedures: high  Management options: moderate          Final diagnoses:   Generalized weakness   Dehydration   Chronic anemia            Baldomero Rock MD  08/18/19 1922

## 2019-08-19 ENCOUNTER — CLINICAL SUPPORT NO REQUIREMENTS (OUTPATIENT)
Dept: CARDIOLOGY | Facility: CLINIC | Age: 84
End: 2019-08-19

## 2019-08-19 DIAGNOSIS — I44.2 COMPLETE HEART BLOCK (HCC): ICD-10-CM

## 2019-08-19 PROCEDURE — 93296 REM INTERROG EVL PM/IDS: CPT | Performed by: INTERNAL MEDICINE

## 2019-08-19 PROCEDURE — 93294 REM INTERROG EVL PM/LDLS PM: CPT | Performed by: INTERNAL MEDICINE

## 2019-08-20 ENCOUNTER — TRANSCRIBE ORDERS (OUTPATIENT)
Dept: OTHER | Facility: OTHER | Age: 84
End: 2019-08-20

## 2019-08-20 DIAGNOSIS — D64.9 ANEMIA, UNSPECIFIED TYPE: Primary | ICD-10-CM

## 2019-08-21 ENCOUNTER — HOSPITAL ENCOUNTER (OUTPATIENT)
Dept: INFUSION THERAPY | Facility: HOSPITAL | Age: 84
Setting detail: INFUSION SERIES
Discharge: HOME | End: 2019-08-21

## 2019-08-21 VITALS
HEART RATE: 78 BPM | TEMPERATURE: 98.5 F | SYSTOLIC BLOOD PRESSURE: 136 MMHG | DIASTOLIC BLOOD PRESSURE: 44 MMHG | RESPIRATION RATE: 18 BRPM

## 2019-08-21 DIAGNOSIS — D50.0 IRON DEFICIENCY ANEMIA DUE TO CHRONIC BLOOD LOSS: Primary | ICD-10-CM

## 2019-08-21 PROCEDURE — 96365 THER/PROPH/DIAG IV INF INIT: CPT

## 2019-08-21 PROCEDURE — 25010000002 FERRIC CARBOXYMALTOSE 750 MG/15ML SOLUTION 15 ML VIAL: Performed by: INTERNAL MEDICINE

## 2019-08-21 RX ADMIN — FERRIC CARBOXYMALTOSE INJECTION 750 MG: 50 INJECTION, SOLUTION INTRAVENOUS at 15:03

## 2019-08-21 NOTE — PATIENT INSTRUCTIONS
Ferric carboxymaltose injection  What is this medicine?  FERRIC CARBOXYMALTOSE (ferr-ik car-box-ee-mol-toes) is an iron complex. Iron is used to make healthy red blood cells, which carry oxygen and nutrients throughout the body. This medicine is used to treat anemia in people with chronic kidney disease or people who cannot take iron by mouth.  This medicine may be used for other purposes; ask your health care provider or pharmacist if you have questions.  COMMON BRAND NAME(S): Injectafer  What should I tell my health care provider before I take this medicine?  They need to know if you have any of these conditions:  -high levels of iron in the blood  -liver disease  -an unusual or allergic reaction to iron, other medicines, foods, dyes, or preservatives  -pregnant or trying to get pregnant  -breast-feeding  How should I use this medicine?  This medicine is for infusion into a vein. It is given by a health care professional in a hospital or clinic setting.  Talk to your pediatrician regarding the use of this medicine in children. Special care may be needed.  Overdosage: If you think you have taken too much of this medicine contact a poison control center or emergency room at once.  NOTE: This medicine is only for you. Do not share this medicine with others.  What if I miss a dose?  It is important not to miss your dose. Call your doctor or health care professional if you are unable to keep an appointment.  What may interact with this medicine?  Do not take this medicine with any of the following medications:  -deferoxamine  -dimercaprol  -other iron products  This list may not describe all possible interactions. Give your health care provider a list of all the medicines, herbs, non-prescription drugs, or dietary supplements you use. Also tell them if you smoke, drink alcohol, or use illegal drugs. Some items may interact with your medicine.  What should I watch for while using this medicine?  Visit your doctor or  health care professional regularly. Tell your doctor if your symptoms do not start to get better or if they get worse. You may need blood work done while you are taking this medicine.  You may need to follow a special diet. Talk to your doctor. Foods that contain iron include: whole grains/cereals, dried fruits, beans, or peas, leafy green vegetables, and organ meats (liver, kidney).  What side effects may I notice from receiving this medicine?  Side effects that you should report to your doctor or health care professional as soon as possible:  -allergic reactions like skin rash, itching or hives, swelling of the face, lips, or tongue  -dizziness  -facial flushing  Side effects that usually do not require medical attention (report to your doctor or health care professional if they continue or are bothersome):  -changes in taste  -constipation  -headache  -nausea, vomiting  -pain, redness, or irritation at site where injected  This list may not describe all possible side effects. Call your doctor for medical advice about side effects. You may report side effects to FDA at 3-104-FDA-1353.  Where should I keep my medicine?  This drug is given in a hospital or clinic and will not be stored at home.  NOTE: This sheet is a summary. It may not cover all possible information. If you have questions about this medicine, talk to your doctor, pharmacist, or health care provider.  © 2019 [employer]/Gold Standard (2018-02-01 09:40:29)

## 2019-08-28 ENCOUNTER — HOSPITAL ENCOUNTER (OUTPATIENT)
Dept: INFUSION THERAPY | Facility: HOSPITAL | Age: 84
Setting detail: INFUSION SERIES
Discharge: HOME | End: 2019-08-28

## 2019-08-28 VITALS
HEART RATE: 70 BPM | DIASTOLIC BLOOD PRESSURE: 56 MMHG | SYSTOLIC BLOOD PRESSURE: 136 MMHG | RESPIRATION RATE: 17 BRPM | TEMPERATURE: 98.3 F

## 2019-08-28 DIAGNOSIS — D50.0 IRON DEFICIENCY ANEMIA DUE TO CHRONIC BLOOD LOSS: Primary | ICD-10-CM

## 2019-08-28 PROCEDURE — 25010000002 FERRIC CARBOXYMALTOSE 750 MG/15ML SOLUTION 15 ML VIAL: Performed by: INTERNAL MEDICINE

## 2019-08-28 PROCEDURE — 96365 THER/PROPH/DIAG IV INF INIT: CPT

## 2019-08-28 RX ADMIN — FERRIC CARBOXYMALTOSE INJECTION 750 MG: 50 INJECTION, SOLUTION INTRAVENOUS at 11:56

## 2019-10-11 ENCOUNTER — OFFICE VISIT (OUTPATIENT)
Dept: CARDIOLOGY | Facility: CLINIC | Age: 84
End: 2019-10-11

## 2019-10-11 VITALS
HEIGHT: 60 IN | DIASTOLIC BLOOD PRESSURE: 84 MMHG | BODY MASS INDEX: 21.6 KG/M2 | WEIGHT: 110 LBS | SYSTOLIC BLOOD PRESSURE: 160 MMHG | HEART RATE: 70 BPM

## 2019-10-11 DIAGNOSIS — I44.2 COMPLETE HEART BLOCK (HCC): Primary | ICD-10-CM

## 2019-10-11 PROCEDURE — 99214 OFFICE O/P EST MOD 30 MIN: CPT | Performed by: PHYSICIAN ASSISTANT

## 2019-10-11 PROCEDURE — 93280 PM DEVICE PROGR EVAL DUAL: CPT | Performed by: PHYSICIAN ASSISTANT

## 2019-10-11 RX ORDER — METOPROLOL TARTRATE 50 MG/1
50 TABLET, FILM COATED ORAL 2 TIMES DAILY
Qty: 60 TABLET | Refills: 11 | Status: SHIPPED | OUTPATIENT
Start: 2019-10-11 | End: 2021-11-05 | Stop reason: SDUPTHER

## 2019-10-11 NOTE — PROGRESS NOTES
Rupinder Lees  1935  PCP: Gifty Kruse MD    SUBJECTIVE:   Rupinder Lees is a 84 y.o. female seen for a follow up visit regarding the following:     Chief Complaint: Follow up for CHB, Afib     HPI:    Patient is an 84 year old female with the below noted PMHx that presents of follow-up of her CHB s/p ppm and PAF. She has not been seen by our practice since discharge in Dec 2018 as she has cancelled her previous office visits. She is not sure which medicines she is taking and came today without a list. Stated she has a difficult time keeping appointments. No cardiovascular complaints today. No CP, SOB, edema, orthopnea, palps, fevers, chills.       Cardiac PMH: (Old records have been reviewed and summarized below)    Problem List:  1. Complete Heart Block  a. Bradycardia/hypotension noted at [facility] 12/17/18  b. Temporary PM placement 12/17/18 at [facility]  c. No underlying rhythm 12/18/18  2. PVD  a. History of claudication pain with ischemic right leg 12/16/187  b. AA with runoffs 12/17/18: R Popliteal was occluded at P1 segment, no distal run off, very faint collaterals  c. Embolectomy/thrombectomy of right lower extremity  3. Nonobstructive CAD:  a. [facility] 12/17/18: nonobstructive CAD, normal EF. LAD with Prox mild diffuse disease, mid at Dx bifurcation had 50% stenosis, distal small vessel diffuse disease Dx small vessel mild luminal irregularities. Left circumflex with Moderate calibre, mild diffuse stenosis, mid Lcx at OM take off had 40% stenosis, OM1 is a large calibre, prox 30-40% stenosis and mid and distal mild irregularities. Small to moderate calibre, dominant, prox 50 % tubular stenosis of RCA, mid and distal normal, small PDA and PL , no stenosis  4. Paroxysmal Afib   a. Diagnosed during admission 12/2018. Currently on Amiodarone therapy  b. CHADSVASC is 5; Eliquis 2.5mg BID   5. Tobacco abuse  6. HLP  7. HTN  8. History of subacute left MCA distribution ischemic CVA  9. Medical  noncompliance  10. MAGGIE - now resolved.       Current Outpatient Medications:   •  amiodarone (PACERONE) 200 MG tablet, , Disp: , Rfl:   •  apixaban (ELIQUIS) 2.5 MG tablet tablet, Take 1 tablet by mouth Every 12 (Twelve) Hours., Disp: 60 tablet, Rfl:   •  atorvastatin (LIPITOR) 40 MG tablet, Take 40 mg by mouth Daily., Disp: , Rfl:   •  cilostazol (PLETAL) 100 MG tablet, Take 1 tablet by mouth 2 (Two) Times a Day., Disp: , Rfl:   •  ferrous sulfate (IRON SUPPLEMENT) 325 (65 FE) MG tablet, Take 1 tablet by mouth Daily With Breakfast., Disp: 30 tablet, Rfl: 0  •  hydrALAZINE (APRESOLINE) 25 MG tablet, Take 25 mg by mouth 2 (Two) Times a Day., Disp: , Rfl:   •  metoprolol tartrate (LOPRESSOR) 50 MG tablet, Take 1 tablet by mouth 2 (Two) Times a Day., Disp: 60 tablet, Rfl: 11  •  pantoprazole (PROTONIX) 40 MG EC tablet, , Disp: , Rfl:   •  potassium chloride (K-DUR) 10 MEQ CR tablet, , Disp: , Rfl:   •  potassium chloride ER (K-TAB) 20 MEQ tablet controlled-release ER tablet, , Disp: , Rfl:     Past Medical History, Past Surgical History, Family history, Social History, and Medications were all reviewed with the patient today and updated as necessary.       Patient Active Problem List   Diagnosis   • Complete heart block S/P transvenous pacemaker placement at [facility] on 12/17   • PAD    • HTN   • History of subacute left MCA distribution ischemic CVA   • COPD    • Tobacco use   • History of noncompliance with medical treatment   • Acute kidney injury Cr 1.72 on admission    • Hyperlipidemia   • NSTEMI    • CAD with occluded LAD and RCA per [facility] 12/17/18   • Right ischemic leg   • Popliteal thrombosis (CMS/HCC)   • Pneumonia of left lower lobe due to infectious organism (CMS/HCC)   • Anemia   • Hypokalemia   • Hypophosphatemia   • Occult blood positive stool   • Iron deficiency anemia due to chronic blood loss     No Known Allergies  Past Medical History:   Diagnosis Date   • COPD  12/17/2018   • DVT of lower extremity (deep  venous thrombosis) (CMS/HCC)    • History of CVA 2018   • HTN 2018   • PAD  2018   • Tobacco use 2018     Past Surgical History:   Procedure Laterality Date   • CARDIAC CATHETERIZATION  2018    Procedure: Left Heart Cath;  Surgeon: Imtiaz Fuentes MD;  Location:  [facility] CATH INVASIVE LOCATION;  Service: Cardiology   • CARDIAC CATHETERIZATION N/A 2018    Procedure: Thrombolysis-peripheral;  Surgeon: Imtiaz Fuentes MD;  Location:  [facility] CATH INVASIVE LOCATION;  Service: Cardiology   • CARDIAC ELECTROPHYSIOLOGY PROCEDURE N/A 2018    Procedure: PACEMAKER IMPLANTATION- DC;  Surgeon: Thony Bobby MD;  Location:  [facility] INVASIVE [facility];  Service: Cardiology   • CARDIAC ELECTROPHYSIOLOGY PROCEDURE N/A 2018    Procedure: Temporary Pacemaker;  Surgeon: Imtiaz Fuentes MD;  Location:  [facility] CATH INVASIVE LOCATION;  Service: Cardiology   • COLONOSCOPY N/A 2019    Procedure: COLONOSCOPY;  Surgeon: Chris Zimmerman MD;  Location: [facility] OR;  Service: Gastroenterology   • ENDOSCOPY N/A 2019    Procedure: ESOPHAGOGASTRODUODENOSCOPY;  Surgeon: Chris Zimmerman MD;  Location: [facility] OR;  Service: Gastroenterology   • FEMORAL ARTERY STENT  2013   • FEMORAL POPLITEAL BYPASS Right 2018    Procedure: LOWER EXTREMITY EMBELECTOMY RIGHT;  Surgeon: David Beatty MD;  Location: [facility] HYBRID OR 15;  Service: Vascular   • HYSTERECTOMY     • LEFT HEART CATH  2018    At [facility] with TV PM placement    • PACEMAKER IMPLANTATION       Family History   Problem Relation Age of Onset   • Cancer Mother    • Cancer Father    • Heart attack Brother    • Heart attack Brother      Social History     Tobacco Use   • Smoking status: Light Tobacco Smoker     Packs/day: 0.25     Years: 55.00     Pack years: 13.75     Types: Cigarettes     Last attempt to quit: 3/25/2019     Years since quittin.5   • Smokeless tobacco: Never  "Used   Substance Use Topics   • Alcohol use: No     Frequency: Never         PHYSICAL EXAM:    /84 (BP Location: Right arm, Patient Position: Sitting)   Pulse 70   Ht 152.4 cm (60\")   Wt 49.9 kg (110 lb)   LMP  (LMP Unknown)   BMI 21.48 kg/m²        Wt Readings from Last 5 Encounters:   10/11/19 49.9 kg (110 lb)   08/18/19 49.9 kg (110 lb)   07/08/19 49.9 kg (110 lb)   05/30/19 49.9 kg (110 lb)   05/16/19 51.1 kg (112 lb 9.6 oz)       BP Readings from Last 5 Encounters:   10/11/19 160/84   08/28/19 136/56   08/21/19 136/44   08/18/19 139/55   07/08/19 175/60       General-Well Nourished, Well developed  Eyes - PERRLA  Neck- supple, No mass  CV- regular rate and rhythm, no MRG, No edema  Lung- clear bilaterally  Abd- soft, +BS  Musc/skel - Norm strength and range of motion  Skin- warm and dry  Neuro - Alert & Oriented x 3, appropriate mood.        Medical problems and test results were reviewed with the patient today.     No results found for this or any previous visit (from the past 672 hour(s)).      EKG: (EKG has been independently visualized by me and summarized below)    Procedures     ASSESSMENT and PLAN    1. Complete Heart Block s/p St. Elvis dual chamber PPM 12/2018 by Dr. Bobby.   2. St. Elvis Dual Chamber Pacemaker- normal function. <1% mode switched.   3. Paroxysmal Atrial Fibrillation- patient is unsure of any medications that she is taking and does not have a list today. At one point she was on Amiodarone 200mg daily. It does not appear that she is taking Metoprolol currently despite being placed on this post pacemaker. Will refill Metoprolol 50mg BID. Continue Eliqius 2.5mg BID for anticoagulation. Provided with samples today.   4. HTN- elevated. Start Metoprolol 50mg BID.       Return in about 6 months (around 4/11/2020) for [facility].        Xiao Duckworth PA-C   Cardiology/Electrophysiology  10/11/2019  11:28 AM  "

## 2019-11-22 ENCOUNTER — CLINICAL SUPPORT NO REQUIREMENTS (OUTPATIENT)
Dept: CARDIOLOGY | Facility: CLINIC | Age: 84
End: 2019-11-22

## 2019-11-22 DIAGNOSIS — I48.0 AF (PAROXYSMAL ATRIAL FIBRILLATION) (HCC): ICD-10-CM

## 2019-11-22 DIAGNOSIS — I44.2 COMPLETE HEART BLOCK (HCC): ICD-10-CM

## 2019-11-22 PROCEDURE — 93296 REM INTERROG EVL PM/IDS: CPT | Performed by: INTERNAL MEDICINE

## 2019-11-22 PROCEDURE — 93294 REM INTERROG EVL PM/LDLS PM: CPT | Performed by: INTERNAL MEDICINE

## 2019-11-26 ENCOUNTER — TELEPHONE (OUTPATIENT)
Dept: CARDIOLOGY | Facility: CLINIC | Age: 84
End: 2019-11-26

## 2019-11-26 NOTE — TELEPHONE ENCOUNTER
Called pt to check in with her in regards to fast heart rates over the weekend.  She is unaware of these fast heart rates.  Asked her if she is taking her Metoprolol and she said she was.  She said she is not on amiodarone. Will continue to monitor.

## 2019-12-18 ENCOUNTER — CLINICAL SUPPORT NO REQUIREMENTS (OUTPATIENT)
Dept: CARDIOLOGY | Facility: CLINIC | Age: 84
End: 2019-12-18

## 2019-12-18 DIAGNOSIS — I44.2 COMPLETE HEART BLOCK (HCC): ICD-10-CM

## 2019-12-18 DIAGNOSIS — I48.0 AF (PAROXYSMAL ATRIAL FIBRILLATION) (HCC): Primary | ICD-10-CM

## 2020-03-04 ENCOUNTER — CLINICAL SUPPORT NO REQUIREMENTS (OUTPATIENT)
Dept: CARDIOLOGY | Facility: CLINIC | Age: 85
End: 2020-03-04

## 2020-03-04 DIAGNOSIS — I44.2 COMPLETE HEART BLOCK (HCC): ICD-10-CM

## 2020-03-04 PROCEDURE — 93296 REM INTERROG EVL PM/IDS: CPT | Performed by: INTERNAL MEDICINE

## 2020-03-04 PROCEDURE — 93295 DEV INTERROG REMOTE 1/2/MLT: CPT | Performed by: INTERNAL MEDICINE

## 2020-04-13 RX ORDER — AMIODARONE HYDROCHLORIDE 200 MG/1
TABLET ORAL
Qty: 180 TABLET | Refills: 5 | OUTPATIENT
Start: 2020-04-13

## 2020-05-08 ENCOUNTER — OFFICE VISIT (OUTPATIENT)
Dept: CARDIOLOGY | Facility: CLINIC | Age: 85
End: 2020-05-08

## 2020-05-08 VITALS
BODY MASS INDEX: 21.6 KG/M2 | HEART RATE: 70 BPM | SYSTOLIC BLOOD PRESSURE: 187 MMHG | DIASTOLIC BLOOD PRESSURE: 84 MMHG | HEIGHT: 60 IN | WEIGHT: 110 LBS

## 2020-05-08 DIAGNOSIS — I10 ESSENTIAL HYPERTENSION: Primary | ICD-10-CM

## 2020-05-08 DIAGNOSIS — I44.2 COMPLETE HEART BLOCK (HCC): ICD-10-CM

## 2020-05-08 PROCEDURE — 99441 PR PHYS/QHP TELEPHONE EVALUATION 5-10 MIN: CPT | Performed by: PHYSICIAN ASSISTANT

## 2020-05-08 NOTE — PROGRESS NOTES
Rupinder Lees  1935  PCP: Gifty Kruse MD    SUBJECTIVE:   Rupinder Lees is a 84 y.o. female seen for a follow up visit regarding the following:     Chief Complaint: Follow up for CHB, afib     HPI:    Patient is an 84 year old female with the below noted PMHx that presents of follow-up of her CHB s/p ppm and PAF. She was not seen by our practice for almost one year following her implant due to no shows. She is a fairly poor historian and is unable to tell me much regarding her medication changes since our last visit. She tells me she has been feeling well. No issues with cp, sob, edema, palps. She was restarted on Amiodarone 200mg BID at some point but is unable to tell me who did this.         Cardiac PMH: (Old records have been reviewed and summarized below)     Problem List:  1. Complete Heart Block  a. Bradycardia/hypotension noted at [facility] 12/17/18  b. Temporary PM placement 12/17/18 at [facility]  c. No underlying rhythm 12/18/18  2. PVD  a. History of claudication pain with ischemic right leg 12/16/187  b. AA with runoffs 12/17/18: R Popliteal was occluded at P1 segment, no distal run off, very faint collaterals  c. Embolectomy/thrombectomy of right lower extremity  3. Nonobstructive CAD:  a. [facility] 12/17/18: nonobstructive CAD, normal EF. LAD with Prox mild diffuse disease, mid at Dx bifurcation had 50% stenosis, distal small vessel diffuse disease Dx small vessel mild luminal irregularities. Left circumflex with Moderate calibre, mild diffuse stenosis, mid Lcx at OM take off had 40% stenosis, OM1 is a large calibre, prox 30-40% stenosis and mid and distal mild irregularities. Small to moderate calibre, dominant, prox 50 % tubular stenosis of RCA, mid and distal normal, small PDA and PL , no stenosis  4. Paroxysmal Afib   a. Diagnosed during admission 12/2018. Currently on Amiodarone therapy  b. CHADSVASC is 5; Eliquis 2.5mg BID   5. Tobacco abuse  6. HLP  7. HTN  8. History of subacute left MCA  distribution ischemic CVA  9. Medical noncompliance  10. MAGGIE - now resolved.       Current Outpatient Medications:   •  amiodarone (PACERONE) 200 MG tablet, Daily., Disp: , Rfl:   •  apixaban (ELIQUIS) 2.5 MG tablet tablet, Take 1 tablet by mouth Every 12 (Twelve) Hours., Disp: 60 tablet, Rfl:   •  atorvastatin (LIPITOR) 40 MG tablet, Take 40 mg by mouth Daily., Disp: , Rfl:   •  cilostazol (PLETAL) 100 MG tablet, Take 1 tablet by mouth 2 (Two) Times a Day., Disp: , Rfl:   •  hydrALAZINE (APRESOLINE) 25 MG tablet, Take 25 mg by mouth 2 (Two) Times a Day., Disp: , Rfl:   •  metoprolol tartrate (LOPRESSOR) 50 MG tablet, Take 1 tablet by mouth 2 (Two) Times a Day., Disp: 60 tablet, Rfl: 11  •  pantoprazole (PROTONIX) 40 MG EC tablet, Daily., Disp: , Rfl:     Past Medical History, Past Surgical History, Family history, Social History, and Medications were all reviewed with the patient today and updated as necessary.       Patient Active Problem List   Diagnosis   • Complete heart block S/P transvenous pacemaker placement at [facility] on 12/17   • PAD    • HTN   • History of subacute left MCA distribution ischemic CVA   • COPD    • Tobacco use   • History of noncompliance with medical treatment   • Acute kidney injury Cr 1.72 on admission    • Hyperlipidemia   • NSTEMI    • CAD with occluded LAD and RCA per [facility] 12/17/18   • Right ischemic leg   • Popliteal thrombosis (CMS/HCC)   • Pneumonia of left lower lobe due to infectious organism (CMS/HCC)   • Anemia   • Hypokalemia   • Hypophosphatemia   • Occult blood positive stool   • Iron deficiency anemia due to chronic blood loss     No Known Allergies  Past Medical History:   Diagnosis Date   • COPD  12/17/2018   • DVT of lower extremity (deep venous thrombosis) (CMS/HCC)    • History of CVA 2013 12/17/2018   • HTN 12/17/2018   • PAD  12/17/2018   • Tobacco use 12/17/2018     Past Surgical History:   Procedure Laterality Date   • CARDIAC CATHETERIZATION  12/17/2018    Procedure:  "Left Heart Cath;  Surgeon: Imtiaz Fuentes MD;  Location:  [facility] CATH INVASIVE LOCATION;  Service: Cardiology   • CARDIAC CATHETERIZATION N/A 2018    Procedure: Thrombolysis-peripheral;  Surgeon: Imtiaz Fuentes MD;  Location:  [facility] CATH INVASIVE LOCATION;  Service: Cardiology   • CARDIAC ELECTROPHYSIOLOGY PROCEDURE N/A 2018    Procedure: PACEMAKER IMPLANTATION- DC;  Surgeon: Thony Bobby MD;  Location:  [facility];  Service: Cardiology   • CARDIAC ELECTROPHYSIOLOGY PROCEDURE N/A 2018    Procedure: Temporary Pacemaker;  Surgeon: Imtiaz Fuentes MD;  Location:  [facility] CATH INVASIVE LOCATION;  Service: Cardiology   • COLONOSCOPY N/A 2019    Procedure: COLONOSCOPY;  Surgeon: Chris Zimmerman MD;  Location: [facility] OR;  Service: Gastroenterology   • ENDOSCOPY N/A 2019    Procedure: ESOPHAGOGASTRODUODENOSCOPY;  Surgeon: Chris Zimmerman MD;  Location: [facility] OR;  Service: Gastroenterology   • FEMORAL ARTERY STENT  2013   • FEMORAL POPLITEAL BYPASS Right 2018    Procedure: LOWER EXTREMITY EMBELECTOMY RIGHT;  Surgeon: David Beatty MD;  Location:  [facility] HYBRID OR 15;  Service: Vascular   • HYSTERECTOMY     • LEFT HEART CATH  2018    At [facility] with TV PM placement    • PACEMAKER IMPLANTATION       Family History   Problem Relation Age of Onset   • Cancer Mother    • Cancer Father    • Heart attack Brother    • Heart attack Brother      Social History     Tobacco Use   • Smoking status: Light Tobacco Smoker     Packs/day: 0.25     Years: 55.00     Pack years: 13.75     Types: Cigarettes     Last attempt to quit: 3/25/2019     Years since quittin.1   • Smokeless tobacco: Never Used   Substance Use Topics   • Alcohol use: No     Frequency: Never         PHYSICAL EXAM:    BP (!) 187/84 (BP Location: Right arm, Patient Position: Sitting)   Pulse 70   Ht 152.4 cm (60\")   Wt 49.9 kg (110 lb)   LMP  (LMP Unknown)   " "BMI 21.48 kg/m²        Wt Readings from Last 5 Encounters:   05/08/20 49.9 kg (110 lb)   10/11/19 49.9 kg (110 lb)   08/18/19 49.9 kg (110 lb)   07/08/19 49.9 kg (110 lb)   05/30/19 49.9 kg (110 lb)       BP Readings from Last 5 Encounters:   05/08/20 (!) 187/84   10/11/19 160/84   08/28/19 136/56   08/21/19 136/44   08/18/19 139/55       Medical problems and test results were reviewed with the patient today.     No results found for this or any previous visit (from the past 672 hour(s)).      EKG: (EKG has been independently visualized by me and summarized below)    Procedures     ASSESSMENT and PLAN    1. Complete Heart Block s/p St. Elvis dual chamber PPM 12/2018 by Dr. Bobby. Normal function on device check.   2. St. Elvis Dual Chamber Pacemaker- normal function and <1% mode switch on remote check 3/2020. She did have some RVR, but all less than one minute   3. Paroxysmal Atrial Fibrillation- <1% mode switching on remote check. Continue Amiodarone, but decrease dose to 200mg daily. She was restarted at 200mg BID at some point but unsure who prescribed this. Continue Metoprolol. Continue Eliqius 2.5mg BID for anticoagulation.   4. HTN- elevated. Patient has not taken her morning medications. She will monitor her BPs and let us know if it remains elevated. Continue Hydralazine and Metoprolol. She tells me her BP is \"fine\" and she feels well. Does not wish to make any changes.       Return in about 6 months (around 11/8/2020) for [facility] in [place] .    This patient has consented to a telehealth visit via telephone. The visit was scheduled as a telephone visit to comply with patient safety concerns in accordance with CDC recommendations.  All vitals recorded within this visit are reported by the patient.  I spent  16 minutes in total including but not limited to the 6 minutes spent in direct conversation with this patient.     Xiao Duckworth PA-C  Cardiology/Electrophysiology  5/8/2020  09:21  "

## 2021-02-12 DIAGNOSIS — Z23 IMMUNIZATION DUE: ICD-10-CM

## 2021-03-13 PROCEDURE — 93294 REM INTERROG EVL PM/LDLS PM: CPT | Performed by: INTERNAL MEDICINE

## 2021-03-13 PROCEDURE — 93296 REM INTERROG EVL PM/IDS: CPT | Performed by: INTERNAL MEDICINE

## 2021-04-28 ENCOUNTER — TELEPHONE (OUTPATIENT)
Dept: CARDIOLOGY | Facility: CLINIC | Age: 86
End: 2021-04-28

## 2021-04-28 NOTE — TELEPHONE ENCOUNTER
Called pt and ask her to send manual transmission from her home monitor. She stated she pushed the button on her monitor and then she gave me her b/p reading.I said thank you and hung up.

## 2021-06-01 ENCOUNTER — TELEPHONE (OUTPATIENT)
Dept: CARDIOLOGY | Facility: CLINIC | Age: 86
End: 2021-06-01

## 2021-06-01 NOTE — TELEPHONE ENCOUNTER
Called Mrs Lees in regards to her Merlin home monitor didn't send scheduled reading.  Attempted to get reading.  She struggled with telling me what monitor was doing.  Will wait and see if reading comes through.  Also sending message to  to set up f/u appt.

## 2021-08-31 ENCOUNTER — TELEPHONE (OUTPATIENT)
Dept: CARDIOLOGY | Facility: CLINIC | Age: 86
End: 2021-08-31

## 2021-08-31 NOTE — TELEPHONE ENCOUNTER
Called due to Merlin home monitor isn't reading.  Spoke with daughter in law and she lives in [place].  Mrs Lees hasn't been seen since 2019.  She is going to call back and ask for scheduling and they want to f/u in [place] office.  As far as the monitor there is no one there to help her with the monitor.

## 2021-10-26 ENCOUNTER — TELEPHONE (OUTPATIENT)
Dept: CARDIOLOGY | Facility: CLINIC | Age: 86
End: 2021-10-26

## 2021-10-26 NOTE — TELEPHONE ENCOUNTER
Pt's daughter in law (Martha Lees-who lives in [place]) called back about pt's follow up appointment (device clinic staff had spoken w/Martha Nabeel 8/31/2021). Patient hasn't been seen by a provider in this office since 10/11/2019. Martha Lees states they will be in [place] next week and would like to schedule an appointment for patient.  Ms. Lees states patient lives alone & appointments have been postponed because of COVID-19 pandemic. I transferred her to the scheduling department.

## 2021-11-05 ENCOUNTER — OFFICE VISIT (OUTPATIENT)
Dept: CARDIOLOGY | Facility: CLINIC | Age: 86
End: 2021-11-05

## 2021-11-05 VITALS
DIASTOLIC BLOOD PRESSURE: 64 MMHG | HEIGHT: 60 IN | BODY MASS INDEX: 21.6 KG/M2 | SYSTOLIC BLOOD PRESSURE: 144 MMHG | HEART RATE: 83 BPM | OXYGEN SATURATION: 95 % | WEIGHT: 110 LBS

## 2021-11-05 DIAGNOSIS — I44.2 COMPLETE HEART BLOCK (HCC): Primary | ICD-10-CM

## 2021-11-05 DIAGNOSIS — I48.0 PAROXYSMAL ATRIAL FIBRILLATION (HCC): ICD-10-CM

## 2021-11-05 DIAGNOSIS — I10 PRIMARY HYPERTENSION: ICD-10-CM

## 2021-11-05 PROCEDURE — 99214 OFFICE O/P EST MOD 30 MIN: CPT | Performed by: NURSE PRACTITIONER

## 2021-11-05 PROCEDURE — 93000 ELECTROCARDIOGRAM COMPLETE: CPT | Performed by: NURSE PRACTITIONER

## 2021-11-05 NOTE — PROGRESS NOTES
Rupinder Lees  1935  [phone]    11/05/2021    [facility] CARDIOLOGY     Referring Provider: No ref. provider found     Gifty Kruse MD  [id number]  [place] [address]    Chief Complaint   Patient presents with   • Complete heart block S/P transvenous pacemaker placement at      Cardiac PMH: (Old records have been reviewed and summarized below)     Problem List:  1. Complete Heart Block  a. Bradycardia/hypotension noted at [facility] 12/17/18  b. Temporary PM placement 12/17/18 at [facility]  c. No underlying rhythm 12/18/18  2. PVD  a. History of claudication pain with ischemic right leg 12/16/187  b. AA with runoffs 12/17/18: R Popliteal was occluded at P1 segment, no distal run off, very faint collaterals  c. Embolectomy/thrombectomy of right lower extremity  3. Nonobstructive CAD:  a. [facility] 12/17/18: nonobstructive CAD, normal EF. LAD with Prox mild diffuse disease, mid at Dx bifurcation had 50% stenosis, distal small vessel diffuse disease Dx small vessel mild luminal irregularities. Left circumflex with Moderate calibre, mild diffuse stenosis, mid Lcx at OM take off had 40% stenosis, OM1 is a large calibre, prox 30-40% stenosis and mid and distal mild irregularities. Small to moderate calibre, dominant, prox 50 % tubular stenosis of RCA, mid and distal normal, small PDA and PL , no stenosis  4. Paroxysmal Afib   a. Diagnosed during admission 12/2018. Currently on Amiodarone therapy  b. CHADSVASC is 5; Eliquis 2.5mg BID   5. Tobacco abuse  6. HLP  7. HTN  8. History of subacute left MCA distribution ischemic CVA  9. Medical noncompliance  10. MAGGIE - now resolved.         Allergies  No Known Allergies    Current Medications    Current Outpatient Medications:   •  apixaban (ELIQUIS) 2.5 MG tablet tablet, Take 1 tablet by mouth Every 12 (Twelve) Hours., Disp: 60 tablet, Rfl:   •  atorvastatin (LIPITOR) 40 MG tablet, Take 40 mg by mouth Daily., Disp: , Rfl:   •  cilostazol (PLETAL) 100  "MG tablet, Take 1 tablet by mouth 2 (Two) Times a Day., Disp: , Rfl:   •  hydrALAZINE (APRESOLINE) 25 MG tablet, Take 50 mg by mouth 2 (Two) Times a Day., Disp: , Rfl:   •  pantoprazole (PROTONIX) 40 MG EC tablet, 40 mg As Needed., Disp: , Rfl:   •  amiodarone (PACERONE) 200 MG tablet, Daily., Disp: , Rfl:   •  metoprolol tartrate (LOPRESSOR) 50 MG tablet, Take 1 tablet by mouth 2 (Two) Times a Day., Disp: 60 tablet, Rfl: 11    History of Present Illness     Pt presents for follow up of CHB s/p ppm and PAF. Since we last saw the pt, pt denies any palps, SOB, CP. Mild dizziness on occasion with walking, no syncope, this is not new. Denies any hospitalizations, ER visits, bleeding issues on Eliquis, or TIA/CVA symptoms. BP well controlled at home runs 130s/80s. Overall feels well.    ROS:  General:  Denies fatigue, weight gain or loss  Cardiovascular:  Denies CP, PND, syncope, near syncope, edema or palpitations.  Pulmonary:  Denies STINSON, cough, or wheezing      Vitals:    11/05/21 1345   BP: 144/64   BP Location: Left arm   Patient Position: Sitting   Pulse: 83   SpO2: 95%   Weight: 49.9 kg (110 lb)   Height: 152.4 cm (60\")     Body mass index is 21.48 kg/m².  PE:  General: NAD  Neck: no JVD, no carotid bruits, no TM  Heart RRR, NL S1, S2, S4 present, no rubs, murmurs  Lungs: CTA, no wheezes, rhonchi, or rales  Abd: soft, non-tender, NL BS  Ext: No musculoskeletal deformities, no edema, cyanosis, or clubbing  Psych: normal mood and affect    Diagnostic Data:  St kenny ppm manual device interrogation: DDDR at 70, AP 88%,  <1%, Underlying SR in the 60s. 285 HVR longest 3 hours at 212 bom. <1% AMS, longest 10 hours.       ECG 12 Lead    Date/Time: 11/5/2021 2:25 PM  Performed by: Alison Red APRN  Authorized by: Alison Red APRN   Comparison: compared with previous ECG from 8/18/2019  Similar to previous ECG  Rhythm: sinus rhythm  BPM: 73  Comments: A paced             1. Complete heart block S/P " transvenous pacemaker placement at [facility] on 12/17    2. Paroxysmal atrial fibrillation (HCC)    3. Primary hypertension          Plan:    1. Complete Heart Block s/p St. Elvis dual chamber PPM 12/2018 by Dr. Bobby. Normal function on device check.     2. Paroxysmal Atrial Fibrillation  - <1% ams on device interrogation, did have episodes of HVR <3 hours and she was asymptomatic with.Discussed with Dr. Jaramillo will continue to monitor for now.   -Continue Amiodarone, metoprolol.    -Continue Eliqius 2.5mg BID for anticoagulation.     3. HTN  - adequate control, patient only checks bp once monthly, advised to check at least 3x weekly and call if bps >140s/80 consistently  -Continue current medications.     F/up in 6 months ( if cannot make apt in 6 months we can do telehealth visit).     Electronically signed by ASHISH Vasquez, 11/05/21, 2:12 PM EDT.

## 2021-11-18 PROCEDURE — 93294 REM INTERROG EVL PM/LDLS PM: CPT | Performed by: STUDENT IN AN ORGANIZED HEALTH CARE EDUCATION/TRAINING PROGRAM

## 2021-11-18 PROCEDURE — 93296 REM INTERROG EVL PM/IDS: CPT | Performed by: STUDENT IN AN ORGANIZED HEALTH CARE EDUCATION/TRAINING PROGRAM

## 2022-02-17 PROCEDURE — 93294 REM INTERROG EVL PM/LDLS PM: CPT | Performed by: STUDENT IN AN ORGANIZED HEALTH CARE EDUCATION/TRAINING PROGRAM

## 2022-02-17 PROCEDURE — 93296 REM INTERROG EVL PM/IDS: CPT | Performed by: STUDENT IN AN ORGANIZED HEALTH CARE EDUCATION/TRAINING PROGRAM

## 2022-05-19 PROCEDURE — 93294 REM INTERROG EVL PM/LDLS PM: CPT | Performed by: STUDENT IN AN ORGANIZED HEALTH CARE EDUCATION/TRAINING PROGRAM

## 2022-05-19 PROCEDURE — 93296 REM INTERROG EVL PM/IDS: CPT | Performed by: STUDENT IN AN ORGANIZED HEALTH CARE EDUCATION/TRAINING PROGRAM

## 2022-06-03 ENCOUNTER — APPOINTMENT (OUTPATIENT)
Dept: GENERAL RADIOLOGY | Facility: HOSPITAL | Age: 87
End: 2022-06-03

## 2022-06-03 ENCOUNTER — APPOINTMENT (OUTPATIENT)
Dept: ULTRASOUND IMAGING | Facility: HOSPITAL | Age: 87
End: 2022-06-03

## 2022-06-03 ENCOUNTER — HOSPITAL ENCOUNTER (EMERGENCY)
Facility: HOSPITAL | Age: 87
Discharge: HOME | End: 2022-06-03
Attending: EMERGENCY MEDICINE | Admitting: EMERGENCY MEDICINE

## 2022-06-03 ENCOUNTER — APPOINTMENT (OUTPATIENT)
Dept: CT IMAGING | Facility: HOSPITAL | Age: 87
End: 2022-06-03

## 2022-06-03 ENCOUNTER — TELEPHONE (OUTPATIENT)
Dept: GENERAL RADIOLOGY | Facility: HOSPITAL | Age: 87
End: 2022-06-03

## 2022-06-03 VITALS
SYSTOLIC BLOOD PRESSURE: 131 MMHG | HEIGHT: 59 IN | DIASTOLIC BLOOD PRESSURE: 76 MMHG | TEMPERATURE: 97.8 F | RESPIRATION RATE: 16 BRPM | HEART RATE: 70 BPM | OXYGEN SATURATION: 99 % | BODY MASS INDEX: 22.18 KG/M2 | WEIGHT: 110 LBS

## 2022-06-03 DIAGNOSIS — I82.5Y1 CHRONIC DEEP VEIN THROMBOSIS (DVT) OF PROXIMAL VEIN OF RIGHT LOWER EXTREMITY: Primary | ICD-10-CM

## 2022-06-03 DIAGNOSIS — I73.9 PAD (PERIPHERAL ARTERY DISEASE): ICD-10-CM

## 2022-06-03 LAB
ALBUMIN SERPL-MCNC: 4.08 G/DL (ref 3.5–5.2)
ALBUMIN/GLOB SERPL: 1.3 G/DL
ALP SERPL-CCNC: 112 U/L (ref 39–117)
ALT SERPL W P-5'-P-CCNC: 14 U/L (ref 1–33)
ANION GAP SERPL CALCULATED.3IONS-SCNC: 12.2 MMOL/L (ref 5–15)
APTT PPP: 23.8 SECONDS (ref 26.5–34.5)
AST SERPL-CCNC: 15 U/L (ref 1–32)
BASOPHILS # BLD AUTO: 0.05 10*3/MM3 (ref 0–0.2)
BASOPHILS NFR BLD AUTO: 0.7 % (ref 0–1.5)
BILIRUB SERPL-MCNC: 0.3 MG/DL (ref 0–1.2)
BUN SERPL-MCNC: 17 MG/DL (ref 8–23)
BUN/CREAT SERPL: 18.7 (ref 7–25)
CALCIUM SPEC-SCNC: 9.5 MG/DL (ref 8.6–10.5)
CHLORIDE SERPL-SCNC: 106 MMOL/L (ref 98–107)
CO2 SERPL-SCNC: 19.8 MMOL/L (ref 22–29)
CREAT SERPL-MCNC: 0.91 MG/DL (ref 0.57–1)
D DIMER PPP FEU-MCNC: 1.28 MCGFEU/ML (ref 0–0.5)
D-LACTATE SERPL-SCNC: 1.5 MMOL/L (ref 0.5–2)
DEPRECATED RDW RBC AUTO: 47 FL (ref 37–54)
EGFRCR SERPLBLD CKD-EPI 2021: 62.3 ML/MIN/1.73
EOSINOPHIL # BLD AUTO: 0.08 10*3/MM3 (ref 0–0.4)
EOSINOPHIL NFR BLD AUTO: 1.1 % (ref 0.3–6.2)
ERYTHROCYTE [DISTWIDTH] IN BLOOD BY AUTOMATED COUNT: 14.6 % (ref 12.3–15.4)
FLUAV RNA RESP QL NAA+PROBE: NOT DETECTED
FLUBV RNA RESP QL NAA+PROBE: NOT DETECTED
GLOBULIN UR ELPH-MCNC: 3.2 GM/DL
GLUCOSE SERPL-MCNC: 109 MG/DL (ref 65–99)
HCT VFR BLD AUTO: 44.1 % (ref 34–46.6)
HGB BLD-MCNC: 14 G/DL (ref 12–15.9)
IMM GRANULOCYTES # BLD AUTO: 0.02 10*3/MM3 (ref 0–0.05)
IMM GRANULOCYTES NFR BLD AUTO: 0.3 % (ref 0–0.5)
INR PPP: 0.98 (ref 0.9–1.1)
LYMPHOCYTES # BLD AUTO: 1.19 10*3/MM3 (ref 0.7–3.1)
LYMPHOCYTES NFR BLD AUTO: 16.4 % (ref 19.6–45.3)
MCH RBC QN AUTO: 28.1 PG (ref 26.6–33)
MCHC RBC AUTO-ENTMCNC: 31.7 G/DL (ref 31.5–35.7)
MCV RBC AUTO: 88.4 FL (ref 79–97)
MONOCYTES # BLD AUTO: 0.41 10*3/MM3 (ref 0.1–0.9)
MONOCYTES NFR BLD AUTO: 5.7 % (ref 5–12)
NEUTROPHILS NFR BLD AUTO: 5.5 10*3/MM3 (ref 1.7–7)
NEUTROPHILS NFR BLD AUTO: 75.8 % (ref 42.7–76)
NRBC BLD AUTO-RTO: 0 /100 WBC (ref 0–0.2)
NT-PROBNP SERPL-MCNC: 1613 PG/ML (ref 0–1800)
PLATELET # BLD AUTO: 267 10*3/MM3 (ref 140–450)
PMV BLD AUTO: 10.2 FL (ref 6–12)
POTASSIUM SERPL-SCNC: 3.8 MMOL/L (ref 3.5–5.2)
PROT SERPL-MCNC: 7.3 G/DL (ref 6–8.5)
PROTHROMBIN TIME: 13.2 SECONDS (ref 12.1–14.7)
QT INTERVAL: 408 MS
QTC INTERVAL: 446 MS
RBC # BLD AUTO: 4.99 10*6/MM3 (ref 3.77–5.28)
SARS-COV-2 RNA RESP QL NAA+PROBE: NOT DETECTED
SODIUM SERPL-SCNC: 138 MMOL/L (ref 136–145)
WBC NRBC COR # BLD: 7.25 10*3/MM3 (ref 3.4–10.8)

## 2022-06-03 PROCEDURE — 71045 X-RAY EXAM CHEST 1 VIEW: CPT

## 2022-06-03 PROCEDURE — 36415 COLL VENOUS BLD VENIPUNCTURE: CPT

## 2022-06-03 PROCEDURE — 83605 ASSAY OF LACTIC ACID: CPT | Performed by: NURSE PRACTITIONER

## 2022-06-03 PROCEDURE — 83880 ASSAY OF NATRIURETIC PEPTIDE: CPT | Performed by: NURSE PRACTITIONER

## 2022-06-03 PROCEDURE — 93926 LOWER EXTREMITY STUDY: CPT | Performed by: RADIOLOGY

## 2022-06-03 PROCEDURE — 99284 EMERGENCY DEPT VISIT MOD MDM: CPT

## 2022-06-03 PROCEDURE — 93926 LOWER EXTREMITY STUDY: CPT

## 2022-06-03 PROCEDURE — 93010 ELECTROCARDIOGRAM REPORT: CPT | Performed by: INTERNAL MEDICINE

## 2022-06-03 PROCEDURE — 80053 COMPREHEN METABOLIC PANEL: CPT | Performed by: NURSE PRACTITIONER

## 2022-06-03 PROCEDURE — 71275 CT ANGIOGRAPHY CHEST: CPT

## 2022-06-03 PROCEDURE — 85379 FIBRIN DEGRADATION QUANT: CPT | Performed by: NURSE PRACTITIONER

## 2022-06-03 PROCEDURE — 0 IOPAMIDOL PER 1 ML: Performed by: EMERGENCY MEDICINE

## 2022-06-03 PROCEDURE — 85610 PROTHROMBIN TIME: CPT | Performed by: NURSE PRACTITIONER

## 2022-06-03 PROCEDURE — 85730 THROMBOPLASTIN TIME PARTIAL: CPT | Performed by: NURSE PRACTITIONER

## 2022-06-03 PROCEDURE — 93971 EXTREMITY STUDY: CPT

## 2022-06-03 PROCEDURE — 93005 ELECTROCARDIOGRAM TRACING: CPT | Performed by: NURSE PRACTITIONER

## 2022-06-03 PROCEDURE — 93971 EXTREMITY STUDY: CPT | Performed by: RADIOLOGY

## 2022-06-03 PROCEDURE — 71045 X-RAY EXAM CHEST 1 VIEW: CPT | Performed by: RADIOLOGY

## 2022-06-03 PROCEDURE — 87636 SARSCOV2 & INF A&B AMP PRB: CPT | Performed by: NURSE PRACTITIONER

## 2022-06-03 PROCEDURE — 71275 CT ANGIOGRAPHY CHEST: CPT | Performed by: RADIOLOGY

## 2022-06-03 PROCEDURE — 87040 BLOOD CULTURE FOR BACTERIA: CPT | Performed by: NURSE PRACTITIONER

## 2022-06-03 PROCEDURE — 85025 COMPLETE CBC W/AUTO DIFF WBC: CPT | Performed by: NURSE PRACTITIONER

## 2022-06-03 RX ORDER — HYDROCODONE BITARTRATE AND ACETAMINOPHEN 5; 325 MG/1; MG/1
.5-1 TABLET ORAL EVERY 6 HOURS PRN
Qty: 6 TABLET | Refills: 0 | Status: SHIPPED | OUTPATIENT
Start: 2022-06-03 | End: 2022-10-07

## 2022-06-03 RX ORDER — SODIUM CHLORIDE 0.9 % (FLUSH) 0.9 %
10 SYRINGE (ML) INJECTION AS NEEDED
Status: DISCONTINUED | OUTPATIENT
Start: 2022-06-03 | End: 2022-06-03 | Stop reason: HOSPADM

## 2022-06-03 RX ADMIN — IOPAMIDOL 88 ML: 755 INJECTION, SOLUTION INTRAVENOUS at 13:28

## 2022-06-03 NOTE — ED NOTES
Attempted to draw labs, pt was getting ultrasound at this time. Will collect when pt is finished.

## 2022-06-03 NOTE — ED PROVIDER NOTES
Subjective     Shortness of Breath  Severity:  Moderate  Onset quality:  Gradual  Duration:  2 days  Timing:  Constant  Progression:  Waxing and waning  Chronicity:  Recurrent  Context: activity    Relieved by:  Nothing  Worsened by:  Nothing  Ineffective treatments:  None tried  Associated symptoms: wheezing    Associated symptoms: no chest pain, no claudication, no diaphoresis, no hemoptysis and no neck pain    Risk factors: no recent alcohol use and no recent surgery        Review of Systems   Constitutional: Negative.  Negative for diaphoresis.   HENT: Negative.    Eyes: Negative.    Respiratory: Positive for shortness of breath and wheezing. Negative for hemoptysis.    Cardiovascular: Negative.  Negative for chest pain and claudication.   Gastrointestinal: Negative.    Endocrine: Negative.    Genitourinary: Negative.    Musculoskeletal: Negative.  Negative for neck pain.        Leg pain     Skin: Negative.    Allergic/Immunologic: Negative.    Neurological: Negative.    Hematological: Negative.    Psychiatric/Behavioral: Negative.        Past Medical History:   Diagnosis Date   • COPD  12/17/2018   • DVT of lower extremity (deep venous thrombosis) (HCC)    • History of CVA 2013 12/17/2018   • HTN 12/17/2018   • PAD  12/17/2018   • Tobacco use 12/17/2018       No Known Allergies    Past Surgical History:   Procedure Laterality Date   • CARDIAC CATHETERIZATION  12/17/2018    Procedure: Left Heart Cath;  Surgeon: Imtiaz Fuentes MD;  Location:  [facility] CATH [facility]IVE [facility];  Service: Cardiology   • CARDIAC CATHETERIZATION N/A 12/17/2018    Procedure: Thrombolysis-peripheral;  Surgeon: Imtiaz Fuentes MD;  Location:  [facility]ATH [facility];  Service: Cardiology   • CARDIAC ELECTROPHYSIOLOGY PROCEDURE N/A 12/18/2018    Procedure: PACEMAKER IMPLANTATION- DC;  Surgeon: Thony Bobby MD;  Location:  [facility];  Service: Cardiology   • CARDIAC ELECTROPHYSIOLOGY  PROCEDURE N/A 12/17/2018    Procedure: Temporary Pacemaker;  Surgeon: Imtiaz Fuentes MD;  Location:  [facility] CATH [facility]ATION;  Service: Cardiology   • COLONOSCOPY N/A 4/19/2019    Procedure: COLONOSCOPY;  Surgeon: Chris Zimmerman MD;  Location: [facility] OR;  Service: Gastroenterology   • ENDOSCOPY N/A 4/19/2019    Procedure: ESOPHAGOGASTRODUODENOSCOPY;  Surgeon: Chris Zimmerman MD;  Location: [facility] OR;  Service: Gastroenterology   • FEMORAL ARTERY STENT  2013   • FEMORAL POPLITEAL BYPASS Right 12/17/2018    Procedure: LOWER EXTREMITY EMBELECTOMY RIGHT;  Surgeon: David Beatty MD;  Location: [facility] HYBRID OR 15;  Service: Vascular   • HYSTERECTOMY     • LEFT HEART CATH  12/17/2018    At [facility] with TV PM placement    • PACEMAKER IMPLANTATION         Family History   Problem Relation Age of Onset   • Cancer Mother    • Cancer Father    • Heart attack Brother    • Heart attack Brother        Social History     Socioeconomic History   • Marital status:    • Number of children: 3   Tobacco Use   • Smoking status: Light Tobacco Smoker     Packs/day: 0.25     Years: 55.00     Pack years: 13.75     Types: Cigarettes     Last attempt to quit: 3/25/2019     Years since quitting: 3.1   • Smokeless tobacco: Never Used   Substance and Sexual Activity   • Alcohol use: No   • Drug use: No   • Sexual activity: Defer           Objective   Physical Exam  Vitals and nursing note reviewed.   Constitutional:       Appearance: She is well-developed.   HENT:      Head: Normocephalic.      Right Ear: External ear normal.      Left Ear: External ear normal.   Eyes:      Conjunctiva/sclera: Conjunctivae normal.      Pupils: Pupils are equal, round, and reactive to light.   Cardiovascular:      Rate and Rhythm: Normal rate and regular rhythm.      Heart sounds: Normal heart sounds.   Pulmonary:      Effort: Pulmonary effort is normal.      Breath sounds: Wheezing present.   Abdominal:      General: Bowel  sounds are normal.      Palpations: Abdomen is soft.   Musculoskeletal:         General: Normal range of motion.      Cervical back: Normal range of motion and neck supple.   Skin:     General: Skin is warm and dry.      Capillary Refill: Capillary refill takes less than 2 seconds.   Neurological:      Mental Status: She is alert and oriented to person, place, and time.   Psychiatric:         Behavior: Behavior normal.         Thought Content: Thought content normal.         Procedures           ED Course  ED Course as of 06/04/22 1623 Fri Jun 03, 2022   1235 ECG 12 Lead  Atrial paced rhythm with PACs.  Rate 72.  Normal QT interval.  LVH with repolarization changes.  No acute ischemic changes.  Abnormal EKG.  Interpreted by me. [BC]      ED Course User Index  [BC] Baldomero Rock MD                                                 [facility]    Final diagnoses:   Chronic deep vein thrombosis (DVT) of proximal vein of right lower extremity (HCC)   PAD (peripheral artery disease) (HCC)       ED Disposition  ED Disposition     ED Disposition   Discharge    Condition   Stable    Comment   --             Gifty Kruse MD  [address]  [id number]  [place] [id number] [phone]    Schedule an appointment as soon as possible for a visit   For further evaluation         Medication List      New Prescriptions    HYDROcodone-acetaminophen 5-325 MG per tablet  Commonly known as: NORCO  Take 0.5-1 tablets by mouth Every 6 (Six) Hours As Needed for Moderate Pain .           Where to Get Your Medications      These medications were sent to [facility] [address] [place], [place] - [address] - [phone]  - [phone] FX  [address] [place] [address]    Phone: [phone]   · HYDROcodone-acetaminophen 5-325 MG per tablet          Alhaji Shelton, APRN  06/04/22 1623

## 2022-06-08 LAB
BACTERIA SPEC AEROBE CULT: NORMAL
BACTERIA SPEC AEROBE CULT: NORMAL

## 2022-08-08 ENCOUNTER — TELEPHONE (OUTPATIENT)
Dept: CARDIOLOGY | Facility: CLINIC | Age: 87
End: 2022-08-08

## 2022-08-08 NOTE — TELEPHONE ENCOUNTER
Per Shruti/SJM Merlin remote transmission received today, 8/8/2022:     One episode of Atrial tach yesterday, 8/7/2022.  Duration: 1 hr, 14 min, 33 seconds.   V-rate: 193 bpm.     Pt denies any symptomatology, was unaware of elevated heart rate. Reviewed meds w/pt who reports she isn't taking metoprolol and was unsure of her medications. Pt was seen in the ER @ [facility] on 6/3/2022 & unable to attend appointment w/Dr. Jaramillo on that date.    I spoke w/pt's son, Antony Lees & daughter in law, who live in [place] with concerns about compliance with medications. Mr. Lees states he will follow up on this. I also advised Mr. Lees of pt's next F/U appointment w/Dr. Jaramillo 10/7/2022. Mr. Lees states he will make sure pt attends the appointment.

## 2022-08-18 PROCEDURE — 93296 REM INTERROG EVL PM/IDS: CPT | Performed by: STUDENT IN AN ORGANIZED HEALTH CARE EDUCATION/TRAINING PROGRAM

## 2022-08-18 PROCEDURE — 93294 REM INTERROG EVL PM/LDLS PM: CPT | Performed by: STUDENT IN AN ORGANIZED HEALTH CARE EDUCATION/TRAINING PROGRAM

## 2022-08-26 ENCOUNTER — TELEPHONE (OUTPATIENT)
Dept: CARDIOLOGY | Facility: CLINIC | Age: 87
End: 2022-08-26

## 2022-08-26 NOTE — TELEPHONE ENCOUNTER
I spoke with Rupinder Lees today regarding her remote transmission revealing some episodes of SVT longest lasting 17 min and 48 sec with Rates up to 189 bpm.  Patient denies symptome related to these fast heart rates and is unaware that they have occurred.  She stated that she id taking her medications as prescribed, but was unable to review them.  She has an upcoming appointment with Dr Jaramillo in Oct and previous communication in the chart states that the appointment has been confirmed with her son.

## 2022-10-07 ENCOUNTER — TELEPHONE (OUTPATIENT)
Dept: CARDIOLOGY | Facility: CLINIC | Age: 87
End: 2022-10-07

## 2022-10-07 ENCOUNTER — OFFICE VISIT (OUTPATIENT)
Dept: CARDIOLOGY | Facility: CLINIC | Age: 87
End: 2022-10-07

## 2022-10-07 VITALS
HEIGHT: 59 IN | HEART RATE: 71 BPM | OXYGEN SATURATION: 96 % | DIASTOLIC BLOOD PRESSURE: 70 MMHG | SYSTOLIC BLOOD PRESSURE: 170 MMHG | WEIGHT: 110 LBS | BODY MASS INDEX: 22.18 KG/M2

## 2022-10-07 DIAGNOSIS — I44.2 COMPLETE HEART BLOCK: ICD-10-CM

## 2022-10-07 DIAGNOSIS — I48.0 PAROXYSMAL ATRIAL FIBRILLATION: Primary | ICD-10-CM

## 2022-10-07 DIAGNOSIS — I47.1 PAROXYSMAL SVT (SUPRAVENTRICULAR TACHYCARDIA): ICD-10-CM

## 2022-10-07 PROCEDURE — 93280 PM DEVICE PROGR EVAL DUAL: CPT | Performed by: NURSE PRACTITIONER

## 2022-10-07 PROCEDURE — 99213 OFFICE O/P EST LOW 20 MIN: CPT | Performed by: NURSE PRACTITIONER

## 2022-10-07 PROCEDURE — 93000 ELECTROCARDIOGRAM COMPLETE: CPT | Performed by: NURSE PRACTITIONER

## 2022-10-07 NOTE — TELEPHONE ENCOUNTER
Patient's granddaughter called with her medication list:    Pantoprazole 40 mg  Donepezil 10 mg  Atorvastatin 40 mg  Atenolol 25 mg  Eliquis 2.5 mg  Hydralazine 50 mg  Cilostazol 100 mg

## 2022-10-07 NOTE — PROGRESS NOTES
Cardiac Electrophysiology Outpatient Note  [facility]    Office Visit     Rupinder Lees  [id number]  10/07/2022    Primary Care Physician: Gifty Kruse MD    Referred By: No ref. provider found    CC:   Chief Complaint   Patient presents with   • complete heart block      Problem List:  1. Complete Heart Block  a. Bradycardia/hypotension noted at [facility] 12/17/18  b. Temporary PM placement 12/17/18 at [facility]  c. No underlying rhythm 12/18/18  2. PVD  a. History of claudication pain with ischemic right leg 12/16/187  b. AA with runoffs 12/17/18: R Popliteal was occluded at P1 segment, no distal run off, very faint collaterals  c. Embolectomy/thrombectomy of right lower extremity  3. Nonobstructive CAD:  a. [facility] 12/17/18: nonobstructive CAD, normal EF. LAD with Prox mild diffuse disease, mid at Dx bifurcation had 50% stenosis, distal small vessel diffuse disease Dx small vessel mild luminal irregularities. Left circumflex with Moderate calibre, mild diffuse stenosis, mid Lcx at OM take off had 40% stenosis, OM1 is a large calibre, prox 30-40% stenosis and mid and distal mild irregularities. Small to moderate calibre, dominant, prox 50 % tubular stenosis of RCA, mid and distal normal, small PDA and PL , no stenosis  4. Paroxysmal Afib   a. Diagnosed during admission 12/2018. Currently on Amiodarone therapy  b. CHADSVASC is 5; Eliquis 2.5mg BID   5. Tobacco abuse  6. HLP  7. HTN  8. History of subacute left MCA distribution ischemic CVA  9. Medical noncompliance  10. MAGGIE - now resolved.       History of Present Illness:   Rupinder Lees is a 87 y.o. female who presents to the electrophysiology clinic for follow up of complete heart block status post dual-chamber pacemaker and paroxysmal atrial fibrillation.  She was last seen in the office almost a year ago and reports she has been doing well since that time.  Does report that she can feel her heart race with activity  which will return back to normal during rest.  Otherwise states she denies chest pain, lower extremity edema, syncope or presyncope.  No any other episodes palpitations.  She continues to take her Eliquis as prescribed and denies any bleeding problems with this.  Her shortness of breath is at baseline.  She does continue to smoke.    Past Surgical History:   Procedure Laterality Date   • CARDIAC CATHETERIZATION  12/17/2018    Procedure: Left Heart Cath;  Surgeon: Imtiaz Fuentes MD;  Location:  [facility] CATH [facility]VASIVE [facility];  Service: Cardiology   • CARDIAC CATHETERIZATION N/A 12/17/2018    Procedure: Thrombolysis-peripheral;  Surgeon: Imtiaz Fuentes MD;  Location:  [facility] CATH [facility];  Service: Cardiology   • CARDIAC ELECTROPHYSIOLOGY PROCEDURE N/A 12/18/2018    Procedure: PACEMAKER IMPLANTATION- DC;  Surgeon: Thony Bobby MD;  Location:  [facility] INVASIVE [facility];  Service: Cardiology   • CARDIAC ELECTROPHYSIOLOGY PROCEDURE N/A 12/17/2018    Procedure: Temporary Pacemaker;  Surgeon: Imtiaz Fuentes MD;  Location: [facility] CATH INVASIVE LOCATION;  Service: Cardiology   • COLONOSCOPY N/A 4/19/2019    Procedure: COLONOSCOPY;  Surgeon: Chris Zimmerman MD;  Location: [facility] OR;  Service: Gastroenterology   • ENDOSCOPY N/A 4/19/2019    Procedure: ESOPHAGOGASTRODUODENOSCOPY;  Surgeon: Chris Zimmerman MD;  Location: [facility] OR;  Service: Gastroenterology   • FEMORAL ARTERY STENT  2013   • FEMORAL POPLITEAL BYPASS Right 12/17/2018    Procedure: LOWER EXTREMITY EMBELECTOMY RIGHT;  Surgeon: David Beatty MD;  Location: [facility] HYBRID OR 15;  Service: Vascular   • HYSTERECTOMY     • LEFT HEART CATH  12/17/2018    At [facility] with TV PM placement    • PACEMAKER IMPLANTATION         Family History   Problem Relation Age of Onset   • Cancer Mother    • Cancer Father    • Heart attack Brother    • Heart attack Brother        Social History     Socioeconomic History   •  "Marital status:    • Number of children: 3   Tobacco Use   • Smoking status: Light Tobacco Smoker     Packs/day: 0.25     Years: 55.00     Pack years: 13.75     Types: Cigarettes   • Smokeless tobacco: Never Used   Substance and Sexual Activity   • Alcohol use: No   • Drug use: No   • Sexual activity: Defer         Current Outpatient Medications:   •  amiodarone (PACERONE) 200 MG tablet, Take 200 mg by mouth 2 (Two) Times a Day., Disp: , Rfl:   •  apixaban (ELIQUIS) 2.5 MG tablet tablet, Take 1 tablet by mouth Every 12 (Twelve) Hours., Disp: 60 tablet, Rfl:   •  atorvastatin (LIPITOR) 40 MG tablet, Take 40 mg by mouth Daily., Disp: , Rfl:   •  cilostazol (PLETAL) 100 MG tablet, Take 1 tablet by mouth 2 (Two) Times a Day., Disp: , Rfl:   •  hydrALAZINE (APRESOLINE) 25 MG tablet, Take 50 mg by mouth 2 (Two) Times a Day., Disp: , Rfl:   •  pantoprazole (PROTONIX) 40 MG EC tablet, 40 mg As Needed., Disp: , Rfl:     Allergies:   No Known Allergies    Review of Systems:  Review of Systems   Cardiovascular: Positive for dyspnea on exertion and palpitations. Negative for chest pain, irregular heartbeat, leg swelling, near-syncope and syncope.   Hematologic/Lymphatic: Negative for bleeding problem.        Vital Signs: Blood pressure 170/70, pulse 71, height 149.9 cm (59\"), weight 49.9 kg (110 lb), SpO2 96 %, not currently breastfeeding.    Physical Exam  Vitals reviewed.   Cardiovascular:      Rate and Rhythm: Normal rate and regular rhythm.      Heart sounds: Normal heart sounds.   Pulmonary:      Effort: Pulmonary effort is normal.      Comments: Decreased breath sounds  Musculoskeletal:      Right lower leg: No edema.      Left lower leg: No edema.   Neurological:      Mental Status: She is alert and oriented to person, place, and time.   Psychiatric:         Behavior: Behavior is cooperative.         Lab Results   Component Value Date    GLUCOSE 109 (H) 06/03/2022    CALCIUM 9.5 06/03/2022     06/03/2022    " K 3.8 06/03/2022    CO2 19.8 (L) 06/03/2022     06/03/2022    BUN 17 06/03/2022    CREATININE 0.91 06/03/2022    EGFRIFNONA 37 (L) 08/18/2019    BCR 18.7 06/03/2022    ANIONGAP 12.2 06/03/2022     Lab Results   Component Value Date    WBC 7.25 06/03/2022    HGB 14.0 06/03/2022    HCT 44.1 06/03/2022    MCV 88.4 06/03/2022     06/03/2022     Lab Results   Component Value Date    INR 0.98 06/03/2022    INR 1.31 (H) 07/08/2019    INR 0.90 05/30/2019    PROTIME 13.2 06/03/2022    PROTIME 17.0 (H) 07/08/2019    PROTIME 12.6 05/30/2019     Lab Results   Component Value Date    TSH 0.762 12/17/2018            I personally viewed and interpreted the patient's EKG/Telemetry/lab data.      ECG 12 Lead    Date/Time: 10/7/2022 1:40 PM  Performed by: Madeleine Covington APRN  Authorized by: Madeleine Covington APRN   Comparison: compared with previous ECG from 6/3/2022  Similar to previous ECG  Rhythm: paced  Rate: normal  BPM: 75  Other findings: non-specific ST-T wave changes  Comments: Atrial paced          Device check: [employer] dual-chamber pacemaker, mode DDDR, rate 70.  RA pacing 82%, RV pacing less than 1%.  Battery voltage 5.5 years.  Events AMS 1.5%, longest 9.5 hours on 8/5/2022.  Frequent short episodes of SVT lasting less than a minute each.  She has home monitored and actively transmitting.      Assessment & Plan    1. Complete heart block (HCC)  -Device check as above    2. Paroxysmal atrial fibrillation (HCC)  -1.5% mode switching on device check today.  She continues to take her Eliquis 2.5 mg twice daily as prescribed for stroke prevention.  We will continue this.    3. Paroxysmal SVT (supraventricular tachycardia) (HCC)  -She does report some episodes of her heart racing but reports she only feels this when she is doing activity and it is relieved with rest.  She is unsure what medication she is taking she was previously on a beta-blocker but she is unclear if she is still taking less.  Her great  granddaughter is also with her at the appointment today and when they got home this afternoon they will call our office and let us know what medicine she is on.  If she is not on a beta-blocker, will plan to add metoprolol back to her regimen to see if this improves and we will recheck her in 3 months.    Follow Up:  Return in about 3 months (around 1/7/2023).    Madeleine Covington, APRN  10/07/2022

## 2022-11-04 RX ORDER — DONEPEZIL HYDROCHLORIDE 10 MG/1
10 TABLET, FILM COATED ORAL NIGHTLY
Start: 2022-11-04

## 2022-11-04 RX ORDER — ATENOLOL 25 MG/1
25 TABLET ORAL DAILY
Qty: 90 TABLET | Refills: 3
Start: 2022-11-04

## 2022-11-17 PROCEDURE — 93294 REM INTERROG EVL PM/LDLS PM: CPT | Performed by: STUDENT IN AN ORGANIZED HEALTH CARE EDUCATION/TRAINING PROGRAM

## 2022-11-17 PROCEDURE — 93296 REM INTERROG EVL PM/IDS: CPT | Performed by: STUDENT IN AN ORGANIZED HEALTH CARE EDUCATION/TRAINING PROGRAM

## 2023-02-16 PROCEDURE — 93296 REM INTERROG EVL PM/IDS: CPT | Performed by: STUDENT IN AN ORGANIZED HEALTH CARE EDUCATION/TRAINING PROGRAM

## 2023-02-16 PROCEDURE — 93294 REM INTERROG EVL PM/LDLS PM: CPT | Performed by: STUDENT IN AN ORGANIZED HEALTH CARE EDUCATION/TRAINING PROGRAM

## 2023-07-20 ENCOUNTER — HOSPITAL ENCOUNTER (EMERGENCY)
Facility: HOSPITAL | Age: 88
Discharge: HOME | End: 2023-07-20
Attending: EMERGENCY MEDICINE | Admitting: EMERGENCY MEDICINE
Payer: MEDICARE

## 2023-07-20 ENCOUNTER — APPOINTMENT (OUTPATIENT)
Dept: GENERAL RADIOLOGY | Facility: HOSPITAL | Age: 88
End: 2023-07-20
Payer: MEDICARE

## 2023-07-20 VITALS
HEIGHT: 59 IN | SYSTOLIC BLOOD PRESSURE: 118 MMHG | HEART RATE: 59 BPM | DIASTOLIC BLOOD PRESSURE: 70 MMHG | RESPIRATION RATE: 20 BRPM | BODY MASS INDEX: 19.76 KG/M2 | OXYGEN SATURATION: 95 % | WEIGHT: 98 LBS | TEMPERATURE: 97.6 F

## 2023-07-20 DIAGNOSIS — N13.30 HYDRONEPHROSIS, UNSPECIFIED HYDRONEPHROSIS TYPE: ICD-10-CM

## 2023-07-20 DIAGNOSIS — K59.00 CONSTIPATION, UNSPECIFIED CONSTIPATION TYPE: Primary | ICD-10-CM

## 2023-07-20 LAB
ALBUMIN SERPL-MCNC: 3.7 G/DL (ref 3.5–5.2)
ALBUMIN/GLOB SERPL: 1.1 G/DL
ALP SERPL-CCNC: 127 U/L (ref 39–117)
ALT SERPL W P-5'-P-CCNC: 14 U/L (ref 1–33)
ANION GAP SERPL CALCULATED.3IONS-SCNC: 12.4 MMOL/L (ref 5–15)
AST SERPL-CCNC: 23 U/L (ref 1–32)
BACTERIA UR QL AUTO: ABNORMAL /HPF
BASOPHILS # BLD AUTO: 0.04 10*3/MM3 (ref 0–0.2)
BASOPHILS NFR BLD AUTO: 0.5 % (ref 0–1.5)
BILIRUB SERPL-MCNC: 0.5 MG/DL (ref 0–1.2)
BILIRUB UR QL STRIP: NEGATIVE
BUN SERPL-MCNC: 15 MG/DL (ref 8–23)
BUN/CREAT SERPL: 10.4 (ref 7–25)
CALCIUM SPEC-SCNC: 9.5 MG/DL (ref 8.6–10.5)
CHLORIDE SERPL-SCNC: 99 MMOL/L (ref 98–107)
CLARITY UR: CLEAR
CO2 SERPL-SCNC: 14.6 MMOL/L (ref 22–29)
COLOR UR: YELLOW
CREAT SERPL-MCNC: 1.44 MG/DL (ref 0.57–1)
DEPRECATED RDW RBC AUTO: 49.9 FL (ref 37–54)
EGFRCR SERPLBLD CKD-EPI 2021: 35.7 ML/MIN/1.73
EOSINOPHIL # BLD AUTO: 0.01 10*3/MM3 (ref 0–0.4)
EOSINOPHIL NFR BLD AUTO: 0.1 % (ref 0.3–6.2)
ERYTHROCYTE [DISTWIDTH] IN BLOOD BY AUTOMATED COUNT: 14.7 % (ref 12.3–15.4)
GLOBULIN UR ELPH-MCNC: 3.5 GM/DL
GLUCOSE SERPL-MCNC: 120 MG/DL (ref 65–99)
GLUCOSE UR STRIP-MCNC: NEGATIVE MG/DL
HCT VFR BLD AUTO: 41.2 % (ref 34–46.6)
HGB BLD-MCNC: 12.7 G/DL (ref 12–15.9)
HGB UR QL STRIP.AUTO: NEGATIVE
HYALINE CASTS UR QL AUTO: ABNORMAL /LPF
IMM GRANULOCYTES # BLD AUTO: 0.04 10*3/MM3 (ref 0–0.05)
IMM GRANULOCYTES NFR BLD AUTO: 0.5 % (ref 0–0.5)
KETONES UR QL STRIP: NEGATIVE
LEUKOCYTE ESTERASE UR QL STRIP.AUTO: ABNORMAL
LYMPHOCYTES # BLD AUTO: 1.1 10*3/MM3 (ref 0.7–3.1)
LYMPHOCYTES NFR BLD AUTO: 14.8 % (ref 19.6–45.3)
MCH RBC QN AUTO: 28.4 PG (ref 26.6–33)
MCHC RBC AUTO-ENTMCNC: 30.8 G/DL (ref 31.5–35.7)
MCV RBC AUTO: 92.2 FL (ref 79–97)
MONOCYTES # BLD AUTO: 0.49 10*3/MM3 (ref 0.1–0.9)
MONOCYTES NFR BLD AUTO: 6.6 % (ref 5–12)
NEUTROPHILS NFR BLD AUTO: 5.73 10*3/MM3 (ref 1.7–7)
NEUTROPHILS NFR BLD AUTO: 77.5 % (ref 42.7–76)
NITRITE UR QL STRIP: NEGATIVE
NRBC BLD AUTO-RTO: 0 /100 WBC (ref 0–0.2)
PH UR STRIP.AUTO: 6 [PH] (ref 5–8)
PLATELET # BLD AUTO: 223 10*3/MM3 (ref 140–450)
PMV BLD AUTO: 9.9 FL (ref 6–12)
POTASSIUM SERPL-SCNC: 4.2 MMOL/L (ref 3.5–5.2)
PROT SERPL-MCNC: 7.2 G/DL (ref 6–8.5)
PROT UR QL STRIP: NEGATIVE
RBC # BLD AUTO: 4.47 10*6/MM3 (ref 3.77–5.28)
RBC # UR STRIP: ABNORMAL /HPF
REF LAB TEST METHOD: ABNORMAL
SODIUM SERPL-SCNC: 126 MMOL/L (ref 136–145)
SP GR UR STRIP: <=1.005 (ref 1–1.03)
SQUAMOUS #/AREA URNS HPF: ABNORMAL /HPF
UROBILINOGEN UR QL STRIP: ABNORMAL
WBC # UR STRIP: ABNORMAL /HPF
WBC NRBC COR # BLD: 7.41 10*3/MM3 (ref 3.4–10.8)

## 2023-07-20 PROCEDURE — 74022 RADEX COMPL AQT ABD SERIES: CPT

## 2023-07-20 PROCEDURE — 99284 EMERGENCY DEPT VISIT MOD MDM: CPT

## 2023-07-20 PROCEDURE — 85025 COMPLETE CBC W/AUTO DIFF WBC: CPT | Performed by: PHYSICIAN ASSISTANT

## 2023-07-20 PROCEDURE — 80053 COMPREHEN METABOLIC PANEL: CPT | Performed by: PHYSICIAN ASSISTANT

## 2023-07-20 PROCEDURE — 36415 COLL VENOUS BLD VENIPUNCTURE: CPT

## 2023-07-20 PROCEDURE — 74022 RADEX COMPL AQT ABD SERIES: CPT | Performed by: RADIOLOGY

## 2023-07-20 PROCEDURE — 87086 URINE CULTURE/COLONY COUNT: CPT | Performed by: PHYSICIAN ASSISTANT

## 2023-07-20 PROCEDURE — 81001 URINALYSIS AUTO W/SCOPE: CPT | Performed by: PHYSICIAN ASSISTANT

## 2023-07-20 RX ORDER — POLYETHYLENE GLYCOL 3350 17 G/17G
17 POWDER, FOR SOLUTION ORAL DAILY
Qty: 578 G | Refills: 0 | Status: SHIPPED | OUTPATIENT
Start: 2023-07-20

## 2023-07-20 NOTE — ED PROVIDER NOTES
Subjective   History of Present Illness  87-year-old female presents secondary to constipation.  Patient states that she has had a normal bowel movement in approximately a month and a half.  She has been seeing primary care.  They had an x-ray.  She also had a recent CT.  This was reviewed.  Patient denies any fever.  Her appetites been decreased.  She has tried Linzess, enema, mag citrate.  She has a past medical history of COPD, DVT, peripheral vascular disease, hypertension.  She presents by private vehicle.    Review of Systems   Constitutional: Negative.  Negative for fever.   HENT: Negative.     Respiratory: Negative.     Cardiovascular: Negative.  Negative for chest pain.   Gastrointestinal:  Positive for abdominal pain and constipation.   Endocrine: Negative.    Genitourinary: Negative.  Negative for dysuria.   Skin: Negative.    Neurological: Negative.    Psychiatric/Behavioral: Negative.     All other systems reviewed and are negative.    Past Medical History:   Diagnosis Date    COPD  12/17/2018    DVT of lower extremity (deep venous thrombosis)     History of CVA 2013 12/17/2018    HTN 12/17/2018    PAD  12/17/2018    Tobacco use 12/17/2018       No Known Allergies    Past Surgical History:   Procedure Laterality Date    CARDIAC CATHETERIZATION  12/17/2018    Procedure: Left Heart Cath;  Surgeon: Imtiaz Fuentes MD;  Location:  [facility] CATH INVASIVE LOCATION;  Service: Cardiology    CARDIAC CATHETERIZATION N/A 12/17/2018    Procedure: Thrombolysis-peripheral;  Surgeon: Imtiaz Fuentes MD;  Location:  [facility] CATH INVASIVE LOCATION;  Service: Cardiology    CARDIAC ELECTROPHYSIOLOGY PROCEDURE N/A 12/18/2018    Procedure: PACEMAKER IMPLANTATION- DC;  Surgeon: Thony Bobby MD;  Location:  [facility] INVASIVE LOCATION;  Service: Cardiology    CARDIAC ELECTROPHYSIOLOGY PROCEDURE N/A 12/17/2018    Procedure: Temporary Pacemaker;  Surgeon: Imtiaz Fuentes MD;  Location:   [facility] CATH INVASIVE LOCATION;  Service: Cardiology    COLONOSCOPY N/A 4/19/2019    Procedure: COLONOSCOPY;  Surgeon: Chris Zimmerman MD;  Location:  [facility] OR;  Service: Gastroenterology    ENDOSCOPY N/A 4/19/2019    Procedure: ESOPHAGOGASTRODUODENOSCOPY;  Surgeon: Chris Zimmerman MD;  Location:  [facility] OR;  Service: Gastroenterology    FEMORAL ARTERY STENT  2013    FEMORAL POPLITEAL BYPASS Right 12/17/2018    Procedure: LOWER EXTREMITY EMBELECTOMY RIGHT;  Surgeon: David Beatty MD;  Location:  [facility] HYBRID OR 15;  Service: Vascular    HYSTERECTOMY      LEFT HEART CATH  12/17/2018    At [facility] with TV PM placement     PACEMAKER IMPLANTATION         Family History   Problem Relation Age of Onset    Cancer Mother     Cancer Father     Heart attack Brother     Heart attack Brother        Social History     Socioeconomic History    Marital status:     Number of children: 3   Tobacco Use    Smoking status: Light Smoker     Packs/day: 0.25     Years: 55.00     Pack years: 13.75     Types: Cigarettes    Smokeless tobacco: Never   Substance and Sexual Activity    Alcohol use: No    Drug use: No    Sexual activity: Defer           Objective   Physical Exam  Vitals and nursing note reviewed.   Constitutional:       General: She is not in acute distress.     Appearance: She is well-developed. She is not diaphoretic.   HENT:      Head: Normocephalic and atraumatic.      Right Ear: External ear normal.      Left Ear: External ear normal.      Nose: Nose normal.   Eyes:      Conjunctiva/sclera: Conjunctivae normal.      Pupils: Pupils are equal, round, and reactive to light.   Neck:      Vascular: No JVD.      Trachea: No tracheal deviation.   Cardiovascular:      Rate and Rhythm: Normal rate and regular rhythm.      Heart sounds: Normal heart sounds. No murmur heard.  Pulmonary:      Effort: Pulmonary effort is normal. No respiratory distress.      Breath sounds: Normal breath sounds. No wheezing.   Abdominal:       General: Bowel sounds are normal.      Palpations: Abdomen is soft.      Tenderness: There is no abdominal tenderness.   Musculoskeletal:         General: No deformity. Normal range of motion.      Cervical back: Normal range of motion and neck supple.   Skin:     General: Skin is warm and dry.      Coloration: Skin is not pale.      Findings: No erythema or rash.   Neurological:      Mental Status: She is alert and oriented to person, place, and time.      Cranial Nerves: No cranial nerve deficit.   Psychiatric:         Behavior: Behavior normal.         Thought Content: Thought content normal.       Procedures           ED Course                            Medical Decision Making  87-year-old female presents secondary to constipation.  Patient states that she has had a normal bowel movement in approximately a month and a half.  She has been seeing primary care.  They had an x-ray.  She also had a recent CT.  This was reviewed.  Patient denies any fever.  Her appetites been decreased.  She has tried Linzess, enema, mag citrate.  She has a past medical history of COPD, DVT, peripheral vascular disease, hypertension.  She presents by private vehicle.    Problems Addressed:  Constipation, unspecified constipation type: complicated acute illness or injury  Hydronephrosis, unspecified hydronephrosis type: complicated acute illness or injury    Amount and/or Complexity of Data Reviewed  Labs: ordered. Decision-making details documented in ED Course.  Radiology: ordered. Decision-making details documented in ED Course.    Risk  Risk Details: Patient was counseled at her diagnostic work-up and labs.  She was counseled on signs and symptoms worsening on appropriate follow-up.  She voices understanding.        Final diagnoses:   Constipation, unspecified constipation type   Hydronephrosis, unspecified hydronephrosis type       ED Disposition  ED Disposition       ED Disposition   Discharge    Condition   Stable    Comment    --               Gifty Kruse MD  [address]  [place] [address]  [phone]    Schedule an appointment as soon as possible for a visit       Chris Zimmerman MD  [address] [facility]  [address]  [place] [address]  [phone]    Schedule an appointment as soon as possible for a visit       Christopher Stevenson MD  [address]  [address]  [facility] [id number] [phone]    Schedule an appointment as soon as possible for a visit            Medication List        New Prescriptions      polyethylene glycol 17 GM/SCOOP powder  Commonly known as: MIRALAX  Take 17 g by mouth Daily.               Where to Get Your Medications        You can get these medications from any pharmacy    Bring a paper prescription for each of these medications  polyethylene glycol 17 GM/SCOOP powder            Hira Beltre PA  07/20/23 3438

## 2023-07-20 NOTE — ED NOTES
Advised patient that we need a urine sample, patient states that she cannot provide one at this time.

## 2023-07-20 NOTE — ED NOTES
Soap suds enema completed per providers orders. Patient tolerated procedure. Patient passed small amount of hard brown stool. No pain reported, no s/s of acute distress noted.

## 2023-07-21 LAB — BACTERIA SPEC AEROBE CULT: NORMAL

## 2023-07-24 ENCOUNTER — HOSPITAL ENCOUNTER (EMERGENCY)
Facility: HOSPITAL | Age: 88
Discharge: HOME | End: 2023-07-24
Attending: EMERGENCY MEDICINE | Admitting: EMERGENCY MEDICINE
Payer: MEDICARE

## 2023-07-24 ENCOUNTER — APPOINTMENT (OUTPATIENT)
Dept: GENERAL RADIOLOGY | Facility: HOSPITAL | Age: 88
End: 2023-07-24
Payer: MEDICARE

## 2023-07-24 ENCOUNTER — APPOINTMENT (OUTPATIENT)
Dept: CT IMAGING | Facility: HOSPITAL | Age: 88
End: 2023-07-24
Payer: MEDICARE

## 2023-07-24 VITALS
HEIGHT: 59 IN | OXYGEN SATURATION: 99 % | BODY MASS INDEX: 19.76 KG/M2 | WEIGHT: 98 LBS | HEART RATE: 77 BPM | RESPIRATION RATE: 16 BRPM | SYSTOLIC BLOOD PRESSURE: 140 MMHG | DIASTOLIC BLOOD PRESSURE: 64 MMHG | TEMPERATURE: 97.9 F

## 2023-07-24 DIAGNOSIS — K31.89 GASTRIC MASS: Primary | ICD-10-CM

## 2023-07-24 LAB
ALBUMIN SERPL-MCNC: 4.1 G/DL (ref 3.5–5.2)
ALBUMIN/GLOB SERPL: 1.5 G/DL
ALP SERPL-CCNC: 120 U/L (ref 39–117)
ALT SERPL W P-5'-P-CCNC: 13 U/L (ref 1–33)
ANION GAP SERPL CALCULATED.3IONS-SCNC: 14 MMOL/L (ref 5–15)
AST SERPL-CCNC: 18 U/L (ref 1–32)
BASOPHILS # BLD AUTO: 0.05 10*3/MM3 (ref 0–0.2)
BASOPHILS NFR BLD AUTO: 0.6 % (ref 0–1.5)
BILIRUB SERPL-MCNC: 0.5 MG/DL (ref 0–1.2)
BILIRUB UR QL STRIP: NEGATIVE
BUN SERPL-MCNC: 16 MG/DL (ref 8–23)
BUN/CREAT SERPL: 11.9 (ref 7–25)
CALCIUM SPEC-SCNC: 9.7 MG/DL (ref 8.6–10.5)
CHLORIDE SERPL-SCNC: 98 MMOL/L (ref 98–107)
CLARITY UR: CLEAR
CO2 SERPL-SCNC: 22 MMOL/L (ref 22–29)
COLOR UR: YELLOW
CREAT SERPL-MCNC: 1.35 MG/DL (ref 0.57–1)
DEPRECATED RDW RBC AUTO: 47.1 FL (ref 37–54)
EGFRCR SERPLBLD CKD-EPI 2021: 38.6 ML/MIN/1.73
EOSINOPHIL # BLD AUTO: 0.03 10*3/MM3 (ref 0–0.4)
EOSINOPHIL NFR BLD AUTO: 0.4 % (ref 0.3–6.2)
ERYTHROCYTE [DISTWIDTH] IN BLOOD BY AUTOMATED COUNT: 14.6 % (ref 12.3–15.4)
GLOBULIN UR ELPH-MCNC: 2.8 GM/DL
GLUCOSE SERPL-MCNC: 141 MG/DL (ref 65–99)
GLUCOSE UR STRIP-MCNC: NEGATIVE MG/DL
HCT VFR BLD AUTO: 38.4 % (ref 34–46.6)
HGB BLD-MCNC: 12.2 G/DL (ref 12–15.9)
HGB UR QL STRIP.AUTO: NEGATIVE
HOLD SPECIMEN: NORMAL
HOLD SPECIMEN: NORMAL
IMM GRANULOCYTES # BLD AUTO: 0.01 10*3/MM3 (ref 0–0.05)
IMM GRANULOCYTES NFR BLD AUTO: 0.1 % (ref 0–0.5)
KETONES UR QL STRIP: NEGATIVE
LEUKOCYTE ESTERASE UR QL STRIP.AUTO: NEGATIVE
LYMPHOCYTES # BLD AUTO: 1.46 10*3/MM3 (ref 0.7–3.1)
LYMPHOCYTES NFR BLD AUTO: 17.7 % (ref 19.6–45.3)
MCH RBC QN AUTO: 28.1 PG (ref 26.6–33)
MCHC RBC AUTO-ENTMCNC: 31.8 G/DL (ref 31.5–35.7)
MCV RBC AUTO: 88.5 FL (ref 79–97)
MONOCYTES # BLD AUTO: 0.69 10*3/MM3 (ref 0.1–0.9)
MONOCYTES NFR BLD AUTO: 8.4 % (ref 5–12)
NEUTROPHILS NFR BLD AUTO: 6.02 10*3/MM3 (ref 1.7–7)
NEUTROPHILS NFR BLD AUTO: 72.8 % (ref 42.7–76)
NITRITE UR QL STRIP: NEGATIVE
NRBC BLD AUTO-RTO: 0 /100 WBC (ref 0–0.2)
PH UR STRIP.AUTO: 6.5 [PH] (ref 5–8)
PLATELET # BLD AUTO: 229 10*3/MM3 (ref 140–450)
PMV BLD AUTO: 10.2 FL (ref 6–12)
POTASSIUM SERPL-SCNC: 3.8 MMOL/L (ref 3.5–5.2)
PROT SERPL-MCNC: 6.9 G/DL (ref 6–8.5)
PROT UR QL STRIP: NEGATIVE
RBC # BLD AUTO: 4.34 10*6/MM3 (ref 3.77–5.28)
SODIUM SERPL-SCNC: 134 MMOL/L (ref 136–145)
SP GR UR STRIP: <=1.005 (ref 1–1.03)
UROBILINOGEN UR QL STRIP: NORMAL
WBC NRBC COR # BLD: 8.26 10*3/MM3 (ref 3.4–10.8)
WHOLE BLOOD HOLD COAG: NORMAL
WHOLE BLOOD HOLD SPECIMEN: NORMAL

## 2023-07-24 PROCEDURE — 74018 RADEX ABDOMEN 1 VIEW: CPT

## 2023-07-24 PROCEDURE — 74176 CT ABD & PELVIS W/O CONTRAST: CPT

## 2023-07-24 PROCEDURE — 85025 COMPLETE CBC W/AUTO DIFF WBC: CPT | Performed by: NURSE PRACTITIONER

## 2023-07-24 PROCEDURE — 36415 COLL VENOUS BLD VENIPUNCTURE: CPT

## 2023-07-24 PROCEDURE — 99283 EMERGENCY DEPT VISIT LOW MDM: CPT

## 2023-07-24 PROCEDURE — 74018 RADEX ABDOMEN 1 VIEW: CPT | Performed by: RADIOLOGY

## 2023-07-24 PROCEDURE — 80053 COMPREHEN METABOLIC PANEL: CPT | Performed by: NURSE PRACTITIONER

## 2023-07-24 PROCEDURE — 81003 URINALYSIS AUTO W/O SCOPE: CPT | Performed by: NURSE PRACTITIONER

## 2023-07-24 PROCEDURE — 74176 CT ABD & PELVIS W/O CONTRAST: CPT | Performed by: RADIOLOGY

## 2023-07-25 NOTE — ED PROVIDER NOTES
Latex:  Patient denies allergy to latex.   Medications reviewed with patient.  Tobacco use verified.  Allergies verified.    REFERRING MD: SEBAS  Primary Medical Doctor: No Pcp   DATE OF INJURY:  1/25/2020  OCCUPATION:  [employer]   SEEN ELSEWHERE: YES  - Seen at:   Date:  1/25/20 and 2/26/20      Patient is here for L hand injury.  He injured his hand by slipping and falling on ice/snow. After the injury the pain still continues mostly in the 4th finger.  He is taking ibuprofen PRN for pain.        Subjective   History of Present Illness  Patient is an 87-year-old female with significant past medical history positive for COPD, CVA in 2013, hypertension, PAD presenting to the ER complaints of constipation.  Patient reports that she has not had a bowel movement in over a month.  Patient reports that she was seen here at this facility a week ago given an enema with no relief.  Patient reports that she has a appointment with GI specialist this Friday.  Patient reports that she cannot take the pain any longer in her abdomen.  Patient denies any fever, cough, nausea, vomiting, chest pain or any additional symptoms at this time.  Patient has also tried MiraLAX and Colace at home.  Patient has not had no successful bowel movements.    History provided by:  Patient   used: No      Review of Systems   Constitutional: Negative.  Negative for fever.   HENT: Negative.     Respiratory: Negative.     Cardiovascular: Negative.  Negative for chest pain.   Gastrointestinal:  Positive for abdominal pain and constipation.   Endocrine: Negative.    Genitourinary: Negative.  Negative for dysuria.   Skin: Negative.    Neurological: Negative.    Psychiatric/Behavioral: Negative.     All other systems reviewed and are negative.    Past Medical History:   Diagnosis Date    COPD  12/17/2018    DVT of lower extremity (deep venous thrombosis)     History of CVA 2013 12/17/2018    HTN 12/17/2018    PAD  12/17/2018    Tobacco use 12/17/2018       No Known Allergies    Past Surgical History:   Procedure Laterality Date    CARDIAC CATHETERIZATION  12/17/2018    Procedure: Left Heart Cath;  Surgeon: Imtiaz Fuentes MD;  Location:  [facility];  Service: Cardiology    CARDIAC CATHETERIZATION N/A 12/17/2018    Procedure: Thrombolysis-peripheral;  Surgeon: Imtiaz Fuentes MD;  [facility]:  [facility];  Service: Cardiology    CARDIAC ELECTROPHYSIOLOGY PROCEDURE N/A  12/18/2018    Procedure: PACEMAKER IMPLANTATION- DC;  Surgeon: Thony Bobby MD;  Location:  [facility];  Service: Cardiology    CARDIAC ELECTROPHYSIOLOGY PROCEDURE N/A 12/17/2018    Procedure: Temporary Pacemaker;  Surgeon: Imtiaz Fuentes MD;  Location:  [facility];  Service: Cardiology    COLONOSCOPY N/A 4/19/2019    Procedure: COLONOSCOPY;  Surgeon: Chris Zimmerman MD;  Location: [facility] OR;  Service: Gastroenterology    ENDOSCOPY N/A 4/19/2019    Procedure: ESOPHAGOGASTRODUODENOSCOPY;  Surgeon: Chris Zimmerman MD;  Location:  [facility] OR;  Service: Gastroenterology    FEMORAL ARTERY STENT  2013    FEMORAL POPLITEAL BYPASS Right 12/17/2018    Procedure: LOWER EXTREMITY EMBELECTOMY RIGHT;  Surgeon: David Beatty MD;  Location:  [facility];  Service: Vascular    HYSTERECTOMY      LEFT HEART CATH  12/17/2018    At [facility] with TV PM placement     PACEMAKER IMPLANTATION         Family History   Problem Relation Age of Onset    Cancer Mother     Cancer Father     Heart attack Brother     Heart attack Brother        Social History     Socioeconomic History    Marital status:     Number of children: 3   Tobacco Use    Smoking status: Light Smoker     Packs/day: 0.25     Years: 55.00     Pack years: 13.75     Types: Cigarettes    Smokeless tobacco: Never   Substance and Sexual Activity    Alcohol use: No    Drug use: No    Sexual activity: Defer           Objective   Physical Exam  Vitals and nursing note reviewed.   Constitutional:       General: She is not in acute distress.     Appearance: She is well-developed. She is not diaphoretic.   HENT:      Head: Normocephalic and atraumatic.      Right Ear: External ear normal.      Left Ear: External ear normal.      Nose: Nose normal.   Eyes:      Conjunctiva/sclera: Conjunctivae normal.      Pupils: Pupils are equal, round, and reactive to light.   Neck:      Vascular: No JVD.      Trachea: No  tracheal deviation.   Cardiovascular:      Rate and Rhythm: Normal rate and regular rhythm.      Heart sounds: Normal heart sounds. No murmur heard.  Pulmonary:      Effort: Pulmonary effort is normal. No respiratory distress.      Breath sounds: Normal breath sounds. No wheezing.   Abdominal:      General: Bowel sounds are normal.      Palpations: Abdomen is soft.      Tenderness: There is no abdominal tenderness.   Musculoskeletal:         General: No deformity. Normal range of motion.      Cervical back: Normal range of motion and neck supple.   Skin:     General: Skin is warm and dry.      Coloration: Skin is not pale.      Findings: No erythema or rash.   Neurological:      Mental Status: She is alert and oriented to person, place, and time.      Cranial Nerves: No cranial nerve deficit.   Psychiatric:         Behavior: Behavior normal.         Thought Content: Thought content normal.       Procedures       Results for orders placed or performed during the hospital encounter of 07/24/23   Comprehensive Metabolic Panel    Specimen: Blood   Result Value Ref Range    Glucose 141 (H) 65 - 99 mg/dL    BUN 16 8 - 23 mg/dL    Creatinine 1.35 (H) 0.57 - 1.00 mg/dL    Sodium 134 (L) 136 - 145 mmol/L    Potassium 3.8 3.5 - 5.2 mmol/L    Chloride 98 98 - 107 mmol/L    CO2 22.0 22.0 - 29.0 mmol/L    Calcium 9.7 8.6 - 10.5 mg/dL    Total Protein 6.9 6.0 - 8.5 g/dL    Albumin 4.1 3.5 - 5.2 g/dL    ALT (SGPT) 13 1 - 33 U/L    AST (SGOT) 18 1 - 32 U/L    Alkaline Phosphatase 120 (H) 39 - 117 U/L    Total Bilirubin 0.5 0.0 - 1.2 mg/dL    Globulin 2.8 gm/dL    A/G Ratio 1.5 g/dL    BUN/Creatinine Ratio 11.9 7.0 - 25.0    Anion Gap 14.0 5.0 - 15.0 mmol/L    eGFR 38.6 (L) >60.0 mL/min/1.73   Urinalysis With Culture If Indicated - Urine, Clean Catch    Specimen: Urine, Clean Catch   Result Value Ref Range    Color, UA Yellow Yellow, Straw    Appearance, UA Clear Clear    pH, UA 6.5 5.0 - 8.0    Specific Gravity, UA <=1.005 1.005 -  1.030    Glucose, UA Negative Negative    Ketones, UA Negative Negative    Bilirubin, UA Negative Negative    Blood, UA Negative Negative    Protein, UA Negative Negative    Leuk Esterase, UA Negative Negative    Nitrite, UA Negative Negative    Urobilinogen, UA 0.2 E.U./dL 0.2 - 1.0 E.U./dL   CBC Auto Differential    Specimen: Blood   Result Value Ref Range    WBC 8.26 3.40 - 10.80 10*3/mm3    RBC 4.34 3.77 - 5.28 10*6/mm3    Hemoglobin 12.2 12.0 - 15.9 g/dL    Hematocrit 38.4 34.0 - 46.6 %    MCV 88.5 79.0 - 97.0 fL    MCH 28.1 26.6 - 33.0 pg    MCHC 31.8 31.5 - 35.7 g/dL    RDW 14.6 12.3 - 15.4 %    RDW-SD 47.1 37.0 - 54.0 fl    MPV 10.2 6.0 - 12.0 fL    Platelets 229 140 - 450 10*3/mm3    Neutrophil % 72.8 42.7 - 76.0 %    Lymphocyte % 17.7 (L) 19.6 - 45.3 %    Monocyte % 8.4 5.0 - 12.0 %    Eosinophil % 0.4 0.3 - 6.2 %    Basophil % 0.6 0.0 - 1.5 %    Immature Grans % 0.1 0.0 - 0.5 %    Neutrophils, Absolute 6.02 1.70 - 7.00 10*3/mm3    Lymphocytes, Absolute 1.46 0.70 - 3.10 10*3/mm3    Monocytes, Absolute 0.69 0.10 - 0.90 10*3/mm3    Eosinophils, Absolute 0.03 0.00 - 0.40 10*3/mm3    Basophils, Absolute 0.05 0.00 - 0.20 10*3/mm3    Immature Grans, Absolute 0.01 0.00 - 0.05 10*3/mm3    nRBC 0.0 0.0 - 0.2 /100 WBC   Green Top (Gel)   Result Value Ref Range    Extra Tube Hold for add-ons.    Lavender Top   Result Value Ref Range    Extra Tube hold for add-on    Gold Top - SST   Result Value Ref Range    Extra Tube Hold for add-ons.    Light Blue Top   Result Value Ref Range    Extra Tube Hold for add-ons.       CT Abdomen Pelvis Without Contrast   Final Result   1.  The distal stomach shows fluid filling, as well as concentric soft   tissue thickening or mass versus nondistention of the mid gastric body   and could be further evaluated with upper endoscopy.   2.  Again there is mild to moderate bilateral hydronephrosis appears   stable.   3.  No significant stool burden identified.       This report was finalized  on 7/24/2023 2:06 PM by Dr. Galen Isaacs MD.          XR Abdomen KUB   Final Result     Degenerative changes lumbar spine as described.       This report was finalized on 7/24/2023 12:42 PM by Dr. Galen Isaacs MD.               ED Course  ED Course as of 07/24/23 2023   Mon Jul 24, 2023   1248 XR Abdomen KUB [SM]   1441 CT Abdomen Pelvis Without Contrast  IMPRESSION:  1.  The distal stomach shows fluid filling, as well as concentric soft  tissue thickening or mass versus nondistention of the mid gastric body  and could be further evaluated with upper endoscopy.  2.  Again there is mild to moderate bilateral hydronephrosis appears  stable.  3.  No significant stool burden identified.     This report was finalized on 7/24/2023 2:06 PM by Dr. Galen Isaacs MD.      [SM]      ED Course User Index  [SM] Phyllis Ta, ASHISH                                           Medical Decision Making  Patient is an 87-year-old female with significant past medical history positive for COPD, CVA in 2013, hypertension, PAD presenting to the ER complaints of constipation.  Patient reports that she has not had a bowel movement in over a month.  Patient reports that she was seen here at this facility a week ago given an enema with no relief.  Patient reports that she has a appointment with GI specialist this Friday.  Patient reports that she cannot take the pain any longer in her abdomen.  Patient denies any fever, cough, nausea, vomiting, chest pain or any additional symptoms at this time.  Patient has also tried MiraLAX and Colace at home.  Patient has not had no successful bowel movements.    Advised patient to return to the ER with new or worsening symptoms.  Advised patient to follow-up with PCP, GI and general surgery.  Patient verbalized understanding and agrees.  Vital signs are stable at discharge.  Patient is in no acute distress.    Problems Addressed:  Gastric mass: complicated acute illness or injury    Amount and/or  Complexity of Data Reviewed  Labs: ordered.  Radiology: ordered. Decision-making details documented in ED Course.        Final diagnoses:   Gastric mass       ED Disposition  ED Disposition       ED Disposition   Discharge    Condition   Stable    Comment   --               Gifty Kruse MD  [address]  [facility] [id number] [phone]    Schedule an appointment as soon as possible for a visit in 2 days      Chris Zimmerman MD  [address] [facility] [address]  [facility] [id number] [phone]    Schedule an appointment as soon as possible for a visit in 2 days           Medication List      No changes were made to your prescriptions during this visit.            Phyllis Ta, APRN  07/24/23 2024

## 2023-07-28 ENCOUNTER — OFFICE VISIT (OUTPATIENT)
Dept: GASTROENTEROLOGY | Facility: CLINIC | Age: 88
End: 2023-07-28
Payer: MEDICARE

## 2023-07-28 VITALS
HEIGHT: 59 IN | HEART RATE: 76 BPM | WEIGHT: 94.8 LBS | DIASTOLIC BLOOD PRESSURE: 79 MMHG | BODY MASS INDEX: 19.11 KG/M2 | SYSTOLIC BLOOD PRESSURE: 155 MMHG

## 2023-07-28 DIAGNOSIS — R10.13 EPIGASTRIC PAIN: Primary | ICD-10-CM

## 2023-07-28 DIAGNOSIS — K59.04 CHRONIC IDIOPATHIC CONSTIPATION: ICD-10-CM

## 2023-07-28 DIAGNOSIS — R13.10 DYSPHAGIA, UNSPECIFIED TYPE: ICD-10-CM

## 2023-07-28 NOTE — H&P (VIEW-ONLY)
DATE OF CONSULTATION:  7/28/2023    REASON FOR REFERRAL: Abdominal pain    REFERRING PHYSICIAN:  ASHISH Steward    CHIEF COMPLAINT:  Chief Complaint   Patient presents with    Abdominal Pain       HISTORY OF PRESENT ILLNESS:   Rupinder Lees is a very pleasant 87 y.o. female who is being seen today at the request of ASHISH Steward for evaluation and treatment of abdominal pain. Ms. Lees is a very poor historian and is accompanied by her daughter in law. She reports she has been having epigastric pain for the past couple of months which occurs particularly after eating. She denies having burning in her chest or throat. Denies nausea and vomiting. She complains of dysphagia particularly with solids. She reports unintentional weight loss ~16 lbs over the past two months. Of note, she is s/p cholecystectomy. She did not bring her medication list today and she is unsure if she is on PPI therapy. However, protonix 40 mg PO daily is on her current medication list.  She also complains of chronic constipation. She has tried over the counter stool softeners and laxatives without improvement. She was subsequently started on Linzess ~3 weeks ago per PCP (she is unsure of dose). She is currently having a bowel movement daily with stool type 6 on BSS. She presented to [facility] ED on 7/24/23 with complaints of abdominal pain. She underwent CT A/P on 7/24/23 which showed the distal stomach with fluid filling, as well as concentric soft tissue thickening or mass vs non distention of the mid gastric body. Upper endoscopy was recommended. There was no significant stool burden identified. We discussed CT imaging findings today and patient adamantly wants to proceed with EGD as soon as possible given significant epigastric pain. She has no other complaints today.     PAST MEDICAL HISTORY:  Past Medical History:   Diagnosis Date    COPD  12/17/2018    DVT of lower extremity (deep venous thrombosis)     History of CVA 2013  12/17/2018    HTN 12/17/2018    PAD  12/17/2018    Tobacco use 12/17/2018       PAST SURGICAL HISTORY:  Past Surgical History:   Procedure Laterality Date    CARDIAC CATHETERIZATION  12/17/2018    Procedure: Left Heart Cath;  Surgeon: Imtiaz Fuentes MD;  Location:  [facility];  Service: Cardiology    CARDIAC CATHETERIZATION N/A 12/17/2018    Procedure: Thrombolysis-peripheral;  Surgeon: Imtiaz Fuentes MD;  Location:  [facility];  Service: Cardiology    CARDIAC ELECTROPHYSIOLOGY PROCEDURE N/A 12/18/2018    Procedure: PACEMAKER IMPLANTATION- DC;  Surgeon: Thony Bobby MD;  Location:  [facility];  Service: Cardiology    CARDIAC ELECTROPHYSIOLOGY PROCEDURE N/A 12/17/2018    Procedure: Temporary Pacemaker;  Surgeon: Imtiaz Fuentes MD;  Location:  [facility];  Service: Cardiology    COLONOSCOPY N/A 4/19/2019    Procedure: COLONOSCOPY;  Surgeon: Chris Zimmerman MD;  Location: [facility] OR;  Service: Gastroenterology    ENDOSCOPY N/A 4/19/2019    Procedure: ESOPHAGOGASTRODUODENOSCOPY;  Surgeon: Chris Zimmerman MD;  Location: [facility] OR;  Service: Gastroenterology    FEMORAL ARTERY STENT  2013    FEMORAL POPLITEAL BYPASS Right 12/17/2018    Procedure: LOWER EXTREMITY EMBELECTOMY RIGHT;  Surgeon: David Beatty MD;  Location: [facility] HYBRID OR 15;  Service: Vascular    HYSTERECTOMY      LEFT HEART CATH  12/17/2018    At [facility] with TV PM placement     PACEMAKER IMPLANTATION         FAMILY HISTORY:  Family History   Problem Relation Age of Onset    Cancer Mother     Cancer Father     Heart attack Brother     Heart attack Brother        SOCIAL HISTORY:  Social History     Socioeconomic History    Marital status:     Number of children: 3   Tobacco Use    Smoking status: Light Smoker     Packs/day: 0.25     Years: 55.00     Pack years: 13.75     Types: Cigarettes    Smokeless tobacco: Never  "  Substance and Sexual Activity    Alcohol use: No    Drug use: No    Sexual activity: Defer     MEDICATIONS:  The current medication list was reviewed in the EMR    Current Outpatient Medications:     apixaban (ELIQUIS) 2.5 MG tablet tablet, Take 1 tablet by mouth Every 12 (Twelve) Hours., Disp: 60 tablet, Rfl:     atenolol (TENORMIN) 25 MG tablet, Take 1 tablet by mouth Daily., Disp: 90 tablet, Rfl: 3    atorvastatin (LIPITOR) 40 MG tablet, Take 1 tablet by mouth Daily., Disp: , Rfl:     cilostazol (PLETAL) 100 MG tablet, Take 1 tablet by mouth 2 (Two) Times a Day., Disp: , Rfl:     donepezil (Aricept) 10 MG tablet, Take 1 tablet by mouth Every Night., Disp: , Rfl:     hydrALAZINE (APRESOLINE) 25 MG tablet, Take 2 tablets by mouth 2 (Two) Times a Day., Disp: , Rfl:     pantoprazole (PROTONIX) 40 MG EC tablet, 1 tablet As Needed., Disp: , Rfl:     polyethylene glycol (MIRALAX) 17 GM/SCOOP powder, Take 17 g by mouth Daily., Disp: 578 g, Rfl: 0    ALLERGIES:  No Known Allergies      REVIEW OF SYSTEMS:    A comprehensive 14 point review of systems was performed.  Significant findings as mentioned above.  All other systems reviewed and are negative.      Physical Exam   Vital Signs: /79   Pulse 76   Ht 149.9 cm (59\")   Wt 43 kg (94 lb 12.8 oz)   LMP  (LMP Unknown)   BMI 19.15 kg/mý   General: Elderly, Alert and oriented x 3, in no acute distress.   Head: ATNC   Eyes: PERRL, No evidence of conjunctivitis.   Nose: No nasal discharge.   Mouth: Oral mucosal membranes moist. No oral ulceration or hemorrhages.   Neck: Neck supple. No thyromegaly. No JVD.   Lungs: Clear in all fields to A&P without rales, rhonchi or wheezing.   Heart: Regular rate and rhythm. No murmurs, rubs, or gallops.   Abdomen: Soft. Bowel sounds are normoactive. Nontender with palpation.  Extremities: No cyanosis or edema.   Neurologic: Grossly non-focal exam      IMAGING:   CT Abdomen Pelvis Without Contrast    Result Date: 7/24/2023  1.  " The distal stomach shows fluid filling, as well as concentric soft tissue thickening or mass versus nondistention of the mid gastric body and could be further evaluated with upper endoscopy. 2.  Again there is mild to moderate bilateral hydronephrosis appears stable. 3.  No significant stool burden identified.  This report was finalized on 7/24/2023 2:06 PM by Dr. Galen Isaacs MD.      CT Abdomen Pelvis Without Contrast    Result Date: 7/13/2023   1. Dilatation of the left renal pelvis and left ureter but I do not see an obstructing stones on the presented study  2.Sigmoid diverticuli without diverticulitis  3. Other findings as above       This report was finalized on 7/13/2023 4:56 PM by Dr. Juan Plascencia MD.      XR Abdomen 2+ VW with Chest 1 VW    Result Date: 7/20/2023  1.  Megaly and chronic lung changes. No acute cardiopulmonary findings. 2.  No acute abdominal findings radiographically evident.  This report was finalized on 7/20/2023 2:58 PM by Dr. Pedrito Alejandre MD.      XR Abdomen 2+ VW with Chest 1 VW    Result Date: 6/28/2023  1. Moderate stool burden 2. The lungs are clear. 3. No evidence of bowel obstruction. 4.Scoliosis  This report was finalized on 6/28/2023 12:31 PM by Dr. Juan Plascencia MD.      XR Abdomen KUB    Result Date: 7/24/2023    Degenerative changes lumbar spine as described.  This report was finalized on 7/24/2023 12:42 PM by Dr. Galen Isaacs MD.          RECENT LABS:  Lab Results   Component Value Date    WBC 8.26 07/24/2023    HGB 12.2 07/24/2023    HCT 38.4 07/24/2023    MCV 88.5 07/24/2023    RDW 14.6 07/24/2023     07/24/2023    NEUTRORELPCT 72.8 07/24/2023    LYMPHORELPCT 17.7 (L) 07/24/2023    MONORELPCT 8.4 07/24/2023    EOSRELPCT 0.4 07/24/2023    BASORELPCT 0.6 07/24/2023    NEUTROABS 6.02 07/24/2023    LYMPHSABS 1.46 07/24/2023       Lab Results   Component Value Date     (L) 07/24/2023    K 3.8 07/24/2023    CO2 22.0 07/24/2023    CL 98 07/24/2023    BUN 16  07/24/2023    CREATININE 1.35 (H) 07/24/2023    EGFRIFNONA 37 (L) 08/18/2019    GLUCOSE 141 (H) 07/24/2023    CALCIUM 9.7 07/24/2023    ALKPHOS 120 (H) 07/24/2023    AST 18 07/24/2023    ALT 13 07/24/2023    BILITOT 0.5 07/24/2023    ALBUMIN 4.1 07/24/2023    PROTEINTOT 6.9 07/24/2023    MG 3.0 (H) 04/18/2019    PHOS 2.7 04/19/2019     ASSESSMENT & PLAN:  Rupinder Lees is a very pleasant 87 y.o. female with    1.  Epigastric pain:  2. Dysphagia:  -Patient underwent CT A/P on 7/24/23 which showed the distal stomach with fluid filling, as well as concentric soft tissue thickening or mass vs non distention of the mid gastric body. Given the above, recommended EGD to further evaluate. We discussed the risks/benefits including her age and co morbidities and patient was agreeable. Also discussed with Dr. Levi and steve to continue with Eliquis for procedure. Will schedule EGD next week.   -RTC following the above procedure.     3. Constipation:  -This is now improved with Linzess started per PCP. She is unsure of dose. Will monitor.     The patient was in agreement with the plan and all questions were answered to her satisfaction.     Thank you so much for allowing us to participate in the care of Rupinder Lees . Please do not hesitate to contact us with any questions or concerns.             Electronically Signed by: ASHISH Chun , July 28, 2023 11:13 EDT       CC:   ASHISH Steward Maria C., MD

## 2023-07-28 NOTE — PROGRESS NOTES
DATE OF CONSULTATION:  7/28/2023    REASON FOR REFERRAL: Abdominal pain    REFERRING PHYSICIAN:  ASHISH Steward    CHIEF COMPLAINT:  Chief Complaint   Patient presents with    Abdominal Pain       HISTORY OF PRESENT ILLNESS:   Rupinder Lees is a very pleasant 87 y.o. female who is being seen today at the request of ASHISH Steward for evaluation and treatment of abdominal pain. Ms. Lees is a very poor historian and is accompanied by her daughter in law. She reports she has been having epigastric pain for the past couple of months which occurs particularly after eating. She denies having burning in her chest or throat. Denies nausea and vomiting. She complains of dysphagia particularly with solids. She reports unintentional weight loss ~16 lbs over the past two months. Of note, she is s/p cholecystectomy. She did not bring her medication list today and she is unsure if she is on PPI therapy. However, protonix 40 mg PO daily is on her current medication list.  She also complains of chronic constipation. She has tried over the counter stool softeners and laxatives without improvement. She was subsequently started on Linzess ~3 weeks ago per PCP (she is unsure of dose). She is currently having a bowel movement daily with stool type 6 on BSS. She presented to [facility] ED on 7/24/23 with complaints of abdominal pain. She underwent CT A/P on 7/24/23 which showed the distal stomach with fluid filling, as well as concentric soft tissue thickening or mass vs non distention of the mid gastric body. Upper endoscopy was recommended. There was no significant stool burden identified. We discussed CT imaging findings today and patient adamantly wants to proceed with EGD as soon as possible given significant epigastric pain. She has no other complaints today.     PAST MEDICAL HISTORY:  Past Medical History:   Diagnosis Date    COPD  12/17/2018    DVT of lower extremity (deep venous thrombosis)     History of CVA 2013  12/17/2018    HTN 12/17/2018    PAD  12/17/2018    Tobacco use 12/17/2018       PAST SURGICAL HISTORY:  Past Surgical History:   Procedure Laterality Date    CARDIAC CATHETERIZATION  12/17/2018    Procedure: Left Heart Cath;  Surgeon: Imtiaz Fuentes MD;  Location:  [facility] CATH INVASIVE LOC[facility];  Service: Cardiology    CARDIAC CATHETERIZATION N/A 12/17/2018    Procedure: Thrombolysis-peripheral;  Surgeon: Imtiaz Fuentes MD;  Location:  [facility];  Service: Cardiology    CARDIAC ELECTROPHYSIOLOGY PROCEDURE N/A 12/18/2018    Procedure: PACEMAKER IMPLANTATION- DC;  Surgeon: Thony Bobby MD;  Location:  [facility] INV[facility]IVE [facility];  Service: Cardiology    CARDIAC ELECTROPHYSIOLOGY PROCEDURE N/A 12/17/2018    Procedure: Temporary Pacemaker;  Surgeon: Imtiaz Fuentes MD;  Location:  [facility] INV[facility]IVE [facility];  Service: Cardiology    COLONOSCOPY N/A 4/19/2019    Procedure: COLONOSCOPY;  Surgeon: Chris Zimmerman MD;  Location: [facility] OR;  Service: Gastroenterology    ENDOSCOPY N/A 4/19/2019    Procedure: ESOPHAGOGASTRODUODENOSCOPY;  Surgeon: Chris Zimmerman MD;  Location: [facility] OR;  Service: Gastroenterology    FEMORAL ARTERY STENT  2013    FEMORAL POPLITEAL BYPASS Right 12/17/2018    Procedure: LOWER EXTREMITY EMBELECTOMY RIGHT;  Surgeon: David Beatty MD;  Location: [facility] HYBRID OR 15;  Service: Vascular    HYSTERECTOMY      LEFT HEART CATH  12/17/2018    At [facility] with TV PM placement     PACEMAKER IMPLANTATION         FAMILY HISTORY:  Family History   Problem Relation Age of Onset    Cancer Mother     Cancer Father     Heart attack Brother     Heart attack Brother        SOCIAL HISTORY:  Social History     Socioeconomic History    Marital status:     Number of children: 3   Tobacco Use    Smoking status: Light Smoker     Packs/day: 0.25     Years: 55.00     Pack years: 13.75     Types: Cigarettes    Smokeless tobacco: Never  "  Substance and Sexual Activity    Alcohol use: No    Drug use: No    Sexual activity: Defer     MEDICATIONS:  The current medication list was reviewed in the EMR    Current Outpatient Medications:     apixaban (ELIQUIS) 2.5 MG tablet tablet, Take 1 tablet by mouth Every 12 (Twelve) Hours., Disp: 60 tablet, Rfl:     atenolol (TENORMIN) 25 MG tablet, Take 1 tablet by mouth Daily., Disp: 90 tablet, Rfl: 3    atorvastatin (LIPITOR) 40 MG tablet, Take 1 tablet by mouth Daily., Disp: , Rfl:     cilostazol (PLETAL) 100 MG tablet, Take 1 tablet by mouth 2 (Two) Times a Day., Disp: , Rfl:     donepezil (Aricept) 10 MG tablet, Take 1 tablet by mouth Every Night., Disp: , Rfl:     hydrALAZINE (APRESOLINE) 25 MG tablet, Take 2 tablets by mouth 2 (Two) Times a Day., Disp: , Rfl:     pantoprazole (PROTONIX) 40 MG EC tablet, 1 tablet As Needed., Disp: , Rfl:     polyethylene glycol (MIRALAX) 17 GM/SCOOP powder, Take 17 g by mouth Daily., Disp: 578 g, Rfl: 0    ALLERGIES:  No Known Allergies      REVIEW OF SYSTEMS:    A comprehensive 14 point review of systems was performed.  Significant findings as mentioned above.  All other systems reviewed and are negative.      Physical Exam   Vital Signs: /79   Pulse 76   Ht 149.9 cm (59\")   Wt 43 kg (94 lb 12.8 oz)   LMP  (LMP Unknown)   BMI 19.15 kg/m²   General: Elderly, Alert and oriented x 3, in no acute distress.   Head: ATNC   Eyes: PERRL, No evidence of conjunctivitis.   Nose: No nasal discharge.   Mouth: Oral mucosal membranes moist. No oral ulceration or hemorrhages.   Neck: Neck supple. No thyromegaly. No JVD.   Lungs: Clear in all fields to A&P without rales, rhonchi or wheezing.   Heart: Regular rate and rhythm. No murmurs, rubs, or gallops.   Abdomen: Soft. Bowel sounds are normoactive. Nontender with palpation.  Extremities: No cyanosis or edema.   Neurologic: Grossly non-focal exam      IMAGING:   CT Abdomen Pelvis Without Contrast    Result Date: 7/24/2023  1.  " The distal stomach shows fluid filling, as well as concentric soft tissue thickening or mass versus nondistention of the mid gastric body and could be further evaluated with upper endoscopy. 2.  Again there is mild to moderate bilateral hydronephrosis appears stable. 3.  No significant stool burden identified.  This report was finalized on 7/24/2023 2:06 PM by Dr. Galen Isaacs MD.      CT Abdomen Pelvis Without Contrast    Result Date: 7/13/2023   1. Dilatation of the left renal pelvis and left ureter but I do not see an obstructing stones on the presented study  2.Sigmoid diverticuli without diverticulitis  3. Other findings as above       This report was finalized on 7/13/2023 4:56 PM by Dr. Juan Plascencia MD.      XR Abdomen 2+ VW with Chest 1 VW    Result Date: 7/20/2023  1.  Megaly and chronic lung changes. No acute cardiopulmonary findings. 2.  No acute abdominal findings radiographically evident.  This report was finalized on 7/20/2023 2:58 PM by Dr. Pedrito Alejandre MD.      XR Abdomen 2+ VW with Chest 1 VW    Result Date: 6/28/2023  1. Moderate stool burden 2. The lungs are clear. 3. No evidence of bowel obstruction. 4.Scoliosis  This report was finalized on 6/28/2023 12:31 PM by Dr. Juan Plascencia MD.      XR Abdomen KUB    Result Date: 7/24/2023    Degenerative changes lumbar spine as described.  This report was finalized on 7/24/2023 12:42 PM by Dr. Galen Isaacs MD.          RECENT LABS:  Lab Results   Component Value Date    WBC 8.26 07/24/2023    HGB 12.2 07/24/2023    HCT 38.4 07/24/2023    MCV 88.5 07/24/2023    RDW 14.6 07/24/2023     07/24/2023    NEUTRORELPCT 72.8 07/24/2023    LYMPHORELPCT 17.7 (L) 07/24/2023    MONORELPCT 8.4 07/24/2023    EOSRELPCT 0.4 07/24/2023    BASORELPCT 0.6 07/24/2023    NEUTROABS 6.02 07/24/2023    LYMPHSABS 1.46 07/24/2023       Lab Results   Component Value Date     (L) 07/24/2023    K 3.8 07/24/2023    CO2 22.0 07/24/2023    CL 98 07/24/2023    BUN 16  07/24/2023    CREATININE 1.35 (H) 07/24/2023    EGFRIFNONA 37 (L) 08/18/2019    GLUCOSE 141 (H) 07/24/2023    CALCIUM 9.7 07/24/2023    ALKPHOS 120 (H) 07/24/2023    AST 18 07/24/2023    ALT 13 07/24/2023    BILITOT 0.5 07/24/2023    ALBUMIN 4.1 07/24/2023    PROTEINTOT 6.9 07/24/2023    MG 3.0 (H) 04/18/2019    PHOS 2.7 04/19/2019     ASSESSMENT & PLAN:  Rupinder Lees is a very pleasant 87 y.o. female with    1.  Epigastric pain:  2. Dysphagia:  -Patient underwent CT A/P on 7/24/23 which showed the distal stomach with fluid filling, as well as concentric soft tissue thickening or mass vs non distention of the mid gastric body. Given the above, recommended EGD to further evaluate. We discussed the risks/benefits including her age and co morbidities and patient was agreeable. Also discussed with Dr. Levi and steve to continue with Eliquis for procedure. Will schedule EGD next week.   -RTC following the above procedure.     3. Constipation:  -This is now improved with Linzess started per PCP. She is unsure of dose. Will monitor.     The patient was in agreement with the plan and all questions were answered to her satisfaction.     Thank you so much for allowing us to participate in the care of Rupinder Lees . Please do not hesitate to contact us with any questions or concerns.             Electronically Signed by: ASHISH Chun , July 28, 2023 11:13 EDT       CC:   ASHISH Steward Maria C., MD

## 2023-08-01 ENCOUNTER — ANESTHESIA EVENT (OUTPATIENT)
Dept: PERIOP | Facility: HOSPITAL | Age: 88
End: 2023-08-01
Payer: MEDICARE

## 2023-08-01 ENCOUNTER — ANESTHESIA (OUTPATIENT)
Dept: PERIOP | Facility: HOSPITAL | Age: 88
End: 2023-08-01
Payer: MEDICARE

## 2023-08-01 ENCOUNTER — HOSPITAL ENCOUNTER (OUTPATIENT)
Facility: HOSPITAL | Age: 88
Setting detail: HOSPITAL OUTPATIENT SURGERY
Discharge: HOME | End: 2023-08-01
Attending: INTERNAL MEDICINE | Admitting: INTERNAL MEDICINE
Payer: MEDICARE

## 2023-08-01 VITALS
OXYGEN SATURATION: 97 % | DIASTOLIC BLOOD PRESSURE: 71 MMHG | HEIGHT: 59 IN | TEMPERATURE: 97.2 F | WEIGHT: 94 LBS | SYSTOLIC BLOOD PRESSURE: 138 MMHG | RESPIRATION RATE: 18 BRPM | HEART RATE: 72 BPM | BODY MASS INDEX: 18.95 KG/M2

## 2023-08-01 DIAGNOSIS — R13.10 DYSPHAGIA, UNSPECIFIED TYPE: ICD-10-CM

## 2023-08-01 DIAGNOSIS — R10.13 EPIGASTRIC PAIN: ICD-10-CM

## 2023-08-01 PROCEDURE — 88305 TISSUE EXAM BY PATHOLOGIST: CPT

## 2023-08-01 PROCEDURE — 25010000002 PROPOFOL 200 MG/20ML EMULSION: Performed by: NURSE ANESTHETIST, CERTIFIED REGISTERED

## 2023-08-01 PROCEDURE — 43239 EGD BIOPSY SINGLE/MULTIPLE: CPT | Performed by: INTERNAL MEDICINE

## 2023-08-01 RX ORDER — SODIUM CHLORIDE, SODIUM LACTATE, POTASSIUM CHLORIDE, CALCIUM CHLORIDE 600; 310; 30; 20 MG/100ML; MG/100ML; MG/100ML; MG/100ML
125 INJECTION, SOLUTION INTRAVENOUS ONCE
Status: COMPLETED | OUTPATIENT
Start: 2023-08-01 | End: 2023-08-01

## 2023-08-01 RX ORDER — SODIUM CHLORIDE 9 MG/ML
40 INJECTION, SOLUTION INTRAVENOUS AS NEEDED
Status: DISCONTINUED | OUTPATIENT
Start: 2023-08-01 | End: 2023-08-01 | Stop reason: HOSPADM

## 2023-08-01 RX ORDER — PHENYLEPHRINE HCL IN 0.9% NACL 1 MG/10 ML
SYRINGE (ML) INTRAVENOUS AS NEEDED
Status: DISCONTINUED | OUTPATIENT
Start: 2023-08-01 | End: 2023-08-01 | Stop reason: SURG

## 2023-08-01 RX ORDER — IPRATROPIUM BROMIDE AND ALBUTEROL SULFATE 2.5; .5 MG/3ML; MG/3ML
3 SOLUTION RESPIRATORY (INHALATION) ONCE AS NEEDED
Status: DISCONTINUED | OUTPATIENT
Start: 2023-08-01 | End: 2023-08-01 | Stop reason: HOSPADM

## 2023-08-01 RX ORDER — SODIUM CHLORIDE 0.9 % (FLUSH) 0.9 %
10 SYRINGE (ML) INJECTION EVERY 12 HOURS SCHEDULED
Status: DISCONTINUED | OUTPATIENT
Start: 2023-08-01 | End: 2023-08-01 | Stop reason: HOSPADM

## 2023-08-01 RX ORDER — PROPOFOL 10 MG/ML
INJECTION, EMULSION INTRAVENOUS CONTINUOUS PRN
Status: DISCONTINUED | OUTPATIENT
Start: 2023-08-01 | End: 2023-08-01 | Stop reason: SURG

## 2023-08-01 RX ORDER — ONDANSETRON 2 MG/ML
4 INJECTION INTRAMUSCULAR; INTRAVENOUS AS NEEDED
Status: DISCONTINUED | OUTPATIENT
Start: 2023-08-01 | End: 2023-08-01 | Stop reason: HOSPADM

## 2023-08-01 RX ORDER — SODIUM CHLORIDE, SODIUM LACTATE, POTASSIUM CHLORIDE, CALCIUM CHLORIDE 600; 310; 30; 20 MG/100ML; MG/100ML; MG/100ML; MG/100ML
100 INJECTION, SOLUTION INTRAVENOUS ONCE AS NEEDED
Status: DISCONTINUED | OUTPATIENT
Start: 2023-08-01 | End: 2023-08-01 | Stop reason: HOSPADM

## 2023-08-01 RX ORDER — SODIUM CHLORIDE 0.9 % (FLUSH) 0.9 %
10 SYRINGE (ML) INJECTION AS NEEDED
Status: DISCONTINUED | OUTPATIENT
Start: 2023-08-01 | End: 2023-08-01 | Stop reason: HOSPADM

## 2023-08-01 RX ORDER — OXYCODONE HYDROCHLORIDE AND ACETAMINOPHEN 5; 325 MG/1; MG/1
1 TABLET ORAL ONCE AS NEEDED
Status: DISCONTINUED | OUTPATIENT
Start: 2023-08-01 | End: 2023-08-01 | Stop reason: HOSPADM

## 2023-08-01 RX ORDER — FENTANYL CITRATE 50 UG/ML
50 INJECTION, SOLUTION INTRAMUSCULAR; INTRAVENOUS
Status: DISCONTINUED | OUTPATIENT
Start: 2023-08-01 | End: 2023-08-01 | Stop reason: HOSPADM

## 2023-08-01 RX ORDER — MIDAZOLAM HYDROCHLORIDE 1 MG/ML
0.5 INJECTION INTRAMUSCULAR; INTRAVENOUS
Status: DISCONTINUED | OUTPATIENT
Start: 2023-08-01 | End: 2023-08-01 | Stop reason: HOSPADM

## 2023-08-01 RX ADMIN — Medication 100 MCG: at 13:00

## 2023-08-01 RX ADMIN — PROPOFOL 100 MCG/KG/MIN: 10 INJECTION, EMULSION INTRAVENOUS at 12:53

## 2023-08-01 RX ADMIN — SODIUM CHLORIDE, POTASSIUM CHLORIDE, SODIUM LACTATE AND CALCIUM CHLORIDE: 600; 310; 30; 20 INJECTION, SOLUTION INTRAVENOUS at 12:49

## 2023-08-02 ENCOUNTER — HOSPITAL ENCOUNTER (EMERGENCY)
Facility: HOSPITAL | Age: 88
Discharge: HOME | End: 2023-08-02
Attending: STUDENT IN AN ORGANIZED HEALTH CARE EDUCATION/TRAINING PROGRAM
Payer: MEDICARE

## 2023-08-02 ENCOUNTER — APPOINTMENT (OUTPATIENT)
Dept: GENERAL RADIOLOGY | Facility: HOSPITAL | Age: 88
End: 2023-08-02
Payer: MEDICARE

## 2023-08-02 ENCOUNTER — TELEPHONE (OUTPATIENT)
Dept: GASTROENTEROLOGY | Facility: CLINIC | Age: 88
End: 2023-08-02
Payer: MEDICARE

## 2023-08-02 VITALS
DIASTOLIC BLOOD PRESSURE: 77 MMHG | HEIGHT: 59 IN | HEART RATE: 73 BPM | OXYGEN SATURATION: 96 % | TEMPERATURE: 97.9 F | WEIGHT: 94 LBS | BODY MASS INDEX: 18.95 KG/M2 | RESPIRATION RATE: 18 BRPM | SYSTOLIC BLOOD PRESSURE: 151 MMHG

## 2023-08-02 DIAGNOSIS — C16.9 GASTRIC ADENOCARCINOMA: ICD-10-CM

## 2023-08-02 DIAGNOSIS — K92.0 HEMATEMESIS WITH NAUSEA: Primary | ICD-10-CM

## 2023-08-02 DIAGNOSIS — R11.0 NAUSEA: ICD-10-CM

## 2023-08-02 LAB
ABO GROUP BLD: NORMAL
ALBUMIN SERPL-MCNC: 4.3 G/DL (ref 3.5–5.2)
ALBUMIN/GLOB SERPL: 1.4 G/DL
ALP SERPL-CCNC: 134 U/L (ref 39–117)
ALT SERPL W P-5'-P-CCNC: 11 U/L (ref 1–33)
ANION GAP SERPL CALCULATED.3IONS-SCNC: 19 MMOL/L (ref 5–15)
AST SERPL-CCNC: 23 U/L (ref 1–32)
BASOPHILS # BLD AUTO: 0.02 10*3/MM3 (ref 0–0.2)
BASOPHILS NFR BLD AUTO: 0.2 % (ref 0–1.5)
BILIRUB SERPL-MCNC: 0.8 MG/DL (ref 0–1.2)
BLD GP AB SCN SERPL QL: NEGATIVE
BUN SERPL-MCNC: 30 MG/DL (ref 8–23)
BUN/CREAT SERPL: 17 (ref 7–25)
CALCIUM SPEC-SCNC: 10 MG/DL (ref 8.6–10.5)
CHLORIDE SERPL-SCNC: 95 MMOL/L (ref 98–107)
CK SERPL-CCNC: 35 U/L (ref 20–180)
CO2 SERPL-SCNC: 30 MMOL/L (ref 22–29)
CREAT SERPL-MCNC: 1.76 MG/DL (ref 0.57–1)
D-LACTATE SERPL-SCNC: 2.7 MMOL/L (ref 0.5–2)
DEPRECATED RDW RBC AUTO: 47.8 FL (ref 37–54)
EGFRCR SERPLBLD CKD-EPI 2021: 28.1 ML/MIN/1.73
EOSINOPHIL # BLD AUTO: 0 10*3/MM3 (ref 0–0.4)
EOSINOPHIL NFR BLD AUTO: 0 % (ref 0.3–6.2)
ERYTHROCYTE [DISTWIDTH] IN BLOOD BY AUTOMATED COUNT: 15 % (ref 12.3–15.4)
GLOBULIN UR ELPH-MCNC: 3.1 GM/DL
GLUCOSE SERPL-MCNC: 156 MG/DL (ref 65–99)
HCT VFR BLD AUTO: 41.9 % (ref 34–46.6)
HGB BLD-MCNC: 13.3 G/DL (ref 12–15.9)
HOLD SPECIMEN: NORMAL
HOLD SPECIMEN: NORMAL
IMM GRANULOCYTES # BLD AUTO: 0.03 10*3/MM3 (ref 0–0.05)
IMM GRANULOCYTES NFR BLD AUTO: 0.3 % (ref 0–0.5)
INR PPP: 1.08 (ref 0.9–1.1)
LYMPHOCYTES # BLD AUTO: 0.85 10*3/MM3 (ref 0.7–3.1)
LYMPHOCYTES NFR BLD AUTO: 8.5 % (ref 19.6–45.3)
MCH RBC QN AUTO: 27.8 PG (ref 26.6–33)
MCHC RBC AUTO-ENTMCNC: 31.7 G/DL (ref 31.5–35.7)
MCV RBC AUTO: 87.7 FL (ref 79–97)
MONOCYTES # BLD AUTO: 0.65 10*3/MM3 (ref 0.1–0.9)
MONOCYTES NFR BLD AUTO: 6.5 % (ref 5–12)
NEUTROPHILS NFR BLD AUTO: 8.44 10*3/MM3 (ref 1.7–7)
NEUTROPHILS NFR BLD AUTO: 84.5 % (ref 42.7–76)
NRBC BLD AUTO-RTO: 0 /100 WBC (ref 0–0.2)
PLATELET # BLD AUTO: 225 10*3/MM3 (ref 140–450)
PMV BLD AUTO: 10 FL (ref 6–12)
POTASSIUM SERPL-SCNC: 4 MMOL/L (ref 3.5–5.2)
PROT SERPL-MCNC: 7.4 G/DL (ref 6–8.5)
PROTHROMBIN TIME: 14.5 SECONDS (ref 12.1–14.7)
RBC # BLD AUTO: 4.78 10*6/MM3 (ref 3.77–5.28)
RH BLD: POSITIVE
SODIUM SERPL-SCNC: 144 MMOL/L (ref 136–145)
T&S EXPIRATION DATE: NORMAL
WBC NRBC COR # BLD: 9.99 10*3/MM3 (ref 3.4–10.8)
WHOLE BLOOD HOLD COAG: NORMAL
WHOLE BLOOD HOLD SPECIMEN: NORMAL

## 2023-08-02 PROCEDURE — 86900 BLOOD TYPING SEROLOGIC ABO: CPT | Performed by: STUDENT IN AN ORGANIZED HEALTH CARE EDUCATION/TRAINING PROGRAM

## 2023-08-02 PROCEDURE — 96361 HYDRATE IV INFUSION ADD-ON: CPT

## 2023-08-02 PROCEDURE — 96376 TX/PRO/DX INJ SAME DRUG ADON: CPT

## 2023-08-02 PROCEDURE — 93005 ELECTROCARDIOGRAM TRACING: CPT | Performed by: EMERGENCY MEDICINE

## 2023-08-02 PROCEDURE — 71045 X-RAY EXAM CHEST 1 VIEW: CPT

## 2023-08-02 PROCEDURE — 85025 COMPLETE CBC W/AUTO DIFF WBC: CPT | Performed by: STUDENT IN AN ORGANIZED HEALTH CARE EDUCATION/TRAINING PROGRAM

## 2023-08-02 PROCEDURE — 93005 ELECTROCARDIOGRAM TRACING: CPT | Performed by: STUDENT IN AN ORGANIZED HEALTH CARE EDUCATION/TRAINING PROGRAM

## 2023-08-02 PROCEDURE — 85610 PROTHROMBIN TIME: CPT | Performed by: STUDENT IN AN ORGANIZED HEALTH CARE EDUCATION/TRAINING PROGRAM

## 2023-08-02 PROCEDURE — 36415 COLL VENOUS BLD VENIPUNCTURE: CPT

## 2023-08-02 PROCEDURE — 82550 ASSAY OF CK (CPK): CPT | Performed by: STUDENT IN AN ORGANIZED HEALTH CARE EDUCATION/TRAINING PROGRAM

## 2023-08-02 PROCEDURE — 86901 BLOOD TYPING SEROLOGIC RH(D): CPT | Performed by: STUDENT IN AN ORGANIZED HEALTH CARE EDUCATION/TRAINING PROGRAM

## 2023-08-02 PROCEDURE — 96365 THER/PROPH/DIAG IV INF INIT: CPT

## 2023-08-02 PROCEDURE — 99284 EMERGENCY DEPT VISIT MOD MDM: CPT

## 2023-08-02 PROCEDURE — 93010 ELECTROCARDIOGRAM REPORT: CPT | Performed by: SPECIALIST

## 2023-08-02 PROCEDURE — 96375 TX/PRO/DX INJ NEW DRUG ADDON: CPT

## 2023-08-02 PROCEDURE — 96372 THER/PROPH/DIAG INJ SC/IM: CPT

## 2023-08-02 PROCEDURE — 25010000002 HALOPERIDOL LACTATE PER 5 MG: Performed by: STUDENT IN AN ORGANIZED HEALTH CARE EDUCATION/TRAINING PROGRAM

## 2023-08-02 PROCEDURE — 25010000002 METOCLOPRAMIDE PER 10 MG: Performed by: STUDENT IN AN ORGANIZED HEALTH CARE EDUCATION/TRAINING PROGRAM

## 2023-08-02 PROCEDURE — 25010000002 PROCHLORPERAZINE 10 MG/2ML SOLUTION: Performed by: STUDENT IN AN ORGANIZED HEALTH CARE EDUCATION/TRAINING PROGRAM

## 2023-08-02 PROCEDURE — 83605 ASSAY OF LACTIC ACID: CPT | Performed by: STUDENT IN AN ORGANIZED HEALTH CARE EDUCATION/TRAINING PROGRAM

## 2023-08-02 PROCEDURE — 25010000002 ONDANSETRON PER 1 MG: Performed by: STUDENT IN AN ORGANIZED HEALTH CARE EDUCATION/TRAINING PROGRAM

## 2023-08-02 PROCEDURE — 86850 RBC ANTIBODY SCREEN: CPT | Performed by: STUDENT IN AN ORGANIZED HEALTH CARE EDUCATION/TRAINING PROGRAM

## 2023-08-02 PROCEDURE — 71045 X-RAY EXAM CHEST 1 VIEW: CPT | Performed by: RADIOLOGY

## 2023-08-02 PROCEDURE — 80053 COMPREHEN METABOLIC PANEL: CPT | Performed by: STUDENT IN AN ORGANIZED HEALTH CARE EDUCATION/TRAINING PROGRAM

## 2023-08-02 RX ORDER — METOCLOPRAMIDE HYDROCHLORIDE 5 MG/ML
10 INJECTION INTRAMUSCULAR; INTRAVENOUS ONCE
Status: COMPLETED | OUTPATIENT
Start: 2023-08-02 | End: 2023-08-02

## 2023-08-02 RX ORDER — PROCHLORPERAZINE MALEATE 10 MG
10 TABLET ORAL EVERY 6 HOURS PRN
Qty: 30 TABLET | Refills: 0 | Status: SHIPPED | OUTPATIENT
Start: 2023-08-02

## 2023-08-02 RX ORDER — CARVEDILOL 3.12 MG/1
3.12 TABLET ORAL 2 TIMES DAILY WITH MEALS
Qty: 60 TABLET | Refills: 0 | Status: SHIPPED | OUTPATIENT
Start: 2023-08-02 | End: 2023-08-11 | Stop reason: DRUGHIGH

## 2023-08-02 RX ORDER — SODIUM CHLORIDE 0.9 % (FLUSH) 0.9 %
10 SYRINGE (ML) INJECTION AS NEEDED
Status: DISCONTINUED | OUTPATIENT
Start: 2023-08-02 | End: 2023-08-02 | Stop reason: HOSPADM

## 2023-08-02 RX ORDER — HALOPERIDOL 5 MG/ML
0.5 INJECTION INTRAMUSCULAR ONCE
Status: COMPLETED | OUTPATIENT
Start: 2023-08-02 | End: 2023-08-02

## 2023-08-02 RX ORDER — PANTOPRAZOLE SODIUM 40 MG/1
40 TABLET, DELAYED RELEASE ORAL 2 TIMES DAILY
Qty: 60 TABLET | Refills: 0 | Status: SHIPPED | OUTPATIENT
Start: 2023-08-02 | End: 2023-09-01

## 2023-08-02 RX ORDER — PROMETHAZINE HYDROCHLORIDE 25 MG/1
25 TABLET ORAL EVERY 8 HOURS PRN
Qty: 30 TABLET | Refills: 0 | Status: SHIPPED | OUTPATIENT
Start: 2023-08-02

## 2023-08-02 RX ORDER — PROCHLORPERAZINE EDISYLATE 5 MG/ML
10 INJECTION INTRAMUSCULAR; INTRAVENOUS ONCE
Status: COMPLETED | OUTPATIENT
Start: 2023-08-02 | End: 2023-08-02

## 2023-08-02 RX ORDER — PROCHLORPERAZINE EDISYLATE 5 MG/ML
5 INJECTION INTRAMUSCULAR; INTRAVENOUS EVERY 4 HOURS PRN
Status: DISCONTINUED | OUTPATIENT
Start: 2023-08-02 | End: 2023-08-02

## 2023-08-02 RX ORDER — CARVEDILOL 6.25 MG/1
6.25 TABLET ORAL ONCE
Status: COMPLETED | OUTPATIENT
Start: 2023-08-02 | End: 2023-08-02

## 2023-08-02 RX ORDER — PANTOPRAZOLE SODIUM 40 MG/10ML
80 INJECTION, POWDER, LYOPHILIZED, FOR SOLUTION INTRAVENOUS ONCE
Status: COMPLETED | OUTPATIENT
Start: 2023-08-02 | End: 2023-08-02

## 2023-08-02 RX ORDER — ONDANSETRON 2 MG/ML
4 INJECTION INTRAMUSCULAR; INTRAVENOUS ONCE
Status: COMPLETED | OUTPATIENT
Start: 2023-08-02 | End: 2023-08-02

## 2023-08-02 RX ADMIN — HALOPERIDOL LACTATE 0.5 MG: 5 INJECTION, SOLUTION INTRAMUSCULAR at 20:15

## 2023-08-02 RX ADMIN — SODIUM CHLORIDE 1000 ML: 9 INJECTION, SOLUTION INTRAVENOUS at 18:32

## 2023-08-02 RX ADMIN — SODIUM CHLORIDE 1000 ML: 9 INJECTION, SOLUTION INTRAVENOUS at 17:38

## 2023-08-02 RX ADMIN — CARVEDILOL 6.25 MG: 6.25 TABLET, FILM COATED ORAL at 19:35

## 2023-08-02 RX ADMIN — ONDANSETRON HYDROCHLORIDE 4 MG: 2 INJECTION, SOLUTION INTRAMUSCULAR; INTRAVENOUS at 17:36

## 2023-08-02 RX ADMIN — PROCHLORPERAZINE EDISYLATE 10 MG: 5 INJECTION INTRAMUSCULAR; INTRAVENOUS at 18:32

## 2023-08-02 RX ADMIN — PANTOPRAZOLE SODIUM 80 MG: 40 INJECTION, POWDER, FOR SOLUTION INTRAVENOUS at 17:37

## 2023-08-02 RX ADMIN — PANTOPRAZOLE SODIUM 8 MG/HR: 40 INJECTION, POWDER, FOR SOLUTION INTRAVENOUS at 17:37

## 2023-08-02 RX ADMIN — METOCLOPRAMIDE 10 MG: 5 INJECTION, SOLUTION INTRAMUSCULAR; INTRAVENOUS at 18:32

## 2023-08-02 NOTE — ED PROVIDER NOTES
Subjective   History of Present Illness  Patient is a 87-year-old female with history significant for COPD, hypertension and recently diagnosed gastric cancer comes to the ER with complaints of hematemesis.  Patient has been having stomach discomfort, reflux and episodic hematemesis for the last several weeks.  She was seen outpatient by gastroenterology and underwent EGD with biopsy on 8/1.  EGD noted large fungating mass in prelim biopsy pathology notes cancer.  She says she had a few episodes of hematemesis prior to the EGD but since the EGD was performed, she has had persistent hematemesis and was up all night.  She is nauseous and cannot stand anything p.o.  She feels so weak and cannot even walk now.  Her son is present at bedside and aids in the HPI.    Review of Systems   Constitutional:  Positive for activity change, appetite change and fatigue. Negative for chills and fever.   HENT:  Negative for ear pain, sinus pain and sore throat.    Respiratory:  Negative for cough, chest tightness, shortness of breath and wheezing.    Cardiovascular:  Negative for chest pain, palpitations and leg swelling.   Gastrointestinal:  Positive for nausea and vomiting. Negative for abdominal pain, constipation and diarrhea.        Hematemesis   Genitourinary:  Negative for dysuria, hematuria and urgency.   Musculoskeletal:  Negative for arthralgias and myalgias.   Neurological:  Negative for dizziness, syncope and light-headedness.   Psychiatric/Behavioral:  Negative for confusion.      Past Medical History:   Diagnosis Date    Arthritis     COPD  12/17/2018    DVT of lower extremity (deep venous thrombosis)     History of CVA 2013 12/17/2018    HTN 12/17/2018    PAD  12/17/2018    Stroke     Tobacco use 12/17/2018       No Known Allergies    Past Surgical History:   Procedure Laterality Date    CARDIAC CATHETERIZATION  12/17/2018    Procedure: Left Heart Cath;  Surgeon: Imtiaz Fuentes MD;  Location: [facility]  INVASIVE LOCATION;  Service: Cardiology    CARDIAC CATHETERIZATION N/A 12/17/2018    Procedure: Thrombolysis-peripheral;  Surgeon: Imtiaz Fuentes MD;  Location:  [facility] CATH INVASIVE LOCATION;  Service: Cardiology    CARDIAC ELECTROPHYSIOLOGY PROCEDURE N/A 12/18/2018    Procedure: PACEMAKER IMPLANTATION- DC;  Surgeon: Thony Bobby MD;  Location:  [facility] INV[facility];  Service: Cardiology    CARDIAC ELECTROPHYSIOLOGY PROCEDURE N/A 12/17/2018    Procedure: Temporary Pacemaker;  Surgeon: Imtiaz Fuentes MD;  Location:  [facility] CATH INVASIVE LOCATION;  Service: Cardiology    COLONOSCOPY N/A 04/19/2019    Procedure: COLONOSCOPY;  Surgeon: Chris Zimmerman MD;  Location:  [facility] OR;  Service: Gastroenterology    ENDOSCOPY N/A 04/19/2019    Procedure: ESOPHAGOGASTRODUODENOSCOPY;  Surgeon: Chris Zimmerman MD;  Location: [facility] OR;  Service: Gastroenterology    ENDOSCOPY N/A 8/1/2023    Procedure: ESOPHAGOGASTRODUODENOSCOPY WITH BIOPSY;  Surgeon: Agueda Arredondo MD;  Location:  [facility] OR;  Service: Gastroenterology;  Laterality: N/A;    FEMORAL ARTERY STENT  2013    FEMORAL POPLITEAL BYPASS Right 12/17/2018    Procedure: LOWER EXTREMITY EMBELECTOMY RIGHT;  Surgeon: David Beatty MD;  Location:  [facility] HYBRID OR 15;  Service: Vascular    HYSTERECTOMY      LAPAROSCOPIC CHOLECYSTECTOMY      LEFT HEART CATH  12/17/2018    At [facility] with TV PM placement     PACEMAKER IMPLANTATION         Family History   Problem Relation Age of Onset    Cancer Mother     Cancer Father     Heart attack Brother     Heart attack Brother        Social History     Socioeconomic History    Marital status:     Number of children: 3   Tobacco Use    Smoking status: Light Smoker     Packs/day: 0.50     Years: 63.00     Pack years: 31.50     Types: Cigarettes    Smokeless tobacco: Never   Vaping Use    Vaping Use: Never used   Substance and Sexual Activity    Alcohol use: No    Drug use: No     Sexual activity: Defer           Objective   Physical Exam  Vitals and nursing note reviewed. Exam conducted with a chaperone present.   Constitutional:       Appearance: She is ill-appearing.      Comments: Cachectic   HENT:      Head: Normocephalic and atraumatic.      Nose: Nose normal.      Mouth/Throat:      Mouth: Mucous membranes are moist. Mucous membranes are dry.   Eyes:      Extraocular Movements: Extraocular movements intact.      Conjunctiva/sclera: Conjunctivae normal.      Pupils: Pupils are equal, round, and reactive to light.   Cardiovascular:      Rate and Rhythm: Regular rhythm. Tachycardia present.      Pulses: Normal pulses.      Heart sounds: Normal heart sounds.   Pulmonary:      Effort: Pulmonary effort is normal.      Breath sounds: Normal breath sounds.   Abdominal:      General: Bowel sounds are normal. There is distension.      Palpations: Abdomen is soft.   Musculoskeletal:         General: Normal range of motion.      Cervical back: Normal range of motion and neck supple.   Skin:     General: Skin is warm and dry.      Capillary Refill: Capillary refill takes less than 2 seconds.   Neurological:      General: No focal deficit present.      Mental Status: She is alert and oriented to person, place, and time.   Psychiatric:         Mood and Affect: Mood normal.         Behavior: Behavior normal.       Procedures           ED Course  ED Course as of 08/02/23 2010   Wed Aug 02, 2023   1721 ECG 12 Lead Tachycardia  Vent. Rate : 138 BPM     Atrial Rate : 144 BPM     P-R Int :   * ms          QRS Dur :  98 ms      QT Int : 334 ms       P-R-T Axes :   * -33 128 degrees     QTc Int : 506 ms     Atrial fibrillation with rapid ventricular response  Left axis deviation  Voltage criteria for left ventricular hypertrophy  Cannot rule out Septal infarct (cited on or before 18-AUG-2019)  Marked ST abnormality, possible inferior subendocardial injury  Abnormal ECG  When compared with ECG of 03-JUN-2022  10:56,  Significant changes have occurred   [ES]      ED Course User Index  [ES] Sagar Calderon MD                                           Medical Decision Making  --Patient appears acutely ill, but nontoxic and hemodynamically stable on arrival   --EKG with A-fib with RVR, rate 138  --Labs with MAGGIE, hemoglobin stable  --EGD from 8/1 noted fungating mass with prelim biopsies noting gastric adenocarcinoma  --IV bolus Protonix  --IVF, zofran--added on Compazine after she did not improve with Zofran, IV Reglan per Dr. Levi  --Did speak to Dr. Levi and updated her on patient's condition, recommended Reglan per above, fluid resuscitation, would be okay to obs from GI standpoint for symptomatic relief and no role for further endoscopy  --Patient did feel significantly better after IV fluids and Compazine, will send home with PPI twice daily, p.o. Phenergan.  Discussed observation with fluid resuscitation but patient stated she felt better and would rather go home.  Instructed her to keep outpatient appointments and follow-up with hematology/oncology for new diagnosis of gastric adenocarcinoma      Problems Addressed:  Gastric adenocarcinoma: complicated acute illness or injury  Hematemesis with nausea: complicated acute illness or injury    Amount and/or Complexity of Data Reviewed  Labs: ordered.  Radiology: ordered.  ECG/medicine tests: ordered.    Risk  Prescription drug management.        Final diagnoses:   Hematemesis with nausea   Gastric adenocarcinoma       ED Disposition  ED Disposition       ED Disposition   Discharge    Condition   Stable    Comment   --               Gifty Kruse MD  [address]  [facility] [address] [phone]    In 1 week      [facility]  [address]  [facility] [address] [phone]  Call in 1 day           Medication List        New Prescriptions      carvedilol 3.125 MG tablet  Commonly known as:  COREG  Take 1 tablet by mouth 2 (Two) Times a Day With Meals for 30 days.     prochlorperazine 10 MG tablet  Commonly known as: COMPAZINE  Take 1 tablet by mouth Every 6 (Six) Hours As Needed for Nausea or Vomiting.            Changed      apixaban 2.5 MG tablet tablet  Commonly known as: ELIQUIS  Take 1 tablet by mouth Every 12 (Twelve) Hours. HOLD for 1week  Start taking on: August 9, 2023  What changed:   additional instructions  These instructions start on August 9, 2023. If you are unsure what to do until then, ask your doctor or other care provider.     pantoprazole 40 MG EC tablet  Commonly known as: PROTONIX  Take 1 tablet by mouth 2 (Two) Times a Day for 30 days.  What changed:   how to take this  when to take this  reasons to take this            Stop      atenolol 25 MG tablet  Commonly known as: TENORMIN               Where to Get Your Medications        These medications were sent to [facility] [id number] - [place] - [address] [facility] [phone] [facility] [phone] FX  [address] [place] [address]      Phone: [phone]   apixaban 2.5 MG tablet tablet  carvedilol 3.125 MG tablet  pantoprazole 40 MG EC tablet  prochlorperazine 10 MG tablet  promethazine 25 MG tablet            Jose Leija DO  08/02/23 2010

## 2023-08-03 LAB
QT INTERVAL: 334 MS
QTC INTERVAL: 506 MS
REF LAB TEST METHOD: NORMAL

## 2023-08-04 ENCOUNTER — TELEPHONE (OUTPATIENT)
Dept: UROLOGY | Facility: CLINIC | Age: 88
End: 2023-08-04
Payer: MEDICARE

## 2023-08-04 NOTE — PROGRESS NOTES
Subjective     Date: 8/9/2023    Referring Provider  Agueda Kirkland*    Chief Complaint  Gastric adenocarcinoma     Subjective      Rupinder Lees is a 87 y.o. female who presents today to [facility] for initial evaluation .    HPI:   87-year-old female with history of paroxysmal atrial fibrillation (currently on Eliquis 2.5 mg twice daily), coronary artery disease, peripheral vascular disease, complete heart block with transvenous pacemaker placement, previous CVA with right-sided weakness, hypertension, hyperlipidemia and recently diagnosed gastric adenocarcinoma who presents for initial consultation regarding treatment.      Oncology History:  July 13 2023: Patient presented to Dr. Kruse for epigastric pain.  CT abdomen pelvis without contrast was ordered which showed dilatation of left renal pelvis and left ureter, sigmoid diverticuli without diverticulitis.  July 24, 2023: Patient presented to the emergency department at [facility], for constipation and persistent abdominal pain.  CT abdomen pelvis with contrast showed concentric soft tissue thickening or mass versus nondistention of the mid gastric body, mild to moderate bilateral hydronephrosis appears stable.  Unremarkable liver.  August 1, 2023: She was seen by Dr. Levi for endoscopy, which showed large fungating partially circumferential mass with oozing bleeding and stigmata of recent bleed in gastric body.  Biopsies taken.  Grade B esophagitis.  Pathology showed focal invasive adenocarcinoma in background of mild chronic gastritis.  Invasive adenocarcinoma present at edge of 1 tissue fragment, limited tissue sampling, unable to perform MSI, HER2 and PD-L1 analysis.  IV is second 2023: Patient presented to the emergency room due to 1 episode of hematemesis, hematochezia, melena.  CBC with normal hemoglobin and hematocrit, patient asked to hold Eliquis for 3 days.    Ms. Lees  presents today with her son, Antony.  She is currently in a wheelchair.  She lives alone, Antony lives in [place].  Another son lives in [place], only visits over the weekend.  She is able to warm meals in the microwave, and shower by herself.  Ambulating in her trailer home.  Unable to do much aside from this.  She does report approximately 20 pound weight loss in the last 3 months.  Associated with epigastric pain and decreased appetite.  She no longer has hematochezia, melena.  Still with persistent epigastric pain, denies taking any medication to help alleviate.    Currently smoking, 1 pack/day for approximately 70 years.  Denies alcohol or drug use.  Family history significant for mother with colon cancer and father with head and neck cancer.      The following portions of the patient's history were reviewed and updated as appropriate: allergies, current medications, past family history, past medical history, past social history, past surgical history and problem list.    Objective     Objective     Allergy:   No Known Allergies     Current Medications:   Current Outpatient Medications   Medication Sig Dispense Refill    apixaban (ELIQUIS) 2.5 MG tablet tablet Take 1 tablet by mouth Every 12 (Twelve) Hours. HOLD for 1week 60 tablet 0    atorvastatin (LIPITOR) 40 MG tablet Take 1 tablet by mouth Daily.      carvedilol (COREG) 3.125 MG tablet Take 1 tablet by mouth 2 (Two) Times a Day With Meals for 30 days. 60 tablet 0    cilostazol (PLETAL) 100 MG tablet Take 1 tablet by mouth 2 (Two) Times a Day.      donepezil (Aricept) 10 MG tablet Take 1 tablet by mouth Every Night.      hydrALAZINE (APRESOLINE) 25 MG tablet Take 2 tablets by mouth 2 (Two) Times a Day.      pantoprazole (PROTONIX) 40 MG EC tablet Take 1 tablet by mouth 2 (Two) Times a Day for 30 days. 60 tablet 0    polyethylene glycol (MIRALAX) 17 GM/SCOOP powder Take 17 g by mouth Daily. 578 g 0    prochlorperazine (COMPAZINE) 10 MG tablet Take 1 tablet  by mouth Every 6 (Six) Hours As Needed for Nausea or Vomiting. 30 tablet 0    promethazine (PHENERGAN) 25 MG tablet Take 1 tablet by mouth Every 8 (Eight) Hours As Needed for Nausea or Vomiting. Take 1/2 to 1 tablet every 8 hours as needed for nausea and vomiting 30 tablet 0     No current facility-administered medications for this visit.       Past Medical History:  Past Medical History:   Diagnosis Date    Arthritis     COPD  12/17/2018    DVT of lower extremity (deep venous thrombosis)     History of CVA 2013 12/17/2018    HTN 12/17/2018    PAD  12/17/2018    Stroke     Tobacco use 12/17/2018       Past Surgical History:  Past Surgical History:   Procedure Laterality Date    CARDIAC CATHETERIZATION  12/17/2018    Procedure: Left Heart Cath;  Surgeon: Imtiaz Fuentes MD;  Location:  [facility];  Service: Cardiology    CARDIAC CATHETERIZATION N/A 12/17/2018    Procedure: Thrombolysis-peripheral;  Surgeon: Imtiaz Fuentes MD;  Location:  [facility];  Service: Cardiology    CARDIAC ELECTROPHYSIOLOGY PROCEDURE N/A 12/18/2018    Procedure: PACEMAKER IMPLANTATION- DC;  Surgeon: Thony Bobby MD;  Location:  [facility];  Service: Cardiology    CARDIAC ELECTROPHYSIOLOGY PROCEDURE N/A 12/17/2018    Procedure: Temporary Pacemaker;  Surgeon: Imtiaz Fuentes MD;  Location:  [facility];  Service: Cardiology    COLONOSCOPY N/A 04/19/2019    Procedure: COLONOSCOPY;  Surgeon: Chris Zimmerman MD;  Location: [facility] OR;  Service: Gastroenterology    ENDOSCOPY N/A 04/19/2019    Procedure: ESOPHAGOGASTRODUODENOSCOPY;  Surgeon: Chris Zimmerman MD;  Location: [facility] OR;  Service: Gastroenterology    ENDOSCOPY N/A 8/1/2023    Procedure: ESOPHAGOGASTRODUODENOSCOPY WITH BIOPSY;  Surgeon: Agueda Arredondo MD;  Location: [facility] OR;  Service: Gastroenterology;  Laterality: N/A;    FEMORAL ARTERY STENT  2013  "   FEMORAL POPLITEAL BYPASS Right 12/17/2018    Procedure: LOWER EXTREMITY EMBELECTOMY RIGHT;  Surgeon: David Beatty MD;  Location: [facility] OR ;  Service: Vascular    HYSTERECTOMY      LAPAROSCOPIC CHOLECYSTECTOMY      LEFT HEART CATH  12/17/2018    At [facility] with TV PM placement     PACEMAKER IMPLANTATION         Social History:  Social History     Socioeconomic History    Marital status:     Number of children: 3   Tobacco Use    Smoking status: Light Smoker     Packs/day: 0.50     Years: 63.00     Pack years: 31.50     Types: Cigarettes     Passive exposure: Current    Smokeless tobacco: Never   Vaping Use    Vaping Use: Never used   Substance and Sexual Activity    Alcohol use: No    Drug use: No    Sexual activity: Defer     Rupinder Lees  reports that she has been smoking cigarettes. She has a 31.50 pack-year smoking history. She has been exposed to tobacco smoke. She has never used smokeless tobacco.. I have educated her on the risk of diseases from using tobacco products such as cancer, COPD, and heart disease.     I advised her to quit and she is not willing to quit.    I spent 3  minutes counseling the patient.         Family History:  Family History   Problem Relation Age of Onset    Cancer Mother     Cancer Father     Heart attack Brother     Heart attack Brother        Review of Systems:  Review of Systems   Constitutional:  Positive for activity change, appetite change, fatigue and unexpected weight change.   Gastrointestinal:  Positive for abdominal pain, blood in stool and nausea.     Vital Signs:   /98   Pulse 94   Temp 97.1 øF (36.2 øC) (Temporal)   Resp 18   Ht 152.4 cm (60\")   Wt 41.9 kg (92 lb 6.4 oz)   SpO2 97%   BMI 18.05 kg/mý      Physical Exam:  Physical Exam  Vitals reviewed.   Constitutional:       General: She is not in acute distress.     Appearance: Normal appearance. She is not ill-appearing.      Comments: In a wheelchair  + Hard of hearing; wears " "hearing aid   HENT:      Head: Normocephalic and atraumatic.      Mouth/Throat:      Mouth: Mucous membranes are moist.      Pharynx: Oropharynx is clear.   Eyes:      Conjunctiva/sclera: Conjunctivae normal.      Pupils: Pupils are equal, round, and reactive to light.   Cardiovascular:      Rate and Rhythm: Normal rate and regular rhythm. Rhythm irregular.      Heart sounds: No murmur heard.  Pulmonary:      Effort: Pulmonary effort is normal. No respiratory distress.      Breath sounds: Normal breath sounds. No wheezing.   Abdominal:      General: Abdomen is flat. Bowel sounds are normal. There is no distension.      Palpations: Abdomen is soft. There is no mass.      Tenderness: There is no abdominal tenderness. There is no guarding.   Musculoskeletal:         General: No swelling. Normal range of motion.      Cervical back: Normal range of motion.   Lymphadenopathy:      Cervical: No cervical adenopathy.   Skin:     General: Skin is warm and dry.   Neurological:      Mental Status: She is alert and oriented to person, place, and time.      Comments: + Right upper and lower extremity weakness   Psychiatric:         Mood and Affect: Mood normal.       PHQ-9 Score  PHQ-9 Total Score: 0     Pain Score  Vitals:    08/09/23 0922   BP: 168/98   Pulse: 94   Resp: 18   Temp: 97.1 øF (36.2 øC)   TempSrc: Temporal   SpO2: 97%   Weight: 41.9 kg (92 lb 6.4 oz)   Height: 152.4 cm (60\")   PainSc: 0-No pain       ECOG score: 3           PAINSCOREQUALITYMETRIC:   Vitals:    08/09/23 0922   PainSc: 0-No pain          Lab Review  Lab Results   Component Value Date    WBC 10.35 08/09/2023    HGB 13.3 08/09/2023    HCT 44.5 08/09/2023    MCV 93.1 08/09/2023    RDW 15.1 08/09/2023     08/09/2023    NEUTRORELPCT 72.8 08/09/2023    LYMPHORELPCT 12.8 (L) 08/09/2023    MONORELPCT 12.6 (H) 08/09/2023    EOSRELPCT 0.6 08/09/2023    BASORELPCT 0.5 08/09/2023    NEUTROABS 7.55 (H) 08/09/2023    LYMPHSABS 1.32 08/09/2023       Lab " Results   Component Value Date     08/02/2023    K 4.0 08/02/2023    CO2 30.0 (H) 08/02/2023    CL 95 (L) 08/02/2023    BUN 30 (H) 08/02/2023    CREATININE 1.76 (H) 08/02/2023    EGFRIFNONA 37 (L) 08/18/2019    GLUCOSE 156 (H) 08/02/2023    CALCIUM 10.0 08/02/2023    ALKPHOS 134 (H) 08/02/2023    AST 23 08/02/2023    ALT 11 08/02/2023    BILITOT 0.8 08/02/2023    ALBUMIN 4.3 08/02/2023    PROTEINTOT 7.4 08/02/2023    MG 3.0 (H) 04/18/2019    PHOS 2.7 04/19/2019         Radiology Results    CT Abdomen Pelvis Without Contrast (07/24/2023 13:33)   Result Date: 7/24/2023  1.  The distal stomach shows fluid filling, as well as concentric soft tissue thickening or mass versus nondistention of the mid gastric body and could be further evaluated with upper endoscopy. 2.  Again there is mild to moderate bilateral hydronephrosis appears stable. 3.  No significant stool burden identified.  This report was finalized on 7/24/2023 2:06 PM by Dr. Galen Isaacs MD.        CT Abdomen Pelvis Without Contrast (07/13/2023 16:11)    Result Date: 7/13/2023  1. Dilatation of the left renal pelvis and left ureter but I do not see an obstructing stones on the presented study. 2.Sigmoid diverticuli without diverticulitis. 3. Other findings as above         Pathology:   08/01/23 04/19/2019          Endoscopy:   08/01/2023        Assessment / Plan         Assessment and Plan   Rupinder Lees is a 87 y.o. year old with multiple comorbidities listed above presents for     Gastric adenocarcinoma  -Ms. Lees presents with 3 months of epigastric pain.  CT imaging with contrast July 24 which showed concentric tissue thickening or mass of mid gastric body.  She underwent endoscopy by Dr. Levi August 1, 2023 which showed large partially circumferential mid gastric body mass.  Pathology showing focal gastric adenocarcinoma, not enough tissue for further testing.  -Previous CT abdomen pelvis did not show any other metastatic disease.   We discussed completing staging studies with CT of the chest. We also discussed, due to her performance status, she would not be an ideal candidate for surgical resection if it were found to be localized disease.   -Given we do not have sufficient tissue for staging, if found to have localized disease, she would require another endoscopy or EUS for further staging. Endoscopic resection could potentially treatment if found to have cT1a disease. However, if found to have cT1b or above, I would recommend chemo-radiation therapy with capecitabine.   -Will present her case to tumor board on 8/16/23  -Referred to radiation oncology  -Check CEA, CBC, and CMP today    2. Malignancy associated pain   - Denies currently    3. Nutrition   - Recommend supplementing diet with boost or ensure     Discussed possible differential diagnoses, testing, treatment, recommended non-pharmacological interventions, risks, warning signs to monitor for that would indicate need for follow-up in clinic or ER. If no improvement with these regimens or you have new or worsening symptoms follow-up. Patient verbalizes understanding and agreement with plan of care. Denies further needs or concerns.     Patient was given instructions and counseling regarding her condition and for health maintenance advised.       All questions were answered to her satisfaction.             Meds ordered during this visit  No orders of the defined types were placed in this encounter.      Visit Diagnoses    ICD-10-CM ICD-9-CM   1. Gastric adenocarcinoma  C16.9 151.9       Follow Up   In 2 weeks after further staging studies        This document has been electronically signed by Alexsandra Marin MD   August 9, 2023 11:09 EDT    Dictated Utilizing Dragon Dictation: Part of this note may be an electronic transcription/translation of spoken language to printed text using the Dragon Dictation System.

## 2023-08-07 ENCOUNTER — TELEPHONE (OUTPATIENT)
Dept: CARDIOLOGY | Facility: CLINIC | Age: 88
End: 2023-08-07
Payer: MEDICARE

## 2023-08-07 NOTE — TELEPHONE ENCOUNTER
Per [facility] remote cardiac device transmissions received today, 8/7/2023:    Afib burden 5% since 10/7/2022  Afib burden 66% since 8/7/2023 per extended diagnostics  Ongoing Afib in presenting EGM  V-rates not well controlled, 47% of time greater than 110 bpm-see below.   Occasional Afib w/RVR, longest episode 33 min, V-rates 181-193 bpm*    I spoke with pt who reports she's recently been diagnoses with adenocarcinoma of the stomach; had presented to [facility] ER w/hematemesis. H&H within normal limits as of 8/2/2022: 13.3/41.9. Pt denies recent respiratory illnesses or steroid Rx; pt denies lower extremity/abd edema, orthopnea/PND, states she has had increase in shortness of breath & occasionally feels palpitations. Currently off Eliquis for the next 2 days.

## 2023-08-07 NOTE — TELEPHONE ENCOUNTER
I just spoke with pt's son who reports pt has had bouts of nausea/vomiting. Pt's son took pt's blood pressure & reports 125/72.

## 2023-08-09 ENCOUNTER — LAB (OUTPATIENT)
Dept: ONCOLOGY | Facility: CLINIC | Age: 88
End: 2023-08-09
Payer: MEDICARE

## 2023-08-09 ENCOUNTER — CONSULT (OUTPATIENT)
Dept: ONCOLOGY | Facility: CLINIC | Age: 88
End: 2023-08-09
Payer: MEDICARE

## 2023-08-09 ENCOUNTER — HOSPITAL ENCOUNTER (OUTPATIENT)
Dept: CT IMAGING | Facility: HOSPITAL | Age: 88
Discharge: HOME | End: 2023-08-09
Admitting: INTERNAL MEDICINE
Payer: MEDICARE

## 2023-08-09 VITALS
HEART RATE: 94 BPM | WEIGHT: 92.4 LBS | SYSTOLIC BLOOD PRESSURE: 168 MMHG | BODY MASS INDEX: 18.14 KG/M2 | RESPIRATION RATE: 18 BRPM | HEIGHT: 60 IN | DIASTOLIC BLOOD PRESSURE: 98 MMHG | OXYGEN SATURATION: 97 % | TEMPERATURE: 97.1 F

## 2023-08-09 DIAGNOSIS — C16.9 GASTRIC ADENOCARCINOMA: ICD-10-CM

## 2023-08-09 DIAGNOSIS — C16.9 GASTRIC ADENOCARCINOMA: Primary | ICD-10-CM

## 2023-08-09 LAB
ALBUMIN SERPL-MCNC: 3.6 G/DL (ref 3.5–5.2)
ALBUMIN/GLOB SERPL: 1.2 G/DL
ALP SERPL-CCNC: 125 U/L (ref 39–117)
ALT SERPL W P-5'-P-CCNC: 10 U/L (ref 1–33)
ANION GAP SERPL CALCULATED.3IONS-SCNC: 16.1 MMOL/L (ref 5–15)
AST SERPL-CCNC: 16 U/L (ref 1–32)
BASOPHILS # BLD AUTO: 0.05 10*3/MM3 (ref 0–0.2)
BASOPHILS NFR BLD AUTO: 0.5 % (ref 0–1.5)
BILIRUB SERPL-MCNC: 0.7 MG/DL (ref 0–1.2)
BUN SERPL-MCNC: 22 MG/DL (ref 8–23)
BUN/CREAT SERPL: 16.7 (ref 7–25)
CALCIUM SPEC-SCNC: 9.3 MG/DL (ref 8.6–10.5)
CEA SERPL-MCNC: 49.4 NG/ML
CHLORIDE SERPL-SCNC: 100 MMOL/L (ref 98–107)
CO2 SERPL-SCNC: 18.9 MMOL/L (ref 22–29)
CREAT SERPL-MCNC: 1.32 MG/DL (ref 0.57–1)
DEPRECATED RDW RBC AUTO: 52.2 FL (ref 37–54)
EGFRCR SERPLBLD CKD-EPI 2021: 39.6 ML/MIN/1.73
EOSINOPHIL # BLD AUTO: 0.06 10*3/MM3 (ref 0–0.4)
EOSINOPHIL NFR BLD AUTO: 0.6 % (ref 0.3–6.2)
ERYTHROCYTE [DISTWIDTH] IN BLOOD BY AUTOMATED COUNT: 15.1 % (ref 12.3–15.4)
GLOBULIN UR ELPH-MCNC: 3.1 GM/DL
GLUCOSE SERPL-MCNC: 128 MG/DL (ref 65–99)
HCT VFR BLD AUTO: 44.5 % (ref 34–46.6)
HGB BLD-MCNC: 13.3 G/DL (ref 12–15.9)
IMM GRANULOCYTES # BLD AUTO: 0.07 10*3/MM3 (ref 0–0.05)
IMM GRANULOCYTES NFR BLD AUTO: 0.7 % (ref 0–0.5)
LYMPHOCYTES # BLD AUTO: 1.32 10*3/MM3 (ref 0.7–3.1)
LYMPHOCYTES NFR BLD AUTO: 12.8 % (ref 19.6–45.3)
MCH RBC QN AUTO: 27.8 PG (ref 26.6–33)
MCHC RBC AUTO-ENTMCNC: 29.9 G/DL (ref 31.5–35.7)
MCV RBC AUTO: 93.1 FL (ref 79–97)
MONOCYTES # BLD AUTO: 1.3 10*3/MM3 (ref 0.1–0.9)
MONOCYTES NFR BLD AUTO: 12.6 % (ref 5–12)
NEUTROPHILS NFR BLD AUTO: 7.55 10*3/MM3 (ref 1.7–7)
NEUTROPHILS NFR BLD AUTO: 72.8 % (ref 42.7–76)
NRBC BLD AUTO-RTO: 0 /100 WBC (ref 0–0.2)
PLATELET # BLD AUTO: 190 10*3/MM3 (ref 140–450)
PMV BLD AUTO: 9.8 FL (ref 6–12)
POTASSIUM SERPL-SCNC: 3.8 MMOL/L (ref 3.5–5.2)
PROT SERPL-MCNC: 6.7 G/DL (ref 6–8.5)
RBC # BLD AUTO: 4.78 10*6/MM3 (ref 3.77–5.28)
SODIUM SERPL-SCNC: 135 MMOL/L (ref 136–145)
WBC NRBC COR # BLD: 10.35 10*3/MM3 (ref 3.4–10.8)

## 2023-08-09 PROCEDURE — 71250 CT THORAX DX C-: CPT | Performed by: RADIOLOGY

## 2023-08-09 PROCEDURE — 82378 CARCINOEMBRYONIC ANTIGEN: CPT | Performed by: INTERNAL MEDICINE

## 2023-08-09 PROCEDURE — 80053 COMPREHEN METABOLIC PANEL: CPT | Performed by: INTERNAL MEDICINE

## 2023-08-09 PROCEDURE — 85025 COMPLETE CBC W/AUTO DIFF WBC: CPT | Performed by: INTERNAL MEDICINE

## 2023-08-09 PROCEDURE — 71250 CT THORAX DX C-: CPT

## 2023-08-09 NOTE — PROGRESS NOTES
Venipuncture Blood Specimen Collection  Venipuncture performed in right hand by Jessica Narvaez MA with good hemostasis. Patient tolerated the procedure well without complications.   08/09/23   Jessica Narvaez MA

## 2023-08-09 NOTE — ED NOTES
PA Informed patient of arterial occlusion to right posterior femoral artery, and that she would need to be transferred.     Abdirahman Beavers RN  07/08/19 [id number]     Epidermal Sutures: 5-0 Ethilon

## 2023-08-10 DIAGNOSIS — C16.9 GASTRIC ADENOCARCINOMA: Primary | ICD-10-CM

## 2023-08-10 DIAGNOSIS — R91.8 MULTIPLE LUNG NODULES ON CT: ICD-10-CM

## 2023-08-11 ENCOUNTER — TELEPHONE (OUTPATIENT)
Dept: CARDIOLOGY | Facility: CLINIC | Age: 88
End: 2023-08-11
Payer: MEDICARE

## 2023-08-11 RX ORDER — CARVEDILOL 6.25 MG/1
6.25 TABLET ORAL 2 TIMES DAILY
Qty: 180 TABLET | Refills: 3 | Status: SHIPPED | OUTPATIENT
Start: 2023-08-11

## 2023-08-11 NOTE — TELEPHONE ENCOUNTER
Called and spoke with son and he reports she hasn't been feeling well and has been diagnosed with cancer.  She isn't eating much.  He said her blood pressures have been good.  Instructed to increase Carvedilol to 6.25 mg bid.  He is in [place] but will relay to family member staying with her.

## 2023-08-11 NOTE — TELEPHONE ENCOUNTER
----- Message from Carlo Jaramillo MD sent at 8/11/2023 10:08 AM EDT -----  Her rates in A-fib are probably too fast.  Unfortunately, it looks like she has been diagnosed with gastric cancer, and all of this may be exacerbating her A-fib.  Looks like she is just on a low-dose of Coreg.  Do we know her blood pressure has been at home?.  If she has room on her blood pressure then we can go up on her Coreg for better rate control.  I would also offer her the option of starting an antiarrhythmic.  Amiodarone or probably be the best given her other issues ongoing.

## 2023-08-14 ENCOUNTER — OFFICE VISIT (OUTPATIENT)
Dept: PULMONOLOGY | Facility: CLINIC | Age: 88
End: 2023-08-14
Payer: MEDICARE

## 2023-08-14 ENCOUNTER — PATIENT OUTREACH (OUTPATIENT)
Dept: PULMONOLOGY | Facility: CLINIC | Age: 88
End: 2023-08-14
Payer: MEDICARE

## 2023-08-14 VITALS
SYSTOLIC BLOOD PRESSURE: 138 MMHG | TEMPERATURE: 97.4 F | HEART RATE: 71 BPM | WEIGHT: 94 LBS | HEIGHT: 60 IN | BODY MASS INDEX: 18.46 KG/M2 | DIASTOLIC BLOOD PRESSURE: 88 MMHG | OXYGEN SATURATION: 100 %

## 2023-08-14 DIAGNOSIS — I44.2 COMPLETE HEART BLOCK: ICD-10-CM

## 2023-08-14 DIAGNOSIS — J44.9 COPD MIXED TYPE: ICD-10-CM

## 2023-08-14 DIAGNOSIS — C16.2 MALIGNANT NEOPLASM OF BODY OF STOMACH: ICD-10-CM

## 2023-08-14 DIAGNOSIS — R91.8 MULTIPLE PULMONARY NODULES: Primary | ICD-10-CM

## 2023-08-14 DIAGNOSIS — Z72.0 TOBACCO USE: ICD-10-CM

## 2023-08-14 RX ORDER — HYDRALAZINE HYDROCHLORIDE 50 MG/1
TABLET, FILM COATED ORAL
COMMUNITY
Start: 2023-06-21 | End: 2023-08-14

## 2023-08-14 RX ORDER — LINACLOTIDE 72 UG/1
CAPSULE, GELATIN COATED ORAL
COMMUNITY
Start: 2023-06-29 | End: 2023-08-14

## 2023-08-14 NOTE — PROGRESS NOTES
PULMONARY  NOTE    Chief Complaint     Pulmonary nodule, gastric cancer, tobacco abuse, COPD, prior CVA    History of Present Illness     87-year-old female referred for an abnormal CT scan of the chest    She has a long history of tobacco abuse which is ongoing  At this point does not have plans for smoking cessation    She recently underwent an EGD and was found to have adenocarcinoma of the stomach  She is being evaluated for therapy and is getting staging work-up  This included a CT scan of the chest which revealed several pulmonary nodules as noted below    The patient has a cough but has not had hemoptysis  She is on no respiratory medications  She is not on supplemental oxygen at home    She typically gets around in a wheelchair due to weakness and instability  This is been ongoing since she sustained a stroke in the past.    Patient Active Problem List   Diagnosis    Complete heart block S/P transvenous pacemaker placement at [facility] on 12/17    PAD     HTN    History of subacute left MCA distribution ischemic CVA    Tobacco use    History of noncompliance with medical treatment    Acute kidney injury Cr 1.72 on admission     Hyperlipidemia    NSTEMI     CAD with occluded LAD and RCA per [facility] 12/17/18    Right ischemic leg    Popliteal thrombosis    Pneumonia of left lower lobe due to infectious organism    Anemia    Hypokalemia    Hypophosphatemia    Occult blood positive stool    Iron deficiency anemia due to chronic blood loss    Paroxysmal atrial fibrillation    Paroxysmal SVT (supraventricular tachycardia)    Epigastric pain    Dysphagia    Multiple pulmonary nodules (New 8mm RLL)    COPD (? severity)    Gastric cancer     No Known Allergies    Current Outpatient Medications:     carvedilol (COREG) 6.25 MG tablet, Take 1 tablet by mouth 2 (Two) Times a Day., Disp: 180 tablet, Rfl: 3    cilostazol (PLETAL) 100 MG tablet, Take 1 tablet by mouth 2 (Two) Times a Day., Disp: , Rfl:     donepezil (Aricept) 10 MG  "tablet, Take 1 tablet by mouth Every Night., Disp: , Rfl:     pantoprazole (PROTONIX) 40 MG EC tablet, Take 1 tablet by mouth 2 (Two) Times a Day for 30 days., Disp: 60 tablet, Rfl: 0    polyethylene glycol (MIRALAX) 17 GM/SCOOP powder, Take 17 g by mouth Daily., Disp: 578 g, Rfl: 0    prochlorperazine (COMPAZINE) 10 MG tablet, Take 1 tablet by mouth Every 6 (Six) Hours As Needed for Nausea or Vomiting., Disp: 30 tablet, Rfl: 0    promethazine (PHENERGAN) 25 MG tablet, Take 1 tablet by mouth Every 8 (Eight) Hours As Needed for Nausea or Vomiting. Take 1/2 to 1 tablet every 8 hours as needed for nausea and vomiting, Disp: 30 tablet, Rfl: 0  MEDICATION LIST AND ALLERGIES REVIEWED.    Family History   Problem Relation Age of Onset    Cancer Mother     Cancer Father     Heart attack Brother     Heart attack Brother      Social History     Tobacco Use    Smoking status: Light Smoker     Packs/day: 0.50     Years: 63.00     Pack years: 31.50     Types: Cigarettes     Passive exposure: Current    Smokeless tobacco: Never   Vaping Use    Vaping Use: Never used   Substance Use Topics    Alcohol use: No    Drug use: No     Social History     Social History Narrative    Lives in [place], [place] alone    Ongoing tobacco abuse with no plans for smoking cessation     FAMILY AND SOCIAL HISTORY REVIEWED.    Review of Systems  IF PRESENT REFER TO SCANNED ROS SHEET FROM SAME DATE  OTHERWISE ROS OBTAINED AND NON-CONTRIBUTORY OVER HPI.    /88 (BP Location: Left arm, Patient Position: Sitting)   Pulse 71   Temp 97.4 øF (36.3 øC)   Ht 152.4 cm (60\")   Wt 42.6 kg (94 lb)   LMP  (LMP Unknown)   SpO2 100%   BMI 18.36 kg/mý   Physical Exam  Vitals and nursing note reviewed.   Constitutional:       General: She is not in acute distress.     Appearance: She is well-developed. She is not diaphoretic.   HENT:      Head: Normocephalic and atraumatic.   Neck:      Thyroid: No thyromegaly.   Cardiovascular:      Rate and Rhythm: Normal rate " and regular rhythm.      Heart sounds: Normal heart sounds. No murmur heard.  Pulmonary:      Effort: Pulmonary effort is normal.      Breath sounds: No stridor.      Comments: Few wheezes and rhonchi  Lymphadenopathy:      Cervical: No cervical adenopathy.      Upper Body:      Right upper body: No supraclavicular or epitrochlear adenopathy.      Left upper body: No supraclavicular or epitrochlear adenopathy.   Skin:     General: Skin is warm and dry.   Neurological:      Mental Status: She is alert and oriented to person, place, and time.      Comments: Right hemiparesis.  Wheelchair-bound.   Psychiatric:         Behavior: Behavior normal.       Results     CT scan of the chest form 8/2023 reviewed on PACS.  Several pulmonary nodules noted but there is a new 8 mm right lower lobe that is solid/semisolid and new in comparison to prior scan from 2022    Immunization History   Administered Date(s) Administered    COVID-19 (MODERNA) 1st,2nd,3rd Dose Monovalent 08/12/2021, 09/09/2021     Problem List       ICD-10-CM ICD-9-CM   1. Multiple pulmonary nodules (New 8mm RLL)  R91.8 793.19   2. Tobacco use  Z72.0 305.1   3. COPD (? severity)  J44.9 496   4. Complete heart block S/P transvenous pacemaker placement at [facility] on 12/17  I44.2 426.0   5. Gastric cancer  C16.2 151.4       Discussion     We reviewed her chest imaging  Her family accompanied her to the office today    She has several pulmonary nodules which have been present in the past but most worrisome is an 8 mm new lesion on the right  Could be nonmalignant, lung primary, or metastatic disease  She has a PET CT scan ordered    Unfortunately, her performance status is such that she would be a very difficult/high risk candidate for biopsy  This lesion would not be amenable to CT FNA.  We might build to hit it with navigational bronchoscopy but I am not sure she can tolerate general anesthesia.  However, she did recently undergo an EGD and did okay with that    For  the time being were just going to follow-up on her PET scan and decide where to go from there    We briefly discussed smoking cessation but she has no plans to stop smoking    Moderate level of Medical Decision Making complexity based on 1 undiagnosed new problem or 2 stable chronic conditions, independent interpretation of tests, and/or prescription drug management     Compa Roberts MD  Note electronically signed    CC: Gifty Kruse MD

## 2023-08-14 NOTE — SIGNIFICANT NOTE
Nurse Navigator met with patient on this date.  Patient was referred to lung nodule clinic to be seen in new consultation with Dr. Roberts following an abnormal chest CT scan (on 8/9/23).  Dr. Roberts's recommendation is to review PET scan results (once it's completed), to determine next steps in care.  The role of the NN was introduced to patient and her daughter.  NN contact information was provided and encouraged for patient/family to call with any questions or concerns.  Patient verbalized understanding and is in agreement with her plan of care.

## 2023-08-16 ENCOUNTER — PATIENT OUTREACH (OUTPATIENT)
Dept: PULMONOLOGY | Facility: CLINIC | Age: 88
End: 2023-08-16
Payer: MEDICARE

## 2023-08-16 ENCOUNTER — TELEPHONE (OUTPATIENT)
Dept: ONCOLOGY | Facility: CLINIC | Age: 88
End: 2023-08-16
Payer: MEDICARE

## 2023-08-16 DIAGNOSIS — C16.9 GASTRIC ADENOCARCINOMA: Primary | ICD-10-CM

## 2023-08-16 RX ORDER — TRAMADOL HYDROCHLORIDE 50 MG/1
50 TABLET ORAL EVERY 6 HOURS PRN
Qty: 120 TABLET | Refills: 0 | Status: SHIPPED | OUTPATIENT
Start: 2023-08-16

## 2023-08-16 NOTE — TELEPHONE ENCOUNTER
RN called patient's daughter in law, Gardenia, to obtain more information regarding reported abdominal pain. Gardenia stated that the patient's abdominal pain began last night and has been persistent despite taking Tylenol. Gardenia states patient is currently doubled over in pain, and the pain started in the lower quadrant but has now moved to upper quadrant. Gardenia states patient continues to have regular bowel movements, and denies constipation. RN reported information to Dr. Marin who prescribed Tramadol 50 mg every 6 hours as needed for pain. RN educated Gardenia about how patient should take medication and frequency. RN encouraged patient or Gardenia to call back with any further concerns and to let us know if unsuccessful in relieving pain. Gardenia verbalized understanding and denies any further requests or needs at this time.

## 2023-08-16 NOTE — TELEPHONE ENCOUNTER
Caller: Antony Lees    Relationship: Emergency Contact    Best call back number: [phone]    What is the best time to reach you: ANY    Who are you requesting to speak with (clinical staff, provider,  specific staff member): CLINICAL      What was the call regarding: PATIENT'S SON CALLED TO LET OFFICE KNOW THAT PATIENT IS HAVING REAL BAD STOMACH PAIN AND WANTED TO KNOW IF WE COULD PRESCRIBE HER SOMETHING FOR IT. ANTONY SAID THAT GERBER DOES NOT WANT TO GO TO THE ER.    Is it okay if the provider responds through MyChart: NO

## 2023-08-17 ENCOUNTER — PATIENT ROUNDING (BHMG ONLY) (OUTPATIENT)
Dept: ONCOLOGY | Facility: CLINIC | Age: 88
End: 2023-08-17
Payer: MEDICARE

## 2023-08-17 DIAGNOSIS — C16.9 GASTRIC ADENOCARCINOMA: Primary | ICD-10-CM

## 2023-08-17 RX ORDER — HYDROCODONE BITARTRATE AND ACETAMINOPHEN 5; 325 MG/1; MG/1
1 TABLET ORAL EVERY 8 HOURS PRN
Qty: 42 TABLET | Refills: 0 | Status: SHIPPED | OUTPATIENT
Start: 2023-08-17

## 2023-08-17 NOTE — TELEPHONE ENCOUNTER
TRINIDAD ARIAS DAUGHTER IN LAW CALLED,STATES PATIENT RECEIVED THE PAIN MEDICATION BUT IT IS NOT HELPING AT ALL AND IS IN EXTREME PAIN. ALSO DAUGHTER IN LAW WANTED TO KNOW IF THERE WOULD BE ANYWAY TO ORDER PATIENT A PORTABLE POTTY CHAIR AND BRIEFS FOR PATIENT.    PLEASE CALL HER [phone]

## 2023-08-22 ENCOUNTER — HOSPITAL ENCOUNTER (OUTPATIENT)
Dept: PET IMAGING | Facility: HOSPITAL | Age: 88
Discharge: HOME | End: 2023-08-22
Admitting: INTERNAL MEDICINE
Payer: MEDICARE

## 2023-08-22 DIAGNOSIS — R91.8 MULTIPLE LUNG NODULES ON CT: ICD-10-CM

## 2023-08-22 DIAGNOSIS — C16.9 GASTRIC ADENOCARCINOMA: ICD-10-CM

## 2023-08-22 PROCEDURE — A9552 F18 FDG: HCPCS | Performed by: INTERNAL MEDICINE

## 2023-08-22 PROCEDURE — 78816 PET IMAGE W/CT FULL BODY: CPT

## 2023-08-22 PROCEDURE — 0 FLUDEOXYGLUCOSE F18 SOLUTION: Performed by: INTERNAL MEDICINE

## 2023-08-22 RX ADMIN — FLUDEOXYGLUCOSE F18 1 DOSE: 300 INJECTION INTRAVENOUS at 10:41

## 2023-08-23 ENCOUNTER — HOSPITAL ENCOUNTER (INPATIENT)
Facility: HOSPITAL | Age: 88
LOS: 4 days | Discharge: HOSPICE | DRG: 270 | End: 2023-08-27
Attending: INTERNAL MEDICINE | Admitting: STUDENT IN AN ORGANIZED HEALTH CARE EDUCATION/TRAINING PROGRAM
Payer: MEDICARE

## 2023-08-23 ENCOUNTER — PATIENT OUTREACH (OUTPATIENT)
Dept: PULMONOLOGY | Facility: CLINIC | Age: 88
End: 2023-08-23
Payer: MEDICARE

## 2023-08-23 ENCOUNTER — APPOINTMENT (OUTPATIENT)
Dept: ULTRASOUND IMAGING | Facility: HOSPITAL | Age: 88
DRG: 270 | End: 2023-08-23
Payer: MEDICARE

## 2023-08-23 ENCOUNTER — LAB (OUTPATIENT)
Dept: ONCOLOGY | Facility: CLINIC | Age: 88
End: 2023-08-23
Payer: MEDICARE

## 2023-08-23 ENCOUNTER — HOSPITAL ENCOUNTER (EMERGENCY)
Facility: HOSPITAL | Age: 88
Discharge: OTHER INSTITUTION | DRG: 270 | End: 2023-08-23
Attending: STUDENT IN AN ORGANIZED HEALTH CARE EDUCATION/TRAINING PROGRAM
Payer: MEDICARE

## 2023-08-23 ENCOUNTER — OFFICE VISIT (OUTPATIENT)
Dept: ONCOLOGY | Facility: CLINIC | Age: 88
End: 2023-08-23
Payer: MEDICARE

## 2023-08-23 VITALS
SYSTOLIC BLOOD PRESSURE: 100 MMHG | BODY MASS INDEX: 17.38 KG/M2 | OXYGEN SATURATION: 96 % | DIASTOLIC BLOOD PRESSURE: 63 MMHG | TEMPERATURE: 97.3 F | WEIGHT: 89 LBS | RESPIRATION RATE: 18 BRPM | HEART RATE: 77 BPM

## 2023-08-23 VITALS
RESPIRATION RATE: 18 BRPM | TEMPERATURE: 98.5 F | BODY MASS INDEX: 17.47 KG/M2 | WEIGHT: 89 LBS | HEART RATE: 63 BPM | SYSTOLIC BLOOD PRESSURE: 118 MMHG | DIASTOLIC BLOOD PRESSURE: 62 MMHG | HEIGHT: 60 IN | OXYGEN SATURATION: 99 %

## 2023-08-23 DIAGNOSIS — R91.8 MULTIPLE LUNG NODULES ON CT: ICD-10-CM

## 2023-08-23 DIAGNOSIS — I70.201 POPLITEAL ARTERY OCCLUSION, RIGHT: Primary | ICD-10-CM

## 2023-08-23 DIAGNOSIS — C16.9 GASTRIC ADENOCARCINOMA: Primary | ICD-10-CM

## 2023-08-23 DIAGNOSIS — I75.021 ATHEROEMBOLISM OF RIGHT LOWER EXTREMITY: ICD-10-CM

## 2023-08-23 DIAGNOSIS — I73.9 PAD (PERIPHERAL ARTERY DISEASE): Primary | ICD-10-CM

## 2023-08-23 PROBLEM — I25.10 CAD (CORONARY ARTERY DISEASE): Status: ACTIVE | Noted: 2023-08-23

## 2023-08-23 LAB
ALBUMIN SERPL-MCNC: 3.5 G/DL (ref 3.5–5.2)
ALBUMIN/GLOB SERPL: 1.1 G/DL
ALP SERPL-CCNC: 159 U/L (ref 39–117)
ALT SERPL W P-5'-P-CCNC: 15 U/L (ref 1–33)
ANION GAP SERPL CALCULATED.3IONS-SCNC: 14.8 MMOL/L (ref 5–15)
APTT PPP: 30.6 SECONDS (ref 26.5–34.5)
AST SERPL-CCNC: 22 U/L (ref 1–32)
BASOPHILS # BLD AUTO: 0.04 10*3/MM3 (ref 0–0.2)
BASOPHILS NFR BLD AUTO: 0.5 % (ref 0–1.5)
BILIRUB SERPL-MCNC: 0.6 MG/DL (ref 0–1.2)
BUN SERPL-MCNC: 21 MG/DL (ref 8–23)
BUN/CREAT SERPL: 16.4 (ref 7–25)
CALCIUM SPEC-SCNC: 8.9 MG/DL (ref 8.6–10.5)
CHLORIDE SERPL-SCNC: 98 MMOL/L (ref 98–107)
CO2 SERPL-SCNC: 22.2 MMOL/L (ref 22–29)
CREAT SERPL-MCNC: 1.28 MG/DL (ref 0.57–1)
DEPRECATED RDW RBC AUTO: 47.8 FL (ref 37–54)
EGFRCR SERPLBLD CKD-EPI 2021: 41.1 ML/MIN/1.73
EOSINOPHIL # BLD AUTO: 0.03 10*3/MM3 (ref 0–0.4)
EOSINOPHIL NFR BLD AUTO: 0.3 % (ref 0.3–6.2)
ERYTHROCYTE [DISTWIDTH] IN BLOOD BY AUTOMATED COUNT: 14.6 % (ref 12.3–15.4)
GLOBULIN UR ELPH-MCNC: 3.3 GM/DL
GLUCOSE SERPL-MCNC: 106 MG/DL (ref 65–99)
HCT VFR BLD AUTO: 38.8 % (ref 34–46.6)
HGB BLD-MCNC: 11.8 G/DL (ref 12–15.9)
HOLD SPECIMEN: NORMAL
HOLD SPECIMEN: NORMAL
IMM GRANULOCYTES # BLD AUTO: 0.04 10*3/MM3 (ref 0–0.05)
IMM GRANULOCYTES NFR BLD AUTO: 0.5 % (ref 0–0.5)
INR PPP: 1.06 (ref 0.9–1.1)
LYMPHOCYTES # BLD AUTO: 1.4 10*3/MM3 (ref 0.7–3.1)
LYMPHOCYTES NFR BLD AUTO: 16 % (ref 19.6–45.3)
MCH RBC QN AUTO: 27 PG (ref 26.6–33)
MCHC RBC AUTO-ENTMCNC: 30.4 G/DL (ref 31.5–35.7)
MCV RBC AUTO: 88.8 FL (ref 79–97)
MONOCYTES # BLD AUTO: 0.85 10*3/MM3 (ref 0.1–0.9)
MONOCYTES NFR BLD AUTO: 9.7 % (ref 5–12)
NEUTROPHILS NFR BLD AUTO: 6.4 10*3/MM3 (ref 1.7–7)
NEUTROPHILS NFR BLD AUTO: 73 % (ref 42.7–76)
NRBC BLD AUTO-RTO: 0 /100 WBC (ref 0–0.2)
PLATELET # BLD AUTO: 238 10*3/MM3 (ref 140–450)
PMV BLD AUTO: 10.1 FL (ref 6–12)
POTASSIUM SERPL-SCNC: 4.5 MMOL/L (ref 3.5–5.2)
PROT SERPL-MCNC: 6.8 G/DL (ref 6–8.5)
PROTHROMBIN TIME: 14.3 SECONDS (ref 12.1–14.7)
RBC # BLD AUTO: 4.37 10*6/MM3 (ref 3.77–5.28)
SODIUM SERPL-SCNC: 135 MMOL/L (ref 136–145)
UFH PPP CHRO-ACNC: <0.1 IU/ML (ref 0.3–0.7)
WBC NRBC COR # BLD: 8.76 10*3/MM3 (ref 3.4–10.8)
WHOLE BLOOD HOLD COAG: NORMAL
WHOLE BLOOD HOLD SPECIMEN: NORMAL

## 2023-08-23 PROCEDURE — 93926 LOWER EXTREMITY STUDY: CPT

## 2023-08-23 PROCEDURE — 25010000002 ONDANSETRON PER 1 MG: Performed by: EMERGENCY MEDICINE

## 2023-08-23 PROCEDURE — 99284 EMERGENCY DEPT VISIT MOD MDM: CPT

## 2023-08-23 PROCEDURE — 85610 PROTHROMBIN TIME: CPT | Performed by: PHYSICIAN ASSISTANT

## 2023-08-23 PROCEDURE — 36415 COLL VENOUS BLD VENIPUNCTURE: CPT

## 2023-08-23 PROCEDURE — 96365 THER/PROPH/DIAG IV INF INIT: CPT

## 2023-08-23 PROCEDURE — 85730 THROMBOPLASTIN TIME PARTIAL: CPT | Performed by: PHYSICIAN ASSISTANT

## 2023-08-23 PROCEDURE — 25010000002 MORPHINE PER 10 MG: Performed by: EMERGENCY MEDICINE

## 2023-08-23 PROCEDURE — 99223 1ST HOSP IP/OBS HIGH 75: CPT | Performed by: INTERNAL MEDICINE

## 2023-08-23 PROCEDURE — 25010000002 HEPARIN (PORCINE) 25000-0.45 UT/250ML-% SOLUTION: Performed by: PHYSICIAN ASSISTANT

## 2023-08-23 PROCEDURE — 93971 EXTREMITY STUDY: CPT

## 2023-08-23 PROCEDURE — 96375 TX/PRO/DX INJ NEW DRUG ADDON: CPT

## 2023-08-23 PROCEDURE — 85520 HEPARIN ASSAY: CPT | Performed by: PHYSICIAN ASSISTANT

## 2023-08-23 PROCEDURE — 85025 COMPLETE CBC W/AUTO DIFF WBC: CPT | Performed by: PHYSICIAN ASSISTANT

## 2023-08-23 PROCEDURE — 93926 LOWER EXTREMITY STUDY: CPT | Performed by: RADIOLOGY

## 2023-08-23 PROCEDURE — 93005 ELECTROCARDIOGRAM TRACING: CPT | Performed by: PHYSICIAN ASSISTANT

## 2023-08-23 PROCEDURE — 80053 COMPREHEN METABOLIC PANEL: CPT | Performed by: PHYSICIAN ASSISTANT

## 2023-08-23 PROCEDURE — 93971 EXTREMITY STUDY: CPT | Performed by: RADIOLOGY

## 2023-08-23 RX ORDER — HEPARIN SODIUM 10000 [USP'U]/100ML
12 INJECTION, SOLUTION INTRAVENOUS
Status: DISCONTINUED | OUTPATIENT
Start: 2023-08-23 | End: 2023-08-23 | Stop reason: HOSPADM

## 2023-08-23 RX ORDER — PANTOPRAZOLE SODIUM 40 MG/1
40 TABLET, DELAYED RELEASE ORAL 2 TIMES DAILY
Status: DISCONTINUED | OUTPATIENT
Start: 2023-08-24 | End: 2023-08-27 | Stop reason: HOSPADM

## 2023-08-23 RX ORDER — AMOXICILLIN 250 MG
2 CAPSULE ORAL 2 TIMES DAILY
Status: DISCONTINUED | OUTPATIENT
Start: 2023-08-24 | End: 2023-08-27 | Stop reason: HOSPADM

## 2023-08-23 RX ORDER — DONEPEZIL HYDROCHLORIDE 10 MG/1
10 TABLET, FILM COATED ORAL NIGHTLY
Status: DISCONTINUED | OUTPATIENT
Start: 2023-08-24 | End: 2023-08-27 | Stop reason: HOSPADM

## 2023-08-23 RX ORDER — ONDANSETRON 2 MG/ML
4 INJECTION INTRAMUSCULAR; INTRAVENOUS EVERY 6 HOURS PRN
Status: DISCONTINUED | OUTPATIENT
Start: 2023-08-23 | End: 2023-08-27 | Stop reason: HOSPADM

## 2023-08-23 RX ORDER — ONDANSETRON 2 MG/ML
4 INJECTION INTRAMUSCULAR; INTRAVENOUS ONCE
Status: COMPLETED | OUTPATIENT
Start: 2023-08-23 | End: 2023-08-23

## 2023-08-23 RX ORDER — POLYETHYLENE GLYCOL 3350 17 G/17G
17 POWDER, FOR SOLUTION ORAL DAILY PRN
Status: DISCONTINUED | OUTPATIENT
Start: 2023-08-23 | End: 2023-08-27 | Stop reason: HOSPADM

## 2023-08-23 RX ORDER — SODIUM CHLORIDE 0.9 % (FLUSH) 0.9 %
10 SYRINGE (ML) INJECTION AS NEEDED
Status: DISCONTINUED | OUTPATIENT
Start: 2023-08-23 | End: 2023-08-27 | Stop reason: HOSPADM

## 2023-08-23 RX ORDER — HEPARIN SODIUM 1000 [USP'U]/ML
50 INJECTION, SOLUTION INTRAVENOUS; SUBCUTANEOUS AS NEEDED
Status: DISCONTINUED | OUTPATIENT
Start: 2023-08-23 | End: 2023-08-24

## 2023-08-23 RX ORDER — HEPARIN SODIUM 1000 [USP'U]/ML
25 INJECTION, SOLUTION INTRAVENOUS; SUBCUTANEOUS AS NEEDED
Status: DISCONTINUED | OUTPATIENT
Start: 2023-08-23 | End: 2023-08-24

## 2023-08-23 RX ORDER — OXYCODONE HCL 5 MG/5 ML
5 SOLUTION, ORAL ORAL EVERY 6 HOURS PRN
Status: DISCONTINUED | OUTPATIENT
Start: 2023-08-23 | End: 2023-08-27 | Stop reason: HOSPADM

## 2023-08-23 RX ORDER — BISACODYL 10 MG
10 SUPPOSITORY, RECTAL RECTAL DAILY PRN
Status: DISCONTINUED | OUTPATIENT
Start: 2023-08-23 | End: 2023-08-27 | Stop reason: HOSPADM

## 2023-08-23 RX ORDER — BISACODYL 5 MG/1
5 TABLET, DELAYED RELEASE ORAL DAILY PRN
Status: DISCONTINUED | OUTPATIENT
Start: 2023-08-23 | End: 2023-08-27 | Stop reason: HOSPADM

## 2023-08-23 RX ORDER — CILOSTAZOL 50 MG/1
100 TABLET ORAL 2 TIMES DAILY
Status: DISCONTINUED | OUTPATIENT
Start: 2023-08-24 | End: 2023-08-27 | Stop reason: HOSPADM

## 2023-08-23 RX ORDER — SODIUM CHLORIDE, SODIUM LACTATE, POTASSIUM CHLORIDE, CALCIUM CHLORIDE 600; 310; 30; 20 MG/100ML; MG/100ML; MG/100ML; MG/100ML
75 INJECTION, SOLUTION INTRAVENOUS CONTINUOUS
Status: ACTIVE | OUTPATIENT
Start: 2023-08-24 | End: 2023-08-24

## 2023-08-23 RX ORDER — SODIUM CHLORIDE 9 MG/ML
40 INJECTION, SOLUTION INTRAVENOUS AS NEEDED
Status: DISCONTINUED | OUTPATIENT
Start: 2023-08-23 | End: 2023-08-27 | Stop reason: HOSPADM

## 2023-08-23 RX ORDER — OXYCODONE HCL 5 MG/5 ML
5 SOLUTION, ORAL ORAL EVERY 6 HOURS PRN
Qty: 600 ML | Refills: 0 | Status: SHIPPED | OUTPATIENT
Start: 2023-08-23 | End: 2023-08-27 | Stop reason: HOSPADM

## 2023-08-23 RX ORDER — CHOLECALCIFEROL (VITAMIN D3) 125 MCG
5 CAPSULE ORAL NIGHTLY PRN
Status: DISCONTINUED | OUTPATIENT
Start: 2023-08-23 | End: 2023-08-27 | Stop reason: HOSPADM

## 2023-08-23 RX ORDER — CARVEDILOL 6.25 MG/1
6.25 TABLET ORAL 2 TIMES DAILY
Status: DISCONTINUED | OUTPATIENT
Start: 2023-08-24 | End: 2023-08-25

## 2023-08-23 RX ORDER — SODIUM CHLORIDE 0.9 % (FLUSH) 0.9 %
10 SYRINGE (ML) INJECTION EVERY 12 HOURS SCHEDULED
Status: DISCONTINUED | OUTPATIENT
Start: 2023-08-24 | End: 2023-08-27 | Stop reason: HOSPADM

## 2023-08-23 RX ORDER — ACETAMINOPHEN 325 MG/1
650 TABLET ORAL EVERY 4 HOURS PRN
Status: DISCONTINUED | OUTPATIENT
Start: 2023-08-23 | End: 2023-08-27 | Stop reason: HOSPADM

## 2023-08-23 RX ORDER — HEPARIN SODIUM 10000 [USP'U]/100ML
18 INJECTION, SOLUTION INTRAVENOUS
Status: DISCONTINUED | OUTPATIENT
Start: 2023-08-24 | End: 2023-08-24

## 2023-08-23 RX ADMIN — ONDANSETRON 4 MG: 2 INJECTION INTRAMUSCULAR; INTRAVENOUS at 17:45

## 2023-08-23 RX ADMIN — MORPHINE SULFATE 4 MG: 4 INJECTION, SOLUTION INTRAMUSCULAR; INTRAVENOUS at 17:45

## 2023-08-23 RX ADMIN — HEPARIN SODIUM 12 UNITS/KG/HR: 10000 INJECTION, SOLUTION INTRAVENOUS at 19:25

## 2023-08-23 RX ADMIN — SODIUM CHLORIDE 1000 ML: 9 INJECTION, SOLUTION INTRAVENOUS at 18:26

## 2023-08-23 NOTE — PROGRESS NOTES
Subjective     Date: 8/23/2023    Referring Provider  No ref. provider found    Chief Complaint  Gastric adenocarcinoma     Subjective      Rupinder Lees is a 87 y.o. female who presents today to [facility] for follow up.    HPI:   87-year-old female with history of paroxysmal atrial fibrillation (currently on Eliquis 2.5 mg twice daily), coronary artery disease, peripheral vascular disease, complete heart block with transvenous pacemaker placement, previous CVA with right-sided weakness, hypertension, hyperlipidemia and recently diagnosed gastric adenocarcinoma who presents for initial consultation regarding treatment.      Oncology History:  July 13 2023: Patient presented to Dr. Kruse for epigastric pain.  CT abdomen pelvis without contrast was ordered which showed dilatation of left renal pelvis and left ureter, sigmoid diverticuli without diverticulitis.  July 24, 2023: Patient presented to the emergency department at [facility], for constipation and persistent abdominal pain.  CT abdomen pelvis with contrast showed concentric soft tissue thickening or mass versus nondistention of the mid gastric body, mild to moderate bilateral hydronephrosis appears stable.  Unremarkable liver.  August 1, 2023: She was seen by Dr. Levi for endoscopy, which showed large fungating partially circumferential mass with oozing bleeding and stigmata of recent bleed in gastric body.  Biopsies taken.  Grade B esophagitis.  Pathology showed focal invasive adenocarcinoma in background of mild chronic gastritis.  Invasive adenocarcinoma present at edge of 1 tissue fragment, limited tissue sampling, unable to perform MSI, HER2 and PD-L1 analysis.  IV is second 2023: Patient presented to the emergency room due to 1 episode of hematemesis, hematochezia, melena.  CBC with normal hemoglobin and hematocrit, patient asked to hold Eliquis for 3 days.    Ms. Lees presents today  with her son, nAtony.  She is currently in a wheelchair.  She lives alone, Antony lives in [place].  Another son lives in [place], only visits over the weekend.  She is able to warm meals in the microwave, and shower by herself.  Ambulating in her trailer home.  Unable to do much aside from this.  She does report approximately 20 pound weight loss in the last 3 months.  Associated with epigastric pain and decreased appetite.  She no longer has hematochezia, melena.  Still with persistent epigastric pain, denies taking any medication to help alleviate.    Currently smoking, 1 pack/day for approximately 70 years.  Denies alcohol or drug use.  Family history significant for mother with colon cancer and father with head and neck cancer.    Interval History 08/23/2023   Staging studies with CT chest showed 8.2 mm nodule right middle lobe, patient was referred to Dr. Roberts for potential bronchoscopy and biopsy.  Follow-up PET/CT was done which shows hypermetabolism in bilateral parotid gland lesion, liver lesions, precarinal region lymph node, multiple right lung region pulmonary nodules, right hilar, and posterior gastric wall.    The above findings are discussed with Ms. Lees and her [place] daughter-in-law's.  We discussed proceeding with additional tissue for diagnostic, however, due to metastatic disease, hospice would be another option as I am unsure if she would be able to tolerate further diagnostic work-up as well as therapy.    After long discussion, patient agreed to proceed with hospice.  She does report increased gastric pain, she also reports new right lower extremity pain and numbness.      The following portions of the patient's history were reviewed and updated as appropriate: allergies, current medications, past family history, past medical history, past social history, past surgical history and problem list.    Objective     Objective     Allergy:   No Known Allergies     Current Medications:    Current Outpatient Medications   Medication Sig Dispense Refill    carvedilol (COREG) 6.25 MG tablet Take 1 tablet by mouth 2 (Two) Times a Day. 180 tablet 3    cilostazol (PLETAL) 100 MG tablet Take 1 tablet by mouth 2 (Two) Times a Day.      donepezil (Aricept) 10 MG tablet Take 1 tablet by mouth Every Night.      HYDROcodone-acetaminophen (NORCO) 5-325 MG per tablet Take 1 tablet by mouth Every 8 (Eight) Hours As Needed for Moderate Pain. 42 tablet 0    pantoprazole (PROTONIX) 40 MG EC tablet Take 1 tablet by mouth 2 (Two) Times a Day for 30 days. 60 tablet 0    polyethylene glycol (MIRALAX) 17 GM/SCOOP powder Take 17 g by mouth Daily. 578 g 0    prochlorperazine (COMPAZINE) 10 MG tablet Take 1 tablet by mouth Every 6 (Six) Hours As Needed for Nausea or Vomiting. 30 tablet 0    promethazine (PHENERGAN) 25 MG tablet Take 1 tablet by mouth Every 8 (Eight) Hours As Needed for Nausea or Vomiting. Take 1/2 to 1 tablet every 8 hours as needed for nausea and vomiting 30 tablet 0    traMADol (ULTRAM) 50 MG tablet Take 1 tablet by mouth Every 6 (Six) Hours As Needed for Moderate Pain. 120 tablet 0    oxyCODONE (ROXICODONE) 5 MG/5ML solution Take 5 mL by mouth Every 6 (Six) Hours As Needed for Moderate Pain. 600 mL 0     No current facility-administered medications for this visit.       Past Medical History:  Past Medical History:   Diagnosis Date    Arthritis     COPD  12/17/2018    DVT of lower extremity (deep venous thrombosis)     History of CVA 2013 12/17/2018    HTN 12/17/2018    PAD  12/17/2018    Stroke     Tobacco use 12/17/2018       Past Surgical History:  Past Surgical History:   Procedure Laterality Date    CARDIAC CATHETERIZATION  12/17/2018    Procedure: Left Heart Cath;  Surgeon: Imtiaz Fuentes MD;  Location: [facility] CATH INVASIVE LOCATION;  Service: Cardiology    CARDIAC CATHETERIZATION N/A 12/17/2018    Procedure: Thrombolysis-peripheral;  Surgeon: Imtiaz Fuentes MD;   Location:  [facility]ATH [facility]IVE LOC[facility];  Service: Cardiology    CARDIAC ELECTROPHYSIOLOGY PROCEDURE N/A 12/18/2018    Procedure: PACEMAKER IMPLANTATION- DC;  Surgeon: Thony Bobby MD;  Location:  [facility] INVASIVE LOCATION;  Service: Cardiology    CARDIAC ELECTROPHYSIOLOGY PROCEDURE N/A 12/17/2018    Procedure: Temporary Pacemaker;  Surgeon: Imtiaz Fuentes MD;  Location:  [facility] CATH INVASIVE LOCATION;  Service: Cardiology    COLONOSCOPY N/A 04/19/2019    Procedure: COLONOSCOPY;  Surgeon: Chris Zimmerman MD;  Location: [facility] OR;  Service: Gastroenterology    ENDOSCOPY N/A 04/19/2019    Procedure: ESOPHAGOGASTRODUODENOSCOPY;  Surgeon: Chris Zimmerman MD;  Location: [facility] OR;  Service: Gastroenterology    ENDOSCOPY N/A 8/1/2023    Procedure: ESOPHAGOGASTRODUODENOSCOPY WITH BIOPSY;  Surgeon: Agueda Arredondo MD;  Location: [facility] OR;  Service: Gastroenterology;  Laterality: N/A;    FEMORAL ARTERY STENT  2013    FEMORAL POPLITEAL BYPASS Right 12/17/2018    Procedure: LOWER EXTREMITY EMBELECTOMY RIGHT;  Surgeon: David Beatty MD;  Location:  [facility] OR 15;  Service: Vascular    HYSTERECTOMY      LAPAROSCOPIC CHOLECYSTECTOMY      LEFT HEART CATH  12/17/2018    At [facility] with TV PM placement     PACEMAKER IMPLANTATION         Social History:  Social History     Socioeconomic History    Marital status:     Number of children: 3   Tobacco Use    Smoking status: Light Smoker     Packs/day: 0.50     Years: 63.00     Pack years: 31.50     Types: Cigarettes     Passive exposure: Current    Smokeless tobacco: Never   Vaping Use    Vaping Use: Never used   Substance and Sexual Activity    Alcohol use: No    Drug use: No    Sexual activity: Defer     Rupinder Lees  reports that she has been smoking cigarettes. She has a 31.50 pack-year smoking history. She has been exposed to tobacco smoke. She has never used smokeless tobacco.. I have educated her on the risk of diseases  from using tobacco products such as cancer, COPD, and heart disease.     I advised her to quit and she is not willing to quit.    I spent 3  minutes counseling the patient.         Family History:  Family History   Problem Relation Age of Onset    Cancer Mother     Cancer Father     Heart attack Brother     Heart attack Brother        Review of Systems:  Review of Systems   Constitutional:  Positive for activity change, appetite change, fatigue and unexpected weight change.   Gastrointestinal:  Positive for abdominal pain, blood in stool and nausea.   Musculoskeletal:  Positive for myalgias.     Vital Signs:   /63   Pulse 77   Temp 97.3 øF (36.3 øC) (Temporal)   Resp 18   Wt 40.4 kg (89 lb)   SpO2 96%   BMI 17.38 kg/mý      Physical Exam:  Physical Exam  Vitals reviewed.   Constitutional:       General: She is not in acute distress.     Appearance: Normal appearance. She is not ill-appearing.      Comments: In a wheelchair  + Hard of hearing; wears hearing aid   HENT:      Head: Normocephalic and atraumatic.      Mouth/Throat:      Mouth: Mucous membranes are moist.      Pharynx: Oropharynx is clear.   Eyes:      Conjunctiva/sclera: Conjunctivae normal.      Pupils: Pupils are equal, round, and reactive to light.   Cardiovascular:      Rate and Rhythm: Normal rate and regular rhythm. Rhythm irregular.      Heart sounds: No murmur heard.  Pulmonary:      Effort: Pulmonary effort is normal. No respiratory distress.      Breath sounds: Normal breath sounds. No wheezing.   Abdominal:      General: Abdomen is flat. Bowel sounds are normal. There is no distension.      Palpations: Abdomen is soft. There is no mass.      Tenderness: There is no abdominal tenderness. There is no guarding.   Musculoskeletal:         General: No swelling. Normal range of motion.      Cervical back: Normal range of motion.   Lymphadenopathy:      Cervical: No cervical adenopathy.   Skin:     General: Skin is warm and dry.       Capillary Refill: Capillary refill takes less than 2 seconds. Unable to adequately feel dorsalis pedis pulse  Neurological:      Mental Status: She is alert and oriented to person, place, and time.      Comments: + Right upper and lower extremity weakness   Psychiatric:         Mood and Affect: Mood normal.       PHQ-9 Score  PHQ-9 Total Score:       Pain Score  Vitals:    08/23/23 1426   BP: 100/63   Pulse: 77   Resp: 18   Temp: 97.3 øF (36.3 øC)   TempSrc: Temporal   SpO2: 96%   Weight: 40.4 kg (89 lb)   PainSc:   9       ECOG score: 3           PAINSCOREQUALITYMETRIC:   Vitals:    08/23/23 1426   PainSc:   9          Lab Review  Lab Results   Component Value Date    WBC 8.76 08/23/2023    HGB 11.8 (L) 08/23/2023    HCT 38.8 08/23/2023    MCV 88.8 08/23/2023    RDW 14.6 08/23/2023     08/23/2023    NEUTRORELPCT 73.0 08/23/2023    LYMPHORELPCT 16.0 (L) 08/23/2023    MONORELPCT 9.7 08/23/2023    EOSRELPCT 0.3 08/23/2023    BASORELPCT 0.5 08/23/2023    NEUTROABS 6.40 08/23/2023    LYMPHSABS 1.40 08/23/2023       Lab Results   Component Value Date     (L) 08/09/2023    K 3.8 08/09/2023    CO2 18.9 (L) 08/09/2023     08/09/2023    BUN 22 08/09/2023    CREATININE 1.32 (H) 08/09/2023    EGFRIFNONA 37 (L) 08/18/2019    GLUCOSE 128 (H) 08/09/2023    CALCIUM 9.3 08/09/2023    ALKPHOS 125 (H) 08/09/2023    AST 16 08/09/2023    ALT 10 08/09/2023    BILITOT 0.7 08/09/2023    ALBUMIN 3.6 08/09/2023    PROTEINTOT 6.7 08/09/2023    MG 3.0 (H) 04/18/2019    PHOS 2.7 04/19/2019         Radiology Results  NM PET/CT Whole Body (08/22/2023 11:59)   IMPRESSION:  1.  Bilateral parotid gland lesions, right measuring up to 2 cm and left  measuring up to 1.1 cm, and both showing PET hypermetabolism mSUV right  10.6 in mSUV left in the mSUV 23.  2.  Heterogeneous appearance of the liver that appear somewhat sclerotic  but there is a liver lesion within mSUV 7 measuring about 3 cm. Another  left lobe of liver lesion  measuring about 2 cm also is hypermetabolic  along with scattered regions of hypermetabolism throughout the liver  that shows no definite corresponding CT PE abnormality.  3.  Precarinal region lymph node measuring about 1.2 cm with PET  hypermetabolism in mSUV 3.2.  4.  Multiple right lung hilar region pulmonary nodules with the largest  measuring up to about 1 cm showing low-grade less than baseline  hypermetabolism of M mSUV 1.9.  5.  Probable right hilar region hypermetabolism with a mesh UV 3.  6.  Posterior gastric wall thickening and associated hypermetabolism  within mSUV 9.5 and extending into the retroperitoneal space lesser sac  region.    CT Chest Without Contrast Diagnostic (08/09/2023 11:32)   IMPRESSION:     1. New 8.2 mm nodule in the right middle lobe.  2. Dilatation of the left renal pelvis.    CT Abdomen Pelvis Without Contrast (07/24/2023 13:33)   Result Date: 7/24/2023  1.  The distal stomach shows fluid filling, as well as concentric soft tissue thickening or mass versus nondistention of the mid gastric body and could be further evaluated with upper endoscopy. 2.  Again there is mild to moderate bilateral hydronephrosis appears stable. 3.  No significant stool burden identified.  This report was finalized on 7/24/2023 2:06 PM by Dr. Galen Isaacs MD.        CT Abdomen Pelvis Without Contrast (07/13/2023 16:11)    Result Date: 7/13/2023  1. Dilatation of the left renal pelvis and left ureter but I do not see an obstructing stones on the presented study. 2.Sigmoid diverticuli without diverticulitis. 3. Other findings as above         Pathology:   08/01/23 04/19/2019          Endoscopy:   08/01/2023        Assessment / Plan         Assessment and Plan   Rupinder Lees is a 87 y.o. year old with multiple comorbidities listed above presents for     Gastric adenocarcinoma  -Ms. Lees presents with 3 months of epigastric pain.  CT imaging with contrast July 24 which showed concentric tissue  thickening or mass of mid gastric body.  She underwent endoscopy by Dr. Levi August 1, 2023 which showed large partially circumferential mid gastric body mass.  Pathology showing focal gastric adenocarcinoma, not enough tissue for further testing.  -Previous CT abdomen pelvis did not show any other metastatic disease.  We discussed completing staging studies with CT of the chest. We also discussed, due to her performance status, she would not be an ideal candidate for surgical resection if it were found to be localized disease.   -Given we do not have sufficient tissue for staging, if found to have localized disease, she would require another endoscopy or EUS for further staging. Endoscopic resection could potentially treatment if found to have cT1a disease. However, if found to have cT1b or above, I would recommend chemo-radiation therapy with capecitabine.   -Staging studies indicated 8.2 mm right middle lobe lesion, follow-up PET scan showed FDG avidity in bilateral parotid, liver, precarinal lymph node, right lung hilar lymph node, right pulmonary nodules, and gastric wall.  -We had a long discussion today regarding diagnostic work-up which could include biopsy, of likely parotid or liver, given bronchoscopy would require patient to go under anesthesia.  Hospice was also presented, at this time she opted for hospice.     2. Malignancy associated pain   -Prescribed Roxicodone oral solution 5 mg every 6 hours as needed for pain    3. Nutrition   - Recommend supplementing diet with boost or ensure     4.  Right foot numbness  -Unable to feel dorsalis pedis pulse in the right foot during my exam today.  Right foot appears pale  -We will send patient to the emergency room    Discussed possible differential diagnoses, testing, treatment, recommended non-pharmacological interventions, risks, warning signs to monitor for that would indicate need for follow-up in clinic or ER. If no improvement with these regimens or  you have new or worsening symptoms follow-up. Patient verbalizes understanding and agreement with plan of care. Denies further needs or concerns.     Patient was given instructions and counseling regarding her condition and for health maintenance advised.       All questions were answered to her satisfaction.             Meds ordered during this visit  No orders of the defined types were placed in this encounter.      Visit Diagnoses    ICD-10-CM ICD-9-CM   1. Gastric adenocarcinoma  C16.9 151.9   2. Multiple lung nodules on CT  R91.8 793.19         Follow Up   As needed        This document has been electronically signed by Alexsandra Marin MD   August 23, 2023 16:10 EDT    Dictated Utilizing Dragon Dictation: Part of this note may be an electronic transcription/translation of spoken language to printed text using the Dragon Dictation System.

## 2023-08-23 NOTE — ED NOTES
Called [facility], spoke with Triny, stated they will call back with vascular surgeon Dr. Pagan.

## 2023-08-23 NOTE — ED NOTES
House supervisor accepted transfer via [facility] with on-call nurse on board.

## 2023-08-23 NOTE — ED NOTES
[facility] contacted for consult per Tiffany PITTMAN, [facility] Transfer center has influx of calls currently, images were power shared, will receive call back.

## 2023-08-23 NOTE — ED PROVIDER NOTES
Subjective   History of Present Illness  Patient states that this morning she could not feel her right foot and oncology brought patient to ER because they could not feel a pulse in the right foot and states that it is cold to touch    History provided by:  Patient   used: No    Leg Pain  Location:  Foot  Foot location:  R foot    Review of Systems    Past Medical History:   Diagnosis Date    Arthritis     COPD  12/17/2018    DVT of lower extremity (deep venous thrombosis)     History of CVA 2013 12/17/2018    HTN 12/17/2018    PAD  12/17/2018    Stroke     Tobacco use 12/17/2018       No Known Allergies    Past Surgical History:   Procedure Laterality Date    CARDIAC CATHETERIZATION  12/17/2018    Procedure: Left Heart Cath;  Surgeon: Imtiaz Fuentes MD;  Location:  [facility];  Service: Cardiology    CARDIAC CATHETERIZATION N/A 12/17/2018    Procedure: Thrombolysis-peripheral;  Surgeon: Imtiaz Fuentes MD;  Location:  [facility];  Service: Cardiology    CARDIAC ELECTROPHYSIOLOGY PROCEDURE N/A 12/18/2018    Procedure: PACEMAKER IMPLANTATION- DC;  Surgeon: Thony Bobby MD;  Location:  [facility];  Service: Cardiology    CARDIAC ELECTROPHYSIOLOGY PROCEDURE N/A 12/17/2018    Procedure: Temporary Pacemaker;  Surgeon: Imtiaz Fuentes MD;  Location:  [facility];  Service: Cardiology    COLONOSCOPY N/A 04/19/2019    Procedure: COLONOSCOPY;  Surgeon: Chris Zimmerman MD;  Location: [facility] OR;  Service: Gastroenterology    ENDOSCOPY N/A 04/19/2019    Procedure: ESOPHAGOGASTRODUODENOSCOPY;  Surgeon: Chris Zimmerman MD;  Location: [facility] OR;  Service: Gastroenterology    ENDOSCOPY N/A 8/1/2023    Procedure: ESOPHAGOGASTRODUODENOSCOPY WITH BIOPSY;  Surgeon: Agueda Arredondo MD;  Location: [facility] OR;  Service: Gastroenterology;  Laterality: N/A;    FEMORAL ARTERY STENT  2013     FEMORAL POPLITEAL BYPASS Right 12/17/2018    Procedure: LOWER EXTREMITY EMBELECTOMY RIGHT;  Surgeon: David Beatty MD;  Location: [facility] OR ;  Service: Vascular    HYSTERECTOMY      LAPAROSCOPIC CHOLECYSTECTOMY      LEFT HEART CATH  12/17/2018    At [facility] with TV PM placement     PACEMAKER IMPLANTATION         Family History   Problem Relation Age of Onset    Cancer Mother     Cancer Father     Heart attack Brother     Heart attack Brother        Social History     Socioeconomic History    Marital status:     Number of children: 3   Tobacco Use    Smoking status: Light Smoker     Packs/day: 0.50     Years: 63.00     Pack years: 31.50     Types: Cigarettes     Passive exposure: Current    Smokeless tobacco: Never   Vaping Use    Vaping Use: Never used   Substance and Sexual Activity    Alcohol use: No    Drug use: No    Sexual activity: Defer           Objective   Physical Exam  Vitals and nursing note reviewed.   Constitutional:       Appearance: She is well-developed.   HENT:      Head: Normocephalic.   Cardiovascular:      Rate and Rhythm: Normal rate and regular rhythm.   Pulmonary:      Effort: Pulmonary effort is normal.      Breath sounds: Normal breath sounds.   Abdominal:      General: Bowel sounds are normal.      Palpations: Abdomen is soft.      Tenderness: There is no abdominal tenderness.   Musculoskeletal:         General: Normal range of motion.      Cervical back: Neck supple.   Feet:      Comments: Right foot is cold, unable to palpate pulse   Dec sensation   Skin:     General: Skin is warm and dry.   Neurological:      Mental Status: She is alert and oriented to person, place, and time.   Psychiatric:         Behavior: Behavior normal.         Thought Content: Thought content normal.         Judgment: Judgment normal.       Procedures           ED Course  ED Course as of 08/27/23 2216   Wed Aug 23, 2023   1753 Called and spoke with [facility],Dr Jarrett accepts pt but  they are on divert  [LC]   1754 Spoke with Dr montgomery, will accept patient   Will have hospitalist at [facility] call back  [LC]   1816 Discussed with Dr RICH accepted pt  [LC]      ED Course User Index  [LC] Tiffany Calhoun PA      Results for orders placed or performed during the hospital encounter of 08/23/23   Comprehensive Metabolic Panel    Specimen: Hand, Left; Blood   Result Value Ref Range    Glucose 106 (H) 65 - 99 mg/dL    BUN 21 8 - 23 mg/dL    Creatinine 1.28 (H) 0.57 - 1.00 mg/dL    Sodium 135 (L) 136 - 145 mmol/L    Potassium 4.5 3.5 - 5.2 mmol/L    Chloride 98 98 - 107 mmol/L    CO2 22.2 22.0 - 29.0 mmol/L    Calcium 8.9 8.6 - 10.5 mg/dL    Total Protein 6.8 6.0 - 8.5 g/dL    Albumin 3.5 3.5 - 5.2 g/dL    ALT (SGPT) 15 1 - 33 U/L    AST (SGOT) 22 1 - 32 U/L    Alkaline Phosphatase 159 (H) 39 - 117 U/L    Total Bilirubin 0.6 0.0 - 1.2 mg/dL    Globulin 3.3 gm/dL    A/G Ratio 1.1 g/dL    BUN/Creatinine Ratio 16.4 7.0 - 25.0    Anion Gap 14.8 5.0 - 15.0 mmol/L    eGFR 41.1 (L) >60.0 mL/min/1.73   CBC Auto Differential    Specimen: Hand, Left; Blood   Result Value Ref Range    WBC 8.76 3.40 - 10.80 10*3/mm3    RBC 4.37 3.77 - 5.28 10*6/mm3    Hemoglobin 11.8 (L) 12.0 - 15.9 g/dL    Hematocrit 38.8 34.0 - 46.6 %    MCV 88.8 79.0 - 97.0 fL    MCH 27.0 26.6 - 33.0 pg    MCHC 30.4 (L) 31.5 - 35.7 g/dL    RDW 14.6 12.3 - 15.4 %    RDW-SD 47.8 37.0 - 54.0 fl    MPV 10.1 6.0 - 12.0 fL    Platelets 238 140 - 450 10*3/mm3    Neutrophil % 73.0 42.7 - 76.0 %    Lymphocyte % 16.0 (L) 19.6 - 45.3 %    Monocyte % 9.7 5.0 - 12.0 %    Eosinophil % 0.3 0.3 - 6.2 %    Basophil % 0.5 0.0 - 1.5 %    Immature Grans % 0.5 0.0 - 0.5 %    Neutrophils, Absolute 6.40 1.70 - 7.00 10*3/mm3    Lymphocytes, Absolute 1.40 0.70 - 3.10 10*3/mm3    Monocytes, Absolute 0.85 0.10 - 0.90 10*3/mm3    Eosinophils, Absolute 0.03 0.00 - 0.40 10*3/mm3    Basophils, Absolute 0.04 0.00 - 0.20 10*3/mm3    Immature Grans, Absolute 0.04 0.00 - 0.05  10*3/mm3    nRBC 0.0 0.0 - 0.2 /100 WBC   Heparin Anti-Xa    Specimen: Hand, Left; Blood   Result Value Ref Range    Heparin Anti-Xa (UFH) <0.10 (L) 0.30 - 0.70 IU/ml   Protime-INR    Specimen: Hand, Left; Blood   Result Value Ref Range    Protime 14.3 12.1 - 14.7 Seconds    INR 1.06 0.90 - 1.10   aPTT    Specimen: Hand, Left; Blood   Result Value Ref Range    PTT 30.6 26.5 - 34.5 seconds   Green Top (Gel)   Result Value Ref Range    Extra Tube Hold for add-ons.    Lavender Top   Result Value Ref Range    Extra Tube hold for add-on    Gold Top - SST   Result Value Ref Range    Extra Tube Hold for add-ons.    Light Blue Top   Result Value Ref Range    Extra Tube Hold for add-ons.               US Arterial Doppler Lower Extremity Right   Final Result   Occlusion of the right popliteal artery.        This report was finalized on 8/23/2023 4:48 PM by Dr. Juan Plascencia MD.          US Venous Doppler Lower Extremity Right (duplex)   Final Result   No DVT in the right lower extremity on today's exam.        This report was finalized on 8/23/2023 4:40 PM by Dr. Juan Plascencia MD.          CT angio abdominal aorta bilat iliofem runoff w wo contrast    (Results Pending)                                 Medical Decision Making  Problems Addressed:  Atheroembolism of right lower extremity: complicated acute illness or injury  PAD : complicated acute illness or injury    Amount and/or Complexity of Data Reviewed  Labs: ordered.  Radiology: ordered.        Final diagnoses:   PAD    Atheroembolism of right lower extremity       ED Disposition  ED Disposition       ED Disposition   Transfer to Another Facility     Condition   --    Comment   --               No follow-up provider specified.       Medication List      No changes were made to your prescriptions during this visit.            Tiffany Calhoun PA  08/27/23 0081

## 2023-08-23 NOTE — ED NOTES
Received call from [place] at [facility] asking I call report to [phone], room 457. [facility] to call [phone] when they are 10 mins out. Reporting to [facility] admit discharge ambulance bay.

## 2023-08-23 NOTE — ED NOTES
Called [phone] and asked for Yoly per charge nurse request, nurse is in the middle of receiving report from another facility. Asked I call back.

## 2023-08-24 ENCOUNTER — ANESTHESIA EVENT (OUTPATIENT)
Dept: PERIOP | Facility: HOSPITAL | Age: 88
DRG: 270 | End: 2023-08-24
Payer: MEDICARE

## 2023-08-24 ENCOUNTER — APPOINTMENT (OUTPATIENT)
Dept: CT IMAGING | Facility: HOSPITAL | Age: 88
DRG: 270 | End: 2023-08-24
Payer: MEDICARE

## 2023-08-24 LAB
ALBUMIN SERPL-MCNC: 3 G/DL (ref 3.5–5.2)
ALBUMIN/GLOB SERPL: 1.1 G/DL
ALP SERPL-CCNC: 151 U/L (ref 39–117)
ALT SERPL W P-5'-P-CCNC: 12 U/L (ref 1–33)
ANION GAP SERPL CALCULATED.3IONS-SCNC: 12 MMOL/L (ref 5–15)
AST SERPL-CCNC: 18 U/L (ref 1–32)
BACTERIA UR QL AUTO: ABNORMAL /HPF
BASOPHILS # BLD AUTO: 0.04 10*3/MM3 (ref 0–0.2)
BASOPHILS NFR BLD AUTO: 0.5 % (ref 0–1.5)
BILIRUB SERPL-MCNC: 0.4 MG/DL (ref 0–1.2)
BILIRUB UR QL STRIP: NEGATIVE
BUN SERPL-MCNC: 18 MG/DL (ref 8–23)
BUN/CREAT SERPL: 15.7 (ref 7–25)
CALCIUM SPEC-SCNC: 8.3 MG/DL (ref 8.6–10.5)
CHLORIDE SERPL-SCNC: 104 MMOL/L (ref 98–107)
CLARITY UR: ABNORMAL
CO2 SERPL-SCNC: 22 MMOL/L (ref 22–29)
COLOR UR: YELLOW
CREAT SERPL-MCNC: 1.15 MG/DL (ref 0.57–1)
DEPRECATED RDW RBC AUTO: 47.5 FL (ref 37–54)
EGFRCR SERPLBLD CKD-EPI 2021: 46.8 ML/MIN/1.73
EOSINOPHIL # BLD AUTO: 0.06 10*3/MM3 (ref 0–0.4)
EOSINOPHIL NFR BLD AUTO: 0.8 % (ref 0.3–6.2)
ERYTHROCYTE [DISTWIDTH] IN BLOOD BY AUTOMATED COUNT: 14.6 % (ref 12.3–15.4)
GLOBULIN UR ELPH-MCNC: 2.7 GM/DL
GLUCOSE SERPL-MCNC: 104 MG/DL (ref 65–99)
GLUCOSE UR STRIP-MCNC: NEGATIVE MG/DL
HCT VFR BLD AUTO: 35.2 % (ref 34–46.6)
HGB BLD-MCNC: 11 G/DL (ref 12–15.9)
HGB UR QL STRIP.AUTO: NEGATIVE
HYALINE CASTS UR QL AUTO: ABNORMAL /LPF
IMM GRANULOCYTES # BLD AUTO: 0.05 10*3/MM3 (ref 0–0.05)
IMM GRANULOCYTES NFR BLD AUTO: 0.6 % (ref 0–0.5)
KETONES UR QL STRIP: NEGATIVE
LEUKOCYTE ESTERASE UR QL STRIP.AUTO: NEGATIVE
LYMPHOCYTES # BLD AUTO: 1.35 10*3/MM3 (ref 0.7–3.1)
LYMPHOCYTES NFR BLD AUTO: 17 % (ref 19.6–45.3)
MCH RBC QN AUTO: 27.6 PG (ref 26.6–33)
MCHC RBC AUTO-ENTMCNC: 31.3 G/DL (ref 31.5–35.7)
MCV RBC AUTO: 88.4 FL (ref 79–97)
MONOCYTES # BLD AUTO: 0.91 10*3/MM3 (ref 0.1–0.9)
MONOCYTES NFR BLD AUTO: 11.5 % (ref 5–12)
NEUTROPHILS NFR BLD AUTO: 5.53 10*3/MM3 (ref 1.7–7)
NEUTROPHILS NFR BLD AUTO: 69.6 % (ref 42.7–76)
NITRITE UR QL STRIP: NEGATIVE
NRBC BLD AUTO-RTO: 0 /100 WBC (ref 0–0.2)
PH UR STRIP.AUTO: 6 [PH] (ref 5–8)
PLATELET # BLD AUTO: 204 10*3/MM3 (ref 140–450)
PMV BLD AUTO: 10.5 FL (ref 6–12)
POTASSIUM SERPL-SCNC: 3.9 MMOL/L (ref 3.5–5.2)
PROT SERPL-MCNC: 5.7 G/DL (ref 6–8.5)
PROT UR QL STRIP: ABNORMAL
QT INTERVAL: 428 MS
QTC INTERVAL: 490 MS
RBC # BLD AUTO: 3.98 10*6/MM3 (ref 3.77–5.28)
RBC # UR STRIP: ABNORMAL /HPF
REF LAB TEST METHOD: ABNORMAL
SODIUM SERPL-SCNC: 138 MMOL/L (ref 136–145)
SP GR UR STRIP: 1.05 (ref 1–1.03)
SQUAMOUS #/AREA URNS HPF: ABNORMAL /HPF
UFH PPP CHRO-ACNC: 0.1 IU/ML (ref 0.3–0.7)
UFH PPP CHRO-ACNC: 0.15 IU/ML (ref 0.3–0.7)
UFH PPP CHRO-ACNC: 0.2 IU/ML (ref 0.3–0.7)
UROBILINOGEN UR QL STRIP: ABNORMAL
WBC # UR STRIP: ABNORMAL /HPF
WBC NRBC COR # BLD: 7.94 10*3/MM3 (ref 3.4–10.8)

## 2023-08-24 PROCEDURE — 86923 COMPATIBILITY TEST ELECTRIC: CPT

## 2023-08-24 PROCEDURE — 85520 HEPARIN ASSAY: CPT

## 2023-08-24 PROCEDURE — 86900 BLOOD TYPING SEROLOGIC ABO: CPT

## 2023-08-24 PROCEDURE — 99223 1ST HOSP IP/OBS HIGH 75: CPT | Performed by: STUDENT IN AN ORGANIZED HEALTH CARE EDUCATION/TRAINING PROGRAM

## 2023-08-24 PROCEDURE — 25010000002 HEPARIN (PORCINE) PER 1000 UNITS

## 2023-08-24 PROCEDURE — 81001 URINALYSIS AUTO W/SCOPE: CPT | Performed by: PHYSICIAN ASSISTANT

## 2023-08-24 PROCEDURE — 25510000001 IOPAMIDOL PER 1 ML: Performed by: INTERNAL MEDICINE

## 2023-08-24 PROCEDURE — 93010 ELECTROCARDIOGRAM REPORT: CPT | Performed by: INTERNAL MEDICINE

## 2023-08-24 PROCEDURE — 93005 ELECTROCARDIOGRAM TRACING: CPT | Performed by: NURSE PRACTITIONER

## 2023-08-24 PROCEDURE — 25010000002 HEPARIN (PORCINE) 25000-0.45 UT/250ML-% SOLUTION

## 2023-08-24 PROCEDURE — 86850 RBC ANTIBODY SCREEN: CPT

## 2023-08-24 PROCEDURE — 86901 BLOOD TYPING SEROLOGIC RH(D): CPT

## 2023-08-24 PROCEDURE — 99232 SBSQ HOSP IP/OBS MODERATE 35: CPT | Performed by: STUDENT IN AN ORGANIZED HEALTH CARE EDUCATION/TRAINING PROGRAM

## 2023-08-24 PROCEDURE — 75635 CT ANGIO ABDOMINAL ARTERIES: CPT

## 2023-08-24 PROCEDURE — 85520 HEPARIN ASSAY: CPT | Performed by: PHYSICIAN ASSISTANT

## 2023-08-24 PROCEDURE — 80053 COMPREHEN METABOLIC PANEL: CPT | Performed by: PHYSICIAN ASSISTANT

## 2023-08-24 PROCEDURE — 85025 COMPLETE CBC W/AUTO DIFF WBC: CPT | Performed by: PHYSICIAN ASSISTANT

## 2023-08-24 RX ORDER — HEPARIN SODIUM 10000 [USP'U]/100ML
23 INJECTION, SOLUTION INTRAVENOUS
Status: DISCONTINUED | OUTPATIENT
Start: 2023-08-24 | End: 2023-08-26

## 2023-08-24 RX ORDER — CEFAZOLIN SODIUM 2 G/100ML
2000 INJECTION, SOLUTION INTRAVENOUS
Status: DISCONTINUED | OUTPATIENT
Start: 2023-08-25 | End: 2023-08-25

## 2023-08-24 RX ORDER — HEPARIN SODIUM 1000 [USP'U]/ML
50 INJECTION, SOLUTION INTRAVENOUS; SUBCUTANEOUS ONCE
Status: COMPLETED | OUTPATIENT
Start: 2023-08-24 | End: 2023-08-24

## 2023-08-24 RX ORDER — HEPARIN SODIUM 1000 [USP'U]/ML
1000 INJECTION, SOLUTION INTRAVENOUS; SUBCUTANEOUS ONCE
Status: COMPLETED | OUTPATIENT
Start: 2023-08-24 | End: 2023-08-24

## 2023-08-24 RX ORDER — METOPROLOL TARTRATE 5 MG/5ML
5 INJECTION INTRAVENOUS EVERY 6 HOURS PRN
Status: DISCONTINUED | OUTPATIENT
Start: 2023-08-24 | End: 2023-08-25

## 2023-08-24 RX ORDER — FAMOTIDINE 20 MG/1
20 TABLET, FILM COATED ORAL ONCE
Status: CANCELLED | OUTPATIENT
Start: 2023-08-24 | End: 2023-08-24

## 2023-08-24 RX ORDER — SODIUM CHLORIDE 0.9 % (FLUSH) 0.9 %
10 SYRINGE (ML) INJECTION EVERY 12 HOURS SCHEDULED
Status: CANCELLED | OUTPATIENT
Start: 2023-08-24

## 2023-08-24 RX ADMIN — SENNOSIDES AND DOCUSATE SODIUM 2 TABLET: 50; 8.6 TABLET ORAL at 00:40

## 2023-08-24 RX ADMIN — METOPROLOL TARTRATE 5 MG: 5 INJECTION INTRAVENOUS at 18:59

## 2023-08-24 RX ADMIN — SODIUM CHLORIDE, POTASSIUM CHLORIDE, SODIUM LACTATE AND CALCIUM CHLORIDE 75 ML/HR: 600; 310; 30; 20 INJECTION, SOLUTION INTRAVENOUS at 00:41

## 2023-08-24 RX ADMIN — CARVEDILOL 6.25 MG: 6.25 TABLET, FILM COATED ORAL at 20:44

## 2023-08-24 RX ADMIN — CILOSTAZOL 100 MG: 50 TABLET ORAL at 02:57

## 2023-08-24 RX ADMIN — CARVEDILOL 6.25 MG: 6.25 TABLET, FILM COATED ORAL at 00:40

## 2023-08-24 RX ADMIN — PANTOPRAZOLE SODIUM 40 MG: 40 TABLET, DELAYED RELEASE ORAL at 08:06

## 2023-08-24 RX ADMIN — Medication 10 ML: at 08:09

## 2023-08-24 RX ADMIN — CILOSTAZOL 100 MG: 50 TABLET ORAL at 08:06

## 2023-08-24 RX ADMIN — IOPAMIDOL 110 ML: 755 INJECTION, SOLUTION INTRAVENOUS at 00:32

## 2023-08-24 RX ADMIN — HEPARIN SODIUM 1000 UNITS: 1000 INJECTION INTRAVENOUS; SUBCUTANEOUS at 18:41

## 2023-08-24 RX ADMIN — PANTOPRAZOLE SODIUM 40 MG: 40 TABLET, DELAYED RELEASE ORAL at 20:44

## 2023-08-24 RX ADMIN — HEPARIN SODIUM 2030 UNITS: 1000 INJECTION, SOLUTION INTRAVENOUS; SUBCUTANEOUS at 01:12

## 2023-08-24 RX ADMIN — CARVEDILOL 6.25 MG: 6.25 TABLET, FILM COATED ORAL at 08:06

## 2023-08-24 RX ADMIN — SENNOSIDES AND DOCUSATE SODIUM 2 TABLET: 50; 8.6 TABLET ORAL at 20:44

## 2023-08-24 RX ADMIN — PANTOPRAZOLE SODIUM 40 MG: 40 TABLET, DELAYED RELEASE ORAL at 00:40

## 2023-08-24 RX ADMIN — DONEPEZIL HYDROCHLORIDE 10 MG: 10 TABLET, FILM COATED ORAL at 00:40

## 2023-08-24 RX ADMIN — CILOSTAZOL 100 MG: 50 TABLET ORAL at 20:44

## 2023-08-24 RX ADMIN — HEPARIN SODIUM 16 UNITS/KG/HR: 10000 INJECTION, SOLUTION INTRAVENOUS at 01:13

## 2023-08-24 RX ADMIN — DONEPEZIL HYDROCHLORIDE 10 MG: 10 TABLET, FILM COATED ORAL at 21:19

## 2023-08-24 NOTE — H&P
[facility] Medicine Services  HISTORY AND PHYSICAL    Patient Name: Rupinder Lees  : 1935  MRN: [id number]  Primary Care Physician: Gifty Kruse MD  Date of admission: 2023    Subjective   Subjective     Chief Complaint:  Right leg pain    HPI:  Rupinder Lees is an 87 y.o. female with a past medical history significant for COPD on 2L NC as needed, CAD, Hypertension, HLD, complete heart block s/p PPM, prior CVA, atrial fibrillation, prior DVT, and PAD s/p femoral artery stent and right popliteal thrombosis s/p embolectomy/ fem pop bypass ( [facility]). Patient presents today as a transfer from [facility] after initially presenting with complaints of loss of sensation in right foot. Of note, patient was recently diagnosed with gastric adenocarcinoma. She followed up with oncology outpatient today and complained that her right foot was numb and cold. Pulses were not palpable and extremity was cold to touch. Patient was subsequently sent to ED for further evaluation and treatment. W/U demonstrated right popliteal artery occlusion. Patient was therefore discussed with vascular surgery who recommended CTA abdominal aorta with run off plus IVF and heparin gtt. Patient was transferred to [facility] for higher level of care.  Currently there are no complaints of cough, congestion, SOB, or chest pain. No abdominal pain or N/V/D. No headache or focal weakness/ no falls or trauma. Will admit to inpatient.      Review of Systems   Constitutional:  Negative for chills, fatigue and fever.   HENT:  Negative for congestion and trouble swallowing.    Eyes:  Negative for photophobia and visual disturbance.   Respiratory:  Negative for cough and shortness of breath.    Cardiovascular:  Negative for chest pain and leg swelling.   Gastrointestinal:  Negative for abdominal pain, diarrhea and nausea.   Endocrine: Negative for cold intolerance and heat intolerance.   Genitourinary:   Negative for dysuria and flank pain.   Musculoskeletal:  Negative for back pain and gait problem.   Skin:  Negative for pallor and rash.   Allergic/Immunologic: Positive for immunocompromised state.   Neurological:  Positive for weakness. Negative for dizziness and headaches.   Hematological:  Negative for adenopathy.   Psychiatric/Behavioral:  Negative for agitation and confusion.           Personal History     Past Medical History:   Diagnosis Date    Arthritis     COPD  12/17/2018    DVT of lower extremity (deep venous thrombosis)     History of CVA 2013 12/17/2018    HTN 12/17/2018    PAD  12/17/2018    Stroke     Tobacco use 12/17/2018         Oncology Problem List:  Gastric cancer (08/14/2023; Status: Active)       Past Surgical History:   Procedure Laterality Date    CARDIAC CATHETERIZATION  12/17/2018    Procedure: Left Heart Cath;  Surgeon: Imtiaz Fuentes MD;  Location:  [facility] CATH INVASIVE LOCATION;  Service: Cardiology    CARDIAC CATHETERIZATION N/A 12/17/2018    Procedure: Thrombolysis-peripheral;  Surgeon: Imtiaz Fuentes MD;  Location:  [facility] CATH INV[facility]IVE LOCATION;  Service: Cardiology    CARDIAC ELECTROPHYSIOLOGY PROCEDURE N/A 12/18/2018    Procedure: PACEMAKER IMPLANTATION- DC;  Surgeon: Thony Bobby MD;  Location:  [facility]VASIVE [facility]ATION;  Service: Cardiology    CARDIAC ELECTROPHYSIOLOGY PROCEDURE N/A 12/17/2018    Procedure: Temporary Pacemaker;  Surgeon: Imtiaz Fuentes MD;  Location:  [facility] CATH INVASIVE LOCATION;  Service: Cardiology    COLONOSCOPY N/A 04/19/2019    Procedure: COLONOSCOPY;  Surgeon: Chris Zimmerman MD;  Location: [facility] OR;  Service: Gastroenterology    ENDOSCOPY N/A 04/19/2019    Procedure: ESOPHAGOGASTRODUODENOSCOPY;  Surgeon: Chris Zimmerman MD;  Location: [facility] OR;  Service: Gastroenterology    ENDOSCOPY N/A 8/1/2023    Procedure: ESOPHAGOGASTRODUODENOSCOPY WITH BIOPSY;  Surgeon: Agueda Arredondo MD;   Location: [facility] OR;  Service: Gastroenterology;  Laterality: N/A;    FEMORAL ARTERY STENT  2013    FEMORAL POPLITEAL BYPASS Right 12/17/2018    Procedure: LOWER EXTREMITY EMBELECTOMY RIGHT;  Surgeon: David Beatty MD;  Location: [facility] HYBRID OR 15;  Service: Vascular    HYSTERECTOMY      LAPAROSCOPIC CHOLECYSTECTOMY      LEFT HEART CATH  12/17/2018    At [facility] with TV PM placement     PACEMAKER IMPLANTATION         Family History: family history includes Cancer in her father and mother; Heart attack in her brother and brother.     Social History:  reports that she has been smoking cigarettes. She has a 31.50 pack-year smoking history. She has been exposed to tobacco smoke. She has never used smokeless tobacco. She reports that she does not drink alcohol and does not use drugs.  Social History     Social History Narrative    Lives in [place], [place] alone    Ongoing tobacco abuse with no plans for smoking cessation       Medications:  HYDROcodone-acetaminophen, carvedilol, cilostazol, donepezil, oxyCODONE, pantoprazole, polyethylene glycol, prochlorperazine, promethazine, and traMADol    No Known Allergies    Objective   Objective     Vital Signs:   Temp:  [97.3 °F (36.3 °C)-98.5 °F (36.9 °C)] 98.5 °F (36.9 °C)  Heart Rate:  [63-82] 79  Resp:  [18] 18  BP: ()/(56-74) 145/74    Physical Exam   Constitutional: Awake, alert  Eyes: PERRLA, sclerae anicteric, no conjunctival injection  HENT: NCAT, mucous membranes moist  Neck: Supple, no thyromegaly, no lymphadenopathy, trachea midline  Respiratory: Clear to auscultation bilaterally, nonlabored respirations   Cardiovascular: RRR, no murmurs, rubs, or gallops, palpable pedal pulses bilaterally  Gastrointestinal: Positive bowel sounds, soft, nontender, nondistended  Musculoskeletal: No bilateral ankle edema, no clubbing or cyanosis to extremities  Loss of sensation in right lower extremity  Psychiatric: Appropriate affect, cooperative  Neurologic: Oriented x 3, strength  symmetric in all extremities, Cranial Nerves grossly intact to confrontation, speech clear  Skin: No rashes      Result Review:  I have personally reviewed the results from the time of this admission to 8/23/2023 23:42 EDT and agree with these findings:  [x]  Laboratory list / accordion  []  Microbiology  []  Radiology  []  EKG/Telemetry   []  Cardiology/Vascular   []  Pathology  [x]  Old records  []  Other:  Most notable findings include: vitals stable. Labs pending    LAB RESULTS:      Lab 08/23/23  1816 08/23/23  1600   WBC  --  8.76   HEMOGLOBIN  --  11.8*   HEMATOCRIT  --  38.8   PLATELETS  --  238   NEUTROS ABS  --  6.40   IMMATURE GRANS (ABS)  --  0.04   LYMPHS ABS  --  1.40   MONOS ABS  --  0.85   EOS ABS  --  0.03   MCV  --  88.8   PROTIME 14.3  --    APTT 30.6  --    HEPARIN ANTI-XA <0.10*  --          Lab 08/23/23  1600   SODIUM 135*   POTASSIUM 4.5   CHLORIDE 98   CO2 22.2   ANION GAP 14.8   BUN 21   CREATININE 1.28*   EGFR 41.1*   GLUCOSE 106*   CALCIUM 8.9         Lab 08/23/23  1600   TOTAL PROTEIN 6.8   ALBUMIN 3.5   GLOBULIN 3.3   ALT (SGPT) 15   AST (SGOT) 22   BILIRUBIN 0.6   ALK PHOS 159*         Lab 08/23/23  1816   PROTIME 14.3   INR 1.06                 Brief Urine Lab Results  (Last result in the past 365 days)        Color   Clarity   Blood   Leuk Est   Nitrite   Protein   CREAT   Urine HCG        07/24/23 1326 Yellow   Clear   Negative   Negative   Negative   Negative                 Microbiology Results (last 10 days)       ** No results found for the last 240 hours. **            US Arterial Doppler Lower Extremity Right    Result Date: 8/23/2023  EXAMINATION: US ARTERIAL DOPPLER LOWER EXTREMITY  RIGHT-  CLINICAL INDICATION: right leg pain   COMPARISON: None available.  PROCEDURE: Color Doppler imaging with pulse Doppler waveform to evaluate lower extremity arterial system on the right.  FINDINGS: Any stenoses are evaluated using the NASCET or similar method.  Velocity is elevated in the  common femoral and superficial femoral artery. No occlusive segments in those arteries however. Moderate stenosis. Biphasic waveforms.  The popliteal artery, however, is occluded.  Posterior tibial arteries show monophasic waveforms.      Impression: Occlusion of the right popliteal artery.  This report was finalized on 8/23/2023 4:48 PM by Dr. Juan Plascencia MD.      US Venous Doppler Lower Extremity Right (duplex)    Result Date: 8/23/2023  US VENOUS DOPPLER LOWER EXTREMITY RIGHT (DUPLEX)-  CLINICAL INDICATION: US arterial doppler   COMPARISON: None available  TECHNIQUE: Color Doppler imaging was used with compression and augmentation to evaluate the right lower extremity deep venous system.  FINDINGS: There is patent spontaneous flow from the common femoral vein through the posterior tibial veins. There was no internal clot or area of noncompressibility in the right lower extremity. Normal augmentation was elicited where applicable. The left common femoral vein is patent       Impression: No DVT in the right lower extremity on today's exam.  This report was finalized on 8/23/2023 4:40 PM by Dr. Juan Plascencia MD.      NM PET/CT Whole Body    Result Date: 8/22/2023  EXAM:   PET/CT Whole Body  EXAM DATE:   8/22/2023 10:30 AM  CLINICAL HISTORY:   Lung nodules, multiple; C16.9-Malignant neoplasm of stomach, unspecified; R91.8-Other nonspecific abnormal finding of lung field  TECHNIQUE:   Axial Positron Emission Tomography / Computed Tomography images were obtained of the whole body following the intravenous administration of F-18 FDG.  MIP reconstructed images were reviewed.  COMPARISON:   08/09/2023  FINDINGS:   HEAD:   BRAIN AND EXTRA-AXIAL SPACES:  Unremarkable.  No hemorrhage.  No significant white matter disease.  No edema.  No ventriculomegaly.   SINUSES:  Unremarkable as visualized.  No acute sinusitis.   MASTOID AIR CELLS:  Unremarkable as visualized.  No mastoid effusion.   NECK:   HYPOPHARYNX:  Unremarkable.    LARYNX:  Unremarkable.   TRACHEA:  Unremarkable.   RETROPHARYNGEAL SPACE:  Unremarkable.   SUBMANDIBULAR/PAROTID GLANDS:  Bilateral parotid gland lesions, right measuring up to 2 cm and left measuring up to 1.1 cm, and both showing PET hypermetabolism mSUV right 10.6 in mSUV left in the mSUV 23.   THYROID:  Unremarkable.  No enlarged or calcified nodules.   CHEST:   LUNGS AND PLEURAL SPACES:  Multiple right lung hilar region pulmonary nodules with the largest measuring up to about 1 cm showing low-grade less than baseline hypermetabolism of M mSUV 1.9.  Probable right hilar region hypermetabolism with a mesh UV 3.  No significant effusion.  No pneumothorax.   HEART:  Unremarkable.  No cardiomegaly.  No significant pericardial effusion.   MEDIASTINUM:  Precarinal region lymph node measuring about 1.2 cm with PET hypermetabolism in mSUV 3.2.   ABDOMEN:   LIVER:  Unremarkable.   GALLBLADDER AND BILE DUCTS:  Unremarkable.  No calcified stones.  No ductal dilation.   PANCREAS:  Unremarkable.  No ductal dilation.   SPLEEN:  Unremarkable.  No splenomegaly.   ADRENALS:  Unremarkable.  No mass.   KIDNEYS AND URETERS:  Unremarkable.   STOMACH AND BOWEL:  Posterior gastric wall thickening and associated hypermetabolism within mSUV 9.5 and extending into the retroperitoneal space lesser sac region.  No obstruction.   PELVIS:   APPENDIX:  No findings to suggest acute appendicitis.   BLADDER:  Unremarkable.   REPRODUCTIVE:  Unremarkable as visualized.   WHOLE BODY:   INTRAPERITONEAL SPACE:  Unremarkable.  No significant fluid collection.  No free air.   BONES/JOINTS:  Unremarkable.  No acute fracture.  No dislocation.   SOFT TISSUES:  Unremarkable.   VASCULATURE:  Heterogeneous appearance of the liver that appear somewhat sclerotic but there is a liver lesion within mSUV 7 measuring about 3 cm. Another left lobe of liver lesion measuring about 2 cm also is hypermetabolic along with scattered regions of hypermetabolism throughout  the liver that shows no definite corresponding CT PE abnormality.  No aortic aneurysm.   LYMPH NODES:  See above.      Impression: 1.  Bilateral parotid gland lesions, right measuring up to 2 cm and left measuring up to 1.1 cm, and both showing PET hypermetabolism mSUV right 10.6 in mSUV left in the mSUV 23. 2.  Heterogeneous appearance of the liver that appear somewhat sclerotic but there is a liver lesion within mSUV 7 measuring about 3 cm. Another left lobe of liver lesion measuring about 2 cm also is hypermetabolic along with scattered regions of hypermetabolism throughout the liver that shows no definite corresponding CT PE abnormality. 3.  Precarinal region lymph node measuring about 1.2 cm with PET hypermetabolism in mSUV 3.2. 4.  Multiple right lung hilar region pulmonary nodules with the largest measuring up to about 1 cm showing low-grade less than baseline hypermetabolism of M mSUV 1.9. 5.  Probable right hilar region hypermetabolism with a mesh UV 3. 6.  Posterior gastric wall thickening and associated hypermetabolism within mSUV 9.5 and extending into the retroperitoneal space lesser sac region.  This report was finalized on 8/22/2023 12:41 PM by Dr. Galen Isaacs MD.           Assessment & Plan   Assessment & Plan       Popliteal artery occlusion, right    Complete heart block S/P transvenous pacemaker placement at [facility] on 12/17    PAD     HTN    Paroxysmal atrial fibrillation    Multiple pulmonary nodules (New 8mm RLL)    COPD (? severity)    CAD (coronary artery disease)    History of subacute left MCA distribution ischemic CVA    Tobacco use    Hyperlipidemia    Iron deficiency anemia due to chronic blood loss    Gastric cancer      Right Popliteal Artery occlusion  PAD  H/O right popliteal artery occlusion s/p embolectomy 2018  - CTA abdominal aorta with runoff not completed at OSH. Will obtain stat  - stat labs  - continue heparin gtt  - continue Pletal  - NPO for now  - consult to CT surgery  -  stat labs, EKG    MAGGIE  - check UA  - IVF  - avoid nephrotoxins  - strict I&O    PAF  Complete heart lock s/p PPM  - currently stable and rate controlled  - followed by Dr. Jaramillo per cardiology  - was on Eliquis and amiodarone, unclear if patient still taking. Confirm in am    Gastric Adenocarcinoma, recently diagnoed  - with multiple pulmonary nodules  - following with oncology and pulmonology in [place]    COPD  Ongoing tobacco abuse  - stable  - duo nebs with additional pulm toilet as needed  - nicotine patch    HTN  HLD  - continue coreg    History of CVA        DVT prophylaxis:  heparin gtt    CODE STATUS:  full code  Level Of Support Discussed With: Patient  Code Status (Patient has no pulse and is not breathing): CPR (Attempt to Resuscitate)  Medical Interventions (Patient has pulse or is breathing): Full Support      Expected Discharge  TBD      This note has been completed as part of a split-shared workflow.     Signature: Electronically signed by Earnest Barrett PA-C, 08/23/23, 11:58 PM EDT    Total time spent: 90 minutes  Time spent includes time reviewing chart, face-to-face time, counseling patient/family/caregiver, ordering medications/tests/procedures, communicating with other health care professionals, documenting clinical information in the electronic health record, and coordination of care.        Attending   Admission Attestation       I have performed an independent face-to-face diagnostic evaluation including performing an independent physical examination.  The documented plan of care above was reviewed and developed with the advanced practice clinician (APC).  I have updated the HPI as appropriate.    Brief HPI    87 F transferred during the overnight shift from [facility].  She was at an oncology appointment today (she has recent diagnosis of gastric adenocarcinoma) and had mentioned to provider that she felt like her right foot was numb.  Reportedly, the patient's pulse was  nonpalpable in the right lower extremity and she was sent to the ED for further evaluation.  Work-up at the [facility] included CT abdominal aorta with iliofemoral runoffs, which showed occlusion of the distal superficial right femoral artery, very limited flow below the occlusion.  During my visit upon the medical floor, she denies any pain but still C/O right foot numbness.    Attending Physical Exam:  Temp:  [97.3 °F (36.3 °C)-98.6 °F (37 °C)] 98.6 °F (37 °C)  Heart Rate:  [63-82] 69  Resp:  [18] 18  BP: ()/(56-74) 152/69    Constitutional: Awake, alert, NAD  Eyes: PERRLA, sclerae anicteric, no conjunctival injection  HENT: NCAT, mucous membranes moist  Neck: Supple, no thyromegaly, no lymphadenopathy, trachea midline  Respiratory: Clear to auscultation bilaterally, nonlabored respirations   Cardiovascular: RRR, no murmurs, rubs, or gallops, palpable pedal pulses bilaterally though decreased on the right, more difficult to palpate  Gastrointestinal: Positive bowel sounds, soft, nontender, nondistended  Musculoskeletal: No bilateral ankle edema, no clubbing or cyanosis to extremities  Psychiatric: Appropriate affect, cooperative  Neurologic: Oriented x 3, strength symmetric in all extremities, Cranial Nerves grossly intact to confrontation, speech clear.  There is decreased sensation of the RLE from the foot extending proximally up to mid calf.  Skin: No rashes, normal turgor.    Assessment and Plan:    Total time spent: 23 minutes  Time spent includes time reviewing chart, face-to-face time, counseling patient/family/caregiver, ordering medications/tests/procedures, communicating with other health care professionals, documenting clinical information in the electronic health record, and coordination of care.       See assessment and plan documented by APC above and updated/edited by me as appropriate.    Ran Blankenship III, DO  08/24/23

## 2023-08-24 NOTE — PROGRESS NOTES
HEPARIN INFUSION  Rupinder Lees is a  87 y.o. female receiving heparin infusion.     Therapy for (VTE/Cardiac): VTE  Patient Weight: 40.4 kg  Initial Bolus (Y/N): N  Any Bolus (Y/N):  Y        Signs or Symptoms of Bleeding: None noted    Recommend Xa every 6 hours.   VTE (PE/DVT)      Anti Xa Rebolus Infusion Hold time Change infusion Dose (Units/kg/hr) Next Anti Xa Level Due   < 0.11 50 Units/kg  (4000 Units Max) None Increase by  4 Units/kg/hr 6 hours   0.11 - 0.19 25 Units/kg  (2000 Units Max) None Increase by  3 Units/kg/hr 6 hours   0.2 - 0.29 0 None Increase by  2 Units/kg/hr 6 hours   0.3 - 0.7 0 None No Change 6 hours (after 2 consecutive levels in range check qAM)   0.71 - 0.8 0 None Decrease by  1 Units/kg/hr 6 hours   0.81 - 0.9 0 None Decrease by  2 Units/kg/hr 6 hours   0.91 - 1 0 60 Minutes Decrease by  3 Units/kg/hr 6 hours   >1 0 Hold  After Anti Xa less than 0.7 decrease previous rate by  4 Units/kg/hr  Every 2 hours until Anti Xa is less than 0.7 then when infusion restarts in 6 hours     Results from last 7 days   Lab Units 08/24/23  0003 08/23/23  1816 08/23/23  1600   INR   --  1.06  --    HEMOGLOBIN g/dL 11.0*  --  11.8*   HEMATOCRIT % 35.2  --  38.8   PLATELETS 10*3/mm3 204  --  238          Date   Time   Anti-Xa Current Rate (Unit/kg/hr) Bolus   (Units) Rate Change   (Unit/kg/hr) New Rate (Unit/kg/hr) Next   Anti-Xa Comments  Pump Check Daily   8/23 2315 Pending 12 -- -- 12 Stat Spoke with RN, heparin gtt currently running from OSH, will check stat Anti-Xa and adjust from there   8/24 0003 0.10 12 2030 +4 16 0700 D/w DESMOND   8/24 0721 0.2 16 -- +2 18 1600 D/w Cecelia   8/24 1139 -- 18 -- -- 18 1600 Pump weight and rate verified. Weight set at 40.4kg in pump, updated epic order to reflect this.                                                                                                                                                                                                 Leeanna  Bruno, PharmD  8/24/2023  09:36 EDT

## 2023-08-24 NOTE — PLAN OF CARE
Goal Outcome Evaluation:                        Problem: Fall Injury Risk  Goal: Absence of Fall and Fall-Related Injury  Intervention: Identify and Manage Contributors  Recent Flowsheet Documentation  Taken 8/24/2023 0400 by Franck Menchaca RN  Medication Review/Management: medications reviewed  Taken 8/24/2023 0200 by Franck Menchaca RN  Medication Review/Management: medications reviewed  Taken 8/24/2023 0000 by Franck Menchaca RN  Medication Review/Management: medications reviewed  Taken 8/23/2023 2230 by Franck Menchaca RN  Medication Review/Management: medications reviewed  Intervention: Promote Injury-Free Environment  Recent Flowsheet Documentation  Taken 8/24/2023 0400 by Franck Menchaca RN  Safety Promotion/Fall Prevention:   activity supervised   safety round/check completed   toileting scheduled  Taken 8/24/2023 0200 by Franck Menchaca RN  Safety Promotion/Fall Prevention:   activity supervised   safety round/check completed   toileting scheduled  Taken 8/24/2023 0000 by Franck Menchaca RN  Safety Promotion/Fall Prevention:   activity supervised   safety round/check completed   toileting scheduled  Taken 8/23/2023 2230 by Franck Menchaca RN  Safety Promotion/Fall Prevention:   safety round/check completed   toileting scheduled    VSS, voids well, rested throughout the night, pain managed with PRN medications, will continue to monitor for changes.

## 2023-08-24 NOTE — PROGRESS NOTES
HEPARIN INFUSION  Rupinder Lees is a  87 y.o. female receiving heparin infusion. Patient transferred here from OSH with heparin gtt running at 12 units/kg/hr.    Therapy for (VTE/Cardiac): VTE  Patient Weight: 40.6 kg  Initial Bolus (Y/N): N  Any Bolus (Y/N):  Y        Signs or Symptoms of Bleeding: None noted    Recommend Xa every 6 hours.   VTE (PE/DVT)   Initial Bolus: 80 units/kg (Max 10,000 units)  Initial rate: 18 units/kg/hr (Max 1,500 units/hr)    Anti Xa Rebolus Infusion Hold time Change infusion Dose (Units/kg/hr) Next Anti Xa Level Due   < 0.11 50 Units/kg  (4000 Units Max) None Increase by  4 Units/kg/hr 6 hours   0.11 - 0.19 25 Units/kg  (2000 Units Max) None Increase by  3 Units/kg/hr 6 hours   0.2 - 0.29 0 None Increase by  2 Units/kg/hr 6 hours   0.3 - 0.7 0 None No Change 6 hours (after 2 consecutive levels in range check qAM)   0.71 - 0.8 0 None Decrease by  1 Units/kg/hr 6 hours   0.81 - 0.9 0 None Decrease by  2 Units/kg/hr 6 hours   0.91 - 1 0 60 Minutes Decrease by  3 Units/kg/hr 6 hours   >1 0 Hold  After Anti Xa less than 0.7 decrease previous rate by  4 Units/kg/hr  Every 2 hours until Anti Xa is less than 0.7 then when infusion restarts in 6 hours     Results from last 7 days   Lab Units 08/24/23  0003 08/23/23  1816 08/23/23  1600   INR   --  1.06  --    HEMOGLOBIN g/dL 11.0*  --  11.8*   HEMATOCRIT % 35.2  --  38.8   PLATELETS 10*3/mm3 204  --  238          Date   Time   Anti-Xa Current Rate (Unit/kg/hr) Bolus   (Units) Rate Change   (Unit/kg/hr) New Rate (Unit/kg/hr) Next   Anti-Xa Comments  Pump Check Daily   8/23 2315 Pending 12 -- -- 12 Stat Spoke with RN, heparin gtt currently running from OSH, will check stat Anti-Xa and adjust from there                                                                                                                                                                                                                                 Janet Matute,  RPH  8/24/2023  00:45 EDT

## 2023-08-24 NOTE — PROGRESS NOTES
[facility] Medicine Services  PROGRESS NOTE    Patient Name: Rupinder Lees  : 1935  MRN: [id number]    Date of Admission: 2023  Primary Care Physician: Gifty Kruse MD    Subjective   Subjective     CC:  Follow-up popliteal artery occlusion    HPI:  Reports feeling hungry.  Denied pain.  No numbness or tingling. No respiratory symptoms. No GI or  symptoms.    ROS:  Gen- No fevers, chills  CV- No chest pain, palpitations  Resp- No cough, dyspnea  GI- No N/V/D, abd pain     Objective   Objective     Vital Signs:   Temp:  [97.3 °F (36.3 °C)-98.8 °F (37.1 °C)] 98.8 °F (37.1 °C)  Heart Rate:  [63-82] 78  Resp:  [17-18] 17  BP: ()/(56-78) 156/78     Physical Exam:  Constitutional: No acute distress, awake, alert  HENT: NCAT, mucous membranes moist  Respiratory: Clear to auscultation bilaterally, respiratory effort normal   Cardiovascular: RRR, no murmurs, rubs, or gallops  Gastrointestinal: Positive bowel sounds, soft, nontender, nondistended  Musculoskeletal: No bilateral ankle edema  Psychiatric: Appropriate affect, cooperative  Neurologic: PERRL, symmetric facies, speech clear  Skin: No rashes    Results Reviewed:  LAB RESULTS:      Lab 23  0721 23  0003 23  1816 23  1600   WBC  --  7.94  --  8.76   HEMOGLOBIN  --  11.0*  --  11.8*   HEMATOCRIT  --  35.2  --  38.8   PLATELETS  --  204  --  238   NEUTROS ABS  --  5.53  --  6.40   IMMATURE GRANS (ABS)  --  0.05  --  0.04   LYMPHS ABS  --  1.35  --  1.40   MONOS ABS  --  0.91*  --  0.85   EOS ABS  --  0.06  --  0.03   MCV  --  88.4  --  88.8   PROTIME  --   --  14.3  --    APTT  --   --  30.6  --    HEPARIN ANTI-XA 0.20* 0.10* <0.10*  --          Lab 23  0003 23  1600   SODIUM 138 135*   POTASSIUM 3.9 4.5   CHLORIDE 104 98   CO2 22.0 22.2   ANION GAP 12.0 14.8   BUN 18 21   CREATININE 1.15* 1.28*   EGFR 46.8* 41.1*   GLUCOSE 104* 106*   CALCIUM 8.3* 8.9         Lab 23  0003  08/23/23  1600   TOTAL PROTEIN 5.7* 6.8   ALBUMIN 3.0* 3.5   GLOBULIN 2.7 3.3   ALT (SGPT) 12 15   AST (SGOT) 18 22   BILIRUBIN 0.4 0.6   ALK PHOS 151* 159*         Lab 08/23/23  1816   PROTIME 14.3   INR 1.06                 Brief Urine Lab Results  (Last result in the past 365 days)        Color   Clarity   Blood   Leuk Est   Nitrite   Protein   CREAT   Urine HCG        07/24/23 1326 Yellow   Clear   Negative   Negative   Negative   Negative                   Microbiology Results Abnormal       None            CT Angio Abdominal Aorta Bilateral Iliofem Runoff    Result Date: 8/24/2023  CT ANGIO ABDOMINAL AORTA BILAT ILIOFEM RUNOFF Date of Exam: 8/24/2023 12:25 AM EDT Indication: Claudication or leg ischemia. Comparison: CT of the abdomen pelvis from July 24, 2023 Technique: CTA of the abdomen, pelvis and both lower extremities was performed after the uneventful intravenous administration of 110 mL Isovue-370. Reconstructed coronal and sagittal images were also obtained. In addition, a 3-D volume rendered image was created for interpretation. Automated exposure control and iterative reconstruction methods were used. Findings: Non--- vascular findings: There is mild right basilar atelectasis. There are wire leads from cardiac pacemaker/AICD device the arterial enhanced images of the right adrenal gland, pancreas, spleen and right kidney appear normal. There is moderate left-sided hydroureteronephrosis to the level of the mid ureter without discrete stone or evidence of obstructive cause. Ureteral stricture would be favored. There is diminished enhancement of the left kidney suggestive of obstructive uropathy on the left. There is a stable left adrenal  mass. There are low-attenuation lesions of the liver which have been described previously. There is diverticulosis of the colon without evidence of diverticulitis. There is no free air, free fluid or pathologic adenopathy. There are degenerative changes  of the spine.  Vascular findings: There is calcific atherosclerosis of the descending thoracic and proximal abdominal aorta. There is a small infrarenal abdominal aortic aneurysm with mural thrombus. This is unchanged with a maximum diameter of approximately 2.7 cm. The celiac artery, superior mesenteric artery, inferior mesenteric artery and bilateral renal arteries are widely patent. There is distal abdominal aortic calcific atherosclerosis. On the left side, there is mild stenosis of the proximal left common iliac artery approximately 50%. There is a stent just beyond the level of the stenosis. The left external iliac artery is patent. The left superficial femoral artery, popliteal artery and infrapopliteal runoff appears normal with at least a single vessel runoff to the ankle. On the right side, there is calcific atherosclerosis of the right common iliac artery, internal and external iliac arteries which are widely patent. There is diffuse irregularity of the distal superficial femoral artery with occlusion just above the knee. This could be from acute embolic event given the lack of calcification at the location. There is some filling of the infrapopliteal runoff, poorly seen likely secondary to significant sluggish flow from the occlusion. .    Impression: Impression: 1.Occlusion of the distal superficial femoral artery approximately 5 cm above the joint line without significant surrounding calcific atherosclerotic disease. This favors probable embolism. Vascular specialist consultation recommended. 2.Very limited flow below the level of right arterial occlusion with the anterior tibial artery vaguely seen. 3.Left-sided runoff appears to be intact with at least a single vessel runoff to the ankle. 4.Left-sided hydronephrosis with abnormal appearance of the left-sided nephrogram which appears delayed likely from obstructive uropathy. 5.Indeterminate low-attenuation hepatic lesions. 6.Left adrenal enlargement not definitely  adenoma similar to the previous study. Please refer to previous PET scan findings from outside institution. Electronically Signed: Pedrito Parra MD  8/24/2023 2:32 AM EDT  Workstation ID: [id number]    US Arterial Doppler Lower Extremity Right    Result Date: 8/23/2023  EXAMINATION: US ARTERIAL DOPPLER LOWER EXTREMITY  RIGHT-  CLINICAL INDICATION: right leg pain   COMPARISON: None available.  PROCEDURE: Color Doppler imaging with pulse Doppler waveform to evaluate lower extremity arterial system on the right.  FINDINGS: Any stenoses are evaluated using the NASCET or similar method.  Velocity is elevated in the common femoral and superficial femoral artery. No occlusive segments in those arteries however. Moderate stenosis. Biphasic waveforms.  The popliteal artery, however, is occluded.  Posterior tibial arteries show monophasic waveforms.      Impression: Occlusion of the right popliteal artery.  This report was finalized on 8/23/2023 4:48 PM by Dr. Juan Plascencia MD.      US Venous Doppler Lower Extremity Right (duplex)    Result Date: 8/23/2023  US VENOUS DOPPLER LOWER EXTREMITY RIGHT (DUPLEX)-  CLINICAL INDICATION: US arterial doppler   COMPARISON: None available  TECHNIQUE: Color Doppler imaging was used with compression and augmentation to evaluate the right lower extremity deep venous system.  FINDINGS: There is patent spontaneous flow from the common femoral vein through the posterior tibial veins. There was no internal clot or area of noncompressibility in the right lower extremity. Normal augmentation was elicited where applicable. The left common femoral vein is patent       Impression: No DVT in the right lower extremity on today's exam.  This report was finalized on 8/23/2023 4:40 PM by Dr. Juan Plascencia MD.      NM PET/CT Whole Body    Result Date: 8/22/2023  EXAM:   PET/CT Whole Body  EXAM DATE:   8/22/2023 10:30 AM  CLINICAL HISTORY:   Lung nodules, multiple; C16.9-Malignant neoplasm of stomach,  unspecified; R91.8-Other nonspecific abnormal finding of lung field  TECHNIQUE:   Axial Positron Emission Tomography / Computed Tomography images were obtained of the whole body following the intravenous administration of F-18 FDG.  MIP reconstructed images were reviewed.  COMPARISON:   08/09/2023  FINDINGS:   HEAD:   BRAIN AND EXTRA-AXIAL SPACES:  Unremarkable.  No hemorrhage.  No significant white matter disease.  No edema.  No ventriculomegaly.   SINUSES:  Unremarkable as visualized.  No acute sinusitis.   MASTOID AIR CELLS:  Unremarkable as visualized.  No mastoid effusion.   NECK:   HYPOPHARYNX:  Unremarkable.   LARYNX:  Unremarkable.   TRACHEA:  Unremarkable.   RETROPHARYNGEAL SPACE:  Unremarkable.   SUBMANDIBULAR/PAROTID GLANDS:  Bilateral parotid gland lesions, right measuring up to 2 cm and left measuring up to 1.1 cm, and both showing PET hypermetabolism mSUV right 10.6 in mSUV left in the mSUV 23.   THYROID:  Unremarkable.  No enlarged or calcified nodules.   CHEST:   LUNGS AND PLEURAL SPACES:  Multiple right lung hilar region pulmonary nodules with the largest measuring up to about 1 cm showing low-grade less than baseline hypermetabolism of M mSUV 1.9.  Probable right hilar region hypermetabolism with a mesh UV 3.  No significant effusion.  No pneumothorax.   HEART:  Unremarkable.  No cardiomegaly.  No significant pericardial effusion.   MEDIASTINUM:  Precarinal region lymph node measuring about 1.2 cm with PET hypermetabolism in mSUV 3.2.   ABDOMEN:   LIVER:  Unremarkable.   GALLBLADDER AND BILE DUCTS:  Unremarkable.  No calcified stones.  No ductal dilation.   PANCREAS:  Unremarkable.  No ductal dilation.   SPLEEN:  Unremarkable.  No splenomegaly.   ADRENALS:  Unremarkable.  No mass.   KIDNEYS AND URETERS:  Unremarkable.   STOMACH AND BOWEL:  Posterior gastric wall thickening and associated hypermetabolism within mSUV 9.5 and extending into the retroperitoneal space lesser sac region.  No obstruction.    PELVIS:   APPENDIX:  No findings to suggest acute appendicitis.   BLADDER:  Unremarkable.   REPRODUCTIVE:  Unremarkable as visualized.   WHOLE BODY:   INTRAPERITONEAL SPACE:  Unremarkable.  No significant fluid collection.  No free air.   BONES/JOINTS:  Unremarkable.  No acute fracture.  No dislocation.   SOFT TISSUES:  Unremarkable.   VASCULATURE:  Heterogeneous appearance of the liver that appear somewhat sclerotic but there is a liver lesion within mSUV 7 measuring about 3 cm. Another left lobe of liver lesion measuring about 2 cm also is hypermetabolic along with scattered regions of hypermetabolism throughout the liver that shows no definite corresponding CT PE abnormality.  No aortic aneurysm.   LYMPH NODES:  See above.      Impression: 1.  Bilateral parotid gland lesions, right measuring up to 2 cm and left measuring up to 1.1 cm, and both showing PET hypermetabolism mSUV right 10.6 in mSUV left in the mSUV 23. 2.  Heterogeneous appearance of the liver that appear somewhat sclerotic but there is a liver lesion within mSUV 7 measuring about 3 cm. Another left lobe of liver lesion measuring about 2 cm also is hypermetabolic along with scattered regions of hypermetabolism throughout the liver that shows no definite corresponding CT PE abnormality. 3.  Precarinal region lymph node measuring about 1.2 cm with PET hypermetabolism in mSUV 3.2. 4.  Multiple right lung hilar region pulmonary nodules with the largest measuring up to about 1 cm showing low-grade less than baseline hypermetabolism of M mSUV 1.9. 5.  Probable right hilar region hypermetabolism with a mesh UV 3. 6.  Posterior gastric wall thickening and associated hypermetabolism within mSUV 9.5 and extending into the retroperitoneal space lesser sac region.  This report was finalized on 8/22/2023 12:41 PM by Dr. Galen Isaacs MD.           Current medications:  Scheduled Meds:carvedilol, 6.25 mg, Oral, BID  cilostazol, 100 mg, Oral, BID  donepezil, 10  mg, Oral, Nightly  pantoprazole, 40 mg, Oral, BID  senna-docusate sodium, 2 tablet, Oral, BID  sodium chloride, 10 mL, Intravenous, Q12H      Continuous Infusions:heparin, 16 Units/kg/hr, Last Rate: 16 Units/kg/hr (08/24/23 0113)  lactated ringers, 75 mL/hr, Last Rate: 75 mL/hr (08/24/23 0041)  Pharmacy to Dose Heparin,       PRN Meds:.  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 severity) [J44.9]  Yes    Gastric cancer [C16.2]  Yes    Paroxysmal atrial fibrillation [I48.0]  Yes    Iron deficiency anemia due to chronic blood loss [D50.0]  Yes    HTN [I10]  Yes    Complete heart block S/P transvenous pacemaker placement at [facility] on 12/17 [I44.2]  Yes    PAD  [I73.9]  Yes    Hyperlipidemia [E78.5]  Yes    History of subacute left MCA distribution ischemic CVA [Z86.73]  Not Applicable    Tobacco use [Z72.0]  Yes    MAGGIE (acute kidney injury) [N17.9]  Yes      Resolved Hospital Problems   No resolved problems to display.        Brief Hospital Course to date:  Rupinder Lees is a 87 y.o. female With COPD on 2 L nasal cannula as needed, CAD, HTN, HLD, complete heart block status post PPM, prior CVA, A-fib, prior DVT, PAD status post femoral artery stent and right popliteal thrombosis status post embolectomy/femoropopliteal bypass (2018, [place]), who presented for evaluation from [facility] after presenting with complaints of loss of sensation in right foot.  Was found to have a right popliteal artery occlusion.    This patient's problems and plans were partially entered by my partner and updated  as appropriate by me 08/24/23.     Right Popliteal Artery occlusion  PAD  H/O right popliteal artery occlusion s/p embolectomy 2018  - CTA abdominal aorta with runoff not completed at OSH. Obtained here and showed occlusion  - continue heparin gtt  - Cardiac diet, n.p.o. after midnight  - CT surgery following  - stat labs, EKG     MAGGIE  - IVF  - avoid nephrotoxins  - strict I&O     PAF  Complete heart lock s/p PPM  - currently stable and rate controlled  - followed by Dr. Jaramillo per cardiology  - was on Eliquis and amiodarone, unclear if patient still taking amiodarone; Eliquis appears to have been recently refilled; pharmacy consulted for clarification     Gastric Adenocarcinoma, recently diagnoed  - with multiple pulmonary nodules  - following with oncology and pulmonology in [place]  - Also has multiple hepatic lesions and an adrenal enlargement on the left side     COPD  Ongoing tobacco abuse  - stable  - duo nebs with additional pulm toilet as needed  - nicotine patch     HTN  HLD  - continue coreg     History of CVA    Expected Discharge Location and Transportation: pending CT surgery and possible PT/OT after  Expected Discharge   Expected Discharge Date: 8/29/2023; Expected Discharge Time:      DVT prophylaxis:  Medical DVT prophylaxis orders are present.          CODE STATUS:   Code Status and Medical Interventions:   Ordered at: 08/23/23 2305     Level Of Support Discussed With:    Patient     Code Status (Patient has no pulse and is not breathing):    CPR (Attempt to Resuscitate)     Medical Interventions (Patient has pulse or is breathing):    Full Support       Meagan Pulliam MD  08/24/23

## 2023-08-24 NOTE — CASE MANAGEMENT/SOCIAL WORK
Continued Stay Note  [facility]     Patient Name: Rupinder Lees  MRN: [id number]  Today's Date: 8/24/2023    Admit Date: 8/23/2023    Plan: TBD   Discharge Plan       Row Name 08/24/23 1716       Plan    Plan TBD    Patient/Family in Agreement with Plan yes    Plan Comments Met with Ms. Lees, at the bedside, for discharge planning.    Ms. Lees lives alone in [facility] [place]. She states that her only help is a relative, Eneida, who comes in once a week and assists with transport to get groceries, MD appointments and any home needs.  She states that she is independent with ADL's. and ambulates using a rolling walker and a wheelchair at home.  She also has a bedside commode.    She has never been to [facility] or used home health in the past.  She states that she would consider inpatient rehab, if necessary.    PCP is Gifty Kruse.  Insurance is Medicare and [place] with no interruption in coverage.  No ACP documents.    Ms. Lees has surgery tomorrow.  DC plan is TBD.    CM will continue to follow and assist with DC planning.    Final Discharge Disposition Code 30 - still a patient                                  Expected Discharge Date and Time       Expected Discharge Date Expected Discharge Time    Aug 29, 2023               Yohana Francisco RN

## 2023-08-24 NOTE — CONSULTS
CTS Consult    Patient Care Team:  Gifty Kruse MD as PCP - General (Geriatric Medicine)  Carlo Jaarmillo MD as Consulting Physician (Cardiology)  Chiquis Red, RN as Nurse Navigator      Reason for Consult:  Right popliteal artery occlusion    HPI  Patient is a 87 y.o. wheel chair bound female with a history of medication noncompliance, hypertension, hyperlipidemia, complete heart block s/p PPM, CVA, COPD, atrial fibrillation, coronary artery disease and peripheral vascular disease s/p emergent right thromboembolectomy in 2018 who presented to [facility] ED as transfer from [facility] with complaints of loss of sensation in right foot.  Patient has had recent diagnosis of gastric adenocarcinoma with pulmonary lung nodules and PET scan revealing hypermetabolic parotid gland, liver, precarinal, and right lung hilar lesions.  Patient was evaluated in CT surgery office in May 2019 for bilateral calf and thigh pain requesting surgical intervention.  It was discussed with patient at that time that due to her significant microvascular disease and occlusion of the vessels of the distal aspect of her lower extremities, there were not any feasible options to help significantly improve blood flow to her distal lower extremities and she was offered referral to [facility] for a second opinion which she declined. Patient states she was instructed to discontinue Eliquis recently. CTA with runoffs overnight revealed occlusion of distal right superficial femoral artery with limited flow below the level of right arterial occlusion for which we have been asked to evaluate.      Review of Systems  Constitutional: Negative for malaise/fatigue.  Negative for chills, fever, night sweats and weight loss.  HENT: Negative for hearing loss, odynophagia and sore throat.    Cardiovascular: Negative for claudication and dyspnea on exertion.  Negative for chest pain, leg swelling, orthopnea and palpitations.  Respiratory: Negative  for shortness of breath.  Negative for cough and hemoptysis.  Endocrine:  Negative for cold intolerance, heat intolerance, polydipsia, polyphagia and polyuria.  Hematologic/Lymphatic: Negative for easy bruising/bleeding.  Musculoskeletal: Negative for joint pain, joint swelling and myalgias.  Gastrointestinal: Negative for abdominal pain, constipation, diarrhea, hematemesis, hematochezia, melena, nausea and vomiting.  Genitourinary: Negative for dysuria, frequency and hematuria.  Neurological: Positive for weakness and numbness  Psychiatric/Behavioral: Negative for depression and suicidal ideas.  The patient is not nervous/anxious.    All other systems are reviewed and are negative.    History  Past Medical History:   Diagnosis Date    Arthritis     COPD  12/17/2018    DVT of lower extremity (deep venous thrombosis)     History of CVA 2013 12/17/2018    HTN 12/17/2018    PAD  12/17/2018    Stroke     Tobacco use 12/17/2018     Past Surgical History:   Procedure Laterality Date    CARDIAC CATHETERIZATION  12/17/2018    Procedure: Left Heart Cath;  Surgeon: Imtiaz Fuentes MD;  Location: [facility] CATH INVASIVE LOCATION;  Service: Cardiology    CARDIAC CATHETERIZATION N/A 12/17/2018    Procedure: Thrombolysis-peripheral;  Surgeon: Imtiaz Fuentes MD;  Location:  [facility] CATH INVASIVE LOCATION;  Service: Cardiology    CARDIAC ELECTROPHYSIOLOGY PROCEDURE N/A 12/18/2018    Procedure: PACEMAKER IMPLANTATION- DC;  Surgeon: Thony Bobby MD;  Location:  [facility] EP INVASIVE LOCATION;  Service: Cardiology    CARDIAC ELECTROPHYSIOLOGY PROCEDURE N/A 12/17/2018    Procedure: Temporary Pacemaker;  Surgeon: Imtiaz Fuentes MD;  Location:  [facility] CATH INVASIVE LOCATION;  Service: Cardiology    COLONOSCOPY N/A 04/19/2019    Procedure: COLONOSCOPY;  Surgeon: Chris Zimmerman MD;  Location: [facility] OR;  Service: Gastroenterology    ENDOSCOPY N/A 04/19/2019    Procedure:  ESOPHAGOGASTRODUODENOSCOPY;  Surgeon: Chris Zimmerman MD;  Location:  [facility] OR;  Service: Gastroenterology    ENDOSCOPY N/A 8/1/2023    Procedure: ESOPHAGOGASTRODUODENOSCOPY WITH BIOPSY;  Surgeon: Agueda Arredondo MD;  Location:  [facility] OR;  Service: Gastroenterology;  Laterality: N/A;    FEMORAL ARTERY STENT  2013    FEMORAL POPLITEAL BYPASS Right 12/17/2018    Procedure: LOWER EXTREMITY EMBELECTOMY RIGHT;  Surgeon: David Beatty MD;  Location:  [facility] HYBRID OR 15;  Service: Vascular    HYSTERECTOMY      LAPAROSCOPIC CHOLECYSTECTOMY      LEFT HEART CATH  12/17/2018    At [facility] with TV PM placement     PACEMAKER IMPLANTATION       Family History   Problem Relation Age of Onset    Cancer Mother     Cancer Father     Heart attack Brother     Heart attack Brother      Social History     Tobacco Use    Smoking status: Light Smoker     Packs/day: 0.50     Years: 63.00     Pack years: 31.50     Types: Cigarettes     Passive exposure: Current    Smokeless tobacco: Never   Vaping Use    Vaping Use: Never used   Substance Use Topics    Alcohol use: No    Drug use: No     Medications Prior to Admission   Medication Sig Dispense Refill Last Dose    carvedilol (COREG) 6.25 MG tablet Take 1 tablet by mouth 2 (Two) Times a Day. 180 tablet 3     cilostazol (PLETAL) 100 MG tablet Take 1 tablet by mouth 2 (Two) Times a Day.       donepezil (Aricept) 10 MG tablet Take 1 tablet by mouth Every Night.       HYDROcodone-acetaminophen (NORCO) 5-325 MG per tablet Take 1 tablet by mouth Every 8 (Eight) Hours As Needed for Moderate Pain. 42 tablet 0     oxyCODONE (ROXICODONE) 5 MG/5ML solution Take 5 mL by mouth Every 6 (Six) Hours As Needed for Moderate Pain. 600 mL 0     pantoprazole (PROTONIX) 40 MG EC tablet Take 1 tablet by mouth 2 (Two) Times a Day for 30 days. 60 tablet 0     polyethylene glycol (MIRALAX) 17 GM/SCOOP powder Take 17 g by mouth Daily. 578 g 0     prochlorperazine (COMPAZINE) 10 MG tablet Take 1 tablet by  mouth Every 6 (Six) Hours As Needed for Nausea or Vomiting. 30 tablet 0     promethazine (PHENERGAN) 25 MG tablet Take 1 tablet by mouth Every 8 (Eight) Hours As Needed for Nausea or Vomiting. Take 1/2 to 1 tablet every 8 hours as needed for nausea and vomiting 30 tablet 0     traMADol (ULTRAM) 50 MG tablet Take 1 tablet by mouth Every 6 (Six) Hours As Needed for Moderate Pain. 120 tablet 0      Allergies:  Patient has no known allergies.    Objective    Vital Signs  Temp:  [97.3 °F (36.3 °C)-98.6 °F (37 °C)] 98.6 °F (37 °C)  Heart Rate:  [63-82] 69  Resp:  [18] 18  BP: ()/(56-74) 152/69    Physical Exam:  General Appearance: alert, appears stated age and cooperative  Head: normocephalic, without obvious abnormality and atraumatic  Throat: gums healthy and no oral lesions  Neck: no adenopathy, suppple, trachea midline, no thyromegaly, no carotid bruit and no JVD  Lungs: clear to auscultation, respirations regular, respirations even and respirations unlabored  Heart: regular rhythm & normal rate, normal S1, S2, no murmur, no chris, no rub and no click  Abdomen: normal bowel sounds, no masses and soft non-tender  Extremities: Loss of sensation right lower extremity, cool right foot  Pulses: Palpable left PT, nonpalpable right pedal pulses  Skin: no bleeding, bruising or rash  Neurologic: Mental Status orientated to person, place, time and situation          Data Review:  Results from last 7 days   Lab Units 08/24/23  0003   WBC 10*3/mm3 7.94   HEMOGLOBIN g/dL 11.0*   HEMATOCRIT % 35.2   PLATELETS 10*3/mm3 204     Results from last 7 days   Lab Units 08/24/23  0003   SODIUM mmol/L 138   POTASSIUM mmol/L 3.9   CHLORIDE mmol/L 104   CO2 mmol/L 22.0   BUN mg/dL 18   CREATININE mg/dL 1.15*   GLUCOSE mg/dL 104*   CALCIUM mg/dL 8.3*     Coagulation:   INR   Date Value Ref Range Status   08/23/2023 1.06 0.90 - 1.10 Final     Cardiac markers:     ABGs:       Invalid input(s): PO2      Imaging Results (Last 72 Hours)        Procedure Component Value Units Date/Time    CT Angio Abdominal Aorta Bilateral Iliofem Runoff [[id number]] Collected: 08/24/23 0223     Updated: 08/24/23 0235    Narrative:      CT ANGIO ABDOMINAL AORTA BILAT ILIOFEM RUNOFF    Date of Exam: 8/24/2023 12:25 AM EDT    Indication: Claudication or leg ischemia.    Comparison: CT of the abdomen pelvis from July 24, 2023    Technique: CTA of the abdomen, pelvis and both lower extremities was performed after the uneventful intravenous administration of 110 mL Isovue-370. Reconstructed coronal and sagittal images were also obtained. In addition, a 3-D volume rendered image   was created for interpretation. Automated exposure control and iterative reconstruction methods were used.      Findings:  Non--- vascular findings:    There is mild right basilar atelectasis. There are wire leads from cardiac pacemaker/AICD device the arterial enhanced images of the right adrenal gland, pancreas, spleen and right kidney appear normal. There is moderate left-sided hydroureteronephrosis   to the level of the mid ureter without discrete stone or evidence of obstructive cause. Ureteral stricture would be favored. There is diminished enhancement of the left kidney suggestive of obstructive uropathy on the left. There is a stable left adrenal   mass. There are low-attenuation lesions of the liver which have been described previously. There is diverticulosis of the colon without evidence of diverticulitis. There is no free air, free fluid or pathologic adenopathy. There are degenerative changes   of the spine.    Vascular findings:  There is calcific atherosclerosis of the descending thoracic and proximal abdominal aorta. There is a small infrarenal abdominal aortic aneurysm with mural thrombus. This is unchanged with a maximum diameter of approximately 2.7 cm. The celiac artery,   superior mesenteric artery, inferior mesenteric artery and bilateral renal arteries are widely patent.  There is distal abdominal aortic calcific atherosclerosis.    On the left side, there is mild stenosis of the proximal left common iliac artery approximately 50%. There is a stent just beyond the level of the stenosis. The left external iliac artery is patent. The left superficial femoral artery, popliteal artery   and infrapopliteal runoff appears normal with at least a single vessel runoff to the ankle.    On the right side, there is calcific atherosclerosis of the right common iliac artery, internal and external iliac arteries which are widely patent. There is diffuse irregularity of the distal superficial femoral artery with occlusion just above the   knee. This could be from acute embolic event given the lack of calcification at the location. There is some filling of the infrapopliteal runoff, poorly seen likely secondary to significant sluggish flow from the occlusion.  .    Impression:      Impression:  1.Occlusion of the distal superficial femoral artery approximately 5 cm above the joint line without significant surrounding calcific atherosclerotic disease. This favors probable embolism. Vascular specialist consultation recommended.  2.Very limited flow below the level of right arterial occlusion with the anterior tibial artery vaguely seen.  3.Left-sided runoff appears to be intact with at least a single vessel runoff to the ankle.  4.Left-sided hydronephrosis with abnormal appearance of the left-sided nephrogram which appears delayed likely from obstructive uropathy.  5.Indeterminate low-attenuation hepatic lesions.  6.Left adrenal enlargement not definitely adenoma similar to the previous study. Please refer to previous PET scan findings from outside institution.        Electronically Signed: Pedrito Parra MD    8/24/2023 2:32 AM EDT    Workstation ID: [id number]                    Assessment:        Popliteal artery occlusion, right    Complete heart block S/P transvenous pacemaker placement at [facility] on  12/17    PAD     HTN    History of subacute left MCA distribution ischemic CVA    Tobacco use    MAGGIE (acute kidney injury)    Hyperlipidemia    Iron deficiency anemia due to chronic blood loss    Paroxysmal atrial fibrillation    Multiple pulmonary nodules (New 8mm RLL)    COPD (? severity)    Gastric cancer    CAD (coronary artery disease)        Plan:  Patient is a high risk surgical candidate.  Continue heparin gtt. Will discuss possible OR tomorrow with Dr. Gonzalez for aortogram with runoffs, thromboembolectomy and possible [place] versus conservative management.  NPO after midnight      Amy Lagunas PA-C  08/24/23  07:30 EDT    --    I reviewed this documentation. I interviewed and examined this patient on the morning of August 24, 2023.  Briefly, the patient is an 87-year-old woman with a history of newly diagnosed metastatic esophageal cancer, hypertension, heart block with pacemaker, atrial fibrillation on anticoagulation, and peripheral arterial disease with history of right popliteal thromboembolic disease and acute limb ischemia in 2018 status post open (?) embolectomy by Dr. Beatty who now presents to [facility] with concern for Jessie class IIa acute limb ischemia with recurrence of her right popliteal artery disease.  She reports acute onset pain approximately 24 hours ago, however this has subsided with pain medication and heparin infusion.  She is able to move and feel her leg, however still has some paresthesia and dysesthesia at this location.  On exam there are nonpalpable pulses in the right foot however strength and sensation is preserved.  I discussed with the patient the risks, benefits, and alternatives of the planned procedure including risk of bleeding, infection, heart attack, stroke, permanent disability, need for amputation, and even death.  Patient demonstrated good understanding and indicated that she would like to proceed with endovascular revascularization.      Julio Gonzalez M.D., R.P.V.I.  Cardiothoracic and Vascular Surgeon  [facility]

## 2023-08-24 NOTE — PLAN OF CARE
Goal Outcome Evaluation:           Progress: no change  Outcome Evaluation: Pt AAOx4, up with 1 assist to BSC.  Heparin drip running continuously as ordered.  HR increased later in shift to 120-160.  MD notified.  STAT EKG ordered that showed afib with RVR.  PRN Metoprolol given.  Awaiting surgery tomorrow.

## 2023-08-25 ENCOUNTER — APPOINTMENT (OUTPATIENT)
Dept: GENERAL RADIOLOGY | Facility: HOSPITAL | Age: 88
DRG: 270 | End: 2023-08-25
Payer: MEDICARE

## 2023-08-25 ENCOUNTER — ANESTHESIA (OUTPATIENT)
Dept: PERIOP | Facility: HOSPITAL | Age: 88
DRG: 270 | End: 2023-08-25
Payer: MEDICARE

## 2023-08-25 LAB
ABO GROUP BLD: NORMAL
ABO GROUP BLD: NORMAL
ANION GAP SERPL CALCULATED.3IONS-SCNC: 10 MMOL/L (ref 5–15)
BLD GP AB SCN SERPL QL: NEGATIVE
BUN SERPL-MCNC: 14 MG/DL (ref 8–23)
BUN/CREAT SERPL: 10.9 (ref 7–25)
CALCIUM SPEC-SCNC: 8.4 MG/DL (ref 8.6–10.5)
CHLORIDE SERPL-SCNC: 101 MMOL/L (ref 98–107)
CO2 SERPL-SCNC: 24 MMOL/L (ref 22–29)
CREAT SERPL-MCNC: 1.29 MG/DL (ref 0.57–1)
DEPRECATED RDW RBC AUTO: 45 FL (ref 37–54)
EGFRCR SERPLBLD CKD-EPI 2021: 40.8 ML/MIN/1.73
ERYTHROCYTE [DISTWIDTH] IN BLOOD BY AUTOMATED COUNT: 14.5 % (ref 12.3–15.4)
GLUCOSE SERPL-MCNC: 121 MG/DL (ref 65–99)
HCT VFR BLD AUTO: 32 % (ref 34–46.6)
HGB BLD-MCNC: 10.3 G/DL (ref 12–15.9)
MAGNESIUM SERPL-MCNC: 1.9 MG/DL (ref 1.6–2.4)
MCH RBC QN AUTO: 27.3 PG (ref 26.6–33)
MCHC RBC AUTO-ENTMCNC: 32.2 G/DL (ref 31.5–35.7)
MCV RBC AUTO: 84.9 FL (ref 79–97)
PLATELET # BLD AUTO: 263 10*3/MM3 (ref 140–450)
PMV BLD AUTO: 10.7 FL (ref 6–12)
POTASSIUM SERPL-SCNC: 3.5 MMOL/L (ref 3.5–5.2)
QT INTERVAL: 348 MS
QT INTERVAL: 364 MS
QTC INTERVAL: 490 MS
QTC INTERVAL: 495 MS
RBC # BLD AUTO: 3.77 10*6/MM3 (ref 3.77–5.28)
RH BLD: POSITIVE
RH BLD: POSITIVE
SODIUM SERPL-SCNC: 135 MMOL/L (ref 136–145)
T&S EXPIRATION DATE: NORMAL
UFH PPP CHRO-ACNC: 0.26 IU/ML (ref 0.3–0.7)
UFH PPP CHRO-ACNC: 0.3 IU/ML (ref 0.3–0.7)
UFH PPP CHRO-ACNC: 0.3 IU/ML (ref 0.3–0.7)
WBC NRBC COR # BLD: 7.01 10*3/MM3 (ref 3.4–10.8)

## 2023-08-25 PROCEDURE — 99024 POSTOP FOLLOW-UP VISIT: CPT | Performed by: PHYSICIAN ASSISTANT

## 2023-08-25 PROCEDURE — 99232 SBSQ HOSP IP/OBS MODERATE 35: CPT | Performed by: STUDENT IN AN ORGANIZED HEALTH CARE EDUCATION/TRAINING PROGRAM

## 2023-08-25 PROCEDURE — 75710 ARTERY X-RAYS ARM/LEG: CPT

## 2023-08-25 PROCEDURE — 85520 HEPARIN ASSAY: CPT

## 2023-08-25 PROCEDURE — 85027 COMPLETE CBC AUTOMATED: CPT | Performed by: STUDENT IN AN ORGANIZED HEALTH CARE EDUCATION/TRAINING PROGRAM

## 2023-08-25 PROCEDURE — 25010000002 HEPARIN (PORCINE) 25000-0.45 UT/250ML-% SOLUTION

## 2023-08-25 PROCEDURE — 83735 ASSAY OF MAGNESIUM: CPT | Performed by: STUDENT IN AN ORGANIZED HEALTH CARE EDUCATION/TRAINING PROGRAM

## 2023-08-25 PROCEDURE — 93010 ELECTROCARDIOGRAM REPORT: CPT | Performed by: INTERNAL MEDICINE

## 2023-08-25 PROCEDURE — 80048 BASIC METABOLIC PNL TOTAL CA: CPT | Performed by: STUDENT IN AN ORGANIZED HEALTH CARE EDUCATION/TRAINING PROGRAM

## 2023-08-25 PROCEDURE — 93005 ELECTROCARDIOGRAM TRACING: CPT | Performed by: STUDENT IN AN ORGANIZED HEALTH CARE EDUCATION/TRAINING PROGRAM

## 2023-08-25 RX ORDER — POTASSIUM CHLORIDE 750 MG/1
40 CAPSULE, EXTENDED RELEASE ORAL ONCE
Status: COMPLETED | OUTPATIENT
Start: 2023-08-25 | End: 2023-08-25

## 2023-08-25 RX ORDER — METOPROLOL TARTRATE 5 MG/5ML
5 INJECTION INTRAVENOUS EVERY 4 HOURS PRN
Status: DISCONTINUED | OUTPATIENT
Start: 2023-08-25 | End: 2023-08-27 | Stop reason: HOSPADM

## 2023-08-25 RX ORDER — CEFAZOLIN SODIUM 2 G/100ML
2000 INJECTION, SOLUTION INTRAVENOUS ONCE
Status: DISCONTINUED | OUTPATIENT
Start: 2023-08-25 | End: 2023-08-26 | Stop reason: HOSPADM

## 2023-08-25 RX ORDER — METOPROLOL TARTRATE 50 MG/1
50 TABLET, FILM COATED ORAL EVERY 12 HOURS SCHEDULED
Status: DISCONTINUED | OUTPATIENT
Start: 2023-08-25 | End: 2023-08-27 | Stop reason: HOSPADM

## 2023-08-25 RX ORDER — CARVEDILOL 6.25 MG/1
6.25 TABLET ORAL ONCE
Status: COMPLETED | OUTPATIENT
Start: 2023-08-25 | End: 2023-08-25

## 2023-08-25 RX ADMIN — CARVEDILOL 6.25 MG: 6.25 TABLET, FILM COATED ORAL at 12:17

## 2023-08-25 RX ADMIN — CARVEDILOL 6.25 MG: 6.25 TABLET, FILM COATED ORAL at 08:38

## 2023-08-25 RX ADMIN — SENNOSIDES AND DOCUSATE SODIUM 2 TABLET: 50; 8.6 TABLET ORAL at 20:39

## 2023-08-25 RX ADMIN — CILOSTAZOL 100 MG: 50 TABLET ORAL at 08:37

## 2023-08-25 RX ADMIN — DONEPEZIL HYDROCHLORIDE 10 MG: 10 TABLET, FILM COATED ORAL at 20:39

## 2023-08-25 RX ADMIN — METOPROLOL TARTRATE 5 MG: 5 INJECTION INTRAVENOUS at 10:01

## 2023-08-25 RX ADMIN — Medication 10 ML: at 20:43

## 2023-08-25 RX ADMIN — METOPROLOL TARTRATE 5 MG: 5 INJECTION INTRAVENOUS at 14:41

## 2023-08-25 RX ADMIN — Medication 10 ML: at 08:42

## 2023-08-25 RX ADMIN — METOPROLOL TARTRATE 5 MG: 5 INJECTION INTRAVENOUS at 23:28

## 2023-08-25 RX ADMIN — METOPROLOL TARTRATE 50 MG: 50 TABLET, FILM COATED ORAL at 20:40

## 2023-08-25 RX ADMIN — CILOSTAZOL 100 MG: 50 TABLET ORAL at 20:39

## 2023-08-25 RX ADMIN — HEPARIN SODIUM 23 UNITS/KG/HR: 10000 INJECTION, SOLUTION INTRAVENOUS at 07:39

## 2023-08-25 RX ADMIN — PANTOPRAZOLE SODIUM 40 MG: 40 TABLET, DELAYED RELEASE ORAL at 08:37

## 2023-08-25 RX ADMIN — PANTOPRAZOLE SODIUM 40 MG: 40 TABLET, DELAYED RELEASE ORAL at 20:39

## 2023-08-25 RX ADMIN — SENNOSIDES AND DOCUSATE SODIUM 2 TABLET: 50; 8.6 TABLET ORAL at 08:41

## 2023-08-25 RX ADMIN — Medication 5 MG: at 20:39

## 2023-08-25 RX ADMIN — METOPROLOL TARTRATE 5 MG: 5 INJECTION INTRAVENOUS at 18:36

## 2023-08-25 RX ADMIN — POTASSIUM CHLORIDE 40 MEQ: 750 CAPSULE, EXTENDED RELEASE ORAL at 09:36

## 2023-08-25 NOTE — PROGRESS NOTES
[facility] Medicine Services  PROGRESS NOTE    Patient Name: Rupinder Lees  : 1935  MRN: [id number]    Date of Admission: 2023  Primary Care Physician: Gifty Kruse MD    Subjective   Subjective     CC:  Follow-up popliteal artery occlusion    HPI:  Having elevated heart rate 140s up to 160s, A-fib with RVR.  Son at bedside and patient okay with him receiving updates.  Denied palpitations or chest pain.  No leg pain.    ROS:  Gen- No fevers, chills  CV- No chest pain, no palpitations  Resp- No cough, dyspnea  GI- No N/V/D, abd pain     Objective   Objective     Vital Signs:   Temp:  [97.8 °F (36.6 °C)-98.8 °F (37.1 °C)] 98 °F (36.7 °C)  Heart Rate:  [] 143  Resp:  [17-19] 17  BP: (105-134)/(63-81) 112/81     Physical Exam:  Constitutional: No acute distress, awake, alert  HENT: NCAT, mucous membranes moist  Respiratory: Clear to auscultation bilaterally, respiratory effort normal   Cardiovascular: IRIR, , no murmurs, rubs, or gallops  Gastrointestinal: Positive bowel sounds, soft, nontender, nondistended  Musculoskeletal: No bilateral ankle edema  Psychiatric: Appropriate affect, cooperative  Neurologic: PERRL, symmetric facies, speech clear  Skin: No rashes    Results Reviewed:  LAB RESULTS:      Lab 23  0609 23  2349 23  1544 23  0721 23  0003 23  1816 23  1816 23  1600   WBC 7.01  --   --   --  7.94  --   --  8.76   HEMOGLOBIN 10.3*  --   --   --  11.0*  --   --  11.8*   HEMATOCRIT 32.0*  --   --   --  35.2  --   --  38.8   PLATELETS 263  --   --   --  204  --   --  238   NEUTROS ABS  --   --   --   --  5.53  --   --  6.40   IMMATURE GRANS (ABS)  --   --   --   --  0.05  --   --  0.04   LYMPHS ABS  --   --   --   --  1.35  --   --  1.40   MONOS ABS  --   --   --   --  0.91*  --   --  0.85   EOS ABS  --   --   --   --  0.06  --   --  0.03   MCV 84.9  --   --   --  88.4  --   --  88.8   PROTIME  --   --    --   --   --   --  14.3  --    APTT  --   --   --   --   --   --  30.6  --    HEPARIN ANTI-XA 0.26* 0.30 0.15* 0.20* 0.10*   < > <0.10*  --     < > = values in this interval not displayed.           Lab 08/25/23  0608 08/24/23  0003 08/23/23  1600   SODIUM 135* 138 135*   POTASSIUM 3.5 3.9 4.5   CHLORIDE 101 104 98   CO2 24.0 22.0 22.2   ANION GAP 10.0 12.0 14.8   BUN 14 18 21   CREATININE 1.29* 1.15* 1.28*   EGFR 40.8* 46.8* 41.1*   GLUCOSE 121* 104* 106*   CALCIUM 8.4* 8.3* 8.9           Lab 08/24/23  0003 08/23/23  1600   TOTAL PROTEIN 5.7* 6.8   ALBUMIN 3.0* 3.5   GLOBULIN 2.7 3.3   ALT (SGPT) 12 15   AST (SGOT) 18 22   BILIRUBIN 0.4 0.6   ALK PHOS 151* 159*           Lab 08/23/23  1816   PROTIME 14.3   INR 1.06               Lab 08/24/23  2349   ABO TYPING O   RH TYPING Positive   ANTIBODY SCREEN Negative         Brief Urine Lab Results  (Last result in the past 365 days)        Color   Clarity   Blood   Leuk Est   Nitrite   Protein   CREAT   Urine HCG        08/24/23 1248 Yellow   Cloudy   Negative   Negative   Negative   30 mg/dL (1+)                   Microbiology Results Abnormal       None            CT Angio Abdominal Aorta Bilateral Iliofem Runoff    Result Date: 8/24/2023  CT ANGIO ABDOMINAL AORTA BILAT ILIOFEM RUNOFF Date of Exam: 8/24/2023 12:25 AM EDT Indication: Claudication or leg ischemia. Comparison: CT of the abdomen pelvis from July 24, 2023 Technique: CTA of the abdomen, pelvis and both lower extremities was performed after the uneventful intravenous administration of 110 mL Isovue-370. Reconstructed coronal and sagittal images were also obtained. In addition, a 3-D volume rendered image was created for interpretation. Automated exposure control and iterative reconstruction methods were used. Findings: Non--- vascular findings: There is mild right basilar atelectasis. There are wire leads from cardiac pacemaker/AICD device the arterial enhanced images of the right adrenal gland, pancreas,  spleen and right kidney appear normal. There is moderate left-sided hydroureteronephrosis to the level of the mid ureter without discrete stone or evidence of obstructive cause. Ureteral stricture would be favored. There is diminished enhancement of the left kidney suggestive of obstructive uropathy on the left. There is a stable left adrenal  mass. There are low-attenuation lesions of the liver which have been described previously. There is diverticulosis of the colon without evidence of diverticulitis. There is no free air, free fluid or pathologic adenopathy. There are degenerative changes  of the spine. Vascular findings: There is calcific atherosclerosis of the descending thoracic and proximal abdominal aorta. There is a small infrarenal abdominal aortic aneurysm with mural thrombus. This is unchanged with a maximum diameter of approximately 2.7 cm. The celiac artery, superior mesenteric artery, inferior mesenteric artery and bilateral renal arteries are widely patent. There is distal abdominal aortic calcific atherosclerosis. On the left side, there is mild stenosis of the proximal left common iliac artery approximately 50%. There is a stent just beyond the level of the stenosis. The left external iliac artery is patent. The left superficial femoral artery, popliteal artery and infrapopliteal runoff appears normal with at least a single vessel runoff to the ankle. On the right side, there is calcific atherosclerosis of the right common iliac artery, internal and external iliac arteries which are widely patent. There is diffuse irregularity of the distal superficial femoral artery with occlusion just above the knee. This could be from acute embolic event given the lack of calcification at the location. There is some filling of the infrapopliteal runoff, poorly seen likely secondary to significant sluggish flow from the occlusion. .    Impression: Impression: 1.Occlusion of the distal superficial femoral artery  approximately 5 cm above the joint line without significant surrounding calcific atherosclerotic disease. This favors probable embolism. Vascular specialist consultation recommended. 2.Very limited flow below the level of right arterial occlusion with the anterior tibial artery vaguely seen. 3.Left-sided runoff appears to be intact with at least a single vessel runoff to the ankle. 4.Left-sided hydronephrosis with abnormal appearance of the left-sided nephrogram which appears delayed likely from obstructive uropathy. 5.Indeterminate low-attenuation hepatic lesions. 6.Left adrenal enlargement not definitely adenoma similar to the previous study. Please refer to previous PET scan findings from outside institution. Electronically Signed: Pedrito Parra MD  8/24/2023 2:32 AM EDT  Workstation ID: [id number]    US Arterial Doppler Lower Extremity Right    Result Date: 8/23/2023  EXAMINATION: US ARTERIAL DOPPLER LOWER EXTREMITY  RIGHT-  CLINICAL INDICATION: right leg pain   COMPARISON: None available.  PROCEDURE: Color Doppler imaging with pulse Doppler waveform to evaluate lower extremity arterial system on the right.  FINDINGS: Any stenoses are evaluated using the NASCET or similar method.  Velocity is elevated in the common femoral and superficial femoral artery. No occlusive segments in those arteries however. Moderate stenosis. Biphasic waveforms.  The popliteal artery, however, is occluded.  Posterior tibial arteries show monophasic waveforms.      Impression: Occlusion of the right popliteal artery.  This report was finalized on 8/23/2023 4:48 PM by Dr. Juan Plascencia MD.      US Venous Doppler Lower Extremity Right (duplex)    Result Date: 8/23/2023  US VENOUS DOPPLER LOWER EXTREMITY RIGHT (DUPLEX)-  CLINICAL INDICATION: US arterial doppler   COMPARISON: None available  TECHNIQUE: Color Doppler imaging was used with compression and augmentation to evaluate the right lower extremity deep venous system.  FINDINGS: There  is patent spontaneous flow from the common femoral vein through the posterior tibial veins. There was no internal clot or area of noncompressibility in the right lower extremity. Normal augmentation was elicited where applicable. The left common femoral vein is patent       Impression: No DVT in the right lower extremity on today's exam.  This report was finalized on 8/23/2023 4:40 PM by Dr. Juan Plascencia MD.           Current medications:  Scheduled Meds:carvedilol, 6.25 mg, Oral, BID  ceFAZolin, 2,000 mg, Intravenous, Once  cilostazol, 100 mg, Oral, BID  donepezil, 10 mg, Oral, Nightly  pantoprazole, 40 mg, Oral, BID  potassium chloride, 40 mEq, Oral, Once  senna-docusate sodium, 2 tablet, Oral, BID  sodium chloride, 10 mL, Intravenous, Q12H      Continuous Infusions:heparin, 23 Units/kg/hr (Order-Specific), Last Rate: 23 Units/kg/hr (08/25/23 0739)  Pharmacy Consult,   Pharmacy to Dose Heparin,       PRN Meds:.  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 severity) [J44.9]  Yes    Gastric cancer [C16.2]  Yes    Paroxysmal atrial fibrillation [I48.0]  Yes    Iron deficiency anemia due to chronic blood loss [D50.0]  Yes    HTN [I10]  Yes    Complete heart block S/P transvenous pacemaker placement at [facility] on 12/17 [I44.2]  Yes    PAD  [I73.9]  Yes    Hyperlipidemia [E78.5]  Yes    History of subacute left MCA distribution ischemic CVA [Z86.73]  Not Applicable    Tobacco use [Z72.0]  Yes    MAGGIE (acute kidney injury) [N17.9]  Yes      Resolved Hospital Problems  "  No resolved problems to display.        Brief Hospital Course to date:  Rupinder Lees is a 87 y.o. female With COPD on 2 L nasal cannula as needed, CAD, HTN, HLD, complete heart block status post PPM, prior CVA, A-fib, prior DVT, PAD status post femoral artery stent and right popliteal thrombosis status post embolectomy/femoropopliteal bypass (2018, [place]), who presented for evaluation from [facility] after presenting with complaints of loss of sensation in right foot.  Was found to have a right popliteal artery occlusion.    This patient's problems and plans were partially entered by my partner and updated as appropriate by me 08/25/23.     Right Popliteal Artery occlusion  PAD  H/O right popliteal artery occlusion s/p embolectomy 2018  - CTA abdominal aorta with runoff not completed at OSH. Obtained here and showed occlusion  - continue heparin gtt  - Cardiac diet, n.p.o. for surgery  - CT surgery following     MAGGIE  - IVF  - avoid nephrotoxins  - strict I&O     PAF with RVR  Complete heart lock s/p PPM  - followed by Dr. Jaramillo per cardiology  - was on Eliquis and amiodarone, unclear if patient still taking amiodarone; Eliquis appears to have been recently refilled (but patient states she stopped taking it \"recently\"); discussed with pharmacy and patient appears to not be on amiodarone outpatient (no fill history; only fills with [facility])  - coreg dosing increased by cards  - Having issues with RVR; PRN IV metoprolol ordered; Dr. Gonzalez notified of this issue given surgery planned for this evening  - Cardiology consulted  - Pacemaker interrogation per cards  - On heparin drip     Gastric Adenocarcinoma, recently diagnoed  - with multiple pulmonary nodules  - following with oncology and pulmonology in [place]  - Also has multiple hepatic lesions and an adrenal enlargement on the left side     COPD  Ongoing tobacco abuse  - stable  - duo nebs with additional pulm toilet as needed  - nicotine patch   "   HTN  HLD  - continue coreg     History of CVA    Expected Discharge Location and Transportation: pending CT surgery and possible PT/OT after  Expected Discharge   Expected Discharge Date: 8/29/2023; Expected Discharge Time:      DVT prophylaxis:  Medical DVT prophylaxis orders are present.          CODE STATUS:   Code Status and Medical Interventions:   Ordered at: 08/23/23 2303     Level Of Support Discussed With:    Patient     Code Status (Patient has no pulse and is not breathing):    CPR (Attempt to Resuscitate)     Medical Interventions (Patient has pulse or is breathing):    Full Support       Meagan Pulliam MD  08/25/23

## 2023-08-25 NOTE — CONSULTS
Cardiology Consult    Rupinder Lees  1935  [phone]  There is no work phone number on file.    08/25/23    DATE OF ADMISSION: 8/23/2023  [facility] 3H    Gifty Kruse MD  [address] [facility] [address]  Referring Provider: Tiffany Calhoun PA     Reason for consult: tachycardia    Problem List:  Complete Heart Block  Bradycardia/hypotension noted at [facility] 12/17/18  S/p [facility] DC PPM 2018  No underlying rhythm 12/18/18  PVD  History of claudication pain with ischemic right leg 12/16/187  AA with runoffs 12/17/18: R Popliteal was occluded at P1 segment, no distal run off, very faint collaterals  Embolectomy/thrombectomy of right lower extremity  Nonobstructive CAD:  [facility] 12/17/18: nonobstructive CAD, normal EF. LAD with Prox mild diffuse disease, mid at Dx bifurcation had 50% stenosis, distal small vessel diffuse disease Dx small vessel mild luminal irregularities. Left circumflex with Moderate calibre, mild diffuse stenosis, mid Lcx at OM take off had 40% stenosis, OM1 is a large calibre, prox 30-40% stenosis and mid and distal mild irregularities. Small to moderate calibre, dominant, prox 50 % tubular stenosis of RCA, mid and distal normal, small PDA and PL , no stenosis  Paroxysmal Afib   Diagnosed during admission 12/2018. Previously on Amiodarone therapy  CHADSVASC is 5; Previously on Eliquis 2.5mg BID, stopped due to hematemesis and gastric cancer  Tobacco abuse  COPD  Wears oxygen as needed  HLP  HTN  History of DVT  Gastric Cancer  PAD  Status post femoral artery stent and right popliteal thrombosis status post embolectomy/fem pop bypass 2018 Dr. Beatty  History of subacute left MCA distribution ischemic CVA  Medical noncompliance  MAGGIE - now resolved.     History of Present Illness:   Rupinder Lees is an 87-year-old female with above past medical history who was transferred from [facility] to [facility] 8/23/2023 due to complaints of loss of sensation  in right foot.  At her oncology office visit recently it was noted that pulses were not palpable in her right lower extremity.  Work-up demonstrated right popliteal artery occlusion.  She was transferred to [facility] for higher level of care and is being seen by Dr. Gonzalez for possible procedure this admission.  She has a history of atrial fibrillation and complete heart block for which she received permanent pacemaker 2018.  She has been previously maintained on amiodarone that was stopped sometime in the last year for unknown reason.  This morning, patient became tachycardic and found to be in A-fib RVR and we are being consulted for management of A-fib. Fairly asymptomatic while in afib. She denies palpitations, SOB, LH, dizziness, swelling. Her daughter in law does report she has been intermittently complaining of SOB at home with exertion. Of note she was recently diagnosed with gastric cancer and had hematemesis.  Eliquis was stopped at least a month ago.      No Known Allergies    Prior to Admission Medications       Prescriptions Last Dose Informant Patient Reported? Taking?    carvedilol (COREG) 6.25 MG tablet   No No    Take 1 tablet by mouth 2 (Two) Times a Day.    cilostazol (PLETAL) 100 MG tablet   No No    Take 1 tablet by mouth 2 (Two) Times a Day.    donepezil (Aricept) 10 MG tablet   No No    Take 1 tablet by mouth Every Night.    HYDROcodone-acetaminophen (NORCO) 5-325 MG per tablet   No No    Take 1 tablet by mouth Every 8 (Eight) Hours As Needed for Moderate Pain.    oxyCODONE (ROXICODONE) 5 MG/5ML solution   No No    Take 5 mL by mouth Every 6 (Six) Hours As Needed for Moderate Pain.    pantoprazole (PROTONIX) 40 MG EC tablet   No No    Take 1 tablet by mouth 2 (Two) Times a Day for 30 days.    polyethylene glycol (MIRALAX) 17 GM/SCOOP powder   No No    Take 17 g by mouth Daily.    prochlorperazine (COMPAZINE) 10 MG tablet   No No    Take 1 tablet by mouth Every 6 (Six) Hours As Needed for Nausea or  Vomiting.    promethazine (PHENERGAN) 25 MG tablet   No No    Take 1 tablet by mouth Every 8 (Eight) Hours As Needed for Nausea or Vomiting. Take 1/2 to 1 tablet every 8 hours as needed for nausea and vomiting    traMADol (ULTRAM) 50 MG tablet   No No    Take 1 tablet by mouth Every 6 (Six) Hours As Needed for Moderate Pain.              Current Facility-Administered Medications:     acetaminophen (TYLENOL) tablet 650 mg, 650 mg, Oral, Q4H PRN, Earnest Barrett PA-SANTOS    sennosides-docusate (PERICOLACE) 8.6-50 MG per tablet 2 tablet, 2 tablet, Oral, BID, 2 tablet at 08/25/23 0841 **AND** polyethylene glycol (MIRALAX) packet 17 g, 17 g, Oral, Daily PRN **AND** bisacodyl (DULCOLAX) EC tablet 5 mg, 5 mg, Oral, Daily PRN **AND** bisacodyl (DULCOLAX) suppository 10 mg, 10 mg, Rectal, Daily PRN, Earnest Barrett PA-SANTOS    carvedilol (COREG) tablet 6.25 mg, 6.25 mg, Oral, BID, Earnest Barrett PA-C, 6.25 mg at 08/25/23 0838    ceFAZolin in dextrose (ANCEF) IVPB solution 2,000 mg, 2,000 mg, Intravenous, Once, Janki Menchaca, ASHISH    cilostazol (PLETAL) tablet 100 mg, 100 mg, Oral, BID, Earnest Barrett PA-C, 100 mg at 08/25/23 0837    donepezil (ARICEPT) tablet 10 mg, 10 mg, Oral, Nightly, Earnest Barrett PA-C, 10 mg at 08/24/23 2119    heparin 91980 units/250 mL (100 units/mL) in 0.45 % NaCl infusion, 23 Units/kg/hr (Order-Specific), Intravenous, Titrated, Leeanna Carr, PharmD, Last Rate: 9.29 mL/hr at 08/25/23 0739, 23 Units/kg/hr at 08/25/23 0739    melatonin tablet 5 mg, 5 mg, Oral, Nightly PRN, Earnest Barrett PA-C    metoprolol tartrate (LOPRESSOR) injection 5 mg, 5 mg, Intravenous, Q6H PRN, Meagan Pulliam MD, 5 mg at 08/24/23 1859    ondansetron (ZOFRAN) injection 4 mg, 4 mg, Intravenous, Q6H PRN, Earnest Barrett PA-C    oxyCODONE (ROXICODONE) 5 MG/5ML solution 5 mg, 5 mg, Oral, Q6H PRN, Ran Blankenship III, DO    pantoprazole (PROTONIX) EC tablet 40 mg, 40 mg, Oral, BID, Earnest Barrett,  PA-C, 40 mg at 08/25/23 0837    Pharmacy Consult, , Does not apply, Continuous PRN, Meagan Pulliam MD    Pharmacy to Dose Heparin, , Does not apply, Continuous PRN, Earnest Barrett PA-C    potassium chloride (MICRO-K) CR capsule 40 mEq, 40 mEq, Oral, Once, Meagan Pulliam MD    sodium chloride 0.9 % flush 10 mL, 10 mL, Intravenous, Q12H, Earnest Barrett PA-C, 10 mL at 08/25/23 0842    sodium chloride 0.9 % flush 10 mL, 10 mL, Intravenous, PRN, Earnest Barrett PA-C    sodium chloride 0.9 % infusion 40 mL, 40 mL, Intravenous, PRN, Earnest Barrett PA-C    Social History     Socioeconomic History    Marital status:     Number of children: 3   Tobacco Use    Smoking status: Light Smoker     Packs/day: 0.50     Years: 63.00     Pack years: 31.50     Types: Cigarettes     Passive exposure: Current    Smokeless tobacco: Never   Vaping Use    Vaping Use: Never used   Substance and Sexual Activity    Alcohol use: No    Drug use: No    Sexual activity: Defer       Family History   Problem Relation Age of Onset    Cancer Mother     Cancer Father     Heart attack Brother     Heart attack Brother        REVIEW OF SYSTEMS:   CONSTITUTIONAL:         No weight loss, fever, chills, weakness or fatigue.   HEENT:                            No visual loss, blurred vision, double vision, yellow sclerae.                                             No hearing loss, congestion, sore throat.   SKIN:                                No rashes, urticaria, ulcers, sores.     RESPIRATORY:               No shortness of breath, hemoptysis, cough, sputum.   GI:                                     No anorexia, nausea, vomiting, diarrhea. No abdominal pain, melena.   :                                   No burning on urination, hematuria or increased frequency.  ENDOCRINE:                   No diaphoresis, cold or heat intolerance. No polyuria or polydipsia.   NEURO:                            No headache, dizziness, syncope,  paralysis, ataxia, or parasthesias.                                            No change in bowel or bladder control. No history of CVA/TIA  MUSCULOSKELETAL:    No muscle, back pain, joint pain or stiffness.   HEMATOLOGY:               No anemia, bleeding, bruising. No history of DVT/PE.  PSYCH:                            No history of depression, anxiety    Vitals:    08/24/23 2342 08/25/23 0315 08/25/23 0736 08/25/23 0838   BP: 134/69 132/70 105/63 112/81   BP Location: Left arm Left arm Left arm    Patient Position: Lying Lying Lying    Pulse: 108 120 (!) 155 (!) 143   Resp: 18 18 17    Temp: 97.8 °F (36.6 °C) 97.8 °F (36.6 °C) 98 °F (36.7 °C)    TempSrc: Oral Oral Oral    SpO2: 93% 94% 96%    Weight:             Vital Sign Min/Max for last 24 hours  Temp  Min: 97.8 °F (36.6 °C)  Max: 98.8 °F (37.1 °C)   BP  Min: 105/63  Max: 134/69   Pulse  Min: 98  Max: 155   Resp  Min: 17  Max: 19   SpO2  Min: 92 %  Max: 96 %   No data recorded      Intake/Output Summary (Last 24 hours) at 8/25/2023 0921  Last data filed at 8/25/2023 0736  Gross per 24 hour   Intake 222 ml   Output 350 ml   Net -128 ml             Physical Exam:  GEN: Well nourished, Well- developed  No acute distress  HEENT: Normocephalic, Atraumatic, PERRLA, moist mucous membranes  NECK: supple, NO JVD, no thyromegaly, no lymphadenopathy  CARDIAC: S1S2  RRR no murmur, gallop, rub  LUNGS: Clear to ausculation, normal respiratory effort  ABDOMEN: Soft, nontender, normal bowel sounds  EXTREMITIES:No gross deformities,  No clubbing, cyanosis, or edema  SKIN: Warm, dry  NEURO: No focal deficits  PSYCHIATRIC: Normal affect and mood      I personally viewed and interpreted the patient's EKG/Telemetry/lab data    Data:   Results from last 7 days   Lab Units 08/25/23  0609 08/24/23  0003 08/23/23  1600   WBC 10*3/mm3 7.01 7.94 8.76   HEMOGLOBIN g/dL 10.3* 11.0* 11.8*   HEMATOCRIT % 32.0* 35.2 38.8   PLATELETS 10*3/mm3 263 204 238     Results from last 7 days   Lab  Units 08/25/23  0608 08/24/23  0003 08/23/23  1600   SODIUM mmol/L 135* 138 135*   POTASSIUM mmol/L 3.5 3.9 4.5   CHLORIDE mmol/L 101 104 98   CO2 mmol/L 24.0 22.0 22.2   BUN mg/dL 14 18 21   CREATININE mg/dL 1.29* 1.15* 1.28*   GLUCOSE mg/dL 121* 104* 106*                  Results from last 7 days   Lab Units 08/23/23  1816   PROTIME Seconds 14.3   INR  1.06   APTT seconds 30.6                   Intake/Output Summary (Last 24 hours) at 8/25/2023 0921  Last data filed at 8/25/2023 0736  Gross per 24 hour   Intake 222 ml   Output 350 ml   Net -128 ml             Telemetry: Afib, HR  bpm  EKG: Afib RVR,  bpm        Assessment and Plan:   1.Paroxysmal Afib   -Diagnosed 12/2018. Maintained previously on Amiodarone therapy, but it was stopped sometime within the last year for unknown reason.   -CHADSVASC is 7; she is at high risk for stroke 11.2-15.7%. Previously on Eliquis however she was recently told to stop taking it but she is unsure who told her to stop it. She has been off anticoagulation for at least a month according to her daughter in law. She was recently diagnosed with gastric cancer and had been noted to have hematemesis prior to diagnosis. Currently on Heparin gtt.   -Device interrogation shows A-pacing 78%, V-pacing 1.5%, AMS 6.7%, all afib episodes this year were during August. 1st episode lasted August 4-11. 2nd episode began 8/24/23 through today. While in afib, heart rates are 110-160 bpm 50% of the time and  bpm 50% of the time.   - Will focus on rate control at this time. Discontinue Coreg and initiate Metoprolol 50 mg BID for better rate control. Will titrate up oral metoprolol as needed. IV Metoprolol PRN. May consider restarting Amiodarone after CT procedure.     2.Complete Heart Block  -Bradycardia/hypotension noted at [facility] 12/17/18  -s/p [facility] dual-chamber permanent pacemaker 12/17/18       3. Popliteal artery occlusion, right   -CT surgery following with plans for  procedure today.   -low cardiac risk for surgery today      Electronically signed by ASHISH Kamara, 08/25/23, 10:54 AM EDT.

## 2023-08-25 NOTE — PROGRESS NOTES
Continue heparin gtt. Tentative plans for aortogram with runoffs, thromboembolectomy and possible Penumbra today in OR with Dr. Gonzalez.

## 2023-08-25 NOTE — PROGRESS NOTES
HEPARIN INFUSION  Rupinder Lees is a  87 y.o. female receiving heparin infusion.     Therapy for (VTE/Cardiac): VTE  Patient Weight: 40.4 kg  Initial Bolus (Y/N): N  Any Bolus (Y/N):  Y        Signs or Symptoms of Bleeding: None noted    VTE (PE/DVT)      Anti Xa Rebolus Infusion Hold time Change infusion Dose (Units/kg/hr) Next Anti Xa Level Due   < 0.11 50 Units/kg  (4000 Units Max) None Increase by  4 Units/kg/hr 6 hours   0.11 - 0.19 25 Units/kg  (2000 Units Max) None Increase by  3 Units/kg/hr 6 hours   0.2 - 0.29 0 None Increase by  2 Units/kg/hr 6 hours   0.3 - 0.7 0 None No Change 6 hours (after 2 consecutive levels in range check qAM)   0.71 - 0.8 0 None Decrease by  1 Units/kg/hr 6 hours   0.81 - 0.9 0 None Decrease by  2 Units/kg/hr 6 hours   0.91 - 1 0 60 Minutes Decrease by  3 Units/kg/hr 6 hours   >1 0 Hold  After Anti Xa less than 0.7 decrease previous rate by  4 Units/kg/hr  Every 2 hours until Anti Xa is less than 0.7 then when infusion restarts in 6 hours     Results from last 7 days   Lab Units 08/25/23  0609 08/24/23  0003 08/23/23  1816 08/23/23  1600   INR   --   --  1.06  --    HEMOGLOBIN g/dL 10.3* 11.0*  --  11.8*   HEMATOCRIT % 32.0* 35.2  --  38.8   PLATELETS 10*3/mm3 263 204  --  238          Date   Time   Anti-Xa Current Rate (Unit/kg/hr) Bolus   (Units) Rate Change   (Unit/kg/hr) New Rate (Unit/kg/hr) Next   Anti-Xa Comments  Pump Check Daily   8/23 2315 Pending 12 -- -- 12 Stat Spoke with RN, heparin gtt currently running from OSH, will check stat Anti-Xa and adjust from there   8/24 0003 0.10 12 2030 +4 16 0700 D/w DESMOND   8/24 0721 0.2 16 -- +2 18 1600 D/w Cecelia   8/24 1139 -- 18 -- -- 18 1600 Pump weight and rate verified. Weight set at 40.4kg in pump, updated epic order to reflect this.    8/24 1544 0.15 18 1000 +3 21 0000 Spoke w/Cecelia RN   8/24 2349 0.3 21 -- -- 21 0600 Discussed w/ nurse   8/25 0609 0.26 21 -- +2 23 1400 D/w  RN

## 2023-08-25 NOTE — PROGRESS NOTES
Clinical Nutrition       Reason for Visit: Identified at risk by screening criteria, BMI, Malnutrition Severity Assessment      Patient Name: Rupinder Lees  YOB: 1935  MRN: [id number]  Date of Encounter: 08/25/23 17:16 EDT  Admission date: 8/23/2023    Pt meets criteria for severe acute malnutrition with 21lb wt loss over 3 months, 19% wt loss, severe muscle wasting, fat loss    Nutrition Assessment   Admission Diagnosis:  Popliteal artery occlusion, 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]      Problem List:    Popliteal artery occlusion, right    Complete heart block S/P transvenous pacemaker placement at [facility] on 12/17    PAD     HTN    History of subacute left MCA distribution ischemic CVA    Tobacco use    MAGGIE (acute kidney injury)    Hyperlipidemia    Iron deficiency anemia due to chronic blood loss    Paroxysmal atrial fibrillation    Multiple pulmonary nodules (New 8mm RLL)    COPD (? severity)    Gastric cancer    CAD (coronary artery disease)        PMH:   She  has a past medical history of Arthritis, COPD  (12/17/2018), DVT of lower extremity (deep venous thrombosis), History of CVA 2013 (12/17/2018), HTN (12/17/2018), PAD  (12/17/2018), Stroke, and Tobacco use (12/17/2018).    PSH:  She  has a past surgical history that includes Femoral artery stent (2013); Left Heart Cath (12/17/2018); Hysterectomy; femoral popliteal bypass (Right, 12/17/2018); Cardiac electrophysiology procedure (N/A, 12/18/2018); Cardiac electrophysiology procedure (N/A, 12/17/2018); Cardiac catheterization (12/17/2018); Cardiac catheterization (N/A, 12/17/2018); Pacemaker Implantation; Colonoscopy (N/A, 04/19/2019); Esophagogastroduodenoscopy (N/A, 04/19/2019); Laparoscopic cholecystectomy; and Esophagogastroduodenoscopy (N/A, 8/1/2023).    Substance history: tobacco    Applicable Nutrition Concerns:   Skin:wnl  Oral: dentures  GI:last bm 8-24    Reported/Observed/Food/Nutrition Related History:     Pt resting in bed,  reports poor appetite at home has lost ~ 15-16lb over the past 3 months, UBW ~110lb's, has not eaten all day as she is npo for surgery      Labs    Labs Reviewed: Yes     Results from last 7 days   Lab Units 08/25/23  0608 08/24/23  0003 08/23/23  1600   GLUCOSE mg/dL 121* 104* 106*   BUN mg/dL 14 18 21   CREATININE mg/dL 1.29* 1.15* 1.28*   SODIUM mmol/L 135* 138 135*   CHLORIDE mmol/L 101 104 98   POTASSIUM mmol/L 3.5 3.9 4.5   MAGNESIUM mg/dL 1.9  --   --    ALT (SGPT) U/L  --  12 15       Results from last 7 days   Lab Units 08/24/23  0003 08/23/23  1600   ALBUMIN g/dL 3.0* 3.5           Lab Results   Lab Value Date/Time    HGBA1C 6.1 (H) 05/31/2015 0131                 Medications    Medications Reviewed: Yes    Intake/Ouptut 24 hrs (0701 - 0700)   I&O's Reviewed: Yes     Intake & Output (last day)         08/24 0701  08/25 0700 08/25 0701 08/26 0700    P.O. 222 0    Total Intake(mL/kg) 222 (5.5) 0 (0)    Urine (mL/kg/hr) 350 (0.4) 350 (0.8)    Stool  0    Total Output 350 350    Net -128 -350          Urine Unmeasured Occurrence 1 x 2 x    Stool Unmeasured Occurrence  3 x              Anthropometrics     Flowsheet Rows      Flowsheet Row First Filed Value   Admission Height --   Admission Weight 40.6 kg (89 lb 6.4 oz) Documented at 08/23/2023 2233            Height:  60in  Last Filed Weight: Weight: 40.6 kg (89 lb 6.4 oz) (08/23/23 2233)  Method: Weight Method: Bed scale  BMI: BMI (Calculated): 17.5  BMI classification: Underweight:<18.5kg/m2  IBW:  100    UBW: 110  Weight change: 21lb wt loss over 3 months, 19% wt loss     Weight       Weight (kg) Weight (lbs) Weight Method Visit Report   2/6/2019 52.164 kg  115 lb  Stated     2/14/2019 52.708 kg  116 lb 3.2 oz   --    2/27/2019 52.164 kg  115 lb      3/21/2019 53.887 kg  118 lb 12.8 oz   --    3/24/2019 53.524 kg  118 lb  Stated      49.896 kg  110 lb  Bed scale     3/25/2019 49.896 kg  110 lb  Bed scale     3/26/2019 53.553 kg  118 lb 1 oz  Bed scale      3/27/2019 54.12 kg  119 lb 5 oz  Bed scale     3/29/2019 51.665 kg  113 lb 14.4 oz      4/1/2019 55.14 kg  121 lb 9 oz      4/11/2019 54.885 kg  121 lb   --    4/17/2019 49.896 kg  110 lb  Bed scale      53.978 kg  119 lb      4/18/2019 54.205 kg  119 lb 8 oz  Bed scale     5/16/2019 51.075 kg  112 lb 9.6 oz   --    5/30/2019 49.896 kg  110 lb  Stated     7/8/2019 49.896 kg  110 lb  Stated     8/18/2019 49.896 kg  110 lb  Stated     10/11/2019 49.896 kg  110 lb   --    5/8/2020 49.896 kg  110 lb   --    11/5/2021 49.896 kg  110 lb   --    6/3/2022 49.896 kg  110 lb  Stated;Estimated     10/7/2022 49.896 kg  110 lb   --    7/20/2023 44.453 kg  98 lb  Stated     7/24/2023 44.453 kg  98 lb  Stated     7/28/2023 43.001 kg  94 lb 12.8 oz   --    8/1/2023 42.638 kg  94 lb  Stated     8/2/2023 42.638 kg  94 lb  Stated     8/9/2023 41.912 kg  92 lb 6.4 oz      8/14/2023 42.638 kg  94 lb   --    8/23/2023 40.37 kg  89 lb  Stated  --     40.37 kg  89 lb  Bed scale  --     40.552 kg  89 lb 6.4 oz   --          Nutrition Focused Physical Exam     Date: 8-25-23  Patient meets criteria for malnutrition diagnosis, see MSA note.      Current Nutrition Prescription     PO: NPO Diet NPO Type: Sips with Meds  Oral Nutrition Supplement:   Intake: N/A      Nutrition Diagnosis   Date: 8-25-23 Updated:   Problem Malnutrition    Etiology Per Clinical Status: COPD/ CA with mets   Signs/Symptoms 21lb wt loss over 3 months, 19% wt loss,  severe muscle wasting and fat loss       Goal:   General: Nutrition support treatment  PO: Establish PO  EN/PN: N/A    Nutrition Intervention      Follow treatment progress, Await begin PO, Supplement provided    Pt meets criteria for severe acute malnutrition with 21lb wt loss over 3 months, 19% wt loss, severe muscle wasting, fat loss    Will send Boost+ 3x/day once diet resumes    Monitoring/Evaluation:   Per protocol, I&O, PO intake, Pertinent labs, Weight, Skin status, GI status, Symptoms, Swallow  function      Debby Mac RD  Time Spent:60min

## 2023-08-25 NOTE — PROGRESS NOTES
Malnutrition Severity Assessment    Patient Name:  Rupinder Lees  YOB: 1935  MRN: [id number]  Admit Date:  8/23/2023    Patient meets criteria for : Severe Malnutrition        Malnutrition Severity Assessment  Malnutrition Type: Acute Disease or Injury - Related Malnutrition  Malnutrition Type (last 8 hours)       Malnutrition Severity Assessment       Row Name 08/25/23 1727       Malnutrition Severity Assessment    Malnutrition Type Acute Disease or Injury - Related Malnutrition      Row Name 08/25/23 1727       Unintentional Weight Loss     Unintentional Weight Loss Findings Severe    Unintentional Weight Loss  Weight loss greater than 7.5% in three months  21lb wt loss over 3 months, 19% wt loss      Row Name 08/25/23 1727       Muscle Loss    Loss of Muscle Mass Findings Severe    Religious Region Severe - deep hollowing/scooping, lack of muscle to touch, facial bones well defined    Clavicle Bone Region Severe - protruding prominent bone    Patellar Region Severe - prominent bone, square looking, very little muscle definition    Anterior Thigh Region Severe - line/depression along thigh, obviously thin    Posterior Calf Region Severe - thin with very little definition/firmness      Row Name 08/25/23 1727       Fat Loss    Subcutaneous Fat Loss Findings Severe    Orbital Region  Severe - pronounced hollowness/depression, dark circles, loose saggy skin    Upper Arm Region Severe - mostly skin, very little space between folds, fingers touch      Row Name 08/25/23 1727       Criteria Met (Must meet criteria for severity in at least 2 of these categories: M Wasting, Fat Loss, Fluid, Secondary Signs, Wt. Status, Intake)    Patient meets criteria for  Severe Malnutrition                    Electronically signed by:  Debby Mac RD  08/25/23 17:31 EDT

## 2023-08-26 ENCOUNTER — APPOINTMENT (OUTPATIENT)
Dept: GENERAL RADIOLOGY | Facility: HOSPITAL | Age: 88
DRG: 270 | End: 2023-08-26
Payer: MEDICARE

## 2023-08-26 PROBLEM — I99.8 ISCHEMIC LEG: Status: RESOLVED | Noted: 2018-12-17 | Resolved: 2023-08-26

## 2023-08-26 PROBLEM — E87.6 HYPOKALEMIA: Status: RESOLVED | Noted: 2019-04-17 | Resolved: 2023-08-26

## 2023-08-26 PROBLEM — R19.5 OCCULT BLOOD POSITIVE STOOL: Status: RESOLVED | Noted: 2019-04-17 | Resolved: 2023-08-26

## 2023-08-26 PROBLEM — R10.13 EPIGASTRIC PAIN: Status: RESOLVED | Noted: 2023-08-01 | Resolved: 2023-08-26

## 2023-08-26 PROBLEM — R13.10 DYSPHAGIA: Status: RESOLVED | Noted: 2023-08-01 | Resolved: 2023-08-26

## 2023-08-26 PROBLEM — I74.3: Status: RESOLVED | Noted: 2018-12-18 | Resolved: 2023-08-26

## 2023-08-26 PROBLEM — E43 SEVERE MALNUTRITION: Status: ACTIVE | Noted: 2023-08-26

## 2023-08-26 PROBLEM — J18.9 PNEUMONIA OF LEFT LOWER LOBE DUE TO INFECTIOUS ORGANISM: Status: RESOLVED | Noted: 2019-03-24 | Resolved: 2023-08-26

## 2023-08-26 PROBLEM — E83.39 HYPOPHOSPHATEMIA: Status: RESOLVED | Noted: 2019-04-17 | Resolved: 2023-08-26

## 2023-08-26 PROBLEM — I21.4 NSTEMI (NON-ST ELEVATED MYOCARDIAL INFARCTION): Status: RESOLVED | Noted: 2018-12-17 | Resolved: 2023-08-26

## 2023-08-26 PROBLEM — I47.1 PAROXYSMAL SVT (SUPRAVENTRICULAR TACHYCARDIA): Status: RESOLVED | Noted: 2022-10-07 | Resolved: 2023-08-26

## 2023-08-26 LAB
ANION GAP SERPL CALCULATED.3IONS-SCNC: 12 MMOL/L (ref 5–15)
BUN SERPL-MCNC: 14 MG/DL (ref 8–23)
BUN/CREAT SERPL: 10.4 (ref 7–25)
CALCIUM SPEC-SCNC: 8.2 MG/DL (ref 8.6–10.5)
CHLORIDE SERPL-SCNC: 103 MMOL/L (ref 98–107)
CO2 SERPL-SCNC: 21 MMOL/L (ref 22–29)
CREAT SERPL-MCNC: 1.35 MG/DL (ref 0.57–1)
EGFRCR SERPLBLD CKD-EPI 2021: 38.6 ML/MIN/1.73
GLUCOSE BLDC GLUCOMTR-MCNC: 145 MG/DL (ref 70–130)
GLUCOSE SERPL-MCNC: 97 MG/DL (ref 65–99)
MAGNESIUM SERPL-MCNC: 1.9 MG/DL (ref 1.6–2.4)
POTASSIUM SERPL-SCNC: 3.8 MMOL/L (ref 3.5–5.2)
SODIUM SERPL-SCNC: 136 MMOL/L (ref 136–145)
TSH SERPL DL<=0.05 MIU/L-ACNC: 0.7 UIU/ML (ref 0.27–4.2)
UFH PPP CHRO-ACNC: 0.1 IU/ML (ref 0.3–0.7)
UFH PPP CHRO-ACNC: 0.42 IU/ML (ref 0.3–0.7)
UFH PPP CHRO-ACNC: 0.72 IU/ML (ref 0.3–0.7)

## 2023-08-26 PROCEDURE — 85520 HEPARIN ASSAY: CPT

## 2023-08-26 PROCEDURE — 93010 ELECTROCARDIOGRAM REPORT: CPT | Performed by: INTERNAL MEDICINE

## 2023-08-26 PROCEDURE — 84443 ASSAY THYROID STIM HORMONE: CPT | Performed by: STUDENT IN AN ORGANIZED HEALTH CARE EDUCATION/TRAINING PROGRAM

## 2023-08-26 PROCEDURE — 25010000002 AMIODARONE IN DEXTROSE 5% 150-4.21 MG/100ML-% SOLUTION: Performed by: PHYSICIAN ASSISTANT

## 2023-08-26 PROCEDURE — C1894 INTRO/SHEATH, NON-LASER: HCPCS | Performed by: STUDENT IN AN ORGANIZED HEALTH CARE EDUCATION/TRAINING PROGRAM

## 2023-08-26 PROCEDURE — C1769 GUIDE WIRE: HCPCS | Performed by: STUDENT IN AN ORGANIZED HEALTH CARE EDUCATION/TRAINING PROGRAM

## 2023-08-26 PROCEDURE — 99232 SBSQ HOSP IP/OBS MODERATE 35: CPT | Performed by: STUDENT IN AN ORGANIZED HEALTH CARE EDUCATION/TRAINING PROGRAM

## 2023-08-26 PROCEDURE — 0 IODIXANOL PER 1 ML: Performed by: STUDENT IN AN ORGANIZED HEALTH CARE EDUCATION/TRAINING PROGRAM

## 2023-08-26 PROCEDURE — 84132 ASSAY OF SERUM POTASSIUM: CPT

## 2023-08-26 PROCEDURE — 99232 SBSQ HOSP IP/OBS MODERATE 35: CPT | Performed by: INTERNAL MEDICINE

## 2023-08-26 PROCEDURE — 37228 PR REVSC OPN/PRQ TIB/PERO W/ANGIOPLASTY UNI: CPT | Performed by: PHYSICIAN ASSISTANT

## 2023-08-26 PROCEDURE — 85014 HEMATOCRIT: CPT

## 2023-08-26 PROCEDURE — 25010000002 ONDANSETRON PER 1 MG

## 2023-08-26 PROCEDURE — 75710 ARTERY X-RAYS ARM/LEG: CPT | Performed by: STUDENT IN AN ORGANIZED HEALTH CARE EDUCATION/TRAINING PROGRAM

## 2023-08-26 PROCEDURE — 25010000002 PHENYLEPHRINE 10 MG/ML SOLUTION 1 ML VIAL

## 2023-08-26 PROCEDURE — C1725 CATH, TRANSLUMIN NON-LASER: HCPCS | Performed by: STUDENT IN AN ORGANIZED HEALTH CARE EDUCATION/TRAINING PROGRAM

## 2023-08-26 PROCEDURE — 25010000002 ESMOLOL 100 MG/10ML SOLUTION

## 2023-08-26 PROCEDURE — 82948 REAGENT STRIP/BLOOD GLUCOSE: CPT

## 2023-08-26 PROCEDURE — 76937 US GUIDE VASCULAR ACCESS: CPT | Performed by: STUDENT IN AN ORGANIZED HEALTH CARE EDUCATION/TRAINING PROGRAM

## 2023-08-26 PROCEDURE — 37184 PRIM ART M-THRMBC 1ST VSL: CPT | Performed by: STUDENT IN AN ORGANIZED HEALTH CARE EDUCATION/TRAINING PROGRAM

## 2023-08-26 PROCEDURE — 84295 ASSAY OF SERUM SODIUM: CPT

## 2023-08-26 PROCEDURE — 99232 SBSQ HOSP IP/OBS MODERATE 35: CPT | Performed by: PHYSICIAN ASSISTANT

## 2023-08-26 PROCEDURE — C1887 CATHETER, GUIDING: HCPCS | Performed by: STUDENT IN AN ORGANIZED HEALTH CARE EDUCATION/TRAINING PROGRAM

## 2023-08-26 PROCEDURE — 25010000002 AMIODARONE IN DEXTROSE 5% 360-4.14 MG/200ML-% SOLUTION: Performed by: PHYSICIAN ASSISTANT

## 2023-08-26 PROCEDURE — 88304 TISSUE EXAM BY PATHOLOGIST: CPT | Performed by: STUDENT IN AN ORGANIZED HEALTH CARE EDUCATION/TRAINING PROGRAM

## 2023-08-26 PROCEDURE — 37228 PR REVSC OPN/PRQ TIB/PERO W/ANGIOPLASTY UNI: CPT | Performed by: STUDENT IN AN ORGANIZED HEALTH CARE EDUCATION/TRAINING PROGRAM

## 2023-08-26 PROCEDURE — 82947 ASSAY GLUCOSE BLOOD QUANT: CPT

## 2023-08-26 PROCEDURE — 047M3ZZ DILATION OF RIGHT POPLITEAL ARTERY, PERCUTANEOUS APPROACH: ICD-10-PCS | Performed by: STUDENT IN AN ORGANIZED HEALTH CARE EDUCATION/TRAINING PROGRAM

## 2023-08-26 PROCEDURE — 85347 COAGULATION TIME ACTIVATED: CPT

## 2023-08-26 PROCEDURE — 25010000002 FENTANYL CITRATE (PF) 100 MCG/2ML SOLUTION

## 2023-08-26 PROCEDURE — 25010000002 HEPARIN (PORCINE) PER 1000 UNITS: Performed by: STUDENT IN AN ORGANIZED HEALTH CARE EDUCATION/TRAINING PROGRAM

## 2023-08-26 PROCEDURE — 71045 X-RAY EXAM CHEST 1 VIEW: CPT

## 2023-08-26 PROCEDURE — 82330 ASSAY OF CALCIUM: CPT

## 2023-08-26 PROCEDURE — 83735 ASSAY OF MAGNESIUM: CPT | Performed by: STUDENT IN AN ORGANIZED HEALTH CARE EDUCATION/TRAINING PROGRAM

## 2023-08-26 PROCEDURE — 25010000002 HEPARIN (PORCINE) 25000-0.45 UT/250ML-% SOLUTION

## 2023-08-26 PROCEDURE — 25010000002 PROPOFOL 10 MG/ML EMULSION

## 2023-08-26 PROCEDURE — 25010000002 SUGAMMADEX 200 MG/2ML SOLUTION

## 2023-08-26 PROCEDURE — 25010000002 CEFAZOLIN PER 500 MG

## 2023-08-26 PROCEDURE — 04CM3ZZ EXTIRPATION OF MATTER FROM RIGHT POPLITEAL ARTERY, PERCUTANEOUS APPROACH: ICD-10-PCS | Performed by: STUDENT IN AN ORGANIZED HEALTH CARE EDUCATION/TRAINING PROGRAM

## 2023-08-26 PROCEDURE — B41F1ZZ FLUOROSCOPY OF RIGHT LOWER EXTREMITY ARTERIES USING LOW OSMOLAR CONTRAST: ICD-10-PCS | Performed by: STUDENT IN AN ORGANIZED HEALTH CARE EDUCATION/TRAINING PROGRAM

## 2023-08-26 PROCEDURE — 80048 BASIC METABOLIC PNL TOTAL CA: CPT | Performed by: STUDENT IN AN ORGANIZED HEALTH CARE EDUCATION/TRAINING PROGRAM

## 2023-08-26 PROCEDURE — 93005 ELECTROCARDIOGRAM TRACING: CPT | Performed by: STUDENT IN AN ORGANIZED HEALTH CARE EDUCATION/TRAINING PROGRAM

## 2023-08-26 PROCEDURE — 25010000002 DEXAMETHASONE PER 1 MG

## 2023-08-26 PROCEDURE — 25010000002 HEPARIN (PORCINE) PER 1000 UNITS

## 2023-08-26 PROCEDURE — 82803 BLOOD GASES ANY COMBINATION: CPT

## 2023-08-26 PROCEDURE — 04CQ3ZZ EXTIRPATION OF MATTER FROM LEFT ANTERIOR TIBIAL ARTERY, PERCUTANEOUS APPROACH: ICD-10-PCS | Performed by: STUDENT IN AN ORGANIZED HEALTH CARE EDUCATION/TRAINING PROGRAM

## 2023-08-26 RX ORDER — ESMOLOL HYDROCHLORIDE 10 MG/ML
INJECTION INTRAVENOUS AS NEEDED
Status: DISCONTINUED | OUTPATIENT
Start: 2023-08-26 | End: 2023-08-26 | Stop reason: SURG

## 2023-08-26 RX ORDER — LABETALOL HYDROCHLORIDE 5 MG/ML
5 INJECTION, SOLUTION INTRAVENOUS
Status: DISCONTINUED | OUTPATIENT
Start: 2023-08-26 | End: 2023-08-26 | Stop reason: HOSPADM

## 2023-08-26 RX ORDER — DEXAMETHASONE SODIUM PHOSPHATE 4 MG/ML
INJECTION, SOLUTION INTRA-ARTICULAR; INTRALESIONAL; INTRAMUSCULAR; INTRAVENOUS; SOFT TISSUE AS NEEDED
Status: DISCONTINUED | OUTPATIENT
Start: 2023-08-26 | End: 2023-08-26 | Stop reason: SURG

## 2023-08-26 RX ORDER — HYDRALAZINE HYDROCHLORIDE 20 MG/ML
5 INJECTION INTRAMUSCULAR; INTRAVENOUS
Status: DISCONTINUED | OUTPATIENT
Start: 2023-08-26 | End: 2023-08-26 | Stop reason: HOSPADM

## 2023-08-26 RX ORDER — ONDANSETRON 2 MG/ML
INJECTION INTRAMUSCULAR; INTRAVENOUS AS NEEDED
Status: DISCONTINUED | OUTPATIENT
Start: 2023-08-26 | End: 2023-08-26 | Stop reason: SURG

## 2023-08-26 RX ORDER — SODIUM CHLORIDE 9 MG/ML
40 INJECTION, SOLUTION INTRAVENOUS AS NEEDED
Status: DISCONTINUED | OUTPATIENT
Start: 2023-08-26 | End: 2023-08-26 | Stop reason: HOSPADM

## 2023-08-26 RX ORDER — LIDOCAINE HYDROCHLORIDE 10 MG/ML
INJECTION, SOLUTION EPIDURAL; INFILTRATION; INTRACAUDAL; PERINEURAL AS NEEDED
Status: DISCONTINUED | OUTPATIENT
Start: 2023-08-26 | End: 2023-08-26 | Stop reason: SURG

## 2023-08-26 RX ORDER — PHENYLEPHRINE HCL IN 0.9% NACL 1 MG/10 ML
SYRINGE (ML) INTRAVENOUS AS NEEDED
Status: DISCONTINUED | OUTPATIENT
Start: 2023-08-26 | End: 2023-08-26 | Stop reason: SURG

## 2023-08-26 RX ORDER — DROPERIDOL 2.5 MG/ML
0.62 INJECTION, SOLUTION INTRAMUSCULAR; INTRAVENOUS ONCE AS NEEDED
Status: DISCONTINUED | OUTPATIENT
Start: 2023-08-26 | End: 2023-08-26 | Stop reason: HOSPADM

## 2023-08-26 RX ORDER — METOPROLOL TARTRATE 5 MG/5ML
INJECTION INTRAVENOUS AS NEEDED
Status: DISCONTINUED | OUTPATIENT
Start: 2023-08-26 | End: 2023-08-26 | Stop reason: SURG

## 2023-08-26 RX ORDER — FENTANYL CITRATE 50 UG/ML
INJECTION, SOLUTION INTRAMUSCULAR; INTRAVENOUS AS NEEDED
Status: DISCONTINUED | OUTPATIENT
Start: 2023-08-26 | End: 2023-08-26 | Stop reason: SURG

## 2023-08-26 RX ORDER — SODIUM CHLORIDE 9 MG/ML
9 INJECTION, SOLUTION INTRAVENOUS ONCE
Status: COMPLETED | OUTPATIENT
Start: 2023-08-26 | End: 2023-08-26

## 2023-08-26 RX ORDER — MIDAZOLAM HYDROCHLORIDE 1 MG/ML
0.5 INJECTION INTRAMUSCULAR; INTRAVENOUS
Status: DISCONTINUED | OUTPATIENT
Start: 2023-08-26 | End: 2023-08-26 | Stop reason: HOSPADM

## 2023-08-26 RX ORDER — ONDANSETRON 2 MG/ML
4 INJECTION INTRAMUSCULAR; INTRAVENOUS ONCE AS NEEDED
Status: DISCONTINUED | OUTPATIENT
Start: 2023-08-26 | End: 2023-08-26 | Stop reason: HOSPADM

## 2023-08-26 RX ORDER — PROPOFOL 10 MG/ML
VIAL (ML) INTRAVENOUS AS NEEDED
Status: DISCONTINUED | OUTPATIENT
Start: 2023-08-26 | End: 2023-08-26 | Stop reason: SURG

## 2023-08-26 RX ORDER — CEFAZOLIN SODIUM 1 G/3ML
INJECTION, POWDER, FOR SOLUTION INTRAMUSCULAR; INTRAVENOUS AS NEEDED
Status: DISCONTINUED | OUTPATIENT
Start: 2023-08-26 | End: 2023-08-26 | Stop reason: SURG

## 2023-08-26 RX ORDER — SODIUM CHLORIDE 9 MG/ML
INJECTION, SOLUTION INTRAVENOUS AS NEEDED
Status: DISCONTINUED | OUTPATIENT
Start: 2023-08-26 | End: 2023-08-26 | Stop reason: HOSPADM

## 2023-08-26 RX ORDER — FAMOTIDINE 10 MG/ML
INJECTION, SOLUTION INTRAVENOUS
Status: COMPLETED
Start: 2023-08-26 | End: 2023-08-26

## 2023-08-26 RX ORDER — FENTANYL CITRATE 50 UG/ML
25 INJECTION, SOLUTION INTRAMUSCULAR; INTRAVENOUS
Status: DISCONTINUED | OUTPATIENT
Start: 2023-08-26 | End: 2023-08-26 | Stop reason: HOSPADM

## 2023-08-26 RX ORDER — SODIUM CHLORIDE 0.9 % (FLUSH) 0.9 %
3 SYRINGE (ML) INJECTION EVERY 12 HOURS SCHEDULED
Status: DISCONTINUED | OUTPATIENT
Start: 2023-08-26 | End: 2023-08-26 | Stop reason: HOSPADM

## 2023-08-26 RX ORDER — SODIUM CHLORIDE 0.9 % (FLUSH) 0.9 %
10 SYRINGE (ML) INJECTION AS NEEDED
Status: DISCONTINUED | OUTPATIENT
Start: 2023-08-26 | End: 2023-08-26 | Stop reason: HOSPADM

## 2023-08-26 RX ORDER — HEPARIN SODIUM 1000 [USP'U]/ML
INJECTION, SOLUTION INTRAVENOUS; SUBCUTANEOUS AS NEEDED
Status: DISCONTINUED | OUTPATIENT
Start: 2023-08-26 | End: 2023-08-26 | Stop reason: SURG

## 2023-08-26 RX ORDER — SODIUM CHLORIDE, SODIUM LACTATE, POTASSIUM CHLORIDE, CALCIUM CHLORIDE 600; 310; 30; 20 MG/100ML; MG/100ML; MG/100ML; MG/100ML
INJECTION, SOLUTION INTRAVENOUS CONTINUOUS PRN
Status: DISCONTINUED | OUTPATIENT
Start: 2023-08-26 | End: 2023-08-26 | Stop reason: SURG

## 2023-08-26 RX ORDER — LIDOCAINE HYDROCHLORIDE 10 MG/ML
0.5 INJECTION, SOLUTION EPIDURAL; INFILTRATION; INTRACAUDAL; PERINEURAL ONCE AS NEEDED
Status: DISCONTINUED | OUTPATIENT
Start: 2023-08-26 | End: 2023-08-26 | Stop reason: HOSPADM

## 2023-08-26 RX ORDER — IPRATROPIUM BROMIDE AND ALBUTEROL SULFATE 2.5; .5 MG/3ML; MG/3ML
3 SOLUTION RESPIRATORY (INHALATION) ONCE AS NEEDED
Status: DISCONTINUED | OUTPATIENT
Start: 2023-08-26 | End: 2023-08-26 | Stop reason: HOSPADM

## 2023-08-26 RX ORDER — DROPERIDOL 2.5 MG/ML
0.62 INJECTION, SOLUTION INTRAMUSCULAR; INTRAVENOUS
Status: DISCONTINUED | OUTPATIENT
Start: 2023-08-26 | End: 2023-08-26 | Stop reason: HOSPADM

## 2023-08-26 RX ORDER — NALOXONE HCL 0.4 MG/ML
0.4 VIAL (ML) INJECTION AS NEEDED
Status: DISCONTINUED | OUTPATIENT
Start: 2023-08-26 | End: 2023-08-26 | Stop reason: HOSPADM

## 2023-08-26 RX ORDER — IODIXANOL 320 MG/ML
INJECTION, SOLUTION INTRAVASCULAR AS NEEDED
Status: DISCONTINUED | OUTPATIENT
Start: 2023-08-26 | End: 2023-08-26 | Stop reason: HOSPADM

## 2023-08-26 RX ORDER — SODIUM CHLORIDE 0.9 % (FLUSH) 0.9 %
3-10 SYRINGE (ML) INJECTION AS NEEDED
Status: DISCONTINUED | OUTPATIENT
Start: 2023-08-26 | End: 2023-08-26 | Stop reason: HOSPADM

## 2023-08-26 RX ORDER — SODIUM CHLORIDE, SODIUM LACTATE, POTASSIUM CHLORIDE, CALCIUM CHLORIDE 600; 310; 30; 20 MG/100ML; MG/100ML; MG/100ML; MG/100ML
9 INJECTION, SOLUTION INTRAVENOUS CONTINUOUS
Status: DISCONTINUED | OUTPATIENT
Start: 2023-08-26 | End: 2023-08-26

## 2023-08-26 RX ORDER — SODIUM CHLORIDE 450 MG/100ML
35 INJECTION, SOLUTION INTRAVENOUS CONTINUOUS
Status: DISCONTINUED | OUTPATIENT
Start: 2023-08-26 | End: 2023-08-27

## 2023-08-26 RX ORDER — FAMOTIDINE 10 MG/ML
20 INJECTION, SOLUTION INTRAVENOUS ONCE
Status: COMPLETED | OUTPATIENT
Start: 2023-08-26 | End: 2023-08-26

## 2023-08-26 RX ORDER — HEPARIN SODIUM 10000 [USP'U]/100ML
22 INJECTION, SOLUTION INTRAVENOUS
Status: DISCONTINUED | OUTPATIENT
Start: 2023-08-26 | End: 2023-08-27

## 2023-08-26 RX ORDER — EPHEDRINE SULFATE 50 MG/ML
INJECTION INTRAVENOUS AS NEEDED
Status: DISCONTINUED | OUTPATIENT
Start: 2023-08-26 | End: 2023-08-26 | Stop reason: SURG

## 2023-08-26 RX ORDER — ROCURONIUM BROMIDE 10 MG/ML
INJECTION, SOLUTION INTRAVENOUS AS NEEDED
Status: DISCONTINUED | OUTPATIENT
Start: 2023-08-26 | End: 2023-08-26 | Stop reason: SURG

## 2023-08-26 RX ADMIN — Medication 10 ML: at 21:35

## 2023-08-26 RX ADMIN — PROPOFOL 70 MG: 10 INJECTION, EMULSION INTRAVENOUS at 13:27

## 2023-08-26 RX ADMIN — ONDANSETRON 4 MG: 2 INJECTION INTRAMUSCULAR; INTRAVENOUS at 14:50

## 2023-08-26 RX ADMIN — PANTOPRAZOLE SODIUM 40 MG: 40 TABLET, DELAYED RELEASE ORAL at 09:16

## 2023-08-26 RX ADMIN — FENTANYL CITRATE 25 MCG: 50 INJECTION, SOLUTION INTRAMUSCULAR; INTRAVENOUS at 13:27

## 2023-08-26 RX ADMIN — METOPROLOL TARTRATE 50 MG: 50 TABLET, FILM COATED ORAL at 22:40

## 2023-08-26 RX ADMIN — Medication 150 MCG: at 14:12

## 2023-08-26 RX ADMIN — CEFAZOLIN SODIUM 2 G: 1 INJECTION, POWDER, FOR SOLUTION INTRAMUSCULAR; INTRAVENOUS at 13:27

## 2023-08-26 RX ADMIN — Medication 10 ML: at 09:14

## 2023-08-26 RX ADMIN — HEPARIN SODIUM 22 UNITS/KG/HR: 10000 INJECTION, SOLUTION INTRAVENOUS at 22:37

## 2023-08-26 RX ADMIN — SENNOSIDES AND DOCUSATE SODIUM 2 TABLET: 50; 8.6 TABLET ORAL at 21:35

## 2023-08-26 RX ADMIN — PHENYLEPHRINE HYDROCHLORIDE 1 MCG/KG/MIN: 10 INJECTION INTRAVENOUS at 14:27

## 2023-08-26 RX ADMIN — ROCURONIUM BROMIDE 40 MG: 10 SOLUTION INTRAVENOUS at 13:28

## 2023-08-26 RX ADMIN — LIDOCAINE HYDROCHLORIDE 50 MG: 10 INJECTION, SOLUTION EPIDURAL; INFILTRATION; INTRACAUDAL; PERINEURAL at 13:27

## 2023-08-26 RX ADMIN — HEPARIN SODIUM 2500 UNITS: 1000 INJECTION INTRAVENOUS; SUBCUTANEOUS at 14:00

## 2023-08-26 RX ADMIN — SODIUM CHLORIDE 35 ML/HR: 4.5 INJECTION, SOLUTION INTRAVENOUS at 16:42

## 2023-08-26 RX ADMIN — METOPROLOL TARTRATE 2.5 MG: 5 INJECTION INTRAVENOUS at 14:03

## 2023-08-26 RX ADMIN — Medication 100 MCG: at 13:43

## 2023-08-26 RX ADMIN — SODIUM CHLORIDE 9 ML/HR: 9 INJECTION, SOLUTION INTRAVENOUS at 12:45

## 2023-08-26 RX ADMIN — METOPROLOL TARTRATE 2.5 MG: 5 INJECTION INTRAVENOUS at 14:07

## 2023-08-26 RX ADMIN — EPHEDRINE SULFATE 5 MG: 50 INJECTION INTRAVENOUS at 13:51

## 2023-08-26 RX ADMIN — HEPARIN SODIUM 23 UNITS/KG/HR: 10000 INJECTION, SOLUTION INTRAVENOUS at 09:11

## 2023-08-26 RX ADMIN — ESMOLOL HYDROCHLORIDE 40 MG: 10 INJECTION, SOLUTION INTRAVENOUS at 13:41

## 2023-08-26 RX ADMIN — AMIODARONE HYDROCHLORIDE 1 MG/MIN: 1.8 INJECTION, SOLUTION INTRAVENOUS at 21:04

## 2023-08-26 RX ADMIN — DONEPEZIL HYDROCHLORIDE 10 MG: 10 TABLET, FILM COATED ORAL at 21:35

## 2023-08-26 RX ADMIN — PANTOPRAZOLE SODIUM 40 MG: 40 TABLET, DELAYED RELEASE ORAL at 21:35

## 2023-08-26 RX ADMIN — ESMOLOL HYDROCHLORIDE 30 MG: 10 INJECTION, SOLUTION INTRAVENOUS at 13:55

## 2023-08-26 RX ADMIN — ESMOLOL HYDROCHLORIDE 30 MG: 10 INJECTION, SOLUTION INTRAVENOUS at 13:32

## 2023-08-26 RX ADMIN — ESMOLOL HYDROCHLORIDE 20 MG: 10 INJECTION, SOLUTION INTRAVENOUS at 13:37

## 2023-08-26 RX ADMIN — FAMOTIDINE 20 MG: 10 INJECTION, SOLUTION INTRAVENOUS at 12:45

## 2023-08-26 RX ADMIN — Medication 150 MCG: at 14:04

## 2023-08-26 RX ADMIN — SODIUM CHLORIDE, POTASSIUM CHLORIDE, SODIUM LACTATE AND CALCIUM CHLORIDE: 600; 310; 30; 20 INJECTION, SOLUTION INTRAVENOUS at 13:25

## 2023-08-26 RX ADMIN — CILOSTAZOL 100 MG: 50 TABLET ORAL at 21:39

## 2023-08-26 RX ADMIN — CILOSTAZOL 100 MG: 50 TABLET ORAL at 09:13

## 2023-08-26 RX ADMIN — HEPARIN SODIUM 5000 UNITS: 1000 INJECTION INTRAVENOUS; SUBCUTANEOUS at 13:49

## 2023-08-26 RX ADMIN — Medication 200 MCG: at 13:46

## 2023-08-26 RX ADMIN — Medication 150 MCG: at 13:27

## 2023-08-26 RX ADMIN — METOPROLOL TARTRATE 50 MG: 50 TABLET, FILM COATED ORAL at 09:14

## 2023-08-26 RX ADMIN — FAMOTIDINE 20 MG: 10 INJECTION INTRAVENOUS at 12:45

## 2023-08-26 RX ADMIN — DEXAMETHASONE SODIUM PHOSPHATE 4 MG: 4 INJECTION, SOLUTION INTRAMUSCULAR; INTRAVENOUS at 13:32

## 2023-08-26 RX ADMIN — Medication 100 MCG: at 13:37

## 2023-08-26 RX ADMIN — SUGAMMADEX 200 MG: 100 INJECTION, SOLUTION INTRAVENOUS at 14:51

## 2023-08-26 NOTE — ANESTHESIA PREPROCEDURE EVALUATION
Anesthesia Evaluation     Patient summary reviewed and Nursing notes reviewed   no history of anesthetic complications:   NPO Solid Status: > 8 hours  NPO Liquid Status: > 2 hours           Airway   Mallampati: II  TM distance: >3 FB  Neck ROM: full  Possible difficult intubation  Dental    (+) upper dentures and lower dentures    Pulmonary    (+) a smoker Current, COPD,  (-) asthma, sleep apnea  Cardiovascular     ECG reviewed  PT is on anticoagulation therapy  Patient on routine beta blocker and Beta blocker given within 24 hours of surgery  Rhythm: irregular  Rate: abnormal    (+) pacemaker pacemaker, hypertension, dysrhythmias Atrial Fib, PVD, DVT, hyperlipidemia    ROS comment: 8/23-Battery is at 50%. Battery has 3.50 yrs left. It has a measured voltage of  2.98. Battery Voltage Trigger Settings: when current voltage < 2.6 volts.  Battery Status: MOS.    [facility] 12/17/18: nonobstructive CAD, normal EF. LAD with Prox mild diffuse disease, mid at Dx bifurcation had 50% stenosis, distal small vessel diffuse disease Dx small vessel mild luminal irregularities. Left circumflex with Moderate calibre, mild diffuse stenosis, mid Lcx at OM take off had 40% stenosis, OM1 is a large calibre, prox 30-40% stenosis and mid and distal mild irregularities. Small to moderate calibre, dominant, prox 50 % tubular stenosis of RCA, mid and distal normal, small PDA and PL , no stenosis     Carlo Jaramillo MD at 08/25/23 [id number] -- She is having rapid atrial fibrillation.  This may be related to her other acute illnesses.  For now we will work on controlling her rate.  We can discuss with her going for different antiarrhythmic options including restarting amiodarone versus trying another medication such as Tikosyn.          Neuro/Psych  (+) CVA (L mca ischemic CVA, 2002)  (-) seizures  GI/Hepatic/Renal/Endo    (+) renal disease ARF    Musculoskeletal     Abdominal    Substance History - negative use     OB/GYN          Other      history of  cancer (gastric cancer)    ROS/Med Hx Other: Pletal   12/18- [id number]  Hgb 10.3 k 3.8  Dysphagia - r/t to possible mets per family at bedside                  Anesthesia Plan    ASA 4     general     (Risks and benefits of general anesthesia discussed with patient (including MI, CVA, death, recall, aspiration, oropharyngeal/dental damage), questions answered, agreeable to proceed.    Long discussion with pt and family (Daughter in law) at bedside re: elevated risk of MACE/adverse event.  Questions answered, agreeable to proceed.)  intravenous induction     Anesthetic plan, risks, benefits, and alternatives have been provided, discussed and informed consent has been obtained with: patient and other.    Use of blood products discussed with patient and other  Consented to blood products.    Plan discussed with CRNA.    CODE STATUS:    Level Of Support Discussed With: Patient  Code Status (Patient has no pulse and is not breathing): CPR (Attempt to Resuscitate)  Medical Interventions (Patient has pulse or is breathing): Full Support

## 2023-08-26 NOTE — DISCHARGE PLACEMENT REQUEST
"Rupinder Arias (87 y.o. Female)   Updates  Screened Covid negative on 8/23/23      Date of Birth   1935    Social Security Number       Address   [address] [facility]    Home Phone   [phone]    MRN   [id number]       [facility]    Marital Status                               Admission Date   8/23/23    Admission Type   Elective    Admitting Provider   Meagan Pulliam MD    Attending Provider   Meagan Pulliam MD    Department, Room/Bed   [address], S370/1       Discharge Date       Discharge Disposition       Discharge Destination                                 Attending Provider: Meagan Pulliam MD    Allergies: No Known Allergies    Isolation: None   Infection: None   Code Status: CPR    Ht: 152.4 cm (60\")   Wt: 40.6 kg (89 lb 6.4 oz)    Admission Cmt: None   Principal Problem: Popliteal artery occlusion, 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]                   Active Insurance as of 8/23/2023       Primary Coverage       Payor Plan Insurance Group Employer/Plan Group    MEDICARE MEDICARE A & B        Payor Plan Address Payor Plan Phone Number Payor Plan Fax Number Effective Dates    [address] [phone]  8/1/2002 - None Entered    [facility] [id number]         Subscriber Name Subscriber Birth Date Member ID       RUPINDER ARIAS 1935 [id number]               Secondary Coverage       Payor Plan Insurance Group Employer/Plan Group    ANTHEM BLUE CROSS ANTHEM BLUE CROSS BLUE SHIELD PPO [id number]       Payor Plan Address Payor Plan Phone Number Payor Plan Fax Number Effective Dates    [address] [phone]  6/3/2022 - None Entered    [facility] [id number]         Subscriber Name Subscriber Birth Date Member ID       RUPINDER ARIAS 1935 [id number]                     Emergency Contacts        (Rel.) Home Phone Work Phone Mobile Phone    GUSTAVO ARIAS (Other) [phone] -- --    Antony Arias (Son) [phone] -- [phone]    " AGNIESZKA ARIAS (Relative) [phone] -- --    RORY ARIAS (Son) [phone] -- --              Emergency Contact Information       Name Relation Home Work Mobile    GUSTAVO ARIAS Other [phone]      Antony Arias Son [phone]    AGNIESZKA RAIAS Relative [phone]      RORY ARIAS Son [phone]            Insurance Information                  MEDICARE/MEDICARE A & B Phone: [phone]    Subscriber: Rupinder Arias Subscriber#: [id number]    Group#: -- Precert#: --        ANTHEM BLUE CROSS/ANTHEM BLUE CROSS BLUE SHIELD PPO Phone: [phone]    Subscriber: Rupinder Arias Subscriber#: [id number]    Group#: [id number] Precert#: --          Problem List             Codes Noted - Resolved       Hospital    Severe malnutrition ICD-10-CM: E43  ICD-9-CM: 261 8/26/2023 - Present    * (Principal) Popliteal artery occlusion, right ICD-10-CM: I70.201  ICD-9-CM: 444.22 8/23/2023 - Present    CAD (coronary artery disease) ICD-10-CM: I25.10  ICD-9-CM: 414.00 8/23/2023 - Present    Multiple pulmonary nodules (New 8mm RLL) ICD-10-CM: R91.8  ICD-9-CM: 793.19 8/14/2023 - Present    COPD (? severity) ICD-10-CM: J44.9  ICD-9-CM: 496 8/14/2023 - Present    Gastric cancer ICD-10-CM: C16.2  ICD-9-CM: 151.4 8/14/2023 - Present    Paroxysmal atrial fibrillation ICD-10-CM: I48.0  ICD-9-CM: 427.31 10/7/2022 - Present    Iron deficiency anemia due to chronic blood loss ICD-10-CM: D50.0  ICD-9-CM: 280.0 4/17/2019 - Present    Complete heart block S/P transvenous pacemaker placement at [facility] on 12/17 ICD-10-CM: I44.2  ICD-9-CM: 426.0 12/17/2018 - Present    PAD  ICD-10-CM: I73.9  ICD-9-CM: 443.9 12/17/2018 - Present    HTN ICD-10-CM: I10  ICD-9-CM: 401.9 12/17/2018 - Present    History of subacute left MCA distribution ischemic CVA ICD-10-CM: Z86.73  ICD-9-CM: V12.54 12/17/2018 - Present    Tobacco use ICD-10-CM: Z72.0  ICD-9-CM: 305.1 12/17/2018 - Present    MAGGIE (acute kidney injury) ICD-10-CM:  N17.9  ICD-9-CM: 584.9 2018 - Present    Hyperlipidemia ICD-10-CM: E78.5  ICD-9-CM: 272.4 2018 - Present       Non-Hospital    Epigastric pain ICD-10-CM: R10.13  ICD-9-CM: 789.06 2023 - Present    Dysphagia ICD-10-CM: R13.10  ICD-9-CM: 787.20 2023 - Present    Paroxysmal SVT (supraventricular tachycardia) ICD-10-CM: I47.1  ICD-9-CM: 427.0 10/7/2022 - Present    Anemia ICD-10-CM: D64.9  ICD-9-CM: 285.9 2019 - Present    Hypokalemia ICD-10-CM: E87.6  ICD-9-CM: 276.8 2019 - Present    Hypophosphatemia ICD-10-CM: E83.39  ICD-9-CM: 275.3 2019 - Present    Occult blood positive stool ICD-10-CM: R19.5  ICD-9-CM: 792.1 2019 - Present    Pneumonia of left lower lobe due to infectious organism ICD-10-CM: J18.9  ICD-9-CM: 486 3/24/2019 - Present    Popliteal thrombosis ICD-10-CM: I74.3  ICD-9-CM: 444.22 2018 - Present    History of noncompliance with medical treatment ICD-10-CM: Z91.199  ICD-9-CM: V15.81 2018 - Present    NSTEMI  ICD-10-CM: I21.4  ICD-9-CM: 410.70 2018 - Present    CAD with occluded LAD and RCA per [facility] 18 ICD-10-CM: I25.10  ICD-9-CM: 414.00 2018 - Present    Right ischemic leg ICD-10-CM: I99.8  ICD-9-CM: 459.9 2018 - Present        History & Physical        Ran Blankenship III, DO at 23 2341              [facility] Medicine Services  HISTORY AND PHYSICAL    Patient Name: Rupinder Lees  : 1935  MRN: [id number]  Primary Care Physician: Gifty Kruse MD  Date of admission: 2023    Subjective  Subjective     Chief Complaint:  Right leg pain    HPI:  Rupinder Lees is an 87 y.o. female with a past medical history significant for COPD on 2L NC as needed, CAD, Hypertension, HLD, complete heart block s/p PPM, prior CVA, atrial fibrillation, prior DVT, and PAD s/p femoral artery stent and right popliteal thrombosis s/p embolectomy/ fem pop bypass ( [facility]). Patient presents today  as a transfer from [facility] after initially presenting with complaints of loss of sensation in right foot. Of note, patient was recently diagnosed with gastric adenocarcinoma. She followed up with oncology outpatient today and complained that her right foot was numb and cold. Pulses were not palpable and extremity was cold to touch. Patient was subsequently sent to ED for further evaluation and treatment. W/U demonstrated right popliteal artery occlusion. Patient was therefore discussed with vascular surgery who recommended CTA abdominal aorta with run off plus IVF and heparin gtt. Patient was transferred to [facility] for higher level of care.  Currently there are no complaints of cough, congestion, SOB, or chest pain. No abdominal pain or N/V/D. No headache or focal weakness/ no falls or trauma. Will admit to inpatient.      Review of Systems   Constitutional:  Negative for chills, fatigue and fever.   HENT:  Negative for congestion and trouble swallowing.    Eyes:  Negative for photophobia and visual disturbance.   Respiratory:  Negative for cough and shortness of breath.    Cardiovascular:  Negative for chest pain and leg swelling.   Gastrointestinal:  Negative for abdominal pain, diarrhea and nausea.   Endocrine: Negative for cold intolerance and heat intolerance.   Genitourinary:  Negative for dysuria and flank pain.   Musculoskeletal:  Negative for back pain and gait problem.   Skin:  Negative for pallor and rash.   Allergic/Immunologic: Positive for immunocompromised state.   Neurological:  Positive for weakness. Negative for dizziness and headaches.   Hematological:  Negative for adenopathy.   Psychiatric/Behavioral:  Negative for agitation and confusion.           Personal History     Past Medical History:   Diagnosis Date    Arthritis     COPD  12/17/2018    DVT of lower extremity (deep venous thrombosis)     History of CVA 2013 12/17/2018    HTN 12/17/2018    PAD  12/17/2018    Stroke     Tobacco  use 12/17/2018         Oncology Problem List:  Gastric cancer (08/14/2023; Status: Active)       Past Surgical History:   Procedure Laterality Date    CARDIAC CATHETERIZATION  12/17/2018    Procedure: Left Heart Cath;  Surgeon: Imtiaz Fuentes MD;  Location:  [facility] CATH INVASIVE LOCATION;  Service: Cardiology    CARDIAC CATHETERIZATION N/A 12/17/2018    Procedure: Thrombolysis-peripheral;  Surgeon: Imtiaz Fuentes MD;  Location:  [facility] CATH [facility] LOC[facility];  Service: Cardiology    CARDIAC ELECTROPHYSIOLOGY PROCEDURE N/A 12/18/2018    Procedure: PACEMAKER IMPLANTATION- DC;  Surgeon: Thony Bobby MD;  Location:  [facility] INVASIVE [facility];  Service: Cardiology    CARDIAC ELECTROPHYSIOLOGY PROCEDURE N/A 12/17/2018    Procedure: Temporary Pacemaker;  Surgeon: Imtiaz Fuentes MD;  Location:  [facility] CATH INVASIVE LOCATION;  Service: Cardiology    COLONOSCOPY N/A 04/19/2019    Procedure: COLONOSCOPY;  Surgeon: Chris Zimmerman MD;  Location: [facility] OR;  Service: Gastroenterology    ENDOSCOPY N/A 04/19/2019    Procedure: ESOPHAGOGASTRODUODENOSCOPY;  Surgeon: Chris Zimmerman MD;  Location: [facility] OR;  Service: Gastroenterology    ENDOSCOPY N/A 8/1/2023    Procedure: ESOPHAGOGASTRODUODENOSCOPY WITH BIOPSY;  Surgeon: Agueda Arredondo MD;  Location: [facility] OR;  Service: Gastroenterology;  Laterality: N/A;    FEMORAL ARTERY STENT  2013    FEMORAL POPLITEAL BYPASS Right 12/17/2018    Procedure: LOWER EXTREMITY EMBELECTOMY RIGHT;  Surgeon: David Beatty MD;  Location:  [facility] HYBRID OR 15;  Service: Vascular    HYSTERECTOMY      LAPAROSCOPIC CHOLECYSTECTOMY      LEFT HEART CATH  12/17/2018    At [facility] with TV PM placement     PACEMAKER IMPLANTATION         Family History: family history includes Cancer in her father and mother; Heart attack in her brother and brother.     Social History:  reports that she has been smoking cigarettes. She has a 31.50 pack-year  smoking history. She has been exposed to tobacco smoke. She has never used smokeless tobacco. She reports that she does not drink alcohol and does not use drugs.  Social History     Social History Narrative    Lives in [place], [place] alone    Ongoing tobacco abuse with no plans for smoking cessation       Medications:  HYDROcodone-acetaminophen, carvedilol, cilostazol, donepezil, oxyCODONE, pantoprazole, polyethylene glycol, prochlorperazine, promethazine, and traMADol    No Known Allergies    Objective  Objective     Vital Signs:   Temp:  [97.3 °F (36.3 °C)-98.5 °F (36.9 °C)] 98.5 °F (36.9 °C)  Heart Rate:  [63-82] 79  Resp:  [18] 18  BP: ()/(56-74) 145/74    Physical Exam   Constitutional: Awake, alert  Eyes: PERRLA, sclerae anicteric, no conjunctival injection  HENT: NCAT, mucous membranes moist  Neck: Supple, no thyromegaly, no lymphadenopathy, trachea midline  Respiratory: Clear to auscultation bilaterally, nonlabored respirations   Cardiovascular: RRR, no murmurs, rubs, or gallops, palpable pedal pulses bilaterally  Gastrointestinal: Positive bowel sounds, soft, nontender, nondistended  Musculoskeletal: No bilateral ankle edema, no clubbing or cyanosis to extremities  Loss of sensation in right lower extremity  Psychiatric: Appropriate affect, cooperative  Neurologic: Oriented x 3, strength symmetric in all extremities, Cranial Nerves grossly intact to confrontation, speech clear  Skin: No rashes      Result Review:  I have personally reviewed the results from the time of this admission to 8/23/2023 23:42 EDT and agree with these findings:  [x]  Laboratory list / accordion  []  Microbiology  []  Radiology  []  EKG/Telemetry   []  Cardiology/Vascular   []  Pathology  [x]  Old records  []  Other:  Most notable findings include: vitals stable. Labs pending    LAB RESULTS:      Lab 08/23/23  1816 08/23/23  1600   WBC  --  8.76   HEMOGLOBIN  --  11.8*   HEMATOCRIT  --  38.8   PLATELETS  --  238   NEUTROS ABS  --   6.40   IMMATURE GRANS (ABS)  --  0.04   LYMPHS ABS  --  1.40   MONOS ABS  --  0.85   EOS ABS  --  0.03   MCV  --  88.8   PROTIME 14.3  --    APTT 30.6  --    HEPARIN ANTI-XA <0.10*  --          Lab 08/23/23  1600   SODIUM 135*   POTASSIUM 4.5   CHLORIDE 98   CO2 22.2   ANION GAP 14.8   BUN 21   CREATININE 1.28*   EGFR 41.1*   GLUCOSE 106*   CALCIUM 8.9         Lab 08/23/23  1600   TOTAL PROTEIN 6.8   ALBUMIN 3.5   GLOBULIN 3.3   ALT (SGPT) 15   AST (SGOT) 22   BILIRUBIN 0.6   ALK PHOS 159*         Lab 08/23/23  1816   PROTIME 14.3   INR 1.06                 Brief Urine Lab Results  (Last result in the past 365 days)        Color   Clarity   Blood   Leuk Est   Nitrite   Protein   CREAT   Urine HCG        07/24/23 1326 Yellow   Clear   Negative   Negative   Negative   Negative                 Microbiology Results (last 10 days)       ** No results found for the last 240 hours. **            US Arterial Doppler Lower Extremity Right    Result Date: 8/23/2023  EXAMINATION: US ARTERIAL DOPPLER LOWER EXTREMITY  RIGHT-  CLINICAL INDICATION: right leg pain   COMPARISON: None available.  PROCEDURE: Color Doppler imaging with pulse Doppler waveform to evaluate lower extremity arterial system on the right.  FINDINGS: Any stenoses are evaluated using the NASCET or similar method.  Velocity is elevated in the common femoral and superficial femoral artery. No occlusive segments in those arteries however. Moderate stenosis. Biphasic waveforms.  The popliteal artery, however, is occluded.  Posterior tibial arteries show monophasic waveforms.      Impression: Occlusion of the right popliteal artery.  This report was finalized on 8/23/2023 4:48 PM by Dr. Juan Plascencia MD.      US Venous Doppler Lower Extremity Right (duplex)    Result Date: 8/23/2023  US VENOUS DOPPLER LOWER EXTREMITY RIGHT (DUPLEX)-  CLINICAL INDICATION: US arterial doppler   COMPARISON: None available  TECHNIQUE: Color Doppler imaging was used with compression and  augmentation to evaluate the right lower extremity deep venous system.  FINDINGS: There is patent spontaneous flow from the common femoral vein through the posterior tibial veins. There was no internal clot or area of noncompressibility in the right lower extremity. Normal augmentation was elicited where applicable. The left common femoral vein is patent       Impression: No DVT in the right lower extremity on today's exam.  This report was finalized on 8/23/2023 4:40 PM by Dr. Juan Plascencia MD.      NM PET/CT Whole Body    Result Date: 8/22/2023  EXAM:   PET/CT Whole Body  EXAM DATE:   8/22/2023 10:30 AM  CLINICAL HISTORY:   Lung nodules, multiple; C16.9-Malignant neoplasm of stomach, unspecified; R91.8-Other nonspecific abnormal finding of lung field  TECHNIQUE:   Axial Positron Emission Tomography / Computed Tomography images were obtained of the whole body following the intravenous administration of F-18 FDG.  MIP reconstructed images were reviewed.  COMPARISON:   08/09/2023  FINDINGS:   HEAD:   BRAIN AND EXTRA-AXIAL SPACES:  Unremarkable.  No hemorrhage.  No significant white matter disease.  No edema.  No ventriculomegaly.   SINUSES:  Unremarkable as visualized.  No acute sinusitis.   MASTOID AIR CELLS:  Unremarkable as visualized.  No mastoid effusion.   NECK:   HYPOPHARYNX:  Unremarkable.   LARYNX:  Unremarkable.   TRACHEA:  Unremarkable.   RETROPHARYNGEAL SPACE:  Unremarkable.   SUBMANDIBULAR/PAROTID GLANDS:  Bilateral parotid gland lesions, right measuring up to 2 cm and left measuring up to 1.1 cm, and both showing PET hypermetabolism mSUV right 10.6 in mSUV left in the mSUV 23.   THYROID:  Unremarkable.  No enlarged or calcified nodules.   CHEST:   LUNGS AND PLEURAL SPACES:  Multiple right lung hilar region pulmonary nodules with the largest measuring up to about 1 cm showing low-grade less than baseline hypermetabolism of M mSUV 1.9.  Probable right hilar region hypermetabolism with a mesh UV 3.  No  significant effusion.  No pneumothorax.   HEART:  Unremarkable.  No cardiomegaly.  No significant pericardial effusion.   MEDIASTINUM:  Precarinal region lymph node measuring about 1.2 cm with PET hypermetabolism in mSUV 3.2.   ABDOMEN:   LIVER:  Unremarkable.   GALLBLADDER AND BILE DUCTS:  Unremarkable.  No calcified stones.  No ductal dilation.   PANCREAS:  Unremarkable.  No ductal dilation.   SPLEEN:  Unremarkable.  No splenomegaly.   ADRENALS:  Unremarkable.  No mass.   KIDNEYS AND URETERS:  Unremarkable.   STOMACH AND BOWEL:  Posterior gastric wall thickening and associated hypermetabolism within mSUV 9.5 and extending into the retroperitoneal space lesser sac region.  No obstruction.   PELVIS:   APPENDIX:  No findings to suggest acute appendicitis.   BLADDER:  Unremarkable.   REPRODUCTIVE:  Unremarkable as visualized.   WHOLE BODY:   INTRAPERITONEAL SPACE:  Unremarkable.  No significant fluid collection.  No free air.   BONES/JOINTS:  Unremarkable.  No acute fracture.  No dislocation.   SOFT TISSUES:  Unremarkable.   VASCULATURE:  Heterogeneous appearance of the liver that appear somewhat sclerotic but there is a liver lesion within mSUV 7 measuring about 3 cm. Another left lobe of liver lesion measuring about 2 cm also is hypermetabolic along with scattered regions of hypermetabolism throughout the liver that shows no definite corresponding CT PE abnormality.  No aortic aneurysm.   LYMPH NODES:  See above.      Impression: 1.  Bilateral parotid gland lesions, right measuring up to 2 cm and left measuring up to 1.1 cm, and both showing PET hypermetabolism mSUV right 10.6 in mSUV left in the mSUV 23. 2.  Heterogeneous appearance of the liver that appear somewhat sclerotic but there is a liver lesion within mSUV 7 measuring about 3 cm. Another left lobe of liver lesion measuring about 2 cm also is hypermetabolic along with scattered regions of hypermetabolism throughout the liver that shows no definite  corresponding CT PE abnormality. 3.  Precarinal region lymph node measuring about 1.2 cm with PET hypermetabolism in mSUV 3.2. 4.  Multiple right lung hilar region pulmonary nodules with the largest measuring up to about 1 cm showing low-grade less than baseline hypermetabolism of M mSUV 1.9. 5.  Probable right hilar region hypermetabolism with a mesh UV 3. 6.  Posterior gastric wall thickening and associated hypermetabolism within mSUV 9.5 and extending into the retroperitoneal space lesser sac region.  This report was finalized on 8/22/2023 12:41 PM by Dr. Galen Isaacs MD.           Assessment & Plan  Assessment & Plan       Popliteal artery occlusion, right    Complete heart block S/P transvenous pacemaker placement at [facility] on 12/17    PAD     HTN    Paroxysmal atrial fibrillation    Multiple pulmonary nodules (New 8mm RLL)    COPD (? severity)    CAD (coronary artery disease)    History of subacute left MCA distribution ischemic CVA    Tobacco use    Hyperlipidemia    Iron deficiency anemia due to chronic blood loss    Gastric cancer      Right Popliteal Artery occlusion  PAD  H/O right popliteal artery occlusion s/p embolectomy 2018  - CTA abdominal aorta with runoff not completed at OSH. Will obtain stat  - stat labs  - continue heparin gtt  - continue Pletal  - NPO for now  - consult to CT surgery  - stat labs, EKG    MAGGIE  - check UA  - IVF  - avoid nephrotoxins  - strict I&O    PAF  Complete heart lock s/p PPM  - currently stable and rate controlled  - followed by Dr. Jaramillo per cardiology  - was on Eliquis and amiodarone, unclear if patient still taking. Confirm in am    Gastric Adenocarcinoma, recently diagnoed  - with multiple pulmonary nodules  - following with oncology and pulmonology in [place]    COPD  Ongoing tobacco abuse  - stable  - duo nebs with additional pulm toilet as needed  - nicotine patch    HTN  HLD  - continue coreg    History of CVA        DVT prophylaxis:  heparin gtt    CODE STATUS:   full code  Level Of Support Discussed With: Patient  Code Status (Patient has no pulse and is not breathing): CPR (Attempt to Resuscitate)  Medical Interventions (Patient has pulse or is breathing): Full Support      Expected Discharge  TBD      This note has been completed as part of a split-shared workflow.     Signature: Electronically signed by Earnest Barrett PA-C, 08/23/23, 11:58 PM EDT    Total time spent: 90 minutes  Time spent includes time reviewing chart, face-to-face time, counseling patient/family/caregiver, ordering medications/tests/procedures, communicating with other health care professionals, documenting clinical information in the electronic health record, and coordination of care.        Attending   Admission Attestation       I have performed an independent face-to-face diagnostic evaluation including performing an independent physical examination.  The documented plan of care above was reviewed and developed with the advanced practice clinician (APC).  I have updated the HPI as appropriate.    Brief HPI    87 F transferred during the overnight shift from [facility].  She was at an oncology appointment today (she has recent diagnosis of gastric adenocarcinoma) and had mentioned to provider that she felt like her right foot was numb.  Reportedly, the patient's pulse was nonpalpable in the right lower extremity and she was sent to the ED for further evaluation.  Work-up at the [place] facility included CT abdominal aorta with iliofemoral runoffs, which showed occlusion of the distal superficial right femoral artery, very limited flow below the occlusion.  During my visit upon the medical floor, she denies any pain but still C/O right foot numbness.    Attending Physical Exam:  Temp:  [97.3 °F (36.3 °C)-98.6 °F (37 °C)] 98.6 °F (37 °C)  Heart Rate:  [63-82] 69  Resp:  [18] 18  BP: ()/(56-74) 152/69    Constitutional: Awake, alert, NAD  Eyes: PERRLA, sclerae anicteric, no  conjunctival injection  HENT: NCAT, mucous membranes moist  Neck: Supple, no thyromegaly, no lymphadenopathy, trachea midline  Respiratory: Clear to auscultation bilaterally, nonlabored respirations   Cardiovascular: RRR, no murmurs, rubs, or gallops, palpable pedal pulses bilaterally though decreased on the right, more difficult to palpate  Gastrointestinal: Positive bowel sounds, soft, nontender, nondistended  Musculoskeletal: No bilateral ankle edema, no clubbing or cyanosis to extremities  Psychiatric: Appropriate affect, cooperative  Neurologic: Oriented x 3, strength symmetric in all extremities, Cranial Nerves grossly intact to confrontation, speech clear.  There is decreased sensation of the RLE from the foot extending proximally up to mid calf.  Skin: No rashes, normal turgor.    Assessment and Plan:    Total time spent: 23 minutes  Time spent includes time reviewing chart, face-to-face time, counseling patient/family/caregiver, ordering medications/tests/procedures, communicating with other health care professionals, documenting clinical information in the electronic health record, and coordination of care.       See assessment and plan documented by APC above and updated/edited by me as appropriate.    Ran Blankenship III,   08/24/23                   Electronically signed by Ran Blankenship III,  at 08/24/23 0358       Current Facility-Administered Medications   Medication Dose Route Frequency Provider Last Rate Last Admin    acetaminophen (TYLENOL) tablet 650 mg  650 mg Oral Q4H PRN Earnest Barrett PA-C        sennosides-docusate (PERICOLACE) 8.6-50 MG per tablet 2 tablet  2 tablet Oral BID Earnest Barrett PA-C   2 tablet at 08/25/23 2039    And    polyethylene glycol (MIRALAX) packet 17 g  17 g Oral Daily PRN Earnest Barrett PA-C        And    bisacodyl (DULCOLAX) EC tablet 5 mg  5 mg Oral Daily PRN Earnest Barrett PA-C        And    bisacodyl (DULCOLAX) suppository 10 mg   10 mg Rectal Daily PRN Earnest Barrett PA-C        ceFAZolin in dextrose (ANCEF) IVPB solution 2,000 mg  2,000 mg Intravenous Once Janki Menchaca APRN   Held at 23 1804    cilostazol (PLETAL) tablet 100 mg  100 mg Oral BID Earnest Barrett PA-C   100 mg at 23 0913    donepezil (ARICEPT) tablet 10 mg  10 mg Oral Nightly Earnest Barrett PA-C   10 mg at 23 2039    heparin 24989 units/250 mL (100 units/mL) in 0.45 % NaCl infusion  23 Units/kg/hr (Order-Specific) Intravenous Titrated Leeanna Carr PharmD 9.29 mL/hr at 23 0911 23 Units/kg/hr at 23 0911    melatonin tablet 5 mg  5 mg Oral Nightly PRN Earnest Barrett PA-C   5 mg at 23    metoprolol tartrate (LOPRESSOR) injection 5 mg  5 mg Intravenous Q4H PRN Ammy Souza APRN   5 mg at 23 2328    metoprolol tartrate (LOPRESSOR) tablet 50 mg  50 mg Oral Q12H Ammy Souza APRN   50 mg at 23 0914    ondansetron (ZOFRAN) injection 4 mg  4 mg Intravenous Q6H PRN Earnest Barrett PA-C        oxyCODONE (ROXICODONE) 5 MG/5ML solution 5 mg  5 mg Oral Q6H PRN Ran Blankenship III, DO        pantoprazole (PROTONIX) EC tablet 40 mg  40 mg Oral BID Earnest Barrett PA-C   40 mg at 23 0916    Pharmacy Consult   Does not apply Continuous PRN Meagan Pulliam MD        Pharmacy to Dose Heparin   Does not apply Continuous PRN Earnest Barrett PA-C        sodium chloride 0.9 % flush 10 mL  10 mL Intravenous Q12H Earnest Barrett PA-C   10 mL at 23 0914    sodium chloride 0.9 % flush 10 mL  10 mL Intravenous PRN Earnest Barrett PA-C        sodium chloride 0.9 % infusion 40 mL  40 mL Intravenous PRN Earnest Barrett PA-C            Physician Progress Notes (last 72 hours)        Meagan Pulliam MD at 23 0943              [facility] Medicine Services  PROGRESS NOTE    Patient Name: Rupinder Lees  : 1935  MRN: [id number]    Date of  Admission: 8/23/2023  Primary Care Physician: Gifty Kruse MD    Subjective   Subjective     CC:  Follow-up popliteal artery occlusion    HPI:  Heart rate improved today to the 90s to low 100s.  No chest pain or shortness of breath.  No palpitations.  Had a bowel movement in the past 24 hours.  Denied leg pain.    ROS:  Gen- No fevers, chills  CV- No chest pain, no palpitations  Resp- No cough, dyspnea  GI- No N/V/D, no abd pain     Objective   Objective     Vital Signs:   Temp:  [98.2 °F (36.8 °C)-98.5 °F (36.9 °C)] 98.2 °F (36.8 °C)  Heart Rate:  [] 107  Resp:  [16-20] 20  BP: (117-155)/(69-99) 155/99     Physical Exam:  Constitutional: No acute distress, awake, alert, laying in bed  HENT: NCAT, mucous membranes moist  Respiratory: Clear to auscultation bilaterally, respiratory effort normal   Cardiovascular: IRIR, , no murmurs, rubs, or gallops  Gastrointestinal: Positive bowel sounds, soft, nontender, nondistended  Musculoskeletal: No bilateral ankle edema  Psychiatric: Appropriate affect, cooperative  Neurologic: PERRL, symmetric facies, speech clear  Skin: No rashes    Results Reviewed:  LAB RESULTS:      Lab 08/26/23  0217 08/25/23  1454 08/25/23  0609 08/24/23  2349 08/24/23  1544 08/24/23  0721 08/24/23  0003 08/23/23  1816 08/23/23  1816 08/23/23  1600   WBC  --   --  7.01  --   --   --  7.94  --   --  8.76   HEMOGLOBIN  --   --  10.3*  --   --   --  11.0*  --   --  11.8*   HEMATOCRIT  --   --  32.0*  --   --   --  35.2  --   --  38.8   PLATELETS  --   --  263  --   --   --  204  --   --  238   NEUTROS ABS  --   --   --   --   --   --  5.53  --   --  6.40   IMMATURE GRANS (ABS)  --   --   --   --   --   --  0.05  --   --  0.04   LYMPHS ABS  --   --   --   --   --   --  1.35  --   --  1.40   MONOS ABS  --   --   --   --   --   --  0.91*  --   --  0.85   EOS ABS  --   --   --   --   --   --  0.06  --   --  0.03   MCV  --   --  84.9  --   --   --  88.4  --   --  88.8   PROTIME  --   --   --    --   --   --   --   --  14.3  --    APTT  --   --   --   --   --   --   --   --  30.6  --    HEPARIN ANTI-XA 0.42 0.30 0.26* 0.30 0.15*   < > 0.10*   < > <0.10*  --     < > = values in this interval not displayed.           Lab 08/26/23  0217 08/25/23  0608 08/24/23  0003 08/23/23  1600   SODIUM 136 135* 138 135*   POTASSIUM 3.8 3.5 3.9 4.5   CHLORIDE 103 101 104 98   CO2 21.0* 24.0 22.0 22.2   ANION GAP 12.0 10.0 12.0 14.8   BUN 14 14 18 21   CREATININE 1.35* 1.29* 1.15* 1.28*   EGFR 38.6* 40.8* 46.8* 41.1*   GLUCOSE 97 121* 104* 106*   CALCIUM 8.2* 8.4* 8.3* 8.9   MAGNESIUM 1.9 1.9  --   --            Lab 08/24/23  0003 08/23/23  1600   TOTAL PROTEIN 5.7* 6.8   ALBUMIN 3.0* 3.5   GLOBULIN 2.7 3.3   ALT (SGPT) 12 15   AST (SGOT) 18 22   BILIRUBIN 0.4 0.6   ALK PHOS 151* 159*           Lab 08/23/23  1816   PROTIME 14.3   INR 1.06               Lab 08/24/23  2349   ABO TYPING O   RH TYPING Positive   ANTIBODY SCREEN Negative           Brief Urine Lab Results  (Last result in the past 365 days)        Color   Clarity   Blood   Leuk Est   Nitrite   Protein   CREAT   Urine HCG        08/24/23 1248 Yellow   Cloudy   Negative   Negative   Negative   30 mg/dL (1+)                   Microbiology Results Abnormal       None            No radiology results from the last 24 hrs        Current medications:  Scheduled Meds:ceFAZolin, 2,000 mg, Intravenous, Once  cilostazol, 100 mg, Oral, BID  donepezil, 10 mg, Oral, Nightly  metoprolol tartrate, 50 mg, Oral, Q12H  pantoprazole, 40 mg, Oral, BID  senna-docusate sodium, 2 tablet, Oral, BID  sodium chloride, 10 mL, Intravenous, Q12H      Continuous Infusions:heparin, 23 Units/kg/hr (Order-Specific), Last Rate: 23 Units/kg/hr (08/26/23 2334)  Pharmacy Consult,   Pharmacy to Dose Heparin,       PRN Meds:.  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 severity) [J44.9]  Yes    Gastric cancer [C16.2]  Yes    Paroxysmal atrial fibrillation [I48.0]  Yes    Iron deficiency anemia due to chronic blood loss [D50.0]  Yes    HTN [I10]  Yes    Complete heart block S/P transvenous pacemaker placement at [facility] [place] on 12/17 [I44.2]  Yes    PAD  [I73.9]  Yes    Hyperlipidemia [E78.5]  Yes    History of subacute left MCA distribution ischemic CVA [Z86.73]  Not Applicable    Tobacco use [Z72.0]  Yes    MGAGIE (acute kidney injury) [N17.9]  Yes      Resolved Hospital Problems   No resolved problems to display.        Brief Hospital Course to date:  Rupinder Lees is a 87 y.o. female With COPD on 2 L nasal cannula as needed, CAD, HTN, HLD, complete heart block status post PPM, prior CVA, A-fib, prior DVT, PAD status post femoral artery stent and right popliteal thrombosis status post embolectomy/femoropopliteal bypass (2018, [place]), who presented for evaluation from [facility] after presenting with complaints of loss of sensation in right foot.  Was found to have a right popliteal artery occlusion.    This patient's problems and plans were partially entered by my partner and updated as appropriate by me 08/26/23.     Right Popliteal Artery occlusion  PAD  H/O right popliteal artery occlusion s/p embolectomy 2018  - CTA abdominal aorta with runoff not completed at OSH. Obtained here and showed occlusion  - continue heparin gtt  - Cardiac diet once no longer n.p.o. for surgery  - CT surgery following     CKD  - Baseline creatinine appears to be 1.1-1.4  - avoid nephrotoxins  - strict I&O     PAF with RVR  Complete  "heart lock s/p PPM  - followed by Dr. Jaramillo per cardiology  - was on Eliquis and amiodarone, unclear if patient still taking amiodarone; Eliquis appears to have been recently refilled (but patient states she stopped taking it \"recently\"); discussed with pharmacy and patient appears to not be on amiodarone outpatient (no fill history; only fills with [facility])  - coreg transitioned to metoprolol per cardiology  - Having issues with RVR; PRN IV metoprolol ordered  - Cardiology consulted  - Pacemaker interrogated  - On heparin drip  - TSH pending     Gastric Adenocarcinoma, recently diagnoed  - with multiple pulmonary nodules  - following with oncology and pulmonology in [place]  - Also has multiple hepatic lesions and an adrenal enlargement on the left side  - Hospice consulted     COPD  Ongoing tobacco abuse  - stable  - duo nebs with additional pulm toilet as needed  - nicotine patch     HTN  HLD  - continue coreg     History of CVA    Expected Discharge Location and Transportation: pending CT surgery and possible PT/OT after  Expected Discharge   Expected Discharge Date: 2023; Expected Discharge Time:      DVT prophylaxis:  Medical DVT prophylaxis orders are present.     AM-PAC 6 Clicks Score (PT): 17 (23 0830)    CODE STATUS:   Code Status and Medical Interventions:   Ordered at: 23 3539     Level Of Support Discussed With:    Patient     Code Status (Patient has no pulse and is not breathing):    CPR (Attempt to Resuscitate)     Medical Interventions (Patient has pulse or is breathing):    Full Support       Meagan Pulliam MD  23      Electronically signed by Meagan Pulliam MD at 23 0946       Meagan Pulliam MD at 23 0903              [facility] Medicine Services  PROGRESS NOTE    Patient Name: Rupinder Lees  : 1935  MRN: [id number]    Date of Admission: 2023  Primary Care Physician: Gifty Kruse MD    Subjective   Subjective "     CC:  Follow-up popliteal artery occlusion    HPI:  Having elevated heart rate 140s up to 160s, A-fib with RVR.  Son at bedside and patient okay with him receiving updates.  Denied palpitations or chest pain.  No leg pain.    ROS:  Gen- No fevers, chills  CV- No chest pain, no palpitations  Resp- No cough, dyspnea  GI- No N/V/D, abd pain     Objective   Objective     Vital Signs:   Temp:  [97.8 °F (36.6 °C)-98.8 °F (37.1 °C)] 98 °F (36.7 °C)  Heart Rate:  [] 143  Resp:  [17-19] 17  BP: (105-134)/(63-81) 112/81     Physical Exam:  Constitutional: No acute distress, awake, alert  HENT: NCAT, mucous membranes moist  Respiratory: Clear to auscultation bilaterally, respiratory effort normal   Cardiovascular: IRIR, , no murmurs, rubs, or gallops  Gastrointestinal: Positive bowel sounds, soft, nontender, nondistended  Musculoskeletal: No bilateral ankle edema  Psychiatric: Appropriate affect, cooperative  Neurologic: PERRL, symmetric facies, speech clear  Skin: No rashes    Results Reviewed:  LAB RESULTS:      Lab 08/25/23  0609 08/24/23  2349 08/24/23  1544 08/24/23  0721 08/24/23  0003 08/23/23  1816 08/23/23  1816 08/23/23  1600   WBC 7.01  --   --   --  7.94  --   --  8.76   HEMOGLOBIN 10.3*  --   --   --  11.0*  --   --  11.8*   HEMATOCRIT 32.0*  --   --   --  35.2  --   --  38.8   PLATELETS 263  --   --   --  204  --   --  238   NEUTROS ABS  --   --   --   --  5.53  --   --  6.40   IMMATURE GRANS (ABS)  --   --   --   --  0.05  --   --  0.04   LYMPHS ABS  --   --   --   --  1.35  --   --  1.40   MONOS ABS  --   --   --   --  0.91*  --   --  0.85   EOS ABS  --   --   --   --  0.06  --   --  0.03   MCV 84.9  --   --   --  88.4  --   --  88.8   PROTIME  --   --   --   --   --   --  14.3  --    APTT  --   --   --   --   --   --  30.6  --    HEPARIN ANTI-XA 0.26* 0.30 0.15* 0.20* 0.10*   < > <0.10*  --     < > = values in this interval not displayed.           Lab 08/25/23  06 08/24/23  Tiana  08/23/23  1600   SODIUM 135* 138 135*   POTASSIUM 3.5 3.9 4.5   CHLORIDE 101 104 98   CO2 24.0 22.0 22.2   ANION GAP 10.0 12.0 14.8   BUN 14 18 21   CREATININE 1.29* 1.15* 1.28*   EGFR 40.8* 46.8* 41.1*   GLUCOSE 121* 104* 106*   CALCIUM 8.4* 8.3* 8.9           Lab 08/24/23  0003 08/23/23  1600   TOTAL PROTEIN 5.7* 6.8   ALBUMIN 3.0* 3.5   GLOBULIN 2.7 3.3   ALT (SGPT) 12 15   AST (SGOT) 18 22   BILIRUBIN 0.4 0.6   ALK PHOS 151* 159*           Lab 08/23/23  1816   PROTIME 14.3   INR 1.06               Lab 08/24/23  2349   ABO TYPING O   RH TYPING Positive   ANTIBODY SCREEN Negative         Brief Urine Lab Results  (Last result in the past 365 days)        Color   Clarity   Blood   Leuk Est   Nitrite   Protein   CREAT   Urine HCG        08/24/23 1248 Yellow   Cloudy   Negative   Negative   Negative   30 mg/dL (1+)                   Microbiology Results Abnormal       None            CT Angio Abdominal Aorta Bilateral Iliofem Runoff    Result Date: 8/24/2023  CT ANGIO ABDOMINAL AORTA BILAT ILIOFEM RUNOFF Date of Exam: 8/24/2023 12:25 AM EDT Indication: Claudication or leg ischemia. Comparison: CT of the abdomen pelvis from July 24, 2023 Technique: CTA of the abdomen, pelvis and both lower extremities was performed after the uneventful intravenous administration of 110 mL Isovue-370. Reconstructed coronal and sagittal images were also obtained. In addition, a 3-D volume rendered image was created for interpretation. Automated exposure control and iterative reconstruction methods were used. Findings: Non--- vascular findings: There is mild right basilar atelectasis. There are wire leads from cardiac pacemaker/AICD device the arterial enhanced images of the right adrenal gland, pancreas, spleen and right kidney appear normal. There is moderate left-sided hydroureteronephrosis to the level of the mid ureter without discrete stone or evidence of obstructive cause. Ureteral stricture would be favored. There is diminished  enhancement of the left kidney suggestive of obstructive uropathy on the left. There is a stable left adrenal  mass. There are low-attenuation lesions of the liver which have been described previously. There is diverticulosis of the colon without evidence of diverticulitis. There is no free air, free fluid or pathologic adenopathy. There are degenerative changes  of the spine. Vascular findings: There is calcific atherosclerosis of the descending thoracic and proximal abdominal aorta. There is a small infrarenal abdominal aortic aneurysm with mural thrombus. This is unchanged with a maximum diameter of approximately 2.7 cm. The celiac artery, superior mesenteric artery, inferior mesenteric artery and bilateral renal arteries are widely patent. There is distal abdominal aortic calcific atherosclerosis. On the left side, there is mild stenosis of the proximal left common iliac artery approximately 50%. There is a stent just beyond the level of the stenosis. The left external iliac artery is patent. The left superficial femoral artery, popliteal artery and infrapopliteal runoff appears normal with at least a single vessel runoff to the ankle. On the right side, there is calcific atherosclerosis of the right common iliac artery, internal and external iliac arteries which are widely patent. There is diffuse irregularity of the distal superficial femoral artery with occlusion just above the knee. This could be from acute embolic event given the lack of calcification at the location. There is some filling of the infrapopliteal runoff, poorly seen likely secondary to significant sluggish flow from the occlusion. .    Impression: Impression: 1.Occlusion of the distal superficial femoral artery approximately 5 cm above the joint line without significant surrounding calcific atherosclerotic disease. This favors probable embolism. Vascular specialist consultation recommended. 2.Very limited flow below the level of right  arterial occlusion with the anterior tibial artery vaguely seen. 3.Left-sided runoff appears to be intact with at least a single vessel runoff to the ankle. 4.Left-sided hydronephrosis with abnormal appearance of the left-sided nephrogram which appears delayed likely from obstructive uropathy. 5.Indeterminate low-attenuation hepatic lesions. 6.Left adrenal enlargement not definitely adenoma similar to the previous study. Please refer to previous PET scan findings from outside institution. Electronically Signed: Pedrito Parra MD  8/24/2023 2:32 AM EDT  Workstation ID: [id number]    US Arterial Doppler Lower Extremity Right    Result Date: 8/23/2023  EXAMINATION: US ARTERIAL DOPPLER LOWER EXTREMITY  RIGHT-  CLINICAL INDICATION: right leg pain   COMPARISON: None available.  PROCEDURE: Color Doppler imaging with pulse Doppler waveform to evaluate lower extremity arterial system on the right.  FINDINGS: Any stenoses are evaluated using the NASCET or similar method.  Velocity is elevated in the common femoral and superficial femoral artery. No occlusive segments in those arteries however. Moderate stenosis. Biphasic waveforms.  The popliteal artery, however, is occluded.  Posterior tibial arteries show monophasic waveforms.      Impression: Occlusion of the right popliteal artery.  This report was finalized on 8/23/2023 4:48 PM by Dr. Juan Plascencia MD.      US Venous Doppler Lower Extremity Right (duplex)    Result Date: 8/23/2023  US VENOUS DOPPLER LOWER EXTREMITY RIGHT (DUPLEX)-  CLINICAL INDICATION: US arterial doppler   COMPARISON: None available  TECHNIQUE: Color Doppler imaging was used with compression and augmentation to evaluate the right lower extremity deep venous system.  FINDINGS: There is patent spontaneous flow from the common femoral vein through the posterior tibial veins. There was no internal clot or area of noncompressibility in the right lower extremity. Normal augmentation was elicited where applicable.  The left common femoral vein is patent       Impression: No DVT in the right lower extremity on today's exam.  This report was finalized on 8/23/2023 4:40 PM by Dr. Juan Plascencia MD.           Current medications:  Scheduled Meds:carvedilol, 6.25 mg, Oral, BID  ceFAZolin, 2,000 mg, Intravenous, Once  cilostazol, 100 mg, Oral, BID  donepezil, 10 mg, Oral, Nightly  pantoprazole, 40 mg, Oral, BID  potassium chloride, 40 mEq, Oral, Once  senna-docusate sodium, 2 tablet, Oral, BID  sodium chloride, 10 mL, Intravenous, Q12H      Continuous Infusions:heparin, 23 Units/kg/hr (Order-Specific), Last Rate: 23 Units/kg/hr (08/25/23 0739)  Pharmacy Consult,   Pharmacy to Dose Heparin,       PRN Meds:.  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 severity) [J44.9]  Yes    Gastric cancer [C16.2]  Yes    Paroxysmal atrial fibrillation [I48.0]  Yes    Iron deficiency anemia due to chronic blood loss [D50.0]  Yes    HTN [I10]  Yes    Complete heart block S/P transvenous pacemaker placement at [facility] [place] on 12/17 [I44.2]  Yes    PAD  [I73.9]  Yes    Hyperlipidemia [E78.5]  Yes    History of subacute left MCA distribution ischemic CVA [Z86.73]  Not Applicable    Tobacco use [Z72.0]  Yes    MAGGIE (acute kidney injury) [N17.9]  Yes      Resolved Hospital Problems   No resolved problems to display.        Brief Hospital Course to date:  Rupinder Lees is a 87 y.o. female With COPD on 2 L nasal cannula as needed, CAD, HTN, HLD, complete heart block status post PPM, prior CVA, A-fib, prior  "DVT, PAD status post femoral artery stent and right popliteal thrombosis status post embolectomy/femoropopliteal bypass (2018, [place]), who presented for evaluation from [facility] after presenting with complaints of loss of sensation in right foot.  Was found to have a right popliteal artery occlusion.    This patient's problems and plans were partially entered by my partner and updated as appropriate by me 08/25/23.     Right Popliteal Artery occlusion  PAD  H/O right popliteal artery occlusion s/p embolectomy 2018  - CTA abdominal aorta with runoff not completed at OSH. Obtained here and showed occlusion  - continue heparin gtt  - Cardiac diet, n.p.o. for surgery  - CT surgery following     MAGGIE  - IVF  - avoid nephrotoxins  - strict I&O     PAF with RVR  Complete heart lock s/p PPM  - followed by Dr. Jaramillo per cardiology  - was on Eliquis and amiodarone, unclear if patient still taking amiodarone; Eliquis appears to have been recently refilled (but patient states she stopped taking it \"recently\"); discussed with pharmacy and patient appears to not be on amiodarone outpatient (no fill history; only fills with [facility])  - coreg dosing increased by cards  - Having issues with RVR; PRN IV metoprolol ordered; Dr. Gonzalez notified of this issue given surgery planned for this evening  - Cardiology consulted  - Pacemaker interrogation per cards  - On heparin drip     Gastric Adenocarcinoma, recently diagnoed  - with multiple pulmonary nodules  - following with oncology and pulmonology in [place]  - Also has multiple hepatic lesions and an adrenal enlargement on the left side     COPD  Ongoing tobacco abuse  - stable  - duo nebs with additional pulm toilet as needed  - nicotine patch     HTN  HLD  - continue coreg     History of CVA    Expected Discharge Location and Transportation: pending CT surgery and possible PT/OT after  Expected Discharge   Expected Discharge Date: 8/29/2023; Expected Discharge Time:  "     DVT prophylaxis:  Medical DVT prophylaxis orders are present.          CODE STATUS:   Code Status and Medical Interventions:   Ordered at: 23 2305     Level Of Support Discussed With:    Patient     Code Status (Patient has no pulse and is not breathing):    CPR (Attempt to Resuscitate)     Medical Interventions (Patient has pulse or is breathing):    Full Support       Meagan Pulliam MD  23      Electronically signed by Meagan Pulliam MD at 23 1315       Amy Lagunas PA-C at 23 0740          Continue heparin gtt. Tentative plans for aortogram with runoffs, thromboembolectomy and possible [place] today in OR with Dr. Gonzalez.    Electronically signed by Amy Lagunas PA-C at 23 0741       Meagan Pulliam MD at 23 0933              [facility] Medicine Services  PROGRESS NOTE    Patient Name: Rupinder Lees  : 1935  MRN: [id number]    Date of Admission: 2023  Primary Care Physician: Gifty Kruse MD    Subjective   Subjective     CC:  Follow-up popliteal artery occlusion    HPI:  Reports feeling hungry.  Denied pain.  No numbness or tingling. No respiratory symptoms. No GI or  symptoms.    ROS:  Gen- No fevers, chills  CV- No chest pain, palpitations  Resp- No cough, dyspnea  GI- No N/V/D, abd pain     Objective   Objective     Vital Signs:   Temp:  [97.3 °F (36.3 °C)-98.8 °F (37.1 °C)] 98.8 °F (37.1 °C)  Heart Rate:  [63-82] 78  Resp:  [17-18] 17  BP: ()/(56-78) 156/78     Physical Exam:  Constitutional: No acute distress, awake, alert  HENT: NCAT, mucous membranes moist  Respiratory: Clear to auscultation bilaterally, respiratory effort normal   Cardiovascular: RRR, no murmurs, rubs, or gallops  Gastrointestinal: Positive bowel sounds, soft, nontender, nondistended  Musculoskeletal: No bilateral ankle edema  Psychiatric: Appropriate affect, cooperative  Neurologic: PERRL, symmetric facies, speech clear  Skin: No  percy    Results Reviewed:  LAB RESULTS:      Lab 08/24/23  0721 08/24/23  0003 08/23/23  1816 08/23/23  1600   WBC  --  7.94  --  8.76   HEMOGLOBIN  --  11.0*  --  11.8*   HEMATOCRIT  --  35.2  --  38.8   PLATELETS  --  204  --  238   NEUTROS ABS  --  5.53  --  6.40   IMMATURE GRANS (ABS)  --  0.05  --  0.04   LYMPHS ABS  --  1.35  --  1.40   MONOS ABS  --  0.91*  --  0.85   EOS ABS  --  0.06  --  0.03   MCV  --  88.4  --  88.8   PROTIME  --   --  14.3  --    APTT  --   --  30.6  --    HEPARIN ANTI-XA 0.20* 0.10* <0.10*  --          Lab 08/24/23  0003 08/23/23  1600   SODIUM 138 135*   POTASSIUM 3.9 4.5   CHLORIDE 104 98   CO2 22.0 22.2   ANION GAP 12.0 14.8   BUN 18 21   CREATININE 1.15* 1.28*   EGFR 46.8* 41.1*   GLUCOSE 104* 106*   CALCIUM 8.3* 8.9         Lab 08/24/23  0003 08/23/23  1600   TOTAL PROTEIN 5.7* 6.8   ALBUMIN 3.0* 3.5   GLOBULIN 2.7 3.3   ALT (SGPT) 12 15   AST (SGOT) 18 22   BILIRUBIN 0.4 0.6   ALK PHOS 151* 159*         Lab 08/23/23  1816   PROTIME 14.3   INR 1.06                 Brief Urine Lab Results  (Last result in the past 365 days)        Color   Clarity   Blood   Leuk Est   Nitrite   Protein   CREAT   Urine HCG        07/24/23 1326 Yellow   Clear   Negative   Negative   Negative   Negative                   Microbiology Results Abnormal       None            CT Angio Abdominal Aorta Bilateral Iliofem Runoff    Result Date: 8/24/2023  CT ANGIO ABDOMINAL AORTA BILAT ILIOFEM RUNOFF Date of Exam: 8/24/2023 12:25 AM EDT Indication: Claudication or leg ischemia. Comparison: CT of the abdomen pelvis from July 24, 2023 Technique: CTA of the abdomen, pelvis and both lower extremities was performed after the uneventful intravenous administration of 110 mL Isovue-370. Reconstructed coronal and sagittal images were also obtained. In addition, a 3-D volume rendered image was created for interpretation. Automated exposure control and iterative reconstruction methods were used. Findings: Non---  vascular findings: There is mild right basilar atelectasis. There are wire leads from cardiac pacemaker/AICD device the arterial enhanced images of the right adrenal gland, pancreas, spleen and right kidney appear normal. There is moderate left-sided hydroureteronephrosis to the level of the mid ureter without discrete stone or evidence of obstructive cause. Ureteral stricture would be favored. There is diminished enhancement of the left kidney suggestive of obstructive uropathy on the left. There is a stable left adrenal  mass. There are low-attenuation lesions of the liver which have been described previously. There is diverticulosis of the colon without evidence of diverticulitis. There is no free air, free fluid or pathologic adenopathy. There are degenerative changes  of the spine. Vascular findings: There is calcific atherosclerosis of the descending thoracic and proximal abdominal aorta. There is a small infrarenal abdominal aortic aneurysm with mural thrombus. This is unchanged with a maximum diameter of approximately 2.7 cm. The celiac artery, superior mesenteric artery, inferior mesenteric artery and bilateral renal arteries are widely patent. There is distal abdominal aortic calcific atherosclerosis. On the left side, there is mild stenosis of the proximal left common iliac artery approximately 50%. There is a stent just beyond the level of the stenosis. The left external iliac artery is patent. The left superficial femoral artery, popliteal artery and infrapopliteal runoff appears normal with at least a single vessel runoff to the ankle. On the right side, there is calcific atherosclerosis of the right common iliac artery, internal and external iliac arteries which are widely patent. There is diffuse irregularity of the distal superficial femoral artery with occlusion just above the knee. This could be from acute embolic event given the lack of calcification at the location. There is some filling of the  infrapopliteal runoff, poorly seen likely secondary to significant sluggish flow from the occlusion. .    Impression: Impression: 1.Occlusion of the distal superficial femoral artery approximately 5 cm above the joint line without significant surrounding calcific atherosclerotic disease. This favors probable embolism. Vascular specialist consultation recommended. 2.Very limited flow below the level of right arterial occlusion with the anterior tibial artery vaguely seen. 3.Left-sided runoff appears to be intact with at least a single vessel runoff to the ankle. 4.Left-sided hydronephrosis with abnormal appearance of the left-sided nephrogram which appears delayed likely from obstructive uropathy. 5.Indeterminate low-attenuation hepatic lesions. 6.Left adrenal enlargement not definitely adenoma similar to the previous study. Please refer to previous PET scan findings from outside institution. Electronically Signed: Pedrito Parra MD  8/24/2023 2:32 AM EDT  Workstation ID: [id number]    US Arterial Doppler Lower Extremity Right    Result Date: 8/23/2023  EXAMINATION: US ARTERIAL DOPPLER LOWER EXTREMITY  RIGHT-  CLINICAL INDICATION: right leg pain   COMPARISON: None available.  PROCEDURE: Color Doppler imaging with pulse Doppler waveform to evaluate lower extremity arterial system on the right.  FINDINGS: Any stenoses are evaluated using the NASCET or similar method.  Velocity is elevated in the common femoral and superficial femoral artery. No occlusive segments in those arteries however. Moderate stenosis. Biphasic waveforms.  The popliteal artery, however, is occluded.  Posterior tibial arteries show monophasic waveforms.      Impression: Occlusion of the right popliteal artery.  This report was finalized on 8/23/2023 4:48 PM by Dr. Juan Plascencia MD.      US Venous Doppler Lower Extremity Right (duplex)    Result Date: 8/23/2023  US VENOUS DOPPLER LOWER EXTREMITY RIGHT (DUPLEX)-  CLINICAL INDICATION: US arterial doppler    COMPARISON: None available  TECHNIQUE: Color Doppler imaging was used with compression and augmentation to evaluate the right lower extremity deep venous system.  FINDINGS: There is patent spontaneous flow from the common femoral vein through the posterior tibial veins. There was no internal clot or area of noncompressibility in the right lower extremity. Normal augmentation was elicited where applicable. The left common femoral vein is patent       Impression: No DVT in the right lower extremity on today's exam.  This report was finalized on 8/23/2023 4:40 PM by Dr. Juan Plascencia MD.      NM PET/CT Whole Body    Result Date: 8/22/2023  EXAM:   PET/CT Whole Body  EXAM DATE:   8/22/2023 10:30 AM  CLINICAL HISTORY:   Lung nodules, multiple; C16.9-Malignant neoplasm of stomach, unspecified; R91.8-Other nonspecific abnormal finding of lung field  TECHNIQUE:   Axial Positron Emission Tomography / Computed Tomography images were obtained of the whole body following the intravenous administration of F-18 FDG.  MIP reconstructed images were reviewed.  COMPARISON:   08/09/2023  FINDINGS:   HEAD:   BRAIN AND EXTRA-AXIAL SPACES:  Unremarkable.  No hemorrhage.  No significant white matter disease.  No edema.  No ventriculomegaly.   SINUSES:  Unremarkable as visualized.  No acute sinusitis.   MASTOID AIR CELLS:  Unremarkable as visualized.  No mastoid effusion.   NECK:   HYPOPHARYNX:  Unremarkable.   LARYNX:  Unremarkable.   TRACHEA:  Unremarkable.   RETROPHARYNGEAL SPACE:  Unremarkable.   SUBMANDIBULAR/PAROTID GLANDS:  Bilateral parotid gland lesions, right measuring up to 2 cm and left measuring up to 1.1 cm, and both showing PET hypermetabolism mSUV right 10.6 in mSUV left in the mSUV 23.   THYROID:  Unremarkable.  No enlarged or calcified nodules.   CHEST:   LUNGS AND PLEURAL SPACES:  Multiple right lung hilar region pulmonary nodules with the largest measuring up to about 1 cm showing low-grade less than baseline  hypermetabolism of M mSUV 1.9.  Probable right hilar region hypermetabolism with a mesh UV 3.  No significant effusion.  No pneumothorax.   HEART:  Unremarkable.  No cardiomegaly.  No significant pericardial effusion.   MEDIASTINUM:  Precarinal region lymph node measuring about 1.2 cm with PET hypermetabolism in mSUV 3.2.   ABDOMEN:   LIVER:  Unremarkable.   GALLBLADDER AND BILE DUCTS:  Unremarkable.  No calcified stones.  No ductal dilation.   PANCREAS:  Unremarkable.  No ductal dilation.   SPLEEN:  Unremarkable.  No splenomegaly.   ADRENALS:  Unremarkable.  No mass.   KIDNEYS AND URETERS:  Unremarkable.   STOMACH AND BOWEL:  Posterior gastric wall thickening and associated hypermetabolism within mSUV 9.5 and extending into the retroperitoneal space lesser sac region.  No obstruction.   PELVIS:   APPENDIX:  No findings to suggest acute appendicitis.   BLADDER:  Unremarkable.   REPRODUCTIVE:  Unremarkable as visualized.   WHOLE BODY:   INTRAPERITONEAL SPACE:  Unremarkable.  No significant fluid collection.  No free air.   BONES/JOINTS:  Unremarkable.  No acute fracture.  No dislocation.   SOFT TISSUES:  Unremarkable.   VASCULATURE:  Heterogeneous appearance of the liver that appear somewhat sclerotic but there is a liver lesion within mSUV 7 measuring about 3 cm. Another left lobe of liver lesion measuring about 2 cm also is hypermetabolic along with scattered regions of hypermetabolism throughout the liver that shows no definite corresponding CT PE abnormality.  No aortic aneurysm.   LYMPH NODES:  See above.      Impression: 1.  Bilateral parotid gland lesions, right measuring up to 2 cm and left measuring up to 1.1 cm, and both showing PET hypermetabolism mSUV right 10.6 in mSUV left in the mSUV 23. 2.  Heterogeneous appearance of the liver that appear somewhat sclerotic but there is a liver lesion within mSUV 7 measuring about 3 cm. Another left lobe of liver lesion measuring about 2 cm also is hypermetabolic  along with scattered regions of hypermetabolism throughout the liver that shows no definite corresponding CT PE abnormality. 3.  Precarinal region lymph node measuring about 1.2 cm with PET hypermetabolism in mSUV 3.2. 4.  Multiple right lung hilar region pulmonary nodules with the largest measuring up to about 1 cm showing low-grade less than baseline hypermetabolism of M mSUV 1.9. 5.  Probable right hilar region hypermetabolism with a mesh UV 3. 6.  Posterior gastric wall thickening and associated hypermetabolism within mSUV 9.5 and extending into the retroperitoneal space lesser sac region.  This report was finalized on 8/22/2023 12:41 PM by Dr. Galen Isaacs MD.           Current medications:  Scheduled Meds:carvedilol, 6.25 mg, Oral, BID  cilostazol, 100 mg, Oral, BID  donepezil, 10 mg, Oral, Nightly  pantoprazole, 40 mg, Oral, BID  senna-docusate sodium, 2 tablet, Oral, BID  sodium chloride, 10 mL, Intravenous, Q12H      Continuous Infusions:heparin, 16 Units/kg/hr, Last Rate: 16 Units/kg/hr (08/24/23 0113)  lactated ringers, 75 mL/hr, Last Rate: 75 mL/hr (08/24/23 0041)  Pharmacy to Dose Heparin,       PRN Meds:.  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 severity) [J44.9]  Yes    Gastric cancer [C16.2]  Yes    Paroxysmal atrial fibrillation [I48.0]  Yes    Iron deficiency anemia due to chronic blood loss [D50.0]  Yes    HTN [I10]  Yes    Complete heart block S/P transvenous pacemaker placement at [facility] on 12/17 [I44.2]  Yes    PAD  [I73.9]  Yes    Hyperlipidemia [E78.5]  Yes    History of  subacute left MCA distribution ischemic CVA [Z86.73]  Not Applicable    Tobacco use [Z72.0]  Yes    MAGGIE (acute kidney injury) [N17.9]  Yes      Resolved Hospital Problems   No resolved problems to display.        Brief Hospital Course to date:  Rupinder Lees is a 87 y.o. female With COPD on 2 L nasal cannula as needed, CAD, HTN, HLD, complete heart block status post PPM, prior CVA, A-fib, prior DVT, PAD status post femoral artery stent and right popliteal thrombosis status post embolectomy/femoropopliteal bypass (2018, [place]), who presented for evaluation from [facility] after presenting with complaints of loss of sensation in right foot.  Was found to have a right popliteal artery occlusion.    This patient's problems and plans were partially entered by my partner and updated as appropriate by me 08/24/23.     Right Popliteal Artery occlusion  PAD  H/O right popliteal artery occlusion s/p embolectomy 2018  - CTA abdominal aorta with runoff not completed at OSH. Obtained here and showed occlusion  - continue heparin gtt  - Cardiac diet, n.p.o. after midnight  - CT surgery following  - stat labs, EKG     MAGGIE  - IVF  - avoid nephrotoxins  - strict I&O     PAF  Complete heart lock s/p PPM  - currently stable and rate controlled  - followed by Dr. Jaramillo per cardiology  - was on Eliquis and amiodarone, unclear if patient still taking amiodarone; Eliquis appears to have been recently refilled; pharmacy consulted for clarification     Gastric Adenocarcinoma, recently diagnoed  - with multiple pulmonary nodules  - following with oncology and pulmonology in [place]  - Also has multiple hepatic lesions and an adrenal enlargement on the left side     COPD  Ongoing tobacco abuse  - stable  - duo nebs with additional pulm toilet as needed  - nicotine patch     HTN  HLD  - continue coreg     History of CVA    Expected Discharge Location and Transportation: pending CT surgery and possible PT/OT after  Expected  Discharge   Expected Discharge Date: 8/29/2023; Expected Discharge Time:      DVT prophylaxis:  Medical DVT prophylaxis orders are present.          CODE STATUS:   Code Status and Medical Interventions:   Ordered at: 08/23/23 2305     Level Of Support Discussed With:    Patient     Code Status (Patient has no pulse and is not breathing):    CPR (Attempt to Resuscitate)     Medical Interventions (Patient has pulse or is breathing):    Full Support       Meagan Pulliam MD  08/24/23      Electronically signed by Meagan Pulliam MD at 08/24/23 1313          Consult Notes (last 72 hours)        Ammy Souza APRN at 08/25/23 0921       Attestation signed by Carlo Jaramillo MD at 08/25/23 0934    I have reviewed this documentation and agree.  Patient was gone for procedure when I attempted to see her.  She is having rapid atrial fibrillation.  This may be related to her other acute illnesses.  For now we will work on controlling her rate.  We can discuss with her going for different antiarrhythmic options including restarting amiodarone versus trying another medication such as Tikosyn.                  Cardiology Consult    Rupinder Oh Nabeel  1935  [phone]  There is no work phone number on file.    08/25/23    DATE OF ADMISSION: 8/23/2023  [address]    Gifty Kruse MD  [address] / [place] [id number]  Referring Provider: Tiffany Calhoun PA     Reason for consult: tachycardia    Problem List:  Complete Heart Block  Bradycardia/hypotension noted at [facility] 12/17/18  S/p [facility] DC PPM 2018  No underlying rhythm 12/18/18  PVD  History of claudication pain with ischemic right leg 12/16/187  AA with runoffs 12/17/18: R Popliteal was occluded at P1 segment, no distal run off, very faint collaterals  Embolectomy/thrombectomy of right lower extremity  Nonobstructive CAD:  [facility] 12/17/18: nonobstructive CAD, normal EF. LAD with Prox mild diffuse disease, mid at Dx bifurcation had 50% stenosis,  distal small vessel diffuse disease Dx small vessel mild luminal irregularities. Left circumflex with Moderate calibre, mild diffuse stenosis, mid Lcx at OM take off had 40% stenosis, OM1 is a large calibre, prox 30-40% stenosis and mid and distal mild irregularities. Small to moderate calibre, dominant, prox 50 % tubular stenosis of RCA, mid and distal normal, small PDA and PL , no stenosis  Paroxysmal Afib   Diagnosed during admission 12/2018. Previously on Amiodarone therapy  CHADSVASC is 5; Previously on Eliquis 2.5mg BID, stopped due to hematemesis and gastric cancer  Tobacco abuse  COPD  Wears oxygen as needed  HLP  HTN  History of DVT  Gastric Cancer  PAD  Status post femoral artery stent and right popliteal thrombosis status post embolectomy/fem pop bypass 2018 Dr. Beatty  History of subacute left MCA distribution ischemic CVA  Medical noncompliance  MAGGIE - now resolved.     History of Present Illness:   Rupinder Lees is an 87-year-old female with above past medical history who was transferred from [facility] to [facility] 8/23/2023 due to complaints of loss of sensation in right foot.  At her oncology office visit recently it was noted that pulses were not palpable in her right lower extremity.  Work-up demonstrated right popliteal artery occlusion.  She was transferred to [facility] for higher level of care and is being seen by Dr. Gonzalez for possible procedure this admission.  She has a history of atrial fibrillation and complete heart block for which she received permanent pacemaker 2018.  She has been previously maintained on amiodarone that was stopped sometime in the last year for unknown reason.  This morning, patient became tachycardic and found to be in A-fib RVR and we are being consulted for management of A-fib. Fairly asymptomatic while in afib. She denies palpitations, SOB, LH, dizziness, swelling. Her daughter in law does report she has been intermittently complaining of SOB at  home with exertion. Of note she was recently diagnosed with gastric cancer and had hematemesis.  Eliquis was stopped at least a month ago.      No Known Allergies    Prior to Admission Medications       Prescriptions Last Dose Informant Patient Reported? Taking?    carvedilol (COREG) 6.25 MG tablet   No No    Take 1 tablet by mouth 2 (Two) Times a Day.    cilostazol (PLETAL) 100 MG tablet   No No    Take 1 tablet by mouth 2 (Two) Times a Day.    donepezil (Aricept) 10 MG tablet   No No    Take 1 tablet by mouth Every Night.    HYDROcodone-acetaminophen (NORCO) 5-325 MG per tablet   No No    Take 1 tablet by mouth Every 8 (Eight) Hours As Needed for Moderate Pain.    oxyCODONE (ROXICODONE) 5 MG/5ML solution   No No    Take 5 mL by mouth Every 6 (Six) Hours As Needed for Moderate Pain.    pantoprazole (PROTONIX) 40 MG EC tablet   No No    Take 1 tablet by mouth 2 (Two) Times a Day for 30 days.    polyethylene glycol (MIRALAX) 17 GM/SCOOP powder   No No    Take 17 g by mouth Daily.    prochlorperazine (COMPAZINE) 10 MG tablet   No No    Take 1 tablet by mouth Every 6 (Six) Hours As Needed for Nausea or Vomiting.    promethazine (PHENERGAN) 25 MG tablet   No No    Take 1 tablet by mouth Every 8 (Eight) Hours As Needed for Nausea or Vomiting. Take 1/2 to 1 tablet every 8 hours as needed for nausea and vomiting    traMADol (ULTRAM) 50 MG tablet   No No    Take 1 tablet by mouth Every 6 (Six) Hours As Needed for Moderate Pain.              Current Facility-Administered Medications:     acetaminophen (TYLENOL) tablet 650 mg, 650 mg, Oral, Q4H PRN, Earnest Barrett PA-C    sennosides-docusate (PERICOLACE) 8.6-50 MG per tablet 2 tablet, 2 tablet, Oral, BID, 2 tablet at 08/25/23 0841 **AND** polyethylene glycol (MIRALAX) packet 17 g, 17 g, Oral, Daily PRN **AND** bisacodyl (DULCOLAX) EC tablet 5 mg, 5 mg, Oral, Daily PRN **AND** bisacodyl (DULCOLAX) suppository 10 mg, 10 mg, Rectal, Daily PRN, Earnest Barrett PA-C     carvedilol (COREG) tablet 6.25 mg, 6.25 mg, Oral, BID, Earnest Barrett, PA-C, 6.25 mg at 08/25/23 0838    ceFAZolin in dextrose (ANCEF) IVPB solution 2,000 mg, 2,000 mg, Intravenous, Once, Janki Menchaca APRN    cilostazol (PLETAL) tablet 100 mg, 100 mg, Oral, BID, Earnest Barrett, PA-C, 100 mg at 08/25/23 0837    donepezil (ARICEPT) tablet 10 mg, 10 mg, Oral, Nightly, Earnest Barrett, PA-C, 10 mg at 08/24/23 2119    heparin 90197 units/250 mL (100 units/mL) in 0.45 % NaCl infusion, 23 Units/kg/hr (Order-Specific), Intravenous, Titrated, Leeanna Carr, PharmD, Last Rate: 9.29 mL/hr at 08/25/23 0739, 23 Units/kg/hr at 08/25/23 0739    melatonin tablet 5 mg, 5 mg, Oral, Nightly PRN, Earnest Barrett PA-C    metoprolol tartrate (LOPRESSOR) injection 5 mg, 5 mg, Intravenous, Q6H PRN, Meagan Pulliam MD, 5 mg at 08/24/23 1859    ondansetron (ZOFRAN) injection 4 mg, 4 mg, Intravenous, Q6H PRN, Earnest Barrett PA-C    oxyCODONE (ROXICODONE) 5 MG/5ML solution 5 mg, 5 mg, Oral, Q6H PRN, Ran Blankenship III, DO    pantoprazole (PROTONIX) EC tablet 40 mg, 40 mg, Oral, BID, Earnest Barrett, PA-C, 40 mg at 08/25/23 0837    Pharmacy Consult, , Does not apply, Continuous PRN, Meagan Pulliam MD    Pharmacy to Dose Heparin, , Does not apply, Continuous PRN, Earnest Barrett PA-C    potassium chloride (MICRO-K) CR capsule 40 mEq, 40 mEq, Oral, Once, Meagan Pulliam MD    sodium chloride 0.9 % flush 10 mL, 10 mL, Intravenous, Q12H, Earnest Barrett PA-C, 10 mL at 08/25/23 0842    sodium chloride 0.9 % flush 10 mL, 10 mL, Intravenous, PRN, Earnest Barrett PA-C    sodium chloride 0.9 % infusion 40 mL, 40 mL, Intravenous, PRN, Earnest Barrett PA-C    Social History     Socioeconomic History    Marital status:     Number of children: 3   Tobacco Use    Smoking status: Light Smoker     Packs/day: 0.50     Years: 63.00     Pack years: 31.50     Types: Cigarettes     Passive exposure: Current    Smokeless  tobacco: Never   Vaping Use    Vaping Use: Never used   Substance and Sexual Activity    Alcohol use: No    Drug use: No    Sexual activity: Defer       Family History   Problem Relation Age of Onset    Cancer Mother     Cancer Father     Heart attack Brother     Heart attack Brother        REVIEW OF SYSTEMS:   CONSTITUTIONAL:         No weight loss, fever, chills, weakness or fatigue.   HEENT:                            No visual loss, blurred vision, double vision, yellow sclerae.                                             No hearing loss, congestion, sore throat.   SKIN:                                No rashes, urticaria, ulcers, sores.     RESPIRATORY:               No shortness of breath, hemoptysis, cough, sputum.   GI:                                     No anorexia, nausea, vomiting, diarrhea. No abdominal pain, melena.   :                                   No burning on urination, hematuria or increased frequency.  ENDOCRINE:                   No diaphoresis, cold or heat intolerance. No polyuria or polydipsia.   NEURO:                            No headache, dizziness, syncope, paralysis, ataxia, or parasthesias.                                            No change in bowel or bladder control. No history of CVA/TIA  MUSCULOSKELETAL:    No muscle, back pain, joint pain or stiffness.   HEMATOLOGY:               No anemia, bleeding, bruising. No history of DVT/PE.  PSYCH:                            No history of depression, anxiety    Vitals:    08/24/23 2342 08/25/23 0315 08/25/23 0736 08/25/23 0838   BP: 134/69 132/70 105/63 112/81   BP Location: Left arm Left arm Left arm    Patient Position: Lying Lying Lying    Pulse: 108 120 (!) 155 (!) 143   Resp: 18 18 17    Temp: 97.8 °F (36.6 °C) 97.8 °F (36.6 °C) 98 °F (36.7 °C)    TempSrc: Oral Oral Oral    SpO2: 93% 94% 96%    Weight:             Vital Sign Min/Max for last 24 hours  Temp  Min: 97.8 °F (36.6 °C)  Max: 98.8 °F (37.1 °C)   BP  Min: 105/63  Max:  134/69   Pulse  Min: 98  Max: 155   Resp  Min: 17  Max: 19   SpO2  Min: 92 %  Max: 96 %   No data recorded      Intake/Output Summary (Last 24 hours) at 8/25/2023 0921  Last data filed at 8/25/2023 0736  Gross per 24 hour   Intake 222 ml   Output 350 ml   Net -128 ml             Physical Exam:  GEN: Well nourished, Well- developed  No acute distress  HEENT: Normocephalic, Atraumatic, PERRLA, moist mucous membranes  NECK: supple, NO JVD, no thyromegaly, no lymphadenopathy  CARDIAC: S1S2  RRR no murmur, gallop, rub  LUNGS: Clear to ausculation, normal respiratory effort  ABDOMEN: Soft, nontender, normal bowel sounds  EXTREMITIES:No gross deformities,  No clubbing, cyanosis, or edema  SKIN: Warm, dry  NEURO: No focal deficits  PSYCHIATRIC: Normal affect and mood      I personally viewed and interpreted the patient's EKG/Telemetry/lab data    Data:   Results from last 7 days   Lab Units 08/25/23  0609 08/24/23  0003 08/23/23  1600   WBC 10*3/mm3 7.01 7.94 8.76   HEMOGLOBIN g/dL 10.3* 11.0* 11.8*   HEMATOCRIT % 32.0* 35.2 38.8   PLATELETS 10*3/mm3 263 204 238     Results from last 7 days   Lab Units 08/25/23  0608 08/24/23  0003 08/23/23  1600   SODIUM mmol/L 135* 138 135*   POTASSIUM mmol/L 3.5 3.9 4.5   CHLORIDE mmol/L 101 104 98   CO2 mmol/L 24.0 22.0 22.2   BUN mg/dL 14 18 21   CREATININE mg/dL 1.29* 1.15* 1.28*   GLUCOSE mg/dL 121* 104* 106*                  Results from last 7 days   Lab Units 08/23/23  1816   PROTIME Seconds 14.3   INR  1.06   APTT seconds 30.6                   Intake/Output Summary (Last 24 hours) at 8/25/2023 0921  Last data filed at 8/25/2023 0736  Gross per 24 hour   Intake 222 ml   Output 350 ml   Net -128 ml             Telemetry: Afib, HR  bpm  EKG: Afib RVR,  bpm        Assessment and Plan:   1.Paroxysmal Afib   -Diagnosed 12/2018. Maintained previously on Amiodarone therapy, but it was stopped sometime within the last year for unknown reason.   -CHADSVASC is 7; she is at high  risk for stroke 11.2-15.7%. Previously on Eliquis however she was recently told to stop taking it but she is unsure who told her to stop it. She has been off anticoagulation for at least a month according to her daughter in law. She was recently diagnosed with gastric cancer and had been noted to have hematemesis prior to diagnosis. Currently on Heparin gtt.   -Device interrogation shows A-pacing 78%, V-pacing 1.5%, AMS 6.7%, all afib episodes this year were during August. 1st episode lasted August 4-11. 2nd episode began 8/24/23 through today. While in afib, heart rates are 110-160 bpm 50% of the time and  bpm 50% of the time.   - Will focus on rate control at this time. Discontinue Coreg and initiate Metoprolol 50 mg BID for better rate control. Will titrate up oral metoprolol as needed. IV Metoprolol PRN. May consider restarting Amiodarone after CT procedure.     2.Complete Heart Block  -Bradycardia/hypotension noted at [facility] 12/17/18  -s/p [employer] dual-chamber permanent pacemaker 12/17/18       3. Popliteal artery occlusion, right   -CT surgery following with plans for procedure today.   -low cardiac risk for surgery today      Electronically signed by ASHISH Kamara, 08/25/23, 10:54 AM EDT.           Electronically signed by Carlo Jaramillo MD at 08/25/23 1738       Amy Lagunas PA-C at 08/24/23 0730        Consult Orders    1. Inpatient Cardiothoracic Surgery Consult [[id number]] ordered by Earnest Barrett PA-C at 08/23/23 2304                 CTS Consult    Patient Care Team:  Gifty Kruse MD as PCP - General (Geriatric Medicine)  Carlo Jaramillo MD as Consulting Physician (Cardiology)  Chiquis Red, RN as Nurse Navigator      Reason for Consult:  Right popliteal artery occlusion    HPI  Patient is a 87 y.o. wheel chair bound female with a history of medication noncompliance, hypertension, hyperlipidemia, complete heart block s/p PPM, CVA, COPD, atrial fibrillation, coronary artery  disease and peripheral vascular disease s/p emergent right thromboembolectomy in 2018 who presented to [facility] ED as transfer from [facility] with complaints of loss of sensation in right foot.  Patient has had recent diagnosis of gastric adenocarcinoma with pulmonary lung nodules and PET scan revealing hypermetabolic parotid gland, liver, precarinal, and right lung hilar lesions.  Patient was evaluated in CT surgery office in May 2019 for bilateral calf and thigh pain requesting surgical intervention.  It was discussed with patient at that time that due to her significant microvascular disease and occlusion of the vessels of the distal aspect of her lower extremities, there were not any feasible options to help significantly improve blood flow to her distal lower extremities and she was offered referral to [facility] for a second opinion which she declined. Patient states she was instructed to discontinue Eliquis recently. CTA with runoffs overnight revealed occlusion of distal right superficial femoral artery with limited flow below the level of right arterial occlusion for which we have been asked to evaluate.      Review of Systems  Constitutional: Negative for malaise/fatigue.  Negative for chills, fever, night sweats and weight loss.  HENT: Negative for hearing loss, odynophagia and sore throat.    Cardiovascular: Negative for claudication and dyspnea on exertion.  Negative for chest pain, leg swelling, orthopnea and palpitations.  Respiratory: Negative for shortness of breath.  Negative for cough and hemoptysis.  Endocrine:  Negative for cold intolerance, heat intolerance, polydipsia, polyphagia and polyuria.  Hematologic/Lymphatic: Negative for easy bruising/bleeding.  Musculoskeletal: Negative for joint pain, joint swelling and myalgias.  Gastrointestinal: Negative for abdominal pain, constipation, diarrhea, hematemesis, hematochezia, melena, nausea and vomiting.  Genitourinary: Negative for dysuria,  frequency and hematuria.  Neurological: Positive for weakness and numbness  Psychiatric/Behavioral: Negative for depression and suicidal ideas.  The patient is not nervous/anxious.    All other systems are reviewed and are negative.    History  Past Medical History:   Diagnosis Date    Arthritis     COPD  12/17/2018    DVT of lower extremity (deep venous thrombosis)     History of CVA 2013 12/17/2018    HTN 12/17/2018    PAD  12/17/2018    Stroke     Tobacco use 12/17/2018     Past Surgical History:   Procedure Laterality Date    CARDIAC CATHETERIZATION  12/17/2018    Procedure: Left Heart Cath;  Surgeon: Imtiaz Fuentes MD;  Location:  [facility] CATH INVASIVE LOCATION;  Service: Cardiology    CARDIAC CATHETERIZATION N/A 12/17/2018    Procedure: Thrombolysis-peripheral;  Surgeon: Imtiaz Fuentes MD;  Location:  [facility];  Service: Cardiology    CARDIAC ELECTROPHYSIOLOGY PROCEDURE N/A 12/18/2018    Procedure: PACEMAKER IMPLANTATION- DC;  Surgeon: Thony Bobby MD;  Location:  [facility];  Service: Cardiology    CARDIAC ELECTROPHYSIOLOGY PROCEDURE N/A 12/17/2018    Procedure: Temporary Pacemaker;  Surgeon: Imtiaz Fuentes MD;  Location:  [facility] CATH INVASIVE LOCATION;  Service: Cardiology    COLONOSCOPY N/A 04/19/2019    Procedure: COLONOSCOPY;  Surgeon: Chris Zimmerman MD;  Location: [facility];  Service: Gastroenterology    ENDOSCOPY N/A 04/19/2019    Procedure: ESOPHAGOGASTRODUODENOSCOPY;  Surgeon: Chris Zimmerman MD;  Location: [facility];  Service: Gastroenterology    ENDOSCOPY N/A 8/1/2023    Procedure: ESOPHAGOGASTRODUODENOSCOPY WITH BIOPSY;  Surgeon: Agueda Arredondo MD;  Location: Psych[facility] OR;  Service: Gastroenterology;  Laterality: N/A;    FEMORAL ARTERY STENT  2013    FEMORAL POPLITEAL BYPASS Right 12/17/2018    Procedure: LOWER EXTREMITY EMBELECTOMY RIGHT;  Surgeon: David Beatty MD;  Location: [facility] HYBRID OR  15;  Service: Vascular    HYSTERECTOMY      LAPAROSCOPIC CHOLECYSTECTOMY      LEFT HEART CATH  12/17/2018    At [facility] with TV PM placement     PACEMAKER IMPLANTATION       Family History   Problem Relation Age of Onset    Cancer Mother     Cancer Father     Heart attack Brother     Heart attack Brother      Social History     Tobacco Use    Smoking status: Light Smoker     Packs/day: 0.50     Years: 63.00     Pack years: 31.50     Types: Cigarettes     Passive exposure: Current    Smokeless tobacco: Never   Vaping Use    Vaping Use: Never used   Substance Use Topics    Alcohol use: No    Drug use: No     Medications Prior to Admission   Medication Sig Dispense Refill Last Dose    carvedilol (COREG) 6.25 MG tablet Take 1 tablet by mouth 2 (Two) Times a Day. 180 tablet 3     cilostazol (PLETAL) 100 MG tablet Take 1 tablet by mouth 2 (Two) Times a Day.       donepezil (Aricept) 10 MG tablet Take 1 tablet by mouth Every Night.       HYDROcodone-acetaminophen (NORCO) 5-325 MG per tablet Take 1 tablet by mouth Every 8 (Eight) Hours As Needed for Moderate Pain. 42 tablet 0     oxyCODONE (ROXICODONE) 5 MG/5ML solution Take 5 mL by mouth Every 6 (Six) Hours As Needed for Moderate Pain. 600 mL 0     pantoprazole (PROTONIX) 40 MG EC tablet Take 1 tablet by mouth 2 (Two) Times a Day for 30 days. 60 tablet 0     polyethylene glycol (MIRALAX) 17 GM/SCOOP powder Take 17 g by mouth Daily. 578 g 0     prochlorperazine (COMPAZINE) 10 MG tablet Take 1 tablet by mouth Every 6 (Six) Hours As Needed for Nausea or Vomiting. 30 tablet 0     promethazine (PHENERGAN) 25 MG tablet Take 1 tablet by mouth Every 8 (Eight) Hours As Needed for Nausea or Vomiting. Take 1/2 to 1 tablet every 8 hours as needed for nausea and vomiting 30 tablet 0     traMADol (ULTRAM) 50 MG tablet Take 1 tablet by mouth Every 6 (Six) Hours As Needed for Moderate Pain. 120 tablet 0      Allergies:  Patient has no known allergies.    Objective    Vital Signs  Temp:  [97.3  °F (36.3 °C)-98.6 °F (37 °C)] 98.6 °F (37 °C)  Heart Rate:  [63-82] 69  Resp:  [18] 18  BP: ()/(56-74) 152/69    Physical Exam:  General Appearance: alert, appears stated age and cooperative  Head: normocephalic, without obvious abnormality and atraumatic  Throat: gums healthy and no oral lesions  Neck: no adenopathy, suppple, trachea midline, no thyromegaly, no carotid bruit and no JVD  Lungs: clear to auscultation, respirations regular, respirations even and respirations unlabored  Heart: regular rhythm & normal rate, normal S1, S2, no murmur, no chris, no rub and no click  Abdomen: normal bowel sounds, no masses and soft non-tender  Extremities: Loss of sensation right lower extremity, cool right foot  Pulses: Palpable left PT, nonpalpable right pedal pulses  Skin: no bleeding, bruising or rash  Neurologic: Mental Status orientated to person, place, time and situation          Data Review:  Results from last 7 days   Lab Units 08/24/23  0003   WBC 10*3/mm3 7.94   HEMOGLOBIN g/dL 11.0*   HEMATOCRIT % 35.2   PLATELETS 10*3/mm3 204     Results from last 7 days   Lab Units 08/24/23  0003   SODIUM mmol/L 138   POTASSIUM mmol/L 3.9   CHLORIDE mmol/L 104   CO2 mmol/L 22.0   BUN mg/dL 18   CREATININE mg/dL 1.15*   GLUCOSE mg/dL 104*   CALCIUM mg/dL 8.3*     Coagulation:   INR   Date Value Ref Range Status   08/23/2023 1.06 0.90 - 1.10 Final     Cardiac markers:     ABGs:       Invalid input(s): PO2      Imaging Results (Last 72 Hours)       Procedure Component Value Units Date/Time    CT Angio Abdominal Aorta Bilateral Iliofem Runoff [[id number]] Collected: 08/24/23 0223     Updated: 08/24/23 0235    Narrative:      CT ANGIO ABDOMINAL AORTA BILAT ILIOFEM RUNOFF    Date of Exam: 8/24/2023 12:25 AM EDT    Indication: Claudication or leg ischemia.    Comparison: CT of the abdomen pelvis from July 24, 2023    Technique: CTA of the abdomen, pelvis and both lower extremities was performed after the uneventful intravenous  administration of 110 mL Isovue-370. Reconstructed coronal and sagittal images were also obtained. In addition, a 3-D volume rendered image   was created for interpretation. Automated exposure control and iterative reconstruction methods were used.      Findings:  Non--- vascular findings:    There is mild right basilar atelectasis. There are wire leads from cardiac pacemaker/AICD device the arterial enhanced images of the right adrenal gland, pancreas, spleen and right kidney appear normal. There is moderate left-sided hydroureteronephrosis   to the level of the mid ureter without discrete stone or evidence of obstructive cause. Ureteral stricture would be favored. There is diminished enhancement of the left kidney suggestive of obstructive uropathy on the left. There is a stable left adrenal   mass. There are low-attenuation lesions of the liver which have been described previously. There is diverticulosis of the colon without evidence of diverticulitis. There is no free air, free fluid or pathologic adenopathy. There are degenerative changes   of the spine.    Vascular findings:  There is calcific atherosclerosis of the descending thoracic and proximal abdominal aorta. There is a small infrarenal abdominal aortic aneurysm with mural thrombus. This is unchanged with a maximum diameter of approximately 2.7 cm. The celiac artery,   superior mesenteric artery, inferior mesenteric artery and bilateral renal arteries are widely patent. There is distal abdominal aortic calcific atherosclerosis.    On the left side, there is mild stenosis of the proximal left common iliac artery approximately 50%. There is a stent just beyond the level of the stenosis. The left external iliac artery is patent. The left superficial femoral artery, popliteal artery   and infrapopliteal runoff appears normal with at least a single vessel runoff to the ankle.    On the right side, there is calcific atherosclerosis of the right common iliac  artery, internal and external iliac arteries which are widely patent. There is diffuse irregularity of the distal superficial femoral artery with occlusion just above the   knee. This could be from acute embolic event given the lack of calcification at the location. There is some filling of the infrapopliteal runoff, poorly seen likely secondary to significant sluggish flow from the occlusion.  .    Impression:      Impression:  1.Occlusion of the distal superficial femoral artery approximately 5 cm above the joint line without significant surrounding calcific atherosclerotic disease. This favors probable embolism. Vascular specialist consultation recommended.  2.Very limited flow below the level of right arterial occlusion with the anterior tibial artery vaguely seen.  3.Left-sided runoff appears to be intact with at least a single vessel runoff to the ankle.  4.Left-sided hydronephrosis with abnormal appearance of the left-sided nephrogram which appears delayed likely from obstructive uropathy.  5.Indeterminate low-attenuation hepatic lesions.  6.Left adrenal enlargement not definitely adenoma similar to the previous study. Please refer to previous PET scan findings from outside institution.        Electronically Signed: Pedrito Parra MD    8/24/2023 2:32 AM EDT    Workstation ID: [id number]                    Assessment:        Popliteal artery occlusion, right    Complete heart block S/P transvenous pacemaker placement at [facility] on 12/17    PAD     HTN    History of subacute left MCA distribution ischemic CVA    Tobacco use    MAGGIE (acute kidney injury)    Hyperlipidemia    Iron deficiency anemia due to chronic blood loss    Paroxysmal atrial fibrillation    Multiple pulmonary nodules (New 8mm RLL)    COPD (? severity)    Gastric cancer    CAD (coronary artery disease)        Plan:  Patient is a high risk surgical candidate.  Continue heparin gtt. Will discuss possible OR tomorrow with Dr. Gonzalez for aortogram with  runoffs, thromboembolectomy and possible Penumbra versus conservative management.  NPO after midnight      Amy Lagunas PA-C  08/24/23  07:30 EDT        Electronically signed by Amy Lagunas PA-C at 08/24/23 0886

## 2023-08-26 NOTE — ANESTHESIA PROCEDURE NOTES
Airway  Urgency: elective    Date/Time: 8/26/2023 1:29 PM  Airway not difficult    General Information and Staff    Patient location during procedure: OR  CRNA/CAA: Mike Parham CRNA    Indications and Patient Condition  Indications for airway management: airway protection    Preoxygenated: yes  MILS not maintained throughout  Mask difficulty assessment: 0 - not attempted    Final Airway Details  Final airway type: endotracheal airway      Successful airway: ETT  Cuffed: yes   Successful intubation technique: video laryngoscopy and RSI  Facilitating devices/methods: intubating stylet  Endotracheal tube insertion site: oral  Blade: Trinidad  Blade size: 3  ETT size (mm): 6.5  Cormack-Lehane Classification: grade I - full view of glottis  Placement verified by: chest auscultation and capnometry   Cuff volume (mL): 10  Measured from: lips  ETT/EBT  to lips (cm): 19  Number of attempts at approach: 1  Assessment: lips, teeth, and gum same as pre-op and atraumatic intubation    Additional Comments  Negative epigastric sounds, Breath sound equal bilaterally with symmetric chest rise and fall

## 2023-08-26 NOTE — PROGRESS NOTES
[facility] Medicine Services  PROGRESS NOTE    Patient Name: Rupinder Lees  : 1935  MRN: [id number]    Date of Admission: 2023  Primary Care Physician: Gifty Kruse MD    Subjective   Subjective     CC:  Follow-up popliteal artery occlusion    HPI:  Heart rate improved today to the 90s to low 100s.  No chest pain or shortness of breath.  No palpitations.  Had a bowel movement in the past 24 hours.  Denied leg pain.    ROS:  Gen- No fevers, chills  CV- No chest pain, no palpitations  Resp- No cough, dyspnea  GI- No N/V/D, no abd pain     Objective   Objective     Vital Signs:   Temp:  [98.2 °F (36.8 °C)-98.5 °F (36.9 °C)] 98.2 °F (36.8 °C)  Heart Rate:  [] 107  Resp:  [16-20] 20  BP: (117-155)/(69-99) 155/99     Physical Exam:  Constitutional: No acute distress, awake, alert, laying in bed  HENT: NCAT, mucous membranes moist  Respiratory: Clear to auscultation bilaterally, respiratory effort normal   Cardiovascular: IRIR, , no murmurs, rubs, or gallops  Gastrointestinal: Positive bowel sounds, soft, nontender, nondistended  Musculoskeletal: No bilateral ankle edema  Psychiatric: Appropriate affect, cooperative  Neurologic: PERRL, symmetric facies, speech clear  Skin: No rashes    Results Reviewed:  LAB RESULTS:      Lab 23  0217 23  1454 23  0609 23  2349 23  1544 23  0721 23  0003 23  1816 23  1816 23  1600   WBC  --   --  7.01  --   --   --  7.94  --   --  8.76   HEMOGLOBIN  --   --  10.3*  --   --   --  11.0*  --   --  11.8*   HEMATOCRIT  --   --  32.0*  --   --   --  35.2  --   --  38.8   PLATELETS  --   --  263  --   --   --  204  --   --  238   NEUTROS ABS  --   --   --   --   --   --  5.53  --   --  6.40   IMMATURE GRANS (ABS)  --   --   --   --   --   --  0.05  --   --  0.04   LYMPHS ABS  --   --   --   --   --   --  1.35  --   --  1.40   MONOS ABS  --   --   --   --   --   --  0.91*  --    --  0.85   EOS ABS  --   --   --   --   --   --  0.06  --   --  0.03   MCV  --   --  84.9  --   --   --  88.4  --   --  88.8   PROTIME  --   --   --   --   --   --   --   --  14.3  --    APTT  --   --   --   --   --   --   --   --  30.6  --    HEPARIN ANTI-XA 0.42 0.30 0.26* 0.30 0.15*   < > 0.10*   < > <0.10*  --     < > = values in this interval not displayed.           Lab 08/26/23  0217 08/25/23  0608 08/24/23  0003 08/23/23  1600   SODIUM 136 135* 138 135*   POTASSIUM 3.8 3.5 3.9 4.5   CHLORIDE 103 101 104 98   CO2 21.0* 24.0 22.0 22.2   ANION GAP 12.0 10.0 12.0 14.8   BUN 14 14 18 21   CREATININE 1.35* 1.29* 1.15* 1.28*   EGFR 38.6* 40.8* 46.8* 41.1*   GLUCOSE 97 121* 104* 106*   CALCIUM 8.2* 8.4* 8.3* 8.9   MAGNESIUM 1.9 1.9  --   --            Lab 08/24/23  0003 08/23/23  1600   TOTAL PROTEIN 5.7* 6.8   ALBUMIN 3.0* 3.5   GLOBULIN 2.7 3.3   ALT (SGPT) 12 15   AST (SGOT) 18 22   BILIRUBIN 0.4 0.6   ALK PHOS 151* 159*           Lab 08/23/23  1816   PROTIME 14.3   INR 1.06               Lab 08/24/23  2349   ABO TYPING O   RH TYPING Positive   ANTIBODY SCREEN Negative           Brief Urine Lab Results  (Last result in the past 365 days)        Color   Clarity   Blood   Leuk Est   Nitrite   Protein   CREAT   Urine HCG        08/24/23 1248 Yellow   Cloudy   Negative   Negative   Negative   30 mg/dL (1+)                   Microbiology Results Abnormal       None            No radiology results from the last 24 hrs        Current medications:  Scheduled Meds:ceFAZolin, 2,000 mg, Intravenous, Once  cilostazol, 100 mg, Oral, BID  donepezil, 10 mg, Oral, Nightly  metoprolol tartrate, 50 mg, Oral, Q12H  pantoprazole, 40 mg, Oral, BID  senna-docusate sodium, 2 tablet, Oral, BID  sodium chloride, 10 mL, Intravenous, Q12H      Continuous Infusions:heparin, 23 Units/kg/hr (Order-Specific), Last Rate: 23 Units/kg/hr (08/26/23 0911)  Pharmacy Consult,   Pharmacy to Dose Heparin,       PRN Meds:.  acetaminophen     senna-docusate sodium **AND** polyethylene glycol **AND** bisacodyl **AND** bisacodyl    melatonin    metoprolol tartrate    ondansetron    oxyCODONE    Pharmacy Consult    Pharmacy to Dose Heparin    sodium chloride    sodium chloride    Assessment & Plan   Assessment & Plan     Active Hospital Problems    Diagnosis  POA    **Popliteal artery occlusion, 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]  Yes    Severe malnutrition [E43]  Yes    CAD (coronary artery disease) [I25.10]  Yes    Multiple pulmonary nodules (New 8mm RLL) [R91.8]  Yes    COPD (? severity) [J44.9]  Yes    Gastric cancer [C16.2]  Yes    Paroxysmal atrial fibrillation [I48.0]  Yes    Iron deficiency anemia due to chronic blood loss [D50.0]  Yes    HTN [I10]  Yes    Complete heart block S/P transvenous pacemaker placement at [facility] on 12/17 [I44.2]  Yes    PAD  [I73.9]  Yes    Hyperlipidemia [E78.5]  Yes    History of subacute left MCA distribution ischemic CVA [Z86.73]  Not Applicable    Tobacco use [Z72.0]  Yes    MAGGIE (acute kidney injury) [N17.9]  Yes      Resolved Hospital Problems   No resolved problems to display.        Brief Hospital Course to date:  Rupinder Lees is a 87 y.o. female With COPD on 2 L nasal cannula as needed, CAD, HTN, HLD, complete heart block status post PPM, prior CVA, A-fib, prior DVT, PAD status post femoral artery stent and right popliteal thrombosis status post embolectomy/femoropopliteal bypass (2018, [place]), who presented for evaluation from [facility] after presenting with complaints of loss of sensation in right foot.  Was found to have a right popliteal artery occlusion.    This patient's problems and plans were partially entered by my partner and updated as appropriate by me 08/26/23.     Right Popliteal Artery occlusion  PAD  H/O right popliteal artery occlusion s/p embolectomy 2018  - CTA abdominal aorta with runoff not completed at OSH. Obtained here and showed occlusion  - continue heparin gtt  - Cardiac diet once no longer  "n.p.o. for surgery  - CT surgery following     CKD  - Baseline creatinine appears to be 1.1-1.4  - avoid nephrotoxins  - strict I&O     PAF with RVR  Complete heart lock s/p PPM  - followed by Dr. Jaramillo per cardiology  - was on Eliquis and amiodarone, unclear if patient still taking amiodarone; Eliquis appears to have been recently refilled (but patient states she stopped taking it \"recently\"); discussed with pharmacy and patient appears to not be on amiodarone outpatient (no fill history; only fills with [facility])  - coreg transitioned to metoprolol per cardiology  - Having issues with RVR; PRN IV metoprolol ordered  - Cardiology consulted  - Pacemaker interrogated  - On heparin drip  - TSH pending     Gastric Adenocarcinoma, recently diagnoed  - with multiple pulmonary nodules  - following with oncology and pulmonology in [place]  - Also has multiple hepatic lesions and an adrenal enlargement on the left side  - Hospice consulted     COPD  Ongoing tobacco abuse  - stable  - duo nebs with additional pulm toilet as needed  - nicotine patch     HTN  HLD  - continue coreg     History of CVA    Expected Discharge Location and Transportation: pending CT surgery and possible PT/OT after  Expected Discharge   Expected Discharge Date: 8/29/2023; Expected Discharge Time:      DVT prophylaxis:  Medical DVT prophylaxis orders are present.     AM-PAC 6 Clicks Score (PT): 17 (08/25/23 0830)    CODE STATUS:   Code Status and Medical Interventions:   Ordered at: 08/23/23 3615     Level Of Support Discussed With:    Patient     Code Status (Patient has no pulse and is not breathing):    CPR (Attempt to Resuscitate)     Medical Interventions (Patient has pulse or is breathing):    Full Support       Meagan Pulliam MD  08/26/23    "

## 2023-08-26 NOTE — OP NOTE
CARDIOTHORACIC AND VASCULAR SURGERY OPERATIVE NOTE    Date of Procedure:   8/26/2023    Preoperative Diagnosis:   Severe peripheral arterial disease, acute limb ischemia [place] 2A at right leg    Postoperative Diagnosis:   Same    Procedure(s):   Needle access and placement of 7 French sheath at left common femoral artery   Right lower extremity arteriogram   Endovascular computer-assisted percutaneous suction mechanical thrombectomy using Penumbra Lightning [place] 7 device with extirpation of matter at right popliteal and tibial arteries   Endovascular angioplasty of right tibioperoneal trunk using 4 x 20 mm EV 3 EverCross balloon   Completion arteriogram  Supervision interpretation of radiography fluoroscopy    Surgeon:   Julio Gonzalez M.D., R.P.V.I.    Assistant(s):   GATITO Bruner  The presence and participation of the physician's assistant was necessary for the successful completion of the case, and duties included positioning, suctioning, retracting, stitching, dressing, holding intracorporeal devices such as a wire or camera and/or harvesting vessels for bypass as needed.     Anesthesia:   General endotracheal anesthesia    Estimated Blood Loss:  Minimal    Complications:   None    Total radiation dose delivered 40 mg    Total fluoroscopy time 21 minutes    Total contrast delivered 40 mL of Visipaque 320    Indications:   87-year-old woman with history of severe peripheral arterial disease with history of acute limb ischemia of the right lower extremity and right femoral artery cutdown and open thrombectomy by Dr. David Beatty several years ago, atrial fibrillation on anticoagulation, and new diagnosis of metastatic esophageal cancer who recently discontinued her anticoagulation for a diagnostic procedure related to her cancer diagnosis, who presented to [facility] as a transfer from outside hospital with acute limb ischemia of the right leg.  The patient was started on heparin  infusion systemically and the severe pain in her right lower extremity lessened however she was still experiencing dysesthesia and paresthesia with full motor function otherwise.  I discussed with the patient the risks, benefits, and alternatives of the planned procedure including risk of bleeding, infection, heart attack, stroke, permanent disability, need for amputation, and even death.  The patient demonstrated good understanding and signed written consent.    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    Procedure in Detail:   The patient was identified in the preoperative area, taken to the operating room, and laid in the supine position on the operating room table. A safety pause was performed in the presence of the operating room staff.  Systemic antibiotics were administered.  General anesthesia was induced, and an endotracheal tube was placed.  The left groin was prepped draped in the standard fashion.  Using ultrasound and fluoroscopic guidance, the left common femoral artery was accessed retrogradely in standard fashion using a modified Seldinger technique.  Over the wire, a 7 [place] sheath was placed in the right superficial femoral artery and selective arteriography was performed revealing the aforementioned findings.  The chronic total occlusion was engaged with a Glidewire advantage and then the penumbra device for several passes were made with incremental gains in the length of the popliteal artery that was treated with each pass.  The device are mainly removed from the body along with the sheath to ensure there was no distal embolization of thrombotic material.  The chronic total occlusion of the proximal  anterior tibial artery was then identified on subsequent arteriography, and a balloon was placed into the tibioperoneal trunk in order to facilitate access of this anterior tibial artery however this proved to be impossible with a combination of multiple wires and catheters.  Completion arteriography revealed rapid filling of the peroneal artery to the level of the ankle with collateral filling of the dorsalis pedis artery through the forefoot with chronic occlusion of the posterior tibial artery distribution being filled via collaterals. The gears were withdrawn, manual pressure was held at the left common femoral arteriotomy site with excellent hemostatic effect, and there were Doppler signals in the bilateral feet at case completion.  The patient was awakened from anesthesia without complication. All needle, sponge, and instrument counts were correct at the completion of the procedure. I was present for the critical portions of the procedure.     Julio Gonzalez M.D., R.P.V.I.  Cardiothoracic and Vascular Surgeon  [facility]

## 2023-08-26 NOTE — PROGRESS NOTES
BS visit made. Pt was off the floor. Daughter was not @ BS. Contact information was left on the couch. Hospice will f/u on Monday. 8/28/23.

## 2023-08-26 NOTE — ANESTHESIA POSTPROCEDURE EVALUATION
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])    Surgeons: Julio Gonzalez MD Provider: Milena De Souza DO    Anesthesia Type: general ASA Status: 4            Anesthesia Type: general    Vitals  Vitals Value Taken Time   /78 08/26/23 1534   Temp 97 øF (36.1 øC) 08/26/23 1534   Pulse 141 08/26/23 1534   Resp     SpO2 100 % 08/26/23 1534           Post Anesthesia Care and Evaluation    Patient location during evaluation: PACU  Patient participation: complete - patient participated  Level of consciousness: awake and alert  Pain score: 0  Pain management: adequate    Airway patency: patent  Anesthetic complications: No anesthetic complications  PONV Status: none  Cardiovascular status: hemodynamically stable and acceptable  Respiratory status: nonlabored ventilation, acceptable and nasal cannula  Hydration status: acceptable

## 2023-08-26 NOTE — CONSULTS
Pt. had an open home hospice prior to her admission @ [facility]. Call placed to the pt.'s daughter, Gardenia. Per conversation she is @ BS & will be here most of the day. Gardenia states that they would like hospice services @ d/c. Writer will f/u today [email or web address] for additional hospice related questions/concerns. Updates have been sent to hospice intake.

## 2023-08-26 NOTE — BRIEF OP NOTE
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]    Procedure/CPT® Codes:        Procedure(s):  Needle access and placement of 7 [place] sheath at left common femoral artery   Right lower extremity arteriogram   Endovascular computer-assisted percutaneous suction mechanical thrombectomy using Penumbra Lightning [place] 7 device at right popliteal and tibial arteries   Endovascular angioplasty of right tibioperoneal trunk using 4 mm balloon   Completion arteriogram  Supervision interpretation of radiography fluoroscopy              Surgeon(s):  Julio Gonzalez MD    Anesthesia: General    Staff:   Circulator: Natalie Joe RN  Radiology Technologist: Krishna Velasquez  Scrub Person: Gladys Raza         Estimated Blood Loss: minimal    Urine Voided: * No values recorded between 8/26/2023  1:24 PM and 8/26/2023  2:59 PM *    Specimens:                A: Right popliteal and tibial artery thrombotic material          Drains: * No LDAs found *    Findings: Rapid filling of the right dorsalis pedis artery via collaterals        Complications: None          Julio Gonzalez MD     Date: 8/26/2023  Time: 14:59 EDT

## 2023-08-26 NOTE — PLAN OF CARE
Problem: Adult Inpatient Plan of Care  Goal: Optimal Comfort and Wellbeing  Intervention: Provide Person-Centered Care  Recent Flowsheet Documentation  Taken 8/25/2023 2040 by Maricel Sinclair RN  Trust Relationship/Rapport:   care explained   choices provided   reassurance provided   thoughts/feelings acknowledged     Problem: Adult Inpatient Plan of Care  Goal: Absence of Hospital-Acquired Illness or Injury  Intervention: Prevent Skin Injury  Recent Flowsheet Documentation  Taken 8/26/2023 0400 by Maricel Sinclair RN  Skin Protection:   adhesive use limited   tubing/devices free from skin contact  Taken 8/26/2023 0200 by Maricel Sinclair RN  Skin Protection: adhesive use limited  Taken 8/26/2023 0000 by Maricel Sinclair RN  Skin Protection: adhesive use limited  Taken 8/25/2023 2200 by Maricel Sinclair RN  Skin Protection: adhesive use limited  Taken 8/25/2023 2040 by Maricel Sinclair RN  Body Position:   position changed independently   weight shifting   left   tilted  Skin Protection:   adhesive use limited   tubing/devices free from skin contact   Goal Outcome Evaluation:    Pt remains on heparin gtt. with tentative plans for aortogram with runoffs, thromboembolectomy and possible Penumbra today in OR with Dr. Gonzalez. Consent signed and on chart, she has been NPO since MN, and in cont. A-fib. No other acute changes noted at this time, will CTM and provide care.

## 2023-08-26 NOTE — PROGRESS NOTES
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 Previously on Amiodarone therapy  CHADSVASC is 5; Previously on Eliquis 2.5mg BID, stopped due to hematemesis and gastric cancer      Complete heart block [I44.2]  Yes     Priority: High     Bradycardia/hypotension noted at [facility] 12/17/18  S/p [facility] DC PPM 2018  No underlying rhythm 12/18/18      CAD (coronary artery disease) [I25.10]  Yes     Priority: Medium     [facility] 12/17/18: nonobstructive CAD, normal EF. LAD with Prox mild diffuse disease, mid at Dx bifurcation had 50% stenosis, distal small vessel diffuse disease Dx small vessel mild luminal irregularities. Left circumflex with Moderate calibre, mild diffuse stenosis, mid Lcx at OM take off had 40% stenosis, OM1 is a large calibre, prox 30-40% stenosis and mid and distal mild irregularities. Small to moderate calibre, dominant, prox 50 % tubular stenosis of RCA, mid and distal normal, small PDA and PL , no stenosis       Severe malnutrition [E43]  Yes    Multiple pulmonary nodules (New 8mm RLL) [R91.8]  Yes    COPD (? severity) [J44.9]  Yes    Gastric cancer [C16.2]  Yes    Iron deficiency anemia due to chronic blood loss [D50.0]  Yes    HTN [I10]  Yes    PAD  [I73.9]  Yes     History of claudication pain with ischemic right leg 12/16/187  AA with runoffs 12/17/18: R Popliteal was occluded at P1 segment, no distal run off, very faint collaterals  Status post femoral artery stent  and right popliteal thrombosis status post embolectomy/fem pop bypass 2018 Dr. 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    Intake/Output Summary (Last 24 hours) at 8/26/2023 1010  Last data filed at 8/25/2023 1300  Gross per 24 hour   Intake 0 ml   Output --   Net 0 ml       Physical Exam:      General: alert, no acute distress, acyanotic, well developed, well nourished   Chest: Clear Auscultation   CV: Rhythm: regularly irregular   Extremities: negative    Results Review:         Labs:  Results from last 7 days   Lab Units 08/25/23  0609 08/24/23  0003 08/23/23  1816   WBC 10*3/mm3 7.01   < >  --    HEMOGLOBIN g/dL 10.3*   < >  --    HEMATOCRIT % 32.0*   < >  --    PLATELETS 10*3/mm3 263   < >  --    INR   --   --  1.06    < > = values in this interval not displayed.     Results from last 7 days   Lab Units 08/26/23  0217 08/25/23  0608 08/24/23  0003 08/23/23  1600   SODIUM mmol/L 136   < > 138 135*   POTASSIUM mmol/L 3.8   < > 3.9 4.5   CHLORIDE mmol/L 103   < > 104 98   CO2 mmol/L 21.0*   < > 22.0 22.2   BUN mg/dL 14   < > 18 21   CREATININE mg/dL 1.35*   < > 1.15* 1.28*   GLUCOSE mg/dL 97   < > 104* 106*   CALCIUM mg/dL 8.2*   < > 8.3* 8.9   ALT (SGPT) U/L  --   --  12 15   AST (SGOT) U/L  --   --  18 22    < > = values in this interval not displayed.     Estimated Creatinine Clearance: 19.5 mL/min (A) (by C-G formula based on SCr of 1.35 mg/dL (H)).  Lab Results   Component Value Date    HGBA1C 6.1 (H) 05/31/2015             Lab Results   Component Value Date    CHOL 132 07/20/2019    CHLPL 257 (H) 05/31/2015    TRIG  107 07/20/2019    HDL 52 07/20/2019    LDL 59 07/20/2019         Scheduled Meds:  ceFAZolin, 2,000 mg, Intravenous, Once  cilostazol, 100 mg, Oral, BID  donepezil, 10 mg, Oral, Nightly  metoprolol tartrate, 50 mg, Oral, Q12H  pantoprazole, 40 mg, Oral, BID  senna-docusate sodium, 2 tablet, Oral, BID  sodium chloride, 10 mL, Intravenous, Q12H      Continuous Infusions:heparin, 23 Units/kg/hr (Order-Specific), Last Rate: 23 Units/kg/hr (08/26/23 0911)  Pharmacy Consult,   Pharmacy to Dose Heparin,           Assessment: Plan:    1.Afib  -currently rate controlled with change to metoprolol    2. Complete heart block   -normal functioning PPM  3. CAD    -stable  4. GI bleeding    -secondary to GI adenocarinoma   -poor candidate for AC  5. PAD   -acute popliteal artery occlusion   -surgery pending          Electronically signed by GATITO Corona, 08/26/23, 10:13 AM EDT.

## 2023-08-27 ENCOUNTER — READMISSION MANAGEMENT (OUTPATIENT)
Dept: CALL CENTER | Facility: HOSPITAL | Age: 88
End: 2023-08-27
Payer: MEDICARE

## 2023-08-27 VITALS
HEART RATE: 83 BPM | HEIGHT: 60 IN | RESPIRATION RATE: 16 BRPM | TEMPERATURE: 97.3 F | OXYGEN SATURATION: 100 % | SYSTOLIC BLOOD PRESSURE: 138 MMHG | BODY MASS INDEX: 17.53 KG/M2 | WEIGHT: 89.29 LBS | DIASTOLIC BLOOD PRESSURE: 64 MMHG

## 2023-08-27 LAB
ANION GAP SERPL CALCULATED.3IONS-SCNC: 14 MMOL/L (ref 5–15)
ANION GAP SERPL CALCULATED.3IONS-SCNC: 15 MMOL/L (ref 5–15)
ARTERIAL PATENCY WRIST A: ABNORMAL
ATMOSPHERIC PRESS: ABNORMAL MM[HG]
BASE EXCESS BLDA CALC-SCNC: -3.7 MMOL/L (ref 0–2)
BASOPHILS # BLD AUTO: 0.01 10*3/MM3 (ref 0–0.2)
BASOPHILS NFR BLD AUTO: 0.1 % (ref 0–1.5)
BDY SITE: ABNORMAL
BODY TEMPERATURE: 37 C
BUN SERPL-MCNC: 18 MG/DL (ref 8–23)
BUN SERPL-MCNC: 19 MG/DL (ref 8–23)
BUN/CREAT SERPL: 12.2 (ref 7–25)
BUN/CREAT SERPL: 13 (ref 7–25)
CALCIUM SPEC-SCNC: 7.3 MG/DL (ref 8.6–10.5)
CALCIUM SPEC-SCNC: 7.6 MG/DL (ref 8.6–10.5)
CHLORIDE SERPL-SCNC: 100 MMOL/L (ref 98–107)
CHLORIDE SERPL-SCNC: 102 MMOL/L (ref 98–107)
CO2 BLDA-SCNC: 21.2 MMOL/L (ref 22–33)
CO2 SERPL-SCNC: 16 MMOL/L (ref 22–29)
CO2 SERPL-SCNC: 18 MMOL/L (ref 22–29)
COHGB MFR BLD: 1.1 % (ref 0–2)
CREAT SERPL-MCNC: 1.46 MG/DL (ref 0.57–1)
CREAT SERPL-MCNC: 1.47 MG/DL (ref 0.57–1)
DEPRECATED RDW RBC AUTO: 47.2 FL (ref 37–54)
EGFRCR SERPLBLD CKD-EPI 2021: 34.8 ML/MIN/1.73
EGFRCR SERPLBLD CKD-EPI 2021: 35.1 ML/MIN/1.73
EOSINOPHIL # BLD AUTO: 0 10*3/MM3 (ref 0–0.4)
EOSINOPHIL NFR BLD AUTO: 0 % (ref 0.3–6.2)
EPAP: 0
ERYTHROCYTE [DISTWIDTH] IN BLOOD BY AUTOMATED COUNT: 14.7 % (ref 12.3–15.4)
GLUCOSE SERPL-MCNC: 122 MG/DL (ref 65–99)
GLUCOSE SERPL-MCNC: 141 MG/DL (ref 65–99)
HBA1C MFR BLD: 5.9 % (ref 4.8–5.6)
HCO3 BLDA-SCNC: 20.2 MMOL/L (ref 20–26)
HCT VFR BLD AUTO: 28.7 % (ref 34–46.6)
HCT VFR BLD CALC: 29.3 % (ref 38–51)
HGB BLD-MCNC: 8.9 G/DL (ref 12–15.9)
HGB BLDA-MCNC: 9.6 G/DL (ref 14–18)
IMM GRANULOCYTES # BLD AUTO: 0.05 10*3/MM3 (ref 0–0.05)
IMM GRANULOCYTES NFR BLD AUTO: 0.6 % (ref 0–0.5)
INHALED O2 CONCENTRATION: 21 %
IPAP: 0
LYMPHOCYTES # BLD AUTO: 0.69 10*3/MM3 (ref 0.7–3.1)
LYMPHOCYTES NFR BLD AUTO: 8.9 % (ref 19.6–45.3)
MAGNESIUM SERPL-MCNC: 2 MG/DL (ref 1.6–2.4)
MCH RBC QN AUTO: 27.2 PG (ref 26.6–33)
MCHC RBC AUTO-ENTMCNC: 31 G/DL (ref 31.5–35.7)
MCV RBC AUTO: 87.8 FL (ref 79–97)
METHGB BLD QL: 0.6 % (ref 0–1.5)
MODALITY: ABNORMAL
MONOCYTES # BLD AUTO: 0.49 10*3/MM3 (ref 0.1–0.9)
MONOCYTES NFR BLD AUTO: 6.3 % (ref 5–12)
NEUTROPHILS NFR BLD AUTO: 6.52 10*3/MM3 (ref 1.7–7)
NEUTROPHILS NFR BLD AUTO: 84.1 % (ref 42.7–76)
NRBC BLD AUTO-RTO: 0 /100 WBC (ref 0–0.2)
OXYHGB MFR BLDV: 94.5 % (ref 94–99)
PAW @ PEAK INSP FLOW SETTING VENT: 0 CMH2O
PCO2 BLDA: 31.5 MM HG (ref 35–45)
PCO2 TEMP ADJ BLD: 31.5 MM HG (ref 35–45)
PH BLDA: 7.42 PH UNITS (ref 7.35–7.45)
PH, TEMP CORRECTED: 7.42 PH UNITS
PLATELET # BLD AUTO: 301 10*3/MM3 (ref 140–450)
PMV BLD AUTO: 10.4 FL (ref 6–12)
PO2 BLDA: 77.6 MM HG (ref 83–108)
PO2 TEMP ADJ BLD: 77.6 MM HG (ref 83–108)
POTASSIUM SERPL-SCNC: 3.5 MMOL/L (ref 3.5–5.2)
POTASSIUM SERPL-SCNC: 3.5 MMOL/L (ref 3.5–5.2)
RBC # BLD AUTO: 3.27 10*6/MM3 (ref 3.77–5.28)
SODIUM SERPL-SCNC: 132 MMOL/L (ref 136–145)
SODIUM SERPL-SCNC: 133 MMOL/L (ref 136–145)
TOTAL RATE: 0 BREATHS/MINUTE
UFH PPP CHRO-ACNC: 0.6 IU/ML (ref 0.3–0.7)
WBC NRBC COR # BLD: 7.76 10*3/MM3 (ref 3.4–10.8)

## 2023-08-27 PROCEDURE — 85520 HEPARIN ASSAY: CPT | Performed by: PHARMACIST

## 2023-08-27 PROCEDURE — 80048 BASIC METABOLIC PNL TOTAL CA: CPT | Performed by: INTERNAL MEDICINE

## 2023-08-27 PROCEDURE — 25010000002 AMIODARONE IN DEXTROSE 5% 360-4.14 MG/200ML-% SOLUTION: Performed by: PHYSICIAN ASSISTANT

## 2023-08-27 PROCEDURE — 93005 ELECTROCARDIOGRAM TRACING: CPT | Performed by: STUDENT IN AN ORGANIZED HEALTH CARE EDUCATION/TRAINING PROGRAM

## 2023-08-27 PROCEDURE — 99231 SBSQ HOSP IP/OBS SF/LOW 25: CPT | Performed by: STUDENT IN AN ORGANIZED HEALTH CARE EDUCATION/TRAINING PROGRAM

## 2023-08-27 PROCEDURE — 80048 BASIC METABOLIC PNL TOTAL CA: CPT | Performed by: PHYSICIAN ASSISTANT

## 2023-08-27 PROCEDURE — 83735 ASSAY OF MAGNESIUM: CPT | Performed by: PHYSICIAN ASSISTANT

## 2023-08-27 PROCEDURE — 82805 BLOOD GASES W/O2 SATURATION: CPT

## 2023-08-27 PROCEDURE — 85025 COMPLETE CBC W/AUTO DIFF WBC: CPT | Performed by: INTERNAL MEDICINE

## 2023-08-27 PROCEDURE — 36600 WITHDRAWAL OF ARTERIAL BLOOD: CPT

## 2023-08-27 PROCEDURE — 93010 ELECTROCARDIOGRAM REPORT: CPT | Performed by: INTERNAL MEDICINE

## 2023-08-27 PROCEDURE — 82375 ASSAY CARBOXYHB QUANT: CPT

## 2023-08-27 PROCEDURE — 83050 HGB METHEMOGLOBIN QUAN: CPT

## 2023-08-27 PROCEDURE — 99239 HOSP IP/OBS DSCHRG MGMT >30: CPT | Performed by: INTERNAL MEDICINE

## 2023-08-27 PROCEDURE — 83036 HEMOGLOBIN GLYCOSYLATED A1C: CPT | Performed by: INTERNAL MEDICINE

## 2023-08-27 PROCEDURE — 25010000002 LORAZEPAM PER 2 MG: Performed by: NURSE PRACTITIONER

## 2023-08-27 RX ORDER — LORAZEPAM 2 MG/ML
0.5 INJECTION INTRAMUSCULAR ONCE
Status: COMPLETED | OUTPATIENT
Start: 2023-08-27 | End: 2023-08-27

## 2023-08-27 RX ORDER — HALOPERIDOL 5 MG/ML
5 INJECTION INTRAMUSCULAR EVERY 6 HOURS PRN
Status: DISCONTINUED | OUTPATIENT
Start: 2023-08-27 | End: 2023-08-27 | Stop reason: HOSPADM

## 2023-08-27 RX ADMIN — METOPROLOL TARTRATE 50 MG: 50 TABLET, FILM COATED ORAL at 15:15

## 2023-08-27 RX ADMIN — PANTOPRAZOLE SODIUM 40 MG: 40 TABLET, DELAYED RELEASE ORAL at 15:15

## 2023-08-27 RX ADMIN — SODIUM CHLORIDE 35 ML/HR: 4.5 INJECTION, SOLUTION INTRAVENOUS at 05:53

## 2023-08-27 RX ADMIN — LORAZEPAM 0.5 MG: 2 INJECTION INTRAMUSCULAR; INTRAVENOUS at 05:19

## 2023-08-27 RX ADMIN — AMIODARONE HYDROCHLORIDE 0.5 MG/MIN: 1.8 INJECTION, SOLUTION INTRAVENOUS at 03:39

## 2023-08-27 NOTE — PROGRESS NOTES
"CARDIOTHORACIC AND VASCULAR SURGERY PROGRESS NOTE    OVERNIGHT EVENTS  Confusion overnight. Oriented only to self, and occasionally to situation and place. At 0500, more confused, threatening, inconsolable and combative. Wrist restraints ordered and Ativan. Converted out of Afib RVR to SR around 0130. Amio gtt and heparin gtt.       SUBJECTIVE  Pain controlled, denies nausea, vomiting, shortness of breath    OBJECTIVE  /62 (BP Location: Left arm, Patient Position: Lying)   Pulse 70   Temp 97.2 °F (36.2 °C) (Oral)   Resp 16   Ht 152.4 cm (60\")   Wt 40.5 kg (89 lb 4.6 oz)   LMP  (LMP Unknown)   SpO2 95%   BMI 17.44 kg/m²   General no acute distress, pleasant, interactive  Head normocephalic, atraumatic  Eyes clear sclerae  ENT no discharge, neck supple  Mouth mucous membranes moist  Cardiac regular rate rhythm, no murmurs rubs gallops  Vascular warm well perfused x4 extremities  Pulmonary lungs clear to auscultation bilaterally  Abdomen soft nontender nondistended  Lymphatic no edema bilateral lower extremities  Neurological grossly intact  Psychological appropriate  Dermatological no lesions in sun exposed areas  Musculoskeletal normal tone and bulk    WBC   Date Value Ref Range Status   08/27/2023 7.76 3.40 - 10.80 10*3/mm3 Final     RBC   Date Value Ref Range Status   08/27/2023 3.27 (L) 3.77 - 5.28 10*6/mm3 Final     Hemoglobin   Date Value Ref Range Status   08/27/2023 8.9 (L) 12.0 - 15.9 g/dL Final     Hematocrit   Date Value Ref Range Status   08/27/2023 28.7 (L) 34.0 - 46.6 % Final     MCV   Date Value Ref Range Status   08/27/2023 87.8 79.0 - 97.0 fL Final     MCH   Date Value Ref Range Status   08/27/2023 27.2 26.6 - 33.0 pg Final     MCHC   Date Value Ref Range Status   08/27/2023 31.0 (L) 31.5 - 35.7 g/dL Final     RDW   Date Value Ref Range Status   08/27/2023 14.7 12.3 - 15.4 % Final     RDW-SD   Date Value Ref Range Status   08/27/2023 47.2 37.0 - 54.0 fl Final     MPV   Date Value Ref " Range Status   08/27/2023 10.4 6.0 - 12.0 fL Final     Platelets   Date Value Ref Range Status   08/27/2023 301 140 - 450 10*3/mm3 Final     Neutrophil %   Date Value Ref Range Status   08/27/2023 84.1 (H) 42.7 - 76.0 % Final     Lymphocyte %   Date Value Ref Range Status   08/27/2023 8.9 (L) 19.6 - 45.3 % Final     Monocyte %   Date Value Ref Range Status   08/27/2023 6.3 5.0 - 12.0 % Final     Eosinophil %   Date Value Ref Range Status   08/27/2023 0.0 (L) 0.3 - 6.2 % Final     Basophil %   Date Value Ref Range Status   08/27/2023 0.1 0.0 - 1.5 % Final     Immature Grans %   Date Value Ref Range Status   08/27/2023 0.6 (H) 0.0 - 0.5 % Final     Neutrophils, Absolute   Date Value Ref Range Status   08/27/2023 6.52 1.70 - 7.00 10*3/mm3 Final     Lymphocytes, Absolute   Date Value Ref Range Status   08/27/2023 0.69 (L) 0.70 - 3.10 10*3/mm3 Final     Monocytes, Absolute   Date Value Ref Range Status   08/27/2023 0.49 0.10 - 0.90 10*3/mm3 Final     Eosinophils, Absolute   Date Value Ref Range Status   08/27/2023 0.00 0.00 - 0.40 10*3/mm3 Final     Basophils, Absolute   Date Value Ref Range Status   08/27/2023 0.01 0.00 - 0.20 10*3/mm3 Final     Immature Grans, Absolute   Date Value Ref Range Status   08/27/2023 0.05 0.00 - 0.05 10*3/mm3 Final     nRBC   Date Value Ref Range Status   08/27/2023 0.0 0.0 - 0.2 /100 WBC Final       Basic Metabolic Panel    Sodium Sodium   Date Value Ref Range Status   08/27/2023 132 (L) 136 - 145 mmol/L Final   08/27/2023 133 (L) 136 - 145 mmol/L Final   08/26/2023 136 136 - 145 mmol/L Final   08/25/2023 135 (L) 136 - 145 mmol/L Final      Potassium Potassium   Date Value Ref Range Status   08/27/2023 3.5 3.5 - 5.2 mmol/L Final   08/27/2023 3.5 3.5 - 5.2 mmol/L Final     Comment:     Slight hemolysis detected by analyzer. Results may be affected.   08/26/2023 3.8 3.5 - 5.2 mmol/L Final   08/25/2023 3.5 3.5 - 5.2 mmol/L Final     Comment:     Slight hemolysis detected by analyzer. Results  may be affected.      Chloride Chloride   Date Value Ref Range Status   08/27/2023 102 98 - 107 mmol/L Final   08/27/2023 100 98 - 107 mmol/L Final   08/26/2023 103 98 - 107 mmol/L Final   08/25/2023 101 98 - 107 mmol/L Final      Bicarbonate No results found for: PLASMABICARB   BUN BUN   Date Value Ref Range Status   08/27/2023 19 8 - 23 mg/dL Final   08/27/2023 18 8 - 23 mg/dL Final   08/26/2023 14 8 - 23 mg/dL Final   08/25/2023 14 8 - 23 mg/dL Final      Creatinine Creatinine   Date Value Ref Range Status   08/27/2023 1.46 (H) 0.57 - 1.00 mg/dL Final   08/27/2023 1.47 (H) 0.57 - 1.00 mg/dL Final   08/26/2023 1.35 (H) 0.57 - 1.00 mg/dL Final   08/25/2023 1.29 (H) 0.57 - 1.00 mg/dL Final      Calcium Calcium   Date Value Ref Range Status   08/27/2023 7.6 (L) 8.6 - 10.5 mg/dL Final   08/27/2023 7.3 (L) 8.6 - 10.5 mg/dL Final   08/26/2023 8.2 (L) 8.6 - 10.5 mg/dL Final   08/25/2023 8.4 (L) 8.6 - 10.5 mg/dL Final      Glucose      No components found for: GLUCOSE.*         Scheduled Meds:amiodarone, 150 mg, Intravenous, Once  cilostazol, 100 mg, Oral, BID  donepezil, 10 mg, Oral, Nightly  metoprolol tartrate, 50 mg, Oral, Q12H  pantoprazole, 40 mg, Oral, BID  senna-docusate sodium, 2 tablet, Oral, BID  sodium chloride, 10 mL, Intravenous, Q12H      Continuous Infusions:amiodarone, 0.5 mg/min, Last Rate: 0.5 mg/min (08/27/23 1622)  heparin, 22 Units/kg/hr (Order-Specific), Last Rate: 22 Units/kg/hr (08/26/23 9567)  Pharmacy Consult,   Pharmacy to Dose Heparin,   sodium chloride, 35 mL/hr, Last Rate: 35 mL/hr (08/27/23 0553)      PRN Meds:.  acetaminophen    senna-docusate sodium **AND** polyethylene glycol **AND** bisacodyl **AND** bisacodyl    melatonin    metoprolol tartrate    ondansetron    oxyCODONE    Pharmacy Consult    Pharmacy to Dose Heparin    sodium chloride    sodium chloride    ASSESSMENT  87 F with metastatic esophageal cancer and Afib (held anticoagulation due to recent hemoptysis) who presented to [facility]  with acute limb ischemia now s/p emergent right lower extremity arteriogram, endovascular computer-assisted percutaneous suction mechanical thrombectomy using Penumbra Lightning [place] 7 device with extirpation of matter at right popliteal and tibial arteries, endovascular angioplasty of right tibioperoneal trunk using 4 x 20 mm EV 3 EverCross balloon, via left femoral artery access on 8/26/2023. Postoperative AF RVR and confusion in ICU.        Popliteal artery occlusion, right    Complete heart block    PAD     HTN    History of subacute left MCA distribution ischemic CVA    Tobacco use    MAGGIE (acute kidney injury)    Hyperlipidemia    Iron deficiency anemia due to chronic blood loss    Paroxysmal atrial fibrillation    Multiple pulmonary nodules (New 8mm RLL)    COPD (? severity)    Gastric cancer    CAD (coronary artery disease)    Severe malnutrition    PLAN  Aspirin  Xarelto 2.5 mg BID  Consider stopping therapeutic (full dose) anticoagulation given recent GI bleeding  GI consult for possible esophageal stent or other esophageal management options  Diet  Hospice consult  PT  Out of bed  Telemetry return to Hospitalist primary service    Julio Gonzalez M.D., R.P.V.I.  Cardiothoracic and Vascular Surgeon  [facility]    Julio Gonzalez MD  08/27/23  07:21 EDT

## 2023-08-27 NOTE — DISCHARGE SUMMARY
Discharge Summary    Patient name: Rupinder Lees  CSN: [id number]  MRN: [id number]  : 1935  Today's date: 2023     Date of Admission: 2023  Date of Discharge:  2023    Admitting Physician:  Dr. Latrice Grider DO; Pulmonology  Primary Care Provider: Gifty Kruse MD  Consultations:  Dr. Julio Gonzalez MD; Cardiothoracic Surgery     GATITO Bonilla; Cardiology    Admission Diagnosis: Right popliteal artery occlusion     Discharge Diagnoses:   Active Hospital Problems    Diagnosis     Severe malnutrition     Popliteal artery occlusion, right     CAD (coronary artery disease)     Multiple pulmonary nodules (New 8mm RLL)     COPD (? severity)     Gastric cancer     Paroxysmal atrial fibrillation     Iron deficiency anemia due to chronic blood loss     HTN     Complete heart block     PAD      Hyperlipidemia     History of subacute left MCA distribution ischemic CVA     Tobacco use     MAGGIE (acute kidney injury)        Allergies:  Patient has no known allergies.    Code Status and Medical Interventions:   Ordered at: 23 2305     Level Of Support Discussed With:    Patient     Code Status (Patient has no pulse and is not breathing):    CPR (Attempt to Resuscitate)     Medical Interventions (Patient has pulse or is breathing):    Full Support       Procedures/Testing:  Procedure(s):  RIGHT LOWER EXTREMITY ARTERIOGRAM, PENUMBRA THROMBECTOMY RIGHT POPLITEAL AND TIBIAL ARTERY, BALLOON ANGIOPLASTY TIBIAL ARTERY RIGHT     History of Present Illness:  Rupinder Lees is a 87 y.o. female with PMHx significant for COPD on 2L home O2 PRN, HTN, dyslipidemia, complete heart block s/p PPM, prior CVA, atrial fibrillation, prior DVT, and PAD s/p femoral artery stent and right popliteal thrombosis s/p embolectomy/femoral popliteal bypass in 2018 per Dr. Beatty who presents to [facility] 23 via EMS as a transfer from [facility], where she presented for evaluation of loss of sensation in  her right foot. Patient was recently diagnosed with gastric adenocarcinoma and was at her oncologist appointment 8/24 when she complained that her foot was numb. Pulses were not palpable and extremity was cold to touch and she was sent to the ED for further evaluation and treatment. Patient was started on a heparin drip and transferred to [facility] after discussion with Cardiothoracic Surgery for higher level of care.     Hospital Course:  Rupinder Lees was initially admitted to Hospital Medicine and underwent stat CTA abdominal aorta with runoff, which revealed occlusion of the distal right superficial femoral artery with limited flow below the level of the occlusion. Dr. Gonzalez with CTS was consulted. On 8/25, patient developed symptomatic atrial fibrillation with RVR. Patient is followed by Dr. Jaramillo with Cardiology, who was consulted. PPM was interrogated and Coreg was discontinued in lieu of rate control with She was previously anticoagulated with Eliquis 2.5 mg PO BID; however, this was stopped ~ 1 month ago due to hematemesis and gastric cancer. Hospice was consulted as the patient was seen at home prior to admission for cancer pain management. On 8/26, patient was taken to the OR by Dr. Gonzalez, where she underwent right lower extremity arteriogram, endovascular computer-assisted percutaneous suction mechanical thrombectomy using Penumbra Lightening [place] 7 device with extirpation of matter at the right popliteal and tibial arteries, endovascular angioplasty of right tibioperoneal trunk using 4 x 20mm EV3 EverCross Balloon and completion arteriogram. She was transferred to the ICU post-operatively on a heparin drip. She continued to have Afib RVR and was started on an amiodarone drip. Post-operatively, she was able to tolerate a PO diet. Overnight on 8/26, the patient became confused and combative and had to be put in bilateral nonviolent upper extremity wrist restraints. At approximately 0500 on 8/27, patient was  "given 0.5 mg Ativan with relief of agitation. She converted out of Afib RVR to SR at 0130. Patient was somnolent the morning of 8/27; however, at ~1300 the patient was back to her normal state of health and spoke with Dr. Gonzalez, who deemed her ready for discharge home. She was seen by the Hospice team prior to discharge and family will make contact with Hospice at home tomorrow. Patient will be discharged home with family this evening via private vehicle. Follow-up medications per Dr. Gonzalez, including initiation of new prescription for Eliquis. Follow-up appointments below.    Vitals:  /64 (BP Location: Left arm, Patient Position: Lying)   Pulse 85   Temp 97.3 °F (36.3 °C) (Axillary)   Resp 16   Ht 152.4 cm (60\")   Wt 40.5 kg (89 lb 4.6 oz)   LMP  (LMP Unknown)   SpO2 100%   BMI 17.44 kg/m²     Physical Examination  Telemetry:  Normal sinus rhythm.    Constitutional:  No acute distress.  Resting in bed on nasal cannula.    Eyes: No scleral icterus.   PERRL, EOM intact.    Neck:  Supple, FROM   Cardiovascular: Normal rate, regular and rhythm. Normal heart sounds.  No murmurs, gallop or rub.   Respiratory: No respiratory distress. Normal respiratory effort.  Diminished bilaterally.    Abdominal:  Soft. No masses. Nontender. No distension. No HSM.   Extremities: No digital cyanosis. No clubbing.  No peripheral edema.   Skin: No rashes, lesions or ulcers   Neurological:             Sedated. Minimally responsive to painful stimuli.    *Upon reassessment, patient was awake and alert, in no respiratory distress, and in NSR without s/s of distress    Labs:  Results from last 7 days   Lab Units 08/27/23  0045   WBC 10*3/mm3 7.76   HEMOGLOBIN g/dL 8.9*   HEMATOCRIT % 28.7*   PLATELETS 10*3/mm3 301     Results from last 7 days   Lab Units 08/27/23  0422 08/25/23  0608 08/24/23  0003   SODIUM mmol/L 132*   < > 138   POTASSIUM mmol/L 3.5   < > 3.9   CHLORIDE mmol/L 102   < > 104   CO2 mmol/L 16.0*   < > 22.0   BUN " mg/dL 19   < > 18   CREATININE mg/dL 1.46*   < > 1.15*   CALCIUM mg/dL 7.6*   < > 8.3*   BILIRUBIN mg/dL  --   --  0.4   ALK PHOS U/L  --   --  151*   ALT (SGPT) U/L  --   --  12   AST (SGOT) U/L  --   --  18   GLUCOSE mg/dL 122*   < > 104*    < > = values in this interval not displayed.         Magnesium   Date Value Ref Range Status   08/27/2023 2.0 1.6 - 2.4 mg/dL Final   08/26/2023 1.9 1.6 - 2.4 mg/dL Final   08/25/2023 1.9 1.6 - 2.4 mg/dL Final                    Discharge Medications        New Medications        Instructions Start Date   apixaban 2.5 MG tablet tablet  Commonly known as: ELIQUIS   2.5 mg, Oral, 2 Times Daily             Continue These Medications        Instructions Start Date   carvedilol 6.25 MG tablet  Commonly known as: COREG   6.25 mg, Oral, 2 Times Daily      pantoprazole 40 MG EC tablet  Commonly known as: PROTONIX   40 mg, Oral, 2 Times Daily      polyethylene glycol 17 GM/SCOOP powder  Commonly known as: MIRALAX   17 g, Oral, Daily             Stop These Medications      cilostazol 100 MG tablet  Commonly known as: PLETAL     donepezil 10 MG tablet  Commonly known as: Aricept     HYDROcodone-acetaminophen 5-325 MG per tablet  Commonly known as: NORCO     oxyCODONE 5 MG/5ML solution  Commonly known as: ROXICODONE     prochlorperazine 10 MG tablet  Commonly known as: COMPAZINE     promethazine 25 MG tablet  Commonly known as: PHENERGAN     traMADol 50 MG tablet  Commonly known as: ULTRAM            Diet Instructions       Diet: Cardiac Diets, Diabetic Diets; Healthy Heart (2-3 Na+); Regular Texture (IDDSI 7); Thin (IDDSI 0); Consistent Carbohydrate      Discharge Diet:  Cardiac Diets  Diabetic Diets       Cardiac Diet: Healthy Heart (2-3 Na+)    Texture: Regular Texture (IDDSI 7)    Fluid Consistency: Thin (IDDSI 0)    Diabetic Diet: Consistent Carbohydrate            Follow-up Appointments  Future Appointments   Date Time Provider Department Center   7/5/2024 10:30 AM Carlo Jaramillo MD  [facility]       Discharge Instructions:  Discharge home this evening with family via private vehicle  Follow-up with CTS in 2-3 weeks  Follow-up with PCP and Cardiologist  Return to hospital or call 911 with recurrence of symptoms  Hospice to follow at home     Alison Cazares, MSN, APRN, ACNPC-AG  Pulmonary and Critical Care Medicine  Electronically signed by ASHISH Bonner, 08/27/23, 4:32 PM EDT.     Time: I spent 31 minutes on this discharge activity which included: face-to-face encounter with the patient, reviewing the data in the system, coordination of the care with the nursing staff as well as consultants, documentation, and entering orders.      CC: Gifty Kruse MD

## 2023-08-27 NOTE — PROGRESS NOTES
INTENSIVIST   PROGRESS NOTE        SUBJECTIVE     Rupinder 87 y.o. female is followed for: No chief complaint on file.       Popliteal artery occlusion, right    Complete heart block    PAD     HTN    History of subacute left MCA distribution ischemic CVA    Tobacco use    MAGGIE (acute kidney injury)    Hyperlipidemia    Iron deficiency anemia due to chronic blood loss    Paroxysmal atrial fibrillation    Multiple pulmonary nodules (New 8mm RLL)    COPD (? severity)    Gastric cancer    CAD (coronary artery disease)    Severe malnutrition    As an Intensivist, we provide an integrated approach to the ICU patient and family, medical management of comorbid conditions, including but not limited to electrolytes, glycemic control, organ dysfunction, lead interdisciplinary rounds and coordinate the care with all other services, including those from other specialists.     Interval History:  POD: Day of Surgery    08/23/23 Transferred from [facility] where she went c/o loss of sensation in right foot, and it was cold. No pulses. It was determined she had a Right popliteal artery occlusion.    08/24/23 CT surgery evaluation and planning timing for surgery. In the meantime she was on UFH continuous intravenous infusion.    08/25/23 She had PAF with RVR and Dr. Jaramillo saw her and adjusted her medications.    08/26/23 She underwent her revascularization procedure. After which she was transferred to the [facility]. She was hypotensive and she was transferred to the ICU.    I saw her in the ICU. She is comfortable. Amiodarone continuous intravenous infusion is going to be started. SBP up to 96 mmHg    Temp  Min: 97 °F (36.1 °C)  Max: 98.4 °F (36.9 °C)       History     Last Reviewed by Ronan Segundo MD on 8/26/2023 at  7:57 PM    Sections Reviewed    Medical, Family, Surgical, Tobacco, Alcohol, Drug Use, Sexual Activity,   Social Documentation    Problem list reviewed by Ronan Segundo MD on 8/26/2023 at  7:57 PM  Medicines reviewed by  Ronan Segundo MD on 2023 at  7:57 PM  Allergies reviewed by Ronan Segundo MD on 2023 at  7:57 PM       The patient's relevant past medical, surgical and social history were reviewed and updated in Epic as appropriate.        OBJECTIVE     Vitals:  Temp: 97.5 °F (36.4 °C) (23 1633) Temp  Min: 97 °F (36.1 °C)  Max: 98.4 °F (36.9 °C)   Temp core:      BP: 96/71 (23 1900) BP  Min: 73/24  Max: 155/99   MAP (non-invasive) Noninvasive MAP (mmHg): 83 (23) Noninvasive MAP (mmHg)  Av.1  Min: 60  Max: 116   Pulse: (!) 152 (23) Pulse  Min: 91  Max: 164   Resp: 16 (23 1633) Resp  Min: 16  Max: 20   SpO2: 100 % (23) SpO2  Min: 82 %  Max: 100 %   Device: nasal cannula, humidified (23)    Flow Rate: 2 (23) Flow (L/min)  Min: 2  Max: 2         23  0841 23  1232   Weight: 40.5 kg (89 lb 4.6 oz) 40.5 kg (89 lb 4.6 oz)        Intake/Ouptut 24 hrs (7:00AM - 6:59 AM)  Intake & Output (last 3 days)          07 0700  0701   0700  0701   0700    P.O. 222 0     I.V. (mL/kg)   550 (13.6)    Total Intake(mL/kg) 222 (5.5) 0 (0) 550 (13.6)    Urine (mL/kg/hr) 350 (0.4) 350 (0.4) 200 (0.3)    Stool  0     Total Output 350 350 200    Net -128 -350 +350           Urine Unmeasured Occurrence 1 x 3 x     Stool Unmeasured Occurrence  6 x             Medications (drips):  amiodarone, Last Rate: 1 mg/min (23)   Followed by  [START ON 2023] amiodarone  heparin, Last Rate: Stopped (23)  Pharmacy Consult  Pharmacy to Dose Heparin  sodium chloride, Last Rate: 35 mL/hr (23 164)      Physical Examination  Telemetry:  Rhythm: atrial rhythm (23)  Atrial Rhythm: atrial flutter (per EKG) (23)      Constitutional:  No acute distress.   Cardiovascular: IRR.    Respiratory: Normal breath sounds  No adventitious sounds   Abdominal:  Soft with no tenderness.   Extremities: No  Edema   Neurological:   Alert, Oriented, Cooperative.  Best Eye Response: 4-->(E4) spontaneous (08/26/23 1845)  Best Motor Response: 6-->(M6) obeys commands (08/26/23 1845)  Best Verbal Response: 4-->(V4) confused (08/26/23 1845)  Ashvin Coma Scale Score: 14 (08/26/23 1845)     Results Reviewed:  Laboratory  Microbiology  Radiology  Pathology    Hematology:  Results from last 7 days   Lab Units 08/25/23  0609 08/24/23  0003 08/23/23  1600   WBC 10*3/mm3 7.01 7.94 8.76   HEMOGLOBIN g/dL 10.3* 11.0* 11.8*   MCV fL 84.9 88.4 88.8   PLATELETS 10*3/mm3 263 204 238     Results from last 7 days   Lab Units 08/24/23  0003 08/23/23  1600   IMM GRAN % % 0.6* 0.5   NEUTROS ABS 10*3/mm3 5.53 6.40   LYMPHS ABS 10*3/mm3 1.35 1.40   MONOS ABS 10*3/mm3 0.91* 0.85   EOS ABS 10*3/mm3 0.06 0.03   BASOS ABS 10*3/mm3 0.04 0.04     Chemistry:  Estimated Creatinine Clearance: 19.5 mL/min (A) (by C-G formula based on SCr of 1.35 mg/dL (H)).    Results from last 7 days   Lab Units 08/26/23  0217 08/25/23  0608   SODIUM mmol/L 136 135*   POTASSIUM mmol/L 3.8 3.5   CHLORIDE mmol/L 103 101   CO2 mmol/L 21.0* 24.0   BUN mg/dL 14 14   CREATININE mg/dL 1.35* 1.29*   GLUCOSE mg/dL 97 121*     Results from last 7 days   Lab Units 08/26/23  0217 08/25/23  0608   CALCIUM mg/dL 8.2* 8.4*   MAGNESIUM mg/dL 1.9 1.9     Hepatic Panel:  Results from last 7 days   Lab Units 08/24/23  0003 08/23/23  1600   ALBUMIN g/dL 3.0* 3.5   BILIRUBIN mg/dL 0.4 0.6   AST (SGOT) U/L 18 22   ALT (SGPT) U/L 12 15   ALK PHOS U/L 151* 159*     COVID-19  Lab Results   Component Value Date    COVID19 Not Detected 06/03/2022     Images:  XR Chest 1 View    Result Date: 8/26/2023  Impression: No acute cardiopulmonary abnormality. Electronically Signed: Dominick Cortez MD  8/26/2023 9:17 PM EDT  Workstation ID: [id number]     Results: Reviewed.  I reviewed the patient's new laboratory and imaging results.  I independently reviewed the patient's new images.    Medications:  Reviewed.    Assessment   A/P     Hospital:  LOS: 3 days   ICU: 3h     Active Hospital Problems    Diagnosis    **Popliteal artery occlusion, 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]    Severe malnutrition [E43]    CAD (coronary artery disease) [I25.10]     [facility] 12/17/18: nonobstructive CAD, normal EF. LAD with Prox mild diffuse disease, mid at Dx bifurcation had 50% stenosis, distal small vessel diffuse disease Dx small vessel mild luminal irregularities. Left circumflex with Moderate calibre, mild diffuse stenosis, mid Lcx at OM take off had 40% stenosis, OM1 is a large calibre, prox 30-40% stenosis and mid and distal mild irregularities. Small to moderate calibre, dominant, prox 50 % tubular stenosis of RCA, mid and distal normal, small PDA and PL , no stenosis       Multiple pulmonary nodules (New 8mm RLL) [R91.8]    COPD (? severity) [J44.9]    Gastric cancer [C16.2]    Paroxysmal atrial fibrillation [I48.0]     Diagnosed during admission 12/2018. Previously on Amiodarone therapy  CHADSVASC is 5; Previously on Eliquis 2.5mg BID, stopped due to hematemesis and gastric cancer      Iron deficiency anemia due to chronic blood loss [D50.0]    HTN [I10]    Complete heart block [I44.2]     Bradycardia/hypotension noted at [facility] 12/17/18  S/p [facility] PPM 2018  No underlying rhythm 12/18/18      PAD  [I73.9]     History of claudication pain with ischemic right leg 12/16/187  AA with runoffs 12/17/18: R Popliteal was occluded at P1 segment, no distal run off, very faint collaterals  Status post femoral artery stent and right popliteal thrombosis status post embolectomy/fem pop bypass 2018 Dr. Beatty      Hyperlipidemia [E78.5]    History of subacute left MCA distribution ischemic CVA [Z86.73]    Tobacco use [Z72.0]    MAGGIE (acute kidney injury) [N17.9]     Rupinder is a 87 y.o. female admitted on 8/23/2023 with Popliteal artery occlusion, 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]    Assessment/Management/Treatment Plan:    PAD  Procedure(s) (LRB):  RIGHT LOWER EXTREMITY  ARTERIOGRAM, PENUMBRA THROMBECTOMY RIGHT POPLITEAL AND TIBIAL ARTERY, BALLOON ANGIOPLASTY TIBIAL ARTERY RIGHT (Right)  Dr. Julio Gonzalez (Cardiothoracic Surgery)  23   Pulmonary   COPD per records. No PFTs available.  Tobacco use  Pulmonary nodules most of them have been present in the past, but there is a new lesion on there right which measures 8 mm - followed by Dr. JR Roberts at [facility]. PET scan done 23. Multiple right lung hilar region pulmonary nodules with the largest measuing up to about 1 cm, with mSUV 1.9. This will be followed up as outpatient.  Cardiovascular  CAD  HTN  Complete Heart Block, S/P [facility] PPM 2018  P A Fib  Dyslipidemia   GI/Hepatology  Gastric Adenocarcinoma - Followed by Oncology  Malnutrition, severe  Hematology  Iron deficiency anemia  Endocrine   Body mass index is 17.44 kg/m². Underweight:<18.5kg/m2  At risk for DM (pre-diabetes) based on her HbA1c. [HbA1C 5.7%-6.4%, eA-139 mg/dL]. Based on A1c from     Lab Results   Lab Value Date/Time    HGBA1C 6.1 (H) 2015 0131       Results from last 7 days   Lab Units 23  2025   GLUCOSE mg/dL 145*       Diet: Diet: Cardiac Diets, Diabetic Diets; Healthy Heart (2-3 Na+); Consistent Carbohydrate; Texture: Regular Texture (IDDSI 7); Fluid Consistency: Thin (IDDSI 0)  Orders Placed This Encounter      Dietary Nutrition Supplements Boost Plus; strawberry      Advance Directives: Code Status and Medical Interventions:   Ordered at: 23 2307     Level Of Support Discussed With:    Patient     Code Status (Patient has no pulse and is not breathing):    CPR (Attempt to Resuscitate)     Medical Interventions (Patient has pulse or is breathing):    Full Support        DVT prophylaxis:  Medical and mechanical DVT prophylaxis orders are present.       In brief:  Hemodynamic support  Monitor Hb  A Fib per cardiology  A1c in AM  Monitor sCr - Trending up    Lab Results   Component Value Date    CREATININE 1.35 (H)  08/26/2023    CREATININE 1.29 (H) 08/25/2023    CREATININE 1.15 (H) 08/24/2023    CREATININE 1.28 (H) 08/23/2023    CREATININE 1.32 (H) 08/09/2023    CREATININE 1.76 (H) 08/02/2023    CREATININE 1.35 (H) 07/24/2023    CREATININE 1.44 (H) 07/20/2023    CREATININE 0.91 06/03/2022    CREATININE 1.38 (H) 08/18/2019     Disposition: Admit to ICU    Plan of care and goals reviewed during interdisciplinary rounds.  I discussed the patient's findings and my recommendations with patient and nursing staff    MDM:    Problem(s) High due to: Acute or Chronic illness or injury that may poses a threat to life or bodily function  Data: Moderate due to: Review of prior external records from each unique source, Review or results of each unique test, and Ordering of each unique test    Moderate    [x] Primary Attending Intensive Care Medicine - Nutrition Support   [] Consultant    Copied text in this note has been reviewed and is accurate as of 08/26/23

## 2023-08-27 NOTE — PROGRESS NOTES
Called by patient's RN that patient was awake (was previously somnolent from one-time dose of Ativan this morning for agitation) and spoke with Dr. Gonzalez, who stated patient was able to be discharged home and follow-up as outpatient with appropriate providers. Patient uses a walker; however, she and family confirm having appropriate DME at home. She follows with Hospice at home and they were contacted prior to her discharge. Patient's family is supportive of discharge plan and med rec was completed per Dr. Gonzalez.     Alison Cazares, MSN, APRN, ACNPC-AG  Pulmonary and Critical Care Medicine  Electronically signed by ASHISH Bonner, 08/27/23, 5:02 PM EDT.

## 2023-08-27 NOTE — PROGRESS NOTES
Continued Stay Note  [facility]     Patient Name: Rupinder Lees  MRN: [id number]  Today's Date: 8/27/2023    Admit Date: 8/23/2023    Plan: Home with hospice   Discharge Plan       Row Name 08/27/23 1711       Plan    Plan Home with hospice    Plan Comments Hospice visit to bedside. Information provided to patient and her son, Antony. They relay that they will make contact with hospice  at home in am. Contact information provided. Patient to discharge home via private vehicle.                   Discharge Codes    No documentation.                 Expected Discharge Date and Time       Expected Discharge Date Expected Discharge Time    Aug 27, 2023               Gita Ashraf RN

## 2023-08-27 NOTE — CONSULTS
Palliative Care Initial Consult   Attending Physician: Latrice Grider DO  Referring Provider: Latrice Grider    Reason for Referral:  Eval of palliative needs/goals    Code Status:   Code Status and Medical Interventions:   Ordered at: 08/23/23 2305     Level Of Support Discussed With:    Patient     Code Status (Patient has no pulse and is not breathing):    CPR (Attempt to Resuscitate)     Medical Interventions (Patient has pulse or is breathing):    Full Support      Advanced Directives: Advance Directive Status: Patient does not have advance directive   Family/Support: dtr   Goals of Care: TBD.    HPI:   88 yo female with hx of PAD, COPD, Ht, tobaccos use and most recently adenoca of stomach.  Pt was admitted 8/23 with RLE/foot pain and had been found to have R popliteal artery occlusion.  Transferred for further eval and underwent procedure yesterday with return of blood flow.  She received ativan x 1 early this M and then was not responsive.  She had previously as outpt had had referral for home hospice and has been seen by hospice team here already.  On arrival pt is awake, talkative and moving all extremities.  Wants to get cleaned up and wants to walk.    ROS:   Denies pain, n/v, SOA but is still confused and uncertain reliability of answers beyond the immediate moment    Past Medical History:   Diagnosis Date    Arthritis     COPD  12/17/2018    DVT of lower extremity (deep venous thrombosis)     History of CVA 2013 12/17/2018    HTN 12/17/2018    PAD  12/17/2018    Stroke     Tobacco use 12/17/2018     Past Surgical History:   Procedure Laterality Date    CARDIAC CATHETERIZATION  12/17/2018    Procedure: Left Heart Cath;  Surgeon: Imtiaz Fuentes MD;  Location:  [facility];  Service: Cardiology    CARDIAC CATHETERIZATION N/A 12/17/2018    Procedure: Thrombolysis-peripheral;  Surgeon: Imtiaz Fuentes MD;  [facility]:  [facility];  Service:  Cardiology    CARDIAC ELECTROPHYSIOLOGY PROCEDURE N/A 12/18/2018    Procedure: PACEMAKER IMPLANTATION- DC;  Surgeon: Thony Bobby MD;  Location:  [facility];  Service: Cardiology    CARDIAC ELECTROPHYSIOLOGY PROCEDURE N/A 12/17/2018    Procedure: Temporary Pacemaker;  Surgeon: Imtiaz Fuentes MD;  Location:  [facility];  Service: Cardiology    COLONOSCOPY N/A 04/19/2019    Procedure: COLONOSCOPY;  Surgeon: Chris Zimmerman MD;  Location:  [facility];  Service: Gastroenterology    ENDOSCOPY N/A 04/19/2019    Procedure: ESOPHAGOGASTRODUODENOSCOPY;  Surgeon: Chris Zimmerman MD;  Location:  [facility];  Service: Gastroenterology    ENDOSCOPY N/A 8/1/2023    Procedure: ESOPHAGOGASTRODUODENOSCOPY WITH BIOPSY;  Surgeon: Agueda rAredondo MD;  Location:  [facility];  Service: Gastroenterology;  Laterality: N/A;    FEMORAL ARTERY STENT  2013    FEMORAL POPLITEAL BYPASS Right 12/17/2018    Procedure: LOWER EXTREMITY EMBELECTOMY RIGHT;  Surgeon: David Beatty MD;  Location:  [facility];  Service: Vascular    HYSTERECTOMY      LAPAROSCOPIC CHOLECYSTECTOMY      LEFT HEART CATH  12/17/2018    At [facility] with TV PM placement     PACEMAKER IMPLANTATION       Social History     Socioeconomic History    Marital status:     Number of children: 3   Tobacco Use    Smoking status: Light Smoker     Packs/day: 0.50     Years: 63.00     Pack years: 31.50     Types: Cigarettes     Passive exposure: Current    Smokeless tobacco: Never   Vaping Use    Vaping Use: Never used   Substance and Sexual Activity    Alcohol use: No    Drug use: No    Sexual activity: Defer     Family History   Problem Relation Age of Onset    Cancer Mother     Cancer Father     Heart attack Brother     Heart attack Brother        No Known Allergies    Current medication reviewed for route, type, dose and frequency and are current per MAR at time of dictation.    Palliative Performance  "Scale Score:      /81   Pulse 98   Temp 97.3 °F (36.3 °C) (Axillary)   Resp 16   Ht 152.4 cm (60\")   Wt 40.5 kg (89 lb 4.6 oz)   LMP  (LMP Unknown)   SpO2 98%   BMI 17.44 kg/m²     Intake/Output Summary (Last 24 hours) at 8/27/2023 1257  Last data filed at 8/27/2023 0600  Gross per 24 hour   Intake 1630.68 ml   Output 75 ml   Net 1555.68 ml       Physical Exam:    General Appearance:    Alert, cooperative, NAD   HEENT:    NC/AT, EOMI, anicteric, MMM, face relaxed   Neck:   supple, trachea midline, no JVD   Lungs:     CTA bilat, diminished in bases; respirations regular, even and unlabored; RR on exam    Heart:    RRR, normal S1 and S2, no M/R/G   Abdomen:     Normal bowel sounds, soft, nontender, nondistended   G/U:   Deferred   MSK/Extremities:   no edema, no mottling   Pulses:   Pulses palpable and equal bilaterally   Skin:   Warm, dry   Neurologic:   A/Ox1,, cooperative, HARTLEY   Psych:   Calm, appropriate         Labs:   Results from last 7 days   Lab Units 08/27/23  0045   WBC 10*3/mm3 7.76   HEMOGLOBIN g/dL 8.9*   HEMATOCRIT % 28.7*   PLATELETS 10*3/mm3 301     Results from last 7 days   Lab Units 08/27/23  0422   SODIUM mmol/L 132*   POTASSIUM mmol/L 3.5   CHLORIDE mmol/L 102   CO2 mmol/L 16.0*   BUN mg/dL 19   CREATININE mg/dL 1.46*   GLUCOSE mg/dL 122*   CALCIUM mg/dL 7.6*     Results from last 7 days   Lab Units 08/27/23  0422 08/25/23  0608 08/24/23  0003   SODIUM mmol/L 132*   < > 138   POTASSIUM mmol/L 3.5   < > 3.9   CHLORIDE mmol/L 102   < > 104   CO2 mmol/L 16.0*   < > 22.0   BUN mg/dL 19   < > 18   CREATININE mg/dL 1.46*   < > 1.15*   CALCIUM mg/dL 7.6*   < > 8.3*   BILIRUBIN mg/dL  --   --  0.4   ALK PHOS U/L  --   --  151*   ALT (SGPT) U/L  --   --  12   AST (SGOT) U/L  --   --  18   GLUCOSE mg/dL 122*   < > 104*    < > = values in this interval not displayed.     Imaging Results (Last 72 Hours)       Procedure Component Value Units Date/Time    XR Chest 1 View [[id number]] Collected: " 08/26/23 2117     Updated: 08/26/23 2120    Narrative:      XR CHEST 1 VW    Date of Exam: 8/26/2023 9:03 PM EDT    Indication: admission to ICU, per Dr. Segundo    Comparison: 8/2/2023.    Findings:  There is a 2-lead left-sided ICD in place. The device appears intact. There is no pneumothorax, pleural effusion or focal airspace consolidation. Heart size and pulmonary vasculature appear within normal limits. Regional bones appear intact.      Impression:      Impression:  No acute cardiopulmonary abnormality.      Electronically Signed: Dominick Cortez MD    8/26/2023 9:17 PM EDT    Workstation ID: [id number]    XR [place] OR Procedure [[id number]] Resulted: 08/26/23 1507     Updated: 08/26/23 1507              Lab 08/27/23  0422   HEMOGLOBIN A1C 5.90*       Diagnostics: Reviewed    A:     Complete heart block    PAD     HTN    History of subacute left MCA distribution ischemic CVA    Tobacco use    MAGGIE (acute kidney injury)    Hyperlipidemia    Iron deficiency anemia due to chronic blood loss    Paroxysmal atrial fibrillation    Multiple pulmonary nodules (New 8mm RLL)    COPD (? severity)    Gastric cancer    Popliteal artery occlusion, right    CAD (coronary artery disease)    Severe malnutrition    Hx heart block with [place] PPM      S/S:   Debility  AMS         P:    Discussed with RN. Would avoid ativan at this point in time as likely cause of unresponsiveness earlier.  Thank you for this consult and allowing us to participate in patient's plan of care. Hospice already on board due to ca dx.  Palliative will follow and see prn.     Time spent:34 minutes spent reviewing medical and medication records, assessing and examining patient, discussing with family, answering questions, providing some guidance about a plan and documentation of care, and coordinating care with other healthcare members, with > 50% time spent face to face.     Ashley Contreras MD  8/27/2023

## 2023-08-27 NOTE — PROGRESS NOTES
INTENSIVIST   PROGRESS NOTE     Hospital:  LOS: 4 days     Ms. Rupinder Lees, 87 y.o. female is followed for a Chief Complaint of: Postoperative management      Subjective   S     Interval History:  Confused and combative overnight and received IV Ativan this AM. She is now unresponsive.        The patient's relevant past medical, surgical and social history were reviewed and updated in Epic as appropriate.      ROS: Unable to obtain secondary to mental status.     Objective   O     Vitals:  Temp  Min: 97 °F (36.1 °C)  Max: 98.3 °F (36.8 °C)  BP  Min: 73/24  Max: 125/77  Pulse  Min: 70  Max: 164  Resp  Min: 16  Max: 22  SpO2  Min: 81 %  Max: 100 % Flow (L/min)  Min: 1  Max: 2    Intake/Ouptut 24 hrs (7:00AM - 6:59 AM)  Intake & Output (last 3 days)         08/24 0701  08/25 0700 08/25 0701  08/26 0700 08/26 0701  08/27 0700 08/27 0701 08/28 0700    P.O. 222 0      I.V. (mL/kg)   1630.7 (40.3)     Total Intake(mL/kg) 222 (5.5) 0 (0) 1630.7 (40.3)     Urine (mL/kg/hr) 350 (0.4) 350 (0.4) 275 (0.3)     Stool  0      Total Output 350 350 275     Net -128 -350 +1355.7             Urine Unmeasured Occurrence 1 x 3 x      Stool Unmeasured Occurrence  6 x              Medications (drips):  amiodarone, Last Rate: 0.5 mg/min (08/27/23 0339)          Physical Examination  Telemetry:  Normal sinus rhythm.    Constitutional:  No acute distress.  Resting in bed on nasal cannula.    Eyes: No scleral icterus.   PERRL, EOM intact.    Neck:  Supple, FROM   Cardiovascular: Normal rate, regular and rhythm. Normal heart sounds.  No murmurs, gallop or rub.   Respiratory: No respiratory distress. Normal respiratory effort.  Diminished bilaterally.    Abdominal:  Soft. No masses. Nontender. No distension. No HSM.   Extremities: No digital cyanosis. No clubbing.  No peripheral edema.   Skin: No rashes, lesions or ulcers   Neurological:   Sedated. Minimally responsive to painful stimuli.              Results from last 7 days   Lab Units  08/27/23  0045 08/25/23  0609 08/24/23  0003   WBC 10*3/mm3 7.76 7.01 7.94   HEMOGLOBIN g/dL 8.9* 10.3* 11.0*   MCV fL 87.8 84.9 88.4   PLATELETS 10*3/mm3 301 263 204     Results from last 7 days   Lab Units 08/27/23  0422 08/27/23  0045 08/26/23  0217 08/25/23  0608   SODIUM mmol/L 132* 133* 136 135*   POTASSIUM mmol/L 3.5 3.5 3.8 3.5   CO2 mmol/L 16.0* 18.0* 21.0* 24.0   CREATININE mg/dL 1.46* 1.47* 1.35* 1.29*   GLUCOSE mg/dL 122* 141* 97 121*   MAGNESIUM mg/dL 2.0  --  1.9 1.9     Estimated Creatinine Clearance: 18 mL/min (A) (by C-G formula based on SCr of 1.46 mg/dL (H)).  Results from last 7 days   Lab Units 08/24/23  0003 08/23/23  1600   ALK PHOS U/L 151* 159*   BILIRUBIN mg/dL 0.4 0.6   ALT (SGPT) U/L 12 15   AST (SGOT) U/L 18 22       Results from last 7 days   Lab Units 08/27/23  1035   PH, ARTERIAL pH units 7.416   PCO2, ARTERIAL mm Hg 31.5*   PO2 ART mm Hg 77.6*   FIO2 % 21       Images:  Imaging Results (Last 24 Hours)       Procedure Component Value Units Date/Time    XR Chest 1 View [[id number]] Collected: 08/26/23 2117     Updated: 08/26/23 2120    Narrative:      XR CHEST 1 VW    Date of Exam: 8/26/2023 9:03 PM EDT    Indication: admission to ICU, per Dr. Segundo    Comparison: 8/2/2023.    Findings:  There is a 2-lead left-sided ICD in place. The device appears intact. There is no pneumothorax, pleural effusion or focal airspace consolidation. Heart size and pulmonary vasculature appear within normal limits. Regional bones appear intact.      Impression:      Impression:  No acute cardiopulmonary abnormality.      Electronically Signed: Dominick Cortez MD    8/26/2023 9:17 PM EDT    Workstation ID: [id number]    XR [facility] OR Procedure [[id number]] Resulted: 08/26/23 1507     Updated: 08/26/23 1507               Results: Reviewed.  I reviewed the patient's new laboratory and imaging results.  I independently reviewed the patient's new images.    Medications: Reviewed.    Assessment & Plan   A / P      Ms. Lees is a 88yo F with a history of COPD on 2L home O2 as needed, CAD, HTN, HLD, CHB s/p PPM, prior CVA, Afib, prior DVT, PAD s/p femoral artery stent, and right popliteal thrombosis s/p embolectomy/femoropopliteal bypass in 2018 by Dr. Beatty who presented to [facility] from [facility] [place] for a right popliteal artery occlusion.     She was admitted to Hospital Medicine and was taken to the OR on 8/26/23 by Dr. Gonzalez where she underwent right lower extremity arteriogram, endovascular computer-assisted percutaneous suction mechanical thrombectomy using Penumbra Lightening [place] 7 device with extirpation of matter at the right popliteal and tibial arteries, endovascular angioplasty of right tibioperoneal trunk using 4 x 20mm EV3 EverCross Balloon and completion arteriogram.     She was transferred to the ICU postoperatively on a Heparin drip.     Overnight, she became agitated and combative and received Ativan. She is now poorly responsive. ABG was performed and was negative for hypercapnia.     Nutrition:   Diet: Cardiac Diets, Diabetic Diets; Healthy Heart (2-3 Na+); Consistent Carbohydrate; Texture: Regular Texture (IDDSI 7); Fluid Consistency: Thin (IDDSI 0)  Advance Directives:   Code Status and Medical Interventions:   Ordered at: 08/23/23 230     Level Of Support Discussed With:    Patient     Code Status (Patient has no pulse and is not breathing):    CPR (Attempt to Resuscitate)     Medical Interventions (Patient has pulse or is breathing):    Full Support       Active Hospital Problems    Diagnosis     Severe malnutrition     Popliteal artery occlusion, right     CAD (coronary artery disease)     Multiple pulmonary nodules (New 8mm RLL)     COPD (? severity)     Gastric cancer     Paroxysmal atrial fibrillation     Iron deficiency anemia due to chronic blood loss     HTN     Complete heart block     PAD      Hyperlipidemia     History of subacute left MCA distribution ischemic CVA     Tobacco use     MAGGIE (acute  kidney injury)        Assessment / Plan:    Continue ICU care until mentation improves.   If no improvement in mental status in the next couple of hours, will plan to check a CT scan of the head w/o contrast  Postoperative management per CTS.   Consult Palliative Care to assist with further goals of care as the family is reportedly interested in Hospice but the patient is still full support.   Recent history of metastatic gastric adenocarcinoma.   Continue Duonebs for COPD with ongoing tobacco use.  AM labs    High level of risk due to:  severe exacerbation of chronic illness and illness with threat to life or bodily function.    Plan of care and goals reviewed during interdisciplinary rounds.  I discussed the patient's findings and my recommendations with nursing staff    Latrice Grider, DO    Intensive Care Medicine and Pulmonary Medicine

## 2023-08-27 NOTE — PLAN OF CARE
Goal Outcome Evaluation:                 Pt confused upon first assessment. Oriented only to self, and occasionally to situation and place. WAYNE DAVIS. Pt initially was refusing care and exiting the bed, stating that she is going home. RN and charge at bedside were able to calm pt enough to perform care overnight. Around 0500, RN and PCT at bedside getting am EKG and patient became increasingly more confused, threatening, inconsolable and combative. Charge called to bedside and nurse practitioner consulted and bilateral upper wrist restraints ordered as well as 0.5mg of Ativan, pt eventually was able to calm down and fall asleep. Otherwise pt is progressing in care very well. Converted out of Afib RVR to SR around 0130, SBP >100 since conversion. On RA, LS clear, diminished. Amio gtt and heparin gtt infusing per order as well as fluids. Active BS. UOP 75mls, BS 26mls. VSS.

## 2023-08-28 LAB
BH BB BLOOD EXPIRATION DATE: NORMAL
BH BB BLOOD TYPE BARCODE: 5100
BH BB DISPENSE STATUS: NORMAL
BH BB PRODUCT CODE: NORMAL
BH BB UNIT NUMBER: NORMAL
CROSSMATCH INTERPRETATION: NORMAL
QT INTERVAL: 298 MS
QT INTERVAL: 438 MS
QTC INTERVAL: 475 MS
QTC INTERVAL: 480 MS
UNIT  ABO: NORMAL
UNIT  RH: NORMAL

## 2023-08-28 NOTE — DISCHARGE PLACEMENT REQUEST
"Rupinder Arias (87 y.o. Female)   D/C Summary      Date of Birth   1935    Social Security Number       Address   [address]    Home Phone   [phone]    MRN   [id number]       [facility]    Marital Status                               Admission Date   8/23/23    Admission Type   Elective    Admitting Provider   Case, Latrice STEVENSON DO    Attending Provider       Department, Room/Bed   [facility]       Discharge Date   8/27/2023    Discharge Disposition   Home or Self Care    Discharge Destination                                 Attending Provider: (none)   Allergies: No Known Allergies    Isolation: None   Infection: None   Code Status: Prior    Ht: 152.4 cm (60\")   Wt: 40.5 kg (89 lb 4.6 oz)    Admission Cmt: None   Principal Problem: None                  Active Insurance as of 8/23/2023       Primary Coverage       Payor Plan Insurance Group Employer/Plan Group    MEDICARE MEDICARE A & B        Payor Plan Address Payor Plan Phone Number Payor Plan Fax Number Effective Dates    [address] [phone]  8/1/2002 - None Entered    [facility] [id number]         Subscriber Name Subscriber Birth Date Member ID       RUPINDER ARIAS 1935 [id number]               Secondary Coverage       Payor Plan Insurance Group Employer/Plan Group    ANTHEM BLUE CROSS ANTHEM BLUE CROSS BLUE SHIELD PPO [id number]       Payor Plan Address Payor Plan Phone Number Payor Plan Fax Number Effective Dates    [address] [phone]  6/3/2022 - None Entered    [facility] [id number]         Subscriber Name Subscriber Birth Date Member ID       RUPINDER ARIAS 1935 [id number]                     Emergency Contacts        (Rel.) Home Phone Work Phone Mobile Phone    HUGO[place]USTAVO (Other) [phone] -- --    Antony Arias (Son) [phone] -- [phone]    AGNIESZKA ARIAS (Relative) [phone] -- --    RORY ARIAS (Son) " [phone] -- --                   Discharge Summary        Nicole Cazares APRN at 23 1632          Discharge Summary    Patient name: Rupinder Lees  CSN: [id number]  MRN: [id number]  : 1935  Today's date: 2023     Date of Admission: 2023  Date of Discharge:  2023    Admitting Physician:  Dr. Latrice Grider DO; Pulmonology  Primary Care Provider: Gifty Kruse MD  Consultations:  Dr. Julio Gonzalez MD; Cardiothoracic Surgery     GATITO Bonilla; Cardiology    Admission Diagnosis: Right popliteal artery occlusion     Discharge Diagnoses:   Active Hospital Problems    Diagnosis     Severe malnutrition     Popliteal artery occlusion, right     CAD (coronary artery disease)     Multiple pulmonary nodules (New 8mm RLL)     COPD (? severity)     Gastric cancer     Paroxysmal atrial fibrillation     Iron deficiency anemia due to chronic blood loss     HTN     Complete heart block     PAD      Hyperlipidemia     History of subacute left MCA distribution ischemic CVA     Tobacco use     MAGGIE (acute kidney injury)        Allergies:  Patient has no known allergies.    Code Status and Medical Interventions:   Ordered at: 23 2305     Level Of Support Discussed With:    Patient     Code Status (Patient has no pulse and is not breathing):    CPR (Attempt to Resuscitate)     Medical Interventions (Patient has pulse or is breathing):    Full Support       Procedures/Testing:  Procedure(s):  RIGHT LOWER EXTREMITY ARTERIOGRAM, PENUMBRA THROMBECTOMY RIGHT POPLITEAL AND TIBIAL ARTERY, BALLOON ANGIOPLASTY TIBIAL ARTERY RIGHT     History of Present Illness:  Rupinder Lees is a 87 y.o. female with PMHx significant for COPD on 2L home O2 PRN, HTN, dyslipidemia, complete heart block s/p PPM, prior CVA, atrial fibrillation, prior DVT, and PAD s/p femoral artery stent and right popliteal thrombosis s/p embolectomy/femoral popliteal bypass in 2018 per Dr. Beatty who presents to [facility] 23  via EMS as a transfer from [facility], where she presented for evaluation of loss of sensation in her right foot. Patient was recently diagnosed with gastric adenocarcinoma and was at her oncologist appointment 8/24 when she complained that her foot was numb. Pulses were not palpable and extremity was cold to touch and she was sent to the ED for further evaluation and treatment. Patient was started on a heparin drip and transferred to [facility] after discussion with Cardiothoracic Surgery for higher level of care.     Hospital Course:  Rupinder Lees was initially admitted to [facility] Medicine and underwent stat CTA abdominal aorta with runoff, which revealed occlusion of the distal right superficial femoral artery with limited flow below the level of the occlusion. Dr. Gonzalez with CTS was consulted. On 8/25, patient developed symptomatic atrial fibrillation with RVR. Patient is followed by Dr. Jaramillo with Cardiology, who was consulted. PPM was interrogated and Coreg was discontinued in lieu of rate control with She was previously anticoagulated with Eliquis 2.5 mg PO BID; however, this was stopped ~ 1 month ago due to hematemesis and gastric cancer. Hospice was consulted as the patient was seen at home prior to admission for cancer pain management. On 8/26, patient was taken to the OR by Dr. Gonzalez, where she underwent right lower extremity arteriogram, endovascular computer-assisted percutaneous suction mechanical thrombectomy using Penumbra Lightening [place] 7 device with extirpation of matter at the right popliteal and tibial arteries, endovascular angioplasty of right tibioperoneal trunk using 4 x 20mm EV3 EverCross Balloon and completion arteriogram. She was transferred to the ICU post-operatively on a heparin drip. She continued to have Afib RVR and was started on an amiodarone drip. Post-operatively, she was able to tolerate a PO diet. Overnight on 8/26, the patient became confused and combative and had  "to be put in bilateral nonviolent upper extremity wrist restraints. At approximately 0500 on 8/27, patient was given 0.5 mg Ativan with relief of agitation. She converted out of Afib RVR to SR at 0130. Patient was somnolent the morning of 8/27; however, at ~1300 the patient was back to her normal state of health and spoke with Dr. Gonzalez, who deemed her ready for discharge home. She was seen by the Hospice team prior to discharge and family will make contact with Hospice at home tomorrow. Patient will be discharged home with family this evening via private vehicle. Follow-up medications per Dr. Gonzalez, including initiation of new prescription for Eliquis. Follow-up appointments below.    Vitals:  /64 (BP Location: Left arm, Patient Position: Lying)   Pulse 85   Temp 97.3 °F (36.3 °C) (Axillary)   Resp 16   Ht 152.4 cm (60\")   Wt 40.5 kg (89 lb 4.6 oz)   LMP  (LMP Unknown)   SpO2 100%   BMI 17.44 kg/m²     Physical Examination  Telemetry:  Normal sinus rhythm.    Constitutional:  No acute distress.  Resting in bed on nasal cannula.    Eyes: No scleral icterus.   PERRL, EOM intact.    Neck:  Supple, FROM   Cardiovascular: Normal rate, regular and rhythm. Normal heart sounds.  No murmurs, gallop or rub.   Respiratory: No respiratory distress. Normal respiratory effort.  Diminished bilaterally.    Abdominal:  Soft. No masses. Nontender. No distension. No HSM.   Extremities: No digital cyanosis. No clubbing.  No peripheral edema.   Skin: No rashes, lesions or ulcers   Neurological:             Sedated. Minimally responsive to painful stimuli.    *Upon reassessment, patient was awake and alert, in no respiratory distress, and in NSR without s/s of distress    Labs:  Results from last 7 days   Lab Units 08/27/23  0045   WBC 10*3/mm3 7.76   HEMOGLOBIN g/dL 8.9*   HEMATOCRIT % 28.7*   PLATELETS 10*3/mm3 301     Results from last 7 days   Lab Units 08/27/23  0422 08/25/23  0608 08/24/23  0003   SODIUM mmol/L 132*  "  < > 138   POTASSIUM mmol/L 3.5   < > 3.9   CHLORIDE mmol/L 102   < > 104   CO2 mmol/L 16.0*   < > 22.0   BUN mg/dL 19   < > 18   CREATININE mg/dL 1.46*   < > 1.15*   CALCIUM mg/dL 7.6*   < > 8.3*   BILIRUBIN mg/dL  --   --  0.4   ALK PHOS U/L  --   --  151*   ALT (SGPT) U/L  --   --  12   AST (SGOT) U/L  --   --  18   GLUCOSE mg/dL 122*   < > 104*    < > = values in this interval not displayed.         Magnesium   Date Value Ref Range Status   08/27/2023 2.0 1.6 - 2.4 mg/dL Final   08/26/2023 1.9 1.6 - 2.4 mg/dL Final   08/25/2023 1.9 1.6 - 2.4 mg/dL Final                    Discharge Medications        New Medications        Instructions Start Date   apixaban 2.5 MG tablet tablet  Commonly known as: ELIQUIS   2.5 mg, Oral, 2 Times Daily             Continue These Medications        Instructions Start Date   carvedilol 6.25 MG tablet  Commonly known as: COREG   6.25 mg, Oral, 2 Times Daily      pantoprazole 40 MG EC tablet  Commonly known as: PROTONIX   40 mg, Oral, 2 Times Daily      polyethylene glycol 17 GM/SCOOP powder  Commonly known as: MIRALAX   17 g, Oral, Daily             Stop These Medications      cilostazol 100 MG tablet  Commonly known as: PLETAL     donepezil 10 MG tablet  Commonly known as: Aricept     HYDROcodone-acetaminophen 5-325 MG per tablet  Commonly known as: NORCO     oxyCODONE 5 MG/5ML solution  Commonly known as: ROXICODONE     prochlorperazine 10 MG tablet  Commonly known as: COMPAZINE     promethazine 25 MG tablet  Commonly known as: PHENERGAN     traMADol 50 MG tablet  Commonly known as: ULTRAM            Diet Instructions       Diet: Cardiac Diets, Diabetic Diets; Healthy Heart (2-3 Na+); Regular Texture (IDDSI 7); Thin (IDDSI 0); Consistent Carbohydrate      Discharge Diet:  Cardiac Diets  Diabetic Diets       Cardiac Diet: Healthy Heart (2-3 Na+)    Texture: Regular Texture (IDDSI 7)    Fluid Consistency: Thin (IDDSI 0)    Diabetic Diet: Consistent Carbohydrate            Follow-up  Appointments  Future Appointments   Date Time Provider Department Center   7/5/2024 10:30 AM Carlo Jaramillo MD [facility]       Discharge Instructions:  Discharge home this evening with family via private vehicle  Follow-up with CTS in 2-3 weeks  Follow-up with PCP and Cardiologist  Return to hospital or call 911 with recurrence of symptoms  Hospice to follow at home     Alison Cazares, MSN, APRN, ACNPC-AG  Pulmonary and Critical Care Medicine  Electronically signed by ASHISH Bonner, 08/27/23, 4:32 PM EDT.     Time: I spent 31 minutes on this discharge activity which included: face-to-face encounter with the patient, reviewing the data in the system, coordination of the care with the nursing staff as well as consultants, documentation, and entering orders.      CC: Gifty Kruse MD      Electronically signed by Nicole Cazares APRN at 08/27/23 0974

## 2023-08-28 NOTE — OUTREACH NOTE
Prep Survey      Flowsheet Row Responses   [facility] facility patient discharged from? [facility]   Is LACE score < 7 ? No   Eligibility Readm Mgmt   Discharge diagnosis Popliteal artery occlusion, right, CAD, PAD   Does the patient have one of the following disease processes/diagnoses(primary or secondary)? Other   Does the patient have Home health ordered? No  [Hospice]   Is there a DME ordered? No   Comments regarding appointments Hospice   Medication alerts for this patient Eliquis   Prep survey completed? Yes            Ness HO - Registered Nurse

## 2023-08-28 NOTE — PROGRESS NOTES
Continued Stay Note  [facility]     Patient Name: Rupinder Lees  MRN: [id number]  Today's Date: 8/28/2023    Admit Date: 8/23/2023    Plan: Home with hospice   Discharge Plan       Row Name 08/28/23 0904       Plan    Final Discharge Disposition Code 50 - home with hospice                   Discharge Codes    No documentation.                 Expected Discharge Date and Time       Expected Discharge Date Expected Discharge Time    Aug 27, 2023               Pattie Lees

## 2023-08-29 LAB
ACT BLD: 227 SECONDS (ref 82–152)
ACT BLD: 257 SECONDS (ref 82–152)
BASE EXCESS BLDA CALC-SCNC: -5 MMOL/L (ref -5–5)
CA-I BLDA-SCNC: 1.14 MMOL/L (ref 1.2–1.32)
CO2 BLDA-SCNC: 23 MMOL/L (ref 24–29)
CYTO UR: NORMAL
GLUCOSE BLDC GLUCOMTR-MCNC: 105 MG/DL (ref 70–130)
HCO3 BLDA-SCNC: 21.4 MMOL/L (ref 22–26)
HCT VFR BLDA CALC: 31 % (ref 38–51)
HGB BLDA-MCNC: 10.5 G/DL (ref 12–17)
LAB AP CASE REPORT: NORMAL
LAB AP CLINICAL INFORMATION: NORMAL
PATH REPORT.FINAL DX SPEC: NORMAL
PATH REPORT.GROSS SPEC: NORMAL
PCO2 BLDA: 43.9 MM HG (ref 35–45)
PH BLDA: 7.3 PH UNITS (ref 7.35–7.6)
PO2 BLDA: 378 MMHG (ref 80–105)
POTASSIUM BLDA-SCNC: 3.4 MMOL/L (ref 3.5–4.9)
SAO2 % BLDA: 100 % (ref 95–98)
SODIUM BLD-SCNC: 136 MMOL/L (ref 138–146)

## 2023-08-30 ENCOUNTER — READMISSION MANAGEMENT (OUTPATIENT)
Dept: CALL CENTER | Facility: HOSPITAL | Age: 88
End: 2023-08-30
Payer: MEDICARE

## 2023-08-30 NOTE — OUTREACH NOTE
Medical Week 1 Survey      Flowsheet Row Responses   [facility] facility patient discharged from? [facility]   Does the patient have one of the following disease processes/diagnoses(primary or secondary)? Other   Week 1 attempt successful? No   Unsuccessful attempts Attempt 1   Revoke Change in health status-moved to LTC/SNF/Hospice            AGNIESZKA AMARO - Registered Nurse

## 2023-09-06 LAB
QT INTERVAL: 364 MS
QT INTERVAL: 428 MS
QTC INTERVAL: 490 MS
QTC INTERVAL: 490 MS

## 2023-09-18 ENCOUNTER — HOSPITAL ENCOUNTER (OUTPATIENT)
Facility: HOSPITAL | Age: 88
Setting detail: OBSERVATION
Discharge: HOSPICE | End: 2023-09-19
Attending: EMERGENCY MEDICINE | Admitting: STUDENT IN AN ORGANIZED HEALTH CARE EDUCATION/TRAINING PROGRAM
Payer: COMMERCIAL

## 2023-09-18 DIAGNOSIS — R52 INTRACTABLE PAIN: ICD-10-CM

## 2023-09-18 DIAGNOSIS — Z51.5 END OF LIFE CARE: ICD-10-CM

## 2023-09-18 DIAGNOSIS — Z51.5 ENCOUNTER FOR PALLIATIVE CARE: ICD-10-CM

## 2023-09-18 DIAGNOSIS — C16.9 MALIGNANT NEOPLASM OF STOMACH, UNSPECIFIED LOCATION: Primary | ICD-10-CM

## 2023-09-18 DIAGNOSIS — Z51.5 COMFORT MEASURES ONLY STATUS: ICD-10-CM

## 2023-09-18 PROCEDURE — 96375 TX/PRO/DX INJ NEW DRUG ADDON: CPT

## 2023-09-18 PROCEDURE — G0378 HOSPITAL OBSERVATION PER HR: HCPCS

## 2023-09-18 PROCEDURE — 25010000002 ONDANSETRON PER 1 MG: Performed by: EMERGENCY MEDICINE

## 2023-09-18 PROCEDURE — 96366 THER/PROPH/DIAG IV INF ADDON: CPT

## 2023-09-18 PROCEDURE — 96361 HYDRATE IV INFUSION ADD-ON: CPT

## 2023-09-18 PROCEDURE — 99284 EMERGENCY DEPT VISIT MOD MDM: CPT

## 2023-09-18 PROCEDURE — 25010000002 MORPHINE PER 10 MG: Performed by: EMERGENCY MEDICINE

## 2023-09-18 PROCEDURE — 96376 TX/PRO/DX INJ SAME DRUG ADON: CPT

## 2023-09-18 PROCEDURE — 93005 ELECTROCARDIOGRAM TRACING: CPT | Performed by: EMERGENCY MEDICINE

## 2023-09-18 PROCEDURE — 96365 THER/PROPH/DIAG IV INF INIT: CPT

## 2023-09-18 RX ORDER — PANTOPRAZOLE SODIUM 40 MG/10ML
80 INJECTION, POWDER, LYOPHILIZED, FOR SOLUTION INTRAVENOUS ONCE
Status: DISCONTINUED | OUTPATIENT
Start: 2023-09-18 | End: 2023-09-18

## 2023-09-18 RX ORDER — HYDRALAZINE HYDROCHLORIDE 50 MG/1
50 TABLET, FILM COATED ORAL 3 TIMES DAILY
COMMUNITY
End: 2023-09-18

## 2023-09-18 RX ORDER — MORPHINE SULFATE 2 MG/ML
2 INJECTION, SOLUTION INTRAMUSCULAR; INTRAVENOUS ONCE
Status: COMPLETED | OUTPATIENT
Start: 2023-09-18 | End: 2023-09-18

## 2023-09-18 RX ORDER — PANTOPRAZOLE SODIUM 40 MG/10ML
40 INJECTION, POWDER, LYOPHILIZED, FOR SOLUTION INTRAVENOUS
Status: DISCONTINUED | OUTPATIENT
Start: 2023-09-19 | End: 2023-09-20 | Stop reason: HOSPADM

## 2023-09-18 RX ORDER — SODIUM CHLORIDE 0.9 % (FLUSH) 0.9 %
10 SYRINGE (ML) INJECTION AS NEEDED
Status: DISCONTINUED | OUTPATIENT
Start: 2023-09-18 | End: 2023-09-20 | Stop reason: HOSPADM

## 2023-09-18 RX ORDER — ONDANSETRON 2 MG/ML
4 INJECTION INTRAMUSCULAR; INTRAVENOUS ONCE
Status: COMPLETED | OUTPATIENT
Start: 2023-09-18 | End: 2023-09-18

## 2023-09-18 RX ORDER — CARVEDILOL 6.25 MG/1
6.25 TABLET ORAL 2 TIMES DAILY
Status: CANCELLED | OUTPATIENT
Start: 2023-09-18

## 2023-09-18 RX ORDER — PROCHLORPERAZINE MALEATE 10 MG
10 TABLET ORAL EVERY 6 HOURS PRN
Status: CANCELLED | OUTPATIENT
Start: 2023-09-18

## 2023-09-18 RX ORDER — SODIUM CHLORIDE 9 MG/ML
100 INJECTION, SOLUTION INTRAVENOUS CONTINUOUS
Status: DISCONTINUED | OUTPATIENT
Start: 2023-09-18 | End: 2023-09-19

## 2023-09-18 RX ORDER — PANTOPRAZOLE SODIUM 40 MG/1
40 TABLET, DELAYED RELEASE ORAL 2 TIMES DAILY
COMMUNITY

## 2023-09-18 RX ORDER — PROCHLORPERAZINE EDISYLATE 5 MG/ML
5 INJECTION INTRAMUSCULAR; INTRAVENOUS EVERY 6 HOURS PRN
Status: DISCONTINUED | OUTPATIENT
Start: 2023-09-18 | End: 2023-09-20 | Stop reason: HOSPADM

## 2023-09-18 RX ORDER — BISACODYL 10 MG
10 SUPPOSITORY, RECTAL RECTAL DAILY PRN
Status: DISCONTINUED | OUTPATIENT
Start: 2023-09-18 | End: 2023-09-20 | Stop reason: HOSPADM

## 2023-09-18 RX ORDER — ATENOLOL 25 MG/1
25 TABLET ORAL DAILY
COMMUNITY
End: 2023-09-18

## 2023-09-18 RX ORDER — PANTOPRAZOLE SODIUM 40 MG/1
40 TABLET, DELAYED RELEASE ORAL 2 TIMES DAILY
Status: CANCELLED | OUTPATIENT
Start: 2023-09-18

## 2023-09-18 RX ORDER — BISACODYL 5 MG/1
5 TABLET, DELAYED RELEASE ORAL DAILY PRN
Status: DISCONTINUED | OUTPATIENT
Start: 2023-09-18 | End: 2023-09-20 | Stop reason: HOSPADM

## 2023-09-18 RX ORDER — OXYCODONE HYDROCHLORIDE 5 MG/1
5 TABLET ORAL EVERY 4 HOURS PRN
Status: DISCONTINUED | OUTPATIENT
Start: 2023-09-18 | End: 2023-09-20 | Stop reason: HOSPADM

## 2023-09-18 RX ORDER — ONDANSETRON 4 MG/1
4 TABLET, ORALLY DISINTEGRATING ORAL EVERY 6 HOURS PRN
Status: DISCONTINUED | OUTPATIENT
Start: 2023-09-18 | End: 2023-09-20 | Stop reason: HOSPADM

## 2023-09-18 RX ORDER — DONEPEZIL HYDROCHLORIDE 10 MG/1
10 TABLET, FILM COATED ORAL NIGHTLY
COMMUNITY
End: 2023-09-18

## 2023-09-18 RX ORDER — SODIUM CHLORIDE 9 MG/ML
40 INJECTION, SOLUTION INTRAVENOUS AS NEEDED
Status: DISCONTINUED | OUTPATIENT
Start: 2023-09-18 | End: 2023-09-20 | Stop reason: HOSPADM

## 2023-09-18 RX ORDER — AMOXICILLIN 250 MG
2 CAPSULE ORAL 2 TIMES DAILY
Status: DISCONTINUED | OUTPATIENT
Start: 2023-09-18 | End: 2023-09-20 | Stop reason: HOSPADM

## 2023-09-18 RX ORDER — POLYETHYLENE GLYCOL 3350 17 G/17G
17 POWDER, FOR SOLUTION ORAL DAILY PRN
Status: DISCONTINUED | OUTPATIENT
Start: 2023-09-18 | End: 2023-09-20 | Stop reason: HOSPADM

## 2023-09-18 RX ORDER — CILOSTAZOL 100 MG/1
100 TABLET ORAL 2 TIMES DAILY
COMMUNITY
End: 2023-09-18

## 2023-09-18 RX ORDER — SODIUM CHLORIDE 0.9 % (FLUSH) 0.9 %
10 SYRINGE (ML) INJECTION EVERY 12 HOURS SCHEDULED
Status: DISCONTINUED | OUTPATIENT
Start: 2023-09-18 | End: 2023-09-20 | Stop reason: HOSPADM

## 2023-09-18 RX ORDER — MORPHINE SULFATE 15 MG/1
15 TABLET, FILM COATED, EXTENDED RELEASE ORAL EVERY 12 HOURS SCHEDULED
Status: DISCONTINUED | OUTPATIENT
Start: 2023-09-18 | End: 2023-09-20 | Stop reason: HOSPADM

## 2023-09-18 RX ORDER — ERGOCALCIFEROL 1.25 MG/1
50000 CAPSULE ORAL WEEKLY
COMMUNITY
End: 2023-09-18

## 2023-09-18 RX ORDER — PANTOPRAZOLE SODIUM 40 MG/10ML
80 INJECTION, POWDER, LYOPHILIZED, FOR SOLUTION INTRAVENOUS ONCE
Status: COMPLETED | OUTPATIENT
Start: 2023-09-18 | End: 2023-09-18

## 2023-09-18 RX ORDER — PROCHLORPERAZINE MALEATE 10 MG
10 TABLET ORAL EVERY 6 HOURS PRN
COMMUNITY

## 2023-09-18 RX ADMIN — SODIUM CHLORIDE 500 ML: 9 INJECTION, SOLUTION INTRAVENOUS at 16:21

## 2023-09-18 RX ADMIN — MORPHINE SULFATE 2 MG: 2 INJECTION, SOLUTION INTRAMUSCULAR; INTRAVENOUS at 18:38

## 2023-09-18 RX ADMIN — PANTOPRAZOLE SODIUM 8 MG/HR: 40 INJECTION, POWDER, FOR SOLUTION INTRAVENOUS at 18:40

## 2023-09-18 RX ADMIN — ONDANSETRON 4 MG: 2 INJECTION INTRAMUSCULAR; INTRAVENOUS at 18:37

## 2023-09-18 RX ADMIN — SODIUM CHLORIDE 100 ML/HR: 9 INJECTION, SOLUTION INTRAVENOUS at 16:20

## 2023-09-18 RX ADMIN — MORPHINE SULFATE 15 MG: 15 TABLET, EXTENDED RELEASE ORAL at 21:07

## 2023-09-18 RX ADMIN — MORPHINE SULFATE 2 MG: 2 INJECTION, SOLUTION INTRAMUSCULAR; INTRAVENOUS at 16:57

## 2023-09-18 RX ADMIN — PANTOPRAZOLE SODIUM 80 MG: 40 INJECTION, POWDER, FOR SOLUTION INTRAVENOUS at 18:36

## 2023-09-18 RX ADMIN — ONDANSETRON 4 MG: 2 INJECTION INTRAMUSCULAR; INTRAVENOUS at 16:56

## 2023-09-18 RX ADMIN — Medication 10 ML: at 21:09

## 2023-09-18 NOTE — ED PROVIDER NOTES
Subjective   History of Present Illness  Patient is an 88-year-old female who has metastatic gastric cancer.  She arrives by EMS, initially with no family members here, with a chief complaint of severe generalized abdominal pain and having coffee-ground emesis.  Patient is a poor historian, she is not very talkative.  No other acute history is available.    Review of Systems   Unable to perform ROS: Other (Severity of illness, confusion)     Past Medical History:   Diagnosis Date    Arthritis     COPD  12/17/2018    DVT of lower extremity (deep venous thrombosis)     History of CVA 2013 12/17/2018    HTN 12/17/2018    PAD  12/17/2018    Stroke     Tobacco use 12/17/2018       No Known Allergies    Past Surgical History:   Procedure Laterality Date    AORTAGRAM Right 8/26/2023    Procedure: LOWER EXTREMITY ARTERIOGRAM RIGHT, PENUMBRA THROMBECTOMY RIGHT POPLITEAL AND TIBIAL ARTERY, BALLOON ANGIOPLASTY TIBIAL ARTERY RIGHT;  Surgeon: Julio Gonzalez MD;  Location: [facility];  Service: Vascular;  Laterality: Right;  RADIOLOGY DOSE 14mGy, 21MIN 18SEC    CARDIAC CATHETERIZATION  12/17/2018    Procedure: Left Heart Cath;  Surgeon: Imtiaz Fuentes MD;  Location: [facility] CATH INVASIVE LOCATION;  Service: Cardiology    CARDIAC CATHETERIZATION N/A 12/17/2018    Procedure: Thrombolysis-peripheral;  Surgeon: Imtiaz Fuentes MD;  Location:  [facility] CATH INVASIVE LOCATION;  Service: Cardiology    CARDIAC ELECTROPHYSIOLOGY PROCEDURE N/A 12/18/2018    Procedure: PACEMAKER IMPLANTATION- DC;  Surgeon: Thony Bobby MD;  Location:  [facility] EP INVASIVE LOCATION;  Service: Cardiology    CARDIAC ELECTROPHYSIOLOGY PROCEDURE N/A 12/17/2018    Procedure: Temporary Pacemaker;  Surgeon: Imtiaz Fuentes MD;  Location:  [facility] CATH INVASIVE LOCATION;  Service: Cardiology    COLONOSCOPY N/A 04/19/2019    Procedure: COLONOSCOPY;  Surgeon: Chris Zimmerman MD;  Location: [facility] OR;  Service:  Gastroenterology    ENDOSCOPY N/A 04/19/2019    Procedure: ESOPHAGOGASTRODUODENOSCOPY;  Surgeon: Chris Zimmerman MD;  Location:  [facility] OR;  Service: Gastroenterology    ENDOSCOPY N/A 8/1/2023    Procedure: ESOPHAGOGASTRODUODENOSCOPY WITH BIOPSY;  Surgeon: Agueda Arredondo MD;  Location:  [facility] OR;  Service: Gastroenterology;  Laterality: N/A;    FEMORAL ARTERY STENT  2013    FEMORAL POPLITEAL BYPASS Right 12/17/2018    Procedure: LOWER EXTREMITY EMBELECTOMY RIGHT;  Surgeon: David Beatty MD;  Location:  [facility] HYBRID OR 15;  Service: Vascular    HYSTERECTOMY      LAPAROSCOPIC CHOLECYSTECTOMY      LEFT HEART CATH  12/17/2018    At [facility] with TV PM placement     PACEMAKER IMPLANTATION         Family History   Problem Relation Age of Onset    Cancer Mother     Cancer Father     Heart attack Brother     Heart attack Brother        Social History     Socioeconomic History    Marital status:     Number of children: 3   Tobacco Use    Smoking status: Light Smoker     Packs/day: 0.50     Years: 63.00     Pack years: 31.50     Types: Cigarettes     Passive exposure: Current    Smokeless tobacco: Never   Vaping Use    Vaping Use: Never used   Substance and Sexual Activity    Alcohol use: No    Drug use: No    Sexual activity: Defer           Objective   Physical Exam  Vitals and nursing note reviewed.   Constitutional:       Appearance: Normal appearance. She is ill-appearing and diaphoretic.      Comments: Elderly and chronically ill-appearing female, appears to be in pain.   HENT:      Head: Normocephalic and atraumatic.   Eyes:      General: No scleral icterus.     Pupils: Pupils are equal, round, and reactive to light.   Neck:      Trachea: No tracheal deviation.   Cardiovascular:      Rate and Rhythm: Normal rate and regular rhythm.   Pulmonary:      Effort: Pulmonary effort is normal. No respiratory distress.      Breath sounds: Normal breath sounds.   Chest:      Chest wall: No tenderness.    Abdominal:      General: Bowel sounds are normal.      Palpations: Abdomen is soft.      Tenderness: There is abdominal tenderness (Moderate generalized tenderness, most prominent in the epigastrium.  No acute peritoneal signs.). There is no guarding or rebound.   Musculoskeletal:         General: No tenderness. Normal range of motion.      Cervical back: Normal range of motion and neck supple. No rigidity or tenderness.      Right lower leg: No edema.      Left lower leg: No edema.   Skin:     General: Skin is warm.      Capillary Refill: Capillary refill takes less than 2 seconds.      Comments: Mildly diaphoretic   Neurological:      General: No focal deficit present.      GCS: GCS eye subscore is 4. GCS verbal subscore is 5. GCS motor subscore is 6.      Motor: No abnormal muscle tone.      Comments: Patient is oriented to person and to hospital.  She is not oriented to time.   Psychiatric:         Behavior: Behavior normal.       Procedures  EKG shows atrial fibrillation, rate 74.  QRS duration 100, QTc 428 ms.  Evidence of LVH.  Nonspecific ST-T changes.  No evidence for STEMI.         ED Course  ED Course as of 09/19/23 0108   Mon Sep 18, 2023   1630 Patient's son Gurpreet and his wife Yudith have arrived.  They advised me that the patient is DNR/DNI, comfort measures only.  They advised that she is under hospice care but for pain is only prescribed hydrocodone 5 mg 3 times daily as needed, and this is not controlling her pain at all.  Family wishes for me to give her IV morphine.  They do not wish for her to undergo any extensive diagnostic testing, no CT scans, no treatment for anything other than her comfort.  They really do not want for me  to even do any labs, because the results of them will not change her treatment.  They do not wish for her to be admitted.  They want to take her home today.  I have ordered morphine and Zofran.  We are attempting to reach palliative care for consult. [CM]   6110 I  "spoke with Milena Gilbert, palliative care nurse practitioner.  She advises that she will see the patient in the ED in approximately 30 to 45 minutes. [CM]   1640 All of patient's labs that I had initially ordered are \"in process\".  I have contacted the lab to see if they can cancel all of these labs.  This process had already been started before the family arrived.  They do not wish for her to have any diagnostic tests, only comfort care. [CM]   1814 Case discussed with Dr. Jacques.  He is admitting patient to the hospitalist service.  He is here to see patient now. [CM]      ED Course User Index  [CM] Marcus Guaman MD                                           Medical Decision Making  Amount and/or Complexity of Data Reviewed  ECG/medicine tests: ordered. Decision-making details documented in ED Course.    Risk  Prescription drug management.        Final diagnoses:   Malignant neoplasm of stomach, unspecified location   Intractable pain   Encounter for palliative care       ED Disposition  ED Disposition       ED Disposition   Decision to Admit    Condition   --    Comment   Level of Care: Med/Surg [1]   Diagnosis: End of life care [433057]                 Please note that portions of this note were completed with a voice recognition program.                Marcus Guaman MD  09/19/23 0108    "

## 2023-09-19 VITALS
RESPIRATION RATE: 20 BRPM | WEIGHT: 85.76 LBS | OXYGEN SATURATION: 100 % | SYSTOLIC BLOOD PRESSURE: 91 MMHG | DIASTOLIC BLOOD PRESSURE: 51 MMHG | HEART RATE: 100 BPM | TEMPERATURE: 98.9 F | BODY MASS INDEX: 16.84 KG/M2 | HEIGHT: 60 IN

## 2023-09-19 LAB
QT INTERVAL: 386 MS
QTC INTERVAL: 428 MS

## 2023-09-19 PROCEDURE — G0378 HOSPITAL OBSERVATION PER HR: HCPCS

## 2023-09-19 PROCEDURE — 96375 TX/PRO/DX INJ NEW DRUG ADDON: CPT

## 2023-09-19 PROCEDURE — 96376 TX/PRO/DX INJ SAME DRUG ADON: CPT

## 2023-09-19 PROCEDURE — 96361 HYDRATE IV INFUSION ADD-ON: CPT

## 2023-09-19 PROCEDURE — 25010000002 PROCHLORPERAZINE 10 MG/2ML SOLUTION: Performed by: STUDENT IN AN ORGANIZED HEALTH CARE EDUCATION/TRAINING PROGRAM

## 2023-09-19 PROCEDURE — 25010000002 MORPHINE PER 10 MG: Performed by: STUDENT IN AN ORGANIZED HEALTH CARE EDUCATION/TRAINING PROGRAM

## 2023-09-19 RX ORDER — MORPHINE SULFATE 15 MG/1
15 TABLET, FILM COATED, EXTENDED RELEASE ORAL EVERY 12 HOURS SCHEDULED
Qty: 5 TABLET | Refills: 0 | Status: SHIPPED | OUTPATIENT
Start: 2023-09-19 | End: 2023-09-22

## 2023-09-19 RX ORDER — OXYCODONE HYDROCHLORIDE 5 MG/1
5 TABLET ORAL EVERY 4 HOURS PRN
Qty: 20 TABLET | Refills: 0 | Status: SHIPPED | OUTPATIENT
Start: 2023-09-19

## 2023-09-19 RX ORDER — PROMETHAZINE HYDROCHLORIDE 12.5 MG/1
25 TABLET ORAL EVERY 6 HOURS PRN
Qty: 20 TABLET | Refills: 0 | Status: SHIPPED | OUTPATIENT
Start: 2023-09-19

## 2023-09-19 RX ADMIN — DOCUSATE SODIUM 50 MG AND SENNOSIDES 8.6 MG 2 TABLET: 8.6; 5 TABLET, FILM COATED ORAL at 08:08

## 2023-09-19 RX ADMIN — PANTOPRAZOLE SODIUM 40 MG: 40 INJECTION, POWDER, FOR SOLUTION INTRAVENOUS at 08:08

## 2023-09-19 RX ADMIN — PROCHLORPERAZINE EDISYLATE 5 MG: 5 INJECTION INTRAMUSCULAR; INTRAVENOUS at 09:37

## 2023-09-19 RX ADMIN — OXYCODONE HYDROCHLORIDE 5 MG: 5 TABLET ORAL at 15:27

## 2023-09-19 RX ADMIN — MORPHINE SULFATE 15 MG: 15 TABLET, EXTENDED RELEASE ORAL at 22:09

## 2023-09-19 RX ADMIN — PANTOPRAZOLE SODIUM 40 MG: 40 INJECTION, POWDER, FOR SOLUTION INTRAVENOUS at 17:27

## 2023-09-19 RX ADMIN — PROCHLORPERAZINE EDISYLATE 5 MG: 5 INJECTION INTRAMUSCULAR; INTRAVENOUS at 03:00

## 2023-09-19 RX ADMIN — SODIUM CHLORIDE 100 ML/HR: 9 INJECTION, SOLUTION INTRAVENOUS at 03:00

## 2023-09-19 RX ADMIN — MORPHINE SULFATE 4 MG: 4 INJECTION, SOLUTION INTRAMUSCULAR; INTRAVENOUS at 18:34

## 2023-09-19 RX ADMIN — MORPHINE SULFATE 4 MG: 4 INJECTION, SOLUTION INTRAMUSCULAR; INTRAVENOUS at 09:37

## 2023-09-19 RX ADMIN — MORPHINE SULFATE 15 MG: 15 TABLET, EXTENDED RELEASE ORAL at 08:08

## 2023-09-19 NOTE — PLAN OF CARE
Goal Outcome Evaluation:  Plan of Care Reviewed With: patient        Progress: no change  Outcome Evaluation: Patient resting in bed at this time. Arrived to unit from ER on comfort measures. Patient A&O x4. VSS on 4L nasal cannula. PRN nausea medication given per MAR. Will continue plan of care.

## 2023-09-19 NOTE — NURSING NOTE
Called [facility]. Spoke with Emily, she said that she would call pts primary nurse and have her to call me (I provided my contact info to Emily).

## 2023-09-19 NOTE — DISCHARGE PLACEMENT REQUEST
"Rupinder Arias (88 y.o. Female)       Date of Birth   1935    Social Security Number       Address   [address] [facility] [address]    Home Phone   [phone]    MRN   [id number]       [facility]    Marital Status                               Admission Date   9/18/23    Admission Type   Emergency    Admitting Provider   Agus Jacques DO    Attending Provider   Agus Jacques DO    Department, Room/Bed   [address], 3333/1P       Discharge Date       Discharge Disposition   Hospice/Home    Discharge Destination                                 Attending Provider: Agus Jacques DO    Allergies: No Known Allergies    Isolation: None   Infection: None   Code Status: No CPR    Ht: 152.4 cm (60\")   Wt: 38.9 kg (85 lb 12.1 oz)    Admission Cmt: None   Principal Problem: End of life care [Z51.5]                   Active Insurance as of 9/18/2023       Primary Coverage       Payor Plan Insurance Group Employer/Plan Group    MEDICARE MEDICARE A & B        Payor Plan Address Payor Plan Phone Number Payor Plan Fax Number Effective Dates    [address] [phone]  8/1/2002 - None Entered    [facility] [id number]         Subscriber Name Subscriber Birth Date Member ID       RUPINDER ARIAS 1935 [id number]               Secondary Coverage       Payor Plan Insurance Group Employer/Plan Group    ANTHEM BLUE CROSS ANTHEM BLUE CROSS BLUE SHIELD PPO [id number]       Payor Plan Address Payor Plan Phone Number Payor Plan Fax Number Effective Dates    [address] [phone]  6/3/2022 - None Entered    [facility] [id number]         Subscriber Name Subscriber Birth Date Member ID       RUPINDER ARIAS 1935 [id number]                     Emergency Contacts        (Rel.) Home Phone Work Phone Mobile Phone    GUSTAVO ARIAS (Other) [phone] -- --    Antony Arias (Son) [phone] -- [phone]    HUGOAGNIESZKA (Relative) [phone] -- " --    RORY ARIAS (Son) [phone] -- --                 History & Physical        Agus Jacques DO at 23 1830              [facility] HOSPITALIST HISTORY AND PHYSICAL    Patient Identification:  Name:  Rupinder Arias  Age:  88 y.o.  Sex:  female  :  1935  MRN:  [id number]   Visit Number:  [id number]  Admit Date: 2023   Room number:  HALLIE/HALLIE  Primary Care Physician:  Gifty Kruse MD     Subjective     Chief complaint:    Chief Complaint   Patient presents with    Coughing Up Blood    Abdominal Pain    Shortness of Breath       History of presenting illness:  88 y.o. female who presents to the hospital for hematemesis, severe abdominal pain with complaints of shortness of breath.  Patient with a past medical history significant for COPD with chronic hypoxemic respiratory failure on 2 L as needed, metastatic gastric adenocarcinoma, atrial fibrillation, prior DVT and CVA, history of car pleat heart block s/p PPM, PAD status post femoral artery stent and right popliteal thrombosis status post embolectomy/femoral-popliteal bypass in 2018 with recent thrombectomy of right popliteal and tibial arteries and endovascular angioplasty of right tibioperoneal trunk on 2023.  Patient with development of severe abdominal pain and hematemesis that began abruptly today.  Patient had been having small emesis and nausea and abdominal pain but that has progressively worsened over the last several days.  Patient is currently on hospice with comfort measures only however due to the severity of her symptoms to aid in acute control patient and family presenting to the emergency department seeking further pain control.  Patient and family did defer any aggressive interventions such as lab work or blood transfusions and instead wished to focus on comfort measures.  Given patient only on limited analgesics at home patient is being admitted to the hospitalist service for further analgesic  control and rapid titration in hospital for eventual discharge back home with hospice services.  ---------------------------------------------------------------------------------------------------------------------   Review of Systems  ---------------------------------------------------------------------------------------------------------------------   Past Medical History:   Diagnosis Date    Arthritis     COPD  12/17/2018    DVT of lower extremity (deep venous thrombosis)     History of CVA 2013 12/17/2018    HTN 12/17/2018    PAD  12/17/2018    Stroke     Tobacco use 12/17/2018     Past Surgical History:   Procedure Laterality Date    AORTAGRAM Right 8/26/2023    Procedure: LOWER EXTREMITY ARTERIOGRAM RIGHT, PENUMBRA THROMBECTOMY RIGHT POPLITEAL AND TIBIAL ARTERY, BALLOON ANGIOPLASTY TIBIAL ARTERY RIGHT;  Surgeon: Julio Gonzalez MD;  Location:  [facility];  Service: Vascular;  Laterality: Right;  RADIOLOGY DOSE 14mGy, 21MIN 18SEC    CARDIAC CATHETERIZATION  12/17/2018    Procedure: Left Heart Cath;  Surgeon: Imtiaz Fuentes MD;  Location:  [facility] CATH INVASIVE LOCATION;  Service: Cardiology    CARDIAC CATHETERIZATION N/A 12/17/2018    Procedure: Thrombolysis-peripheral;  Surgeon: Imtiaz Fuentes MD;  Location:  [facility] CATH [facility]VASIVE [facility];  Service: Cardiology    CARDIAC ELECTROPHYSIOLOGY PROCEDURE N/A 12/18/2018    Procedure: PACEMAKER IMPLANTATION- DC;  Surgeon: Thony Bobby MD;  Location:  [facility];  Service: Cardiology    CARDIAC ELECTROPHYSIOLOGY PROCEDURE N/A 12/17/2018    Procedure: Temporary Pacemaker;  Surgeon: Imtiaz Fuentes MD;  Location: [facility] CATH INVASIVE LOCATION;  Service: Cardiology    COLONOSCOPY N/A 04/19/2019    Procedure: COLONOSCOPY;  Surgeon: Chris Zimmerman MD;  Location: [facility] OR;  Service: Gastroenterology    ENDOSCOPY N/A 04/19/2019    Procedure: ESOPHAGOGASTRODUODENOSCOPY;  Surgeon: Chris Zimmerman,  MD;  Location: [facility] OR;  Service: Gastroenterology    ENDOSCOPY N/A 8/1/2023    Procedure: ESOPHAGOGASTRODUODENOSCOPY WITH BIOPSY;  Surgeon: Agueda Arredondo MD;  Location:  [facility] OR;  Service: Gastroenterology;  Laterality: N/A;    FEMORAL ARTERY STENT  2013    FEMORAL POPLITEAL BYPASS Right 12/17/2018    Procedure: LOWER EXTREMITY EMBELECTOMY RIGHT;  Surgeon: David Beatty MD;  Location: [facility] HYBRID OR 15;  Service: Vascular    HYSTERECTOMY      LAPAROSCOPIC CHOLECYSTECTOMY      LEFT HEART CATH  12/17/2018    At [facility] with TV PM placement     PACEMAKER IMPLANTATION       Family History   Problem Relation Age of Onset    Cancer Mother     Cancer Father     Heart attack Brother     Heart attack Brother      Social History     Socioeconomic History    Marital status:     Number of children: 3   Tobacco Use    Smoking status: Light Smoker     Packs/day: 0.50     Years: 63.00     Pack years: 31.50     Types: Cigarettes     Passive exposure: Current    Smokeless tobacco: Never   Vaping Use    Vaping Use: Never used   Substance and Sexual Activity    Alcohol use: No    Drug use: No    Sexual activity: Defer     ---------------------------------------------------------------------------------------------------------------------   Allergies:  Patient has no known allergies.  ---------------------------------------------------------------------------------------------------------------------   Medications below are reported home medications pulling from within the system; at this time, these medications have not been reconciled unless otherwise specified and are in the verification process for further verifcation as current home medications.    Prior to Admission Medications       Prescriptions Last Dose Informant Patient Reported? Taking?    carvedilol (COREG) 6.25 MG tablet 9/18/2023 Family Member No Yes    Take 1 tablet by mouth 2 (Two) Times a Day.    pantoprazole (PROTONIX) 40 MG EC tablet  Family  Member Yes Yes    Take 1 tablet by mouth 2 (Two) Times a Day.    prochlorperazine (COMPAZINE) 10 MG tablet 9/18/2023 Family Member Yes Yes    Take 1 tablet by mouth Every 6 (Six) Hours As Needed for Nausea or Vomiting.          Objective     Vital Signs:  Temp:  [98.2 °F (36.8 °C)] 98.2 °F (36.8 °C)  Heart Rate:  [70-85] 77  Resp:  [20-22] 20  BP: ()/(54-70) 112/65    Mean Arterial Pressure (Non-Invasive) for the past 24 hrs (Last 3 readings):   Noninvasive MAP (mmHg)   09/18/23 1930 78   09/18/23 1915 71   09/18/23 1900 77     SpO2:  [99 %-100 %] 100 %  on  Flow (L/min):  [4] 4;   Device (Oxygen Therapy): nasal cannula  Body mass index is 17.38 kg/m².    Wt Readings from Last 3 Encounters:   09/18/23 40.4 kg (89 lb)   08/26/23 40.5 kg (89 lb 4.6 oz)   08/23/23 40.4 kg (89 lb)      ---------------------------------------------------------------------------------------------------------------------   Physical Exam:  Constitutional: Frail cachectic chronically ill-appearing elderly adult female  HENT:  Head: Normocephalic and atraumatic.  Mouth:  Moist mucous membranes.  Dark dried minute remnants of blood around mouth.  Black stains on T-shirt from emesis.  Eyes:  Conjunctivae and EOM are normal.  Pupils are equal, round, and reactive to light.  No scleral icterus.  Neck:  Neck supple.  No JVD present.    Cardiovascular:  Normal rate, regular rhythm and normal heart sounds with no murmur.  Pulmonary/Chest:  No respiratory distress, no wheezes, no crackles, with normal breath sounds and good air movement.  Abdominal:  Soft.  Bowel sounds are hypoactive.  No distension but tender to even minute palpation..   Musculoskeletal:  No tenderness and no deformity.  No red or swollen joints anywhere.    Neurological:  Alert and oriented to person, place, and time.  No cranial nerve deficit.  No tongue deviation.  No facial droop.  No slurred speech.   Skin:  Skin is warm and dry.  No rash noted.  No pallor.   Peripheral  vascular:  No edema and pulses on all 4 extremities.    ---------------------------------------------------------------------------------------------------------------------  EKG:        --------------------------------------------------------------------------------------------------------------------  Labs:                Invalid input(s): PROTEstimated Creatinine Clearance: 17.6 mL/min (A) (by C-G formula based on SCr of 1.46 mg/dL (H)).    No results found for: AMMONIA          No results found for: HGBA1C, POCGLU  Lab Results   Component Value Date    TSH 0.702 08/26/2023    FREET4 1.47 05/31/2015     No results found for: PREGTESTUR, PREGSERUM, HCG, HCGQUANT  Pain Management Panel  More data may exist         Latest Ref Rng & Units 8/18/2019 5/31/2015   Pain Management Panel   Amphetamine, Urine Qual Negative Negative  Negative    Barbiturates Screen, Urine Negative Negative  Negative    Benzodiazepine Screen, Urine Negative Negative  Negative    Buprenorphine, Screen, Urine Negative Negative  -   Cocaine Screen, Urine Negative Negative  Negative    Methadone Screen , Urine Negative Negative  Negative      Brief Urine Lab Results  (Last result in the past 365 days)        Color   Clarity   Blood   Leuk Est   Nitrite   Protein   CREAT   Urine HCG        08/24/23 1248 Yellow   Cloudy   Negative   Negative   Negative   30 mg/dL (1+)                 No results found for: BLOODCX  No results found for: URINECX  No results found for: WOUNDCX  No results found for: STOOLCX    I have personally looked at the labs and they are summarized above.  ----------------------------------------------------------------------------------------------------------------------  Detailed radiology reports for the last 24 hours:    Imaging Results (Last 24 Hours)       ** No results found for the last 24 hours. **          Final impressions for the last 30 days of radiology reports:    CT Angio Abdominal Aorta Bilateral Iliofem  Runoff    Result Date: 8/24/2023  Impression: 1.Occlusion of the distal superficial femoral artery approximately 5 cm above the joint line without significant surrounding calcific atherosclerotic disease. This favors probable embolism. Vascular specialist consultation recommended. 2.Very limited flow below the level of right arterial occlusion with the anterior tibial artery vaguely seen. 3.Left-sided runoff appears to be intact with at least a single vessel runoff to the ankle. 4.Left-sided hydronephrosis with abnormal appearance of the left-sided nephrogram which appears delayed likely from obstructive uropathy. 5.Indeterminate low-attenuation hepatic lesions. 6.Left adrenal enlargement not definitely adenoma similar to the previous study. Please refer to previous PET scan findings from outside institution. Electronically Signed: Pedrito Parra MD  8/24/2023 2:32 AM EDT  Workstation ID: [id number]    XR Chest 1 View    Result Date: 8/26/2023  Impression: No acute cardiopulmonary abnormality. Electronically Signed: Dominick Cortez MD  8/26/2023 9:17 PM EDT  Workstation ID: [id number]    US Arterial Doppler Lower Extremity Right    Result Date: 8/23/2023  Occlusion of the right popliteal artery.  This report was finalized on 8/23/2023 4:48 PM by Dr. Juan Plascencia MD.      US Venous Doppler Lower Extremity Right (duplex)    Result Date: 8/23/2023  No DVT in the right lower extremity on today's exam.  This report was finalized on 8/23/2023 4:40 PM by Dr. Juan Plascencia MD.      NM PET/CT Whole Body    Result Date: 8/22/2023  1.  Bilateral parotid gland lesions, right measuring up to 2 cm and left measuring up to 1.1 cm, and both showing PET hypermetabolism mSUV right 10.6 in mSUV left in the mSUV 23. 2.  Heterogeneous appearance of the liver that appear somewhat sclerotic but there is a liver lesion within mSUV 7 measuring about 3 cm. Another left lobe of liver lesion measuring about 2 cm also is hypermetabolic along with  scattered regions of hypermetabolism throughout the liver that shows no definite corresponding CT PE abnormality. 3.  Precarinal region lymph node measuring about 1.2 cm with PET hypermetabolism in mSUV 3.2. 4.  Multiple right lung hilar region pulmonary nodules with the largest measuring up to about 1 cm showing low-grade less than baseline hypermetabolism of M mSUV 1.9. 5.  Probable right hilar region hypermetabolism with a mesh UV 3. 6.  Posterior gastric wall thickening and associated hypermetabolism within mSUV 9.5 and extending into the retroperitoneal space lesser sac region.  This report was finalized on 8/22/2023 12:41 PM by Dr. Galen Isaacs MD.     I have personally looked at the radiology images and read the final radiology report.    Assessment & Plan       Static gastric adenocarcinoma  Hematemesis suspicious for upper GI bleeding  Intractable abdominal pain with hematemesis  Currently on hospice with comfort measures only    -Patient presenting with known history of metastatic gastric adenocarcinoma with intractable abdominal pain and development of hematemesis with clear evidence witness both in room and on T-shirt of black coffee ground liquid the patient is coughing up.    -Long discussion with patient and family at bedside and previously seen by palliative services as well as discussed by ER physician and family has deferred any aggressive interventions or lab work during this time he requested focus instead on comfort measures but ultimately would like to take patient back home but want her to be comfortable.    -As needed IV morphine and Zofran as well as oral Compazine    -Start patient on oral morphine 15 mg every 12 scheduled and titrate as needed for symptom control.    -Protonix 40 mg IV twice daily with plans to transition to oral at discharge.     COPD, not in exacerbation  Acute on chronic hypoxemic respiratory failure     -Continue supplemental oxygen as needed    -Patient typically  requiring only 2 L nasal cannula as needed but currently requiring 4 and suspect large component related to patient's likely worsening anemia in the setting of hemoptysis likely secondary to her gastric adenocarcinoma and anticoagulation.    Atrial fibrillation  Prior DVT and stroke  HX of complete heart block s/p PPM   PAD status post femoral artery stent  Right popliteal thrombosis s/p embolectomy/femoral-popliteal bypass   Recent thrombectomy of right popliteal and tibial arteries and endovascular angioplasty of right tibioperoneal trunk on 8/26/2023    -Long discussion with patient and family at bedside regarding risks and benefits of continued anticoagulation in the setting of ongoing hematemesis and after discussion we will discontinue anticoagulation and focus on pain control and comfort measures given high likelihood of ongoing upper GI bleeding in the setting of chronic anticoagulation with known metastatic gastric adenocarcinoma.    Hypertension    -Hold home antihypertensives for now while adjusting analgesic regiment and monitor for development of hypotension.    VTE Prophylaxis:   Mechanical Order History:       None          Pharmalogical Order History:       None            The patient is high risk due to the following diagnoses/reasons: Intractable abdominal pain in the setting of metastatic gastric adenocarcinoma with known recent thrombectomy of thrombosed peripheral arterial system chronically anticoagulated but now being stopped in favor of comfort measures requiring IV morphine and oral morphine for pain control.        Agus Jacques DO  [facility]  09/18/23  20:11 EDT      Electronically signed by Agus Jacques DO at 09/18/23 2024       Lines, Drains & Airways       Active LDAs       Name Placement date Placement time Site Days    Peripheral IV 09/18/23 1618 Left;Posterior Hand 09/18/23  1618  Hand  less than 1                  Lab Results (most recent)       None           Imaging Results (Most Recent)       None          Physician Progress Notes (most recent note)    No notes of this type exist for this encounter.       Consult Notes (most recent note)    No notes of this type exist for this encounter.       Discharge Summary    No notes of this type exist for this encounter.       Discharge Order (From admission, onward)       Start     Ordered    09/19/23 0859  Discharge patient  Once        Expected Discharge Date: 09/19/23   Discharge Disposition: Hospice/Home   Physician of Record for Attribution - Please select from Treatment Team: LOUANN PUGA [[id number]]   Review needed by CMO to determine Physician of Record: No      Question Answer Comment   Physician of Record for Attribution - Please select from Treatment Team LOUANN PUGA    Review needed by CMO to determine Physician of Record No        09/19/23 0859

## 2023-09-19 NOTE — CASE MANAGEMENT/SOCIAL WORK
Discharge Planning Assessment   [place]     Patient Name: Rupinder Lees  MRN: [id number]  Today's Date: 9/19/2023    Admit Date: 9/18/2023     Discharge Needs Assessment       Row Name 09/19/23 0928       Living Environment    People in Home other relative(s);child(cheikh), adult    Name(s) of People in Home FAmily 24/7 care    Current Living Arrangements home    Primary Care Provided by child(cheikh);other (see comments)  daughter in law    Provides Primary Care For no one    Family Caregiver if Needed child(cheikh), adult    Quality of Family Relationships helpful;involved;supportive    Able to Return to Prior Arrangements yes       Resource/Environmental Concerns    Resource/Environmental Concerns none       Transition Planning    Patient/Family Anticipates Transition to home with family    Transportation Anticipated family or friend will provide       Discharge Needs Assessment    Equipment Currently Used at Home oxygen;wheelchair;walker, standard;commode    Concerns to be Addressed discharge planning                   Discharge Plan       Row Name 09/19/23 0927       Plan    Plan Pt was admitted observation on 09/18/23. SS received consult for discharge planning. SS spoke with Pt's daughter in law Gardenia on this date. Pt lives at her home and family take shifts providing 24/7 care. Pt does not utilize home health services. Pt utilizes home oxygen, wheelchair, bedside commode, walker and straight cane via [facility]. Pt is comfort measures. Pt's PCP is Gifty Kruse. Pt's family to provide transportation at discharge.    Final Discharge Disposition Code 50 - home with hospice    Final Note Pt is being discharged home with existing [facility] hosp[facility]. Pt's daughter in law Gardenia and son Antony are aware and agreeable to discharge. Pt's daughter in law will transport, will need a portable O2 tank for transport home. SS contacted [facility] [phone] and made DESMOND Yoon who states  Hospice dme will bring portable O2 tank at bedside. SS provided Lead RN with report number for [facility] [phone]. SS faxed AVS summary to fax [phone].    11:49am: SS notified by RN that Pt's family have requested for EMS to transport instead. SS spoke with [facility] who states  EMS is not available. SS contacted [facility] per Olesya Swan and scheduled transport. Son at home awaiting EMS arrival. Palliative care RN completed EMS DNR form for transport.     16:23pm: [facility] per Ailyn states Pt remains on their transport list. SSS spoke with [facility] who states  EMS is not available. SS updated Lead RN.                                      Continued Care and Services - Admitted Since 9/18/2023       Home Medical Care       Service Provider Request Status Selected Services Address Phone Fax Patient Preferred    [facility] [address] [place], [place] [id number] [phone] --                    ELDON Mcgill

## 2023-09-19 NOTE — H&P
[facility] HOSPITALIST HISTORY AND PHYSICAL    Patient Identification:  Name:  Rupinder Lees  Age:  88 y.o.  Sex:  female  :  1935  MRN:  [id number]   Visit Number:  [id number]  Admit Date: 2023   Room number:  HALLIE/HALLIE  Primary Care Physician:  Gifty Kruse MD     Subjective     Chief complaint:    Chief Complaint   Patient presents with    Coughing Up Blood    Abdominal Pain    Shortness of Breath       History of presenting illness:  88 y.o. female who presents to the hospital for hematemesis, severe abdominal pain with complaints of shortness of breath.  Patient with a past medical history significant for COPD with chronic hypoxemic respiratory failure on 2 L as needed, metastatic gastric adenocarcinoma, atrial fibrillation, prior DVT and CVA, history of car pleat heart block s/p PPM, PAD status post femoral artery stent and right popliteal thrombosis status post embolectomy/femoral-popliteal bypass in 2018 with recent thrombectomy of right popliteal and tibial arteries and endovascular angioplasty of right tibioperoneal trunk on 2023.  Patient with development of severe abdominal pain and hematemesis that began abruptly today.  Patient had been having small emesis and nausea and abdominal pain but that has progressively worsened over the last several days.  Patient is currently on hospice with comfort measures only however due to the severity of her symptoms to aid in acute control patient and family presenting to the emergency department seeking further pain control.  Patient and family did defer any aggressive interventions such as lab work or blood transfusions and instead wished to focus on comfort measures.  Given patient only on limited analgesics at home patient is being admitted to the hospitalist service for further analgesic control and rapid titration in hospital for eventual discharge back home with hospice  services.  ---------------------------------------------------------------------------------------------------------------------   Review of Systems a 14 system review of systems was performed pertinent positives and negatives as above in the HPI.  ---------------------------------------------------------------------------------------------------------------------   Past Medical History:   Diagnosis Date    Arthritis     COPD  12/17/2018    DVT of lower extremity (deep venous thrombosis)     History of CVA 2013 12/17/2018    HTN 12/17/2018    PAD  12/17/2018    Stroke     Tobacco use 12/17/2018     Past Surgical History:   Procedure Laterality Date    AORTAGRAM Right 8/26/2023    Procedure: LOWER EXTREMITY ARTERIOGRAM RIGHT, PENUMBRA THROMBECTOMY RIGHT POPLITEAL AND TIBIAL ARTERY, BALLOON ANGIOPLASTY TIBIAL ARTERY RIGHT;  Surgeon: Julio Gonzalez MD;  Location:  [facility];  Service: Vascular;  Laterality: Right;  RADIOLOGY DOSE 14mGy, 21MIN 18SEC    CARDIAC CATHETERIZATION  12/17/2018    Procedure: Left Heart Cath;  Surgeon: Imtiaz Fuentes MD;  Location:  [facility] CATH INVASIVE LOC[facility];  Service: Cardiology    CARDIAC CATHETERIZATION N/A 12/17/2018    Procedure: Thrombolysis-peripheral;  Surgeon: Imtiaz Fuentes MD;  Location:  [facility] CATH [facility];  Service: Cardiology    CARDIAC ELECTROPHYSIOLOGY PROCEDURE N/A 12/18/2018    Procedure: PACEMAKER IMPLANTATION- DC;  Surgeon: Thony Bobby MD;  Location:  [facility];  Service: Cardiology    CARDIAC ELECTROPHYSIOLOGY PROCEDURE N/A 12/17/2018    Procedure: Temporary Pacemaker;  Surgeon: Imtiaz Fuentes MD;  Location: [facility] CATH INVASIVE LOCATION;  Service: Cardiology    COLONOSCOPY N/A 04/19/2019    Procedure: COLONOSCOPY;  Surgeon: Chris Zimmerman MD;  Location: [facility] OR;  Service: Gastroenterology    ENDOSCOPY N/A 04/19/2019    Procedure: ESOPHAGOGASTRODUODENOSCOPY;  Surgeon: Erasmo,  Chris Miller MD;  Location: [facility] OR;  Service: Gastroenterology    ENDOSCOPY N/A 8/1/2023    Procedure: ESOPHAGOGASTRODUODENOSCOPY WITH BIOPSY;  Surgeon: Agueda Arredondo MD;  Location:  [facility] OR;  Service: Gastroenterology;  Laterality: N/A;    FEMORAL ARTERY STENT  2013    FEMORAL POPLITEAL BYPASS Right 12/17/2018    Procedure: LOWER EXTREMITY EMBELECTOMY RIGHT;  Surgeon: David Beatty MD;  Location: [facility] HYBRID OR 15;  Service: Vascular    HYSTERECTOMY      LAPAROSCOPIC CHOLECYSTECTOMY      LEFT HEART CATH  12/17/2018    At [facility] with TV PM placement     PACEMAKER IMPLANTATION       Family History   Problem Relation Age of Onset    Cancer Mother     Cancer Father     Heart attack Brother     Heart attack Brother      Social History     Socioeconomic History    Marital status:     Number of children: 3   Tobacco Use    Smoking status: Light Smoker     Packs/day: 0.50     Years: 63.00     Pack years: 31.50     Types: Cigarettes     Passive exposure: Current    Smokeless tobacco: Never   Vaping Use    Vaping Use: Never used   Substance and Sexual Activity    Alcohol use: No    Drug use: No    Sexual activity: Defer     ---------------------------------------------------------------------------------------------------------------------   Allergies:  Patient has no known allergies.  ---------------------------------------------------------------------------------------------------------------------   Medications below are reported home medications pulling from within the system; at this time, these medications have not been reconciled unless otherwise specified and are in the verification process for further verifcation as current home medications.    Prior to Admission Medications       Prescriptions Last Dose Informant Patient Reported? Taking?    carvedilol (COREG) 6.25 MG tablet 9/18/2023 Family Member No Yes    Take 1 tablet by mouth 2 (Two) Times a Day.    pantoprazole (PROTONIX) 40 MG EC  tablet  Family Member Yes Yes    Take 1 tablet by mouth 2 (Two) Times a Day.    prochlorperazine (COMPAZINE) 10 MG tablet 9/18/2023 Family Member Yes Yes    Take 1 tablet by mouth Every 6 (Six) Hours As Needed for Nausea or Vomiting.          Objective     Vital Signs:  Temp:  [98.2 °F (36.8 °C)] 98.2 °F (36.8 °C)  Heart Rate:  [70-85] 77  Resp:  [20-22] 20  BP: ()/(54-70) 112/65    Mean Arterial Pressure (Non-Invasive) for the past 24 hrs (Last 3 readings):   Noninvasive MAP (mmHg)   09/18/23 1930 78   09/18/23 1915 71   09/18/23 1900 77     SpO2:  [99 %-100 %] 100 %  on  Flow (L/min):  [4] 4;   Device (Oxygen Therapy): nasal cannula  Body mass index is 17.38 kg/m².    Wt Readings from Last 3 Encounters:   09/18/23 40.4 kg (89 lb)   08/26/23 40.5 kg (89 lb 4.6 oz)   08/23/23 40.4 kg (89 lb)      ---------------------------------------------------------------------------------------------------------------------   Physical Exam:  Constitutional: Frail cachectic chronically ill-appearing elderly adult female  HENT:  Head: Normocephalic and atraumatic.  Mouth:  Moist mucous membranes.  Dark dried minute remnants of blood around mouth.  Black stains on T-shirt from emesis.  Eyes:  Conjunctivae and EOM are normal.  Pupils are equal, round, and reactive to light.  No scleral icterus.  Neck:  Neck supple.  No JVD present.    Cardiovascular:  Normal rate, regular rhythm and normal heart sounds with no murmur.  Pulmonary/Chest:  No respiratory distress, no wheezes, no crackles, with normal breath sounds and good air movement.  Abdominal:  Soft.  Bowel sounds are hypoactive.  No distension but tender to even minute palpation..   Musculoskeletal:  No tenderness and no deformity.  No red or swollen joints anywhere.    Neurological:  Alert and oriented to person, place, and time.  No cranial nerve deficit.  No tongue deviation.  No facial droop.  No slurred speech.   Skin:  Skin is warm and dry.  No rash noted.  No  pallor.   Peripheral vascular:  No edema and pulses on all 4 extremities.    ---------------------------------------------------------------------------------------------------------------------  EKG:        --------------------------------------------------------------------------------------------------------------------  Labs:                Invalid input(s): PROTEstimated Creatinine Clearance: 17.6 mL/min (A) (by C-G formula based on SCr of 1.46 mg/dL (H)).    No results found for: AMMONIA          No results found for: HGBA1C, POCGLU  Lab Results   Component Value Date    TSH 0.702 08/26/2023    FREET4 1.47 05/31/2015     No results found for: PREGTESTUR, PREGSERUM, HCG, HCGQUANT  Pain Management Panel  More data may exist         Latest Ref Rng & Units 8/18/2019 5/31/2015   Pain Management Panel   Amphetamine, Urine Qual Negative Negative  Negative    Barbiturates Screen, Urine Negative Negative  Negative    Benzodiazepine Screen, Urine Negative Negative  Negative    Buprenorphine, Screen, Urine Negative Negative  -   Cocaine Screen, Urine Negative Negative  Negative    Methadone Screen , Urine Negative Negative  Negative      Brief Urine Lab Results  (Last result in the past 365 days)        Color   Clarity   Blood   Leuk Est   Nitrite   Protein   CREAT   Urine HCG        08/24/23 1248 Yellow   Cloudy   Negative   Negative   Negative   30 mg/dL (1+)                 No results found for: BLOODCX  No results found for: URINECX  No results found for: WOUNDCX  No results found for: STOOLCX    I have personally looked at the labs and they are summarized above.  ----------------------------------------------------------------------------------------------------------------------  Detailed radiology reports for the last 24 hours:    Imaging Results (Last 24 Hours)       ** No results found for the last 24 hours. **          Final impressions for the last 30 days of radiology reports:    CT Angio Abdominal Aorta  Bilateral Iliofem Runoff    Result Date: 8/24/2023  Impression: 1.Occlusion of the distal superficial femoral artery approximately 5 cm above the joint line without significant surrounding calcific atherosclerotic disease. This favors probable embolism. Vascular specialist consultation recommended. 2.Very limited flow below the level of right arterial occlusion with the anterior tibial artery vaguely seen. 3.Left-sided runoff appears to be intact with at least a single vessel runoff to the ankle. 4.Left-sided hydronephrosis with abnormal appearance of the left-sided nephrogram which appears delayed likely from obstructive uropathy. 5.Indeterminate low-attenuation hepatic lesions. 6.Left adrenal enlargement not definitely adenoma similar to the previous study. Please refer to previous PET scan findings from outside institution. Electronically Signed: Pedrito Parra MD  8/24/2023 2:32 AM EDT  Workstation ID: [id number]    XR Chest 1 View    Result Date: 8/26/2023  Impression: No acute cardiopulmonary abnormality. Electronically Signed: Dominick Cortez MD  8/26/2023 9:17 PM EDT  Workstation ID: [id number]    US Arterial Doppler Lower Extremity Right    Result Date: 8/23/2023  Occlusion of the right popliteal artery.  This report was finalized on 8/23/2023 4:48 PM by Dr. Juan Plascencia MD.      US Venous Doppler Lower Extremity Right (duplex)    Result Date: 8/23/2023  No DVT in the right lower extremity on today's exam.  This report was finalized on 8/23/2023 4:40 PM by Dr. Juan Plascencia MD.      NM PET/CT Whole Body    Result Date: 8/22/2023  1.  Bilateral parotid gland lesions, right measuring up to 2 cm and left measuring up to 1.1 cm, and both showing PET hypermetabolism mSUV right 10.6 in mSUV left in the mSUV 23. 2.  Heterogeneous appearance of the liver that appear somewhat sclerotic but there is a liver lesion within mSUV 7 measuring about 3 cm. Another left lobe of liver lesion measuring about 2 cm also is  hypermetabolic along with scattered regions of hypermetabolism throughout the liver that shows no definite corresponding CT PE abnormality. 3.  Precarinal region lymph node measuring about 1.2 cm with PET hypermetabolism in mSUV 3.2. 4.  Multiple right lung hilar region pulmonary nodules with the largest measuring up to about 1 cm showing low-grade less than baseline hypermetabolism of M mSUV 1.9. 5.  Probable right hilar region hypermetabolism with a mesh UV 3. 6.  Posterior gastric wall thickening and associated hypermetabolism within mSUV 9.5 and extending into the retroperitoneal space lesser sac region.  This report was finalized on 8/22/2023 12:41 PM by Dr. Galen Isaacs MD.     I have personally looked at the radiology images and read the final radiology report.    Assessment & Plan       Static gastric adenocarcinoma  Hematemesis suspicious for upper GI bleeding  Intractable abdominal pain with hematemesis  Currently on hospice with comfort measures only    -Patient presenting with known history of metastatic gastric adenocarcinoma with intractable abdominal pain and development of hematemesis with clear evidence witness both in room and on T-shirt of black coffee ground liquid the patient is coughing up.    -Long discussion with patient and family at bedside and previously seen by palliative services as well as discussed by ER physician and family has deferred any aggressive interventions or lab work during this time he requested focus instead on comfort measures but ultimately would like to take patient back home but want her to be comfortable.    -As needed IV morphine and Zofran as well as oral Compazine    -Start patient on oral morphine 15 mg every 12 scheduled and titrate as needed for symptom control.    -Protonix 40 mg IV twice daily with plans to transition to oral at discharge.     COPD, not in exacerbation  Acute on chronic hypoxemic respiratory failure     -Continue supplemental oxygen as  needed    -Patient typically requiring only 2 L nasal cannula as needed but currently requiring 4 and suspect large component related to patient's likely worsening anemia in the setting of hemoptysis likely secondary to her gastric adenocarcinoma and anticoagulation.    Atrial fibrillation  Prior DVT and stroke  HX of complete heart block s/p PPM   PAD status post femoral artery stent  Right popliteal thrombosis s/p embolectomy/femoral-popliteal bypass   Recent thrombectomy of right popliteal and tibial arteries and endovascular angioplasty of right tibioperoneal trunk on 8/26/2023    -Long discussion with patient and family at bedside regarding risks and benefits of continued anticoagulation in the setting of ongoing hematemesis and after discussion we will discontinue anticoagulation and focus on pain control and comfort measures given high likelihood of ongoing upper GI bleeding in the setting of chronic anticoagulation with known metastatic gastric adenocarcinoma.    Hypertension    -Hold home antihypertensives for now while adjusting analgesic regiment and monitor for development of hypotension.    VTE Prophylaxis:   Mechanical Order History:       None          Pharmalogical Order History:       None            The patient is high risk due to the following diagnoses/reasons: Intractable abdominal pain in the setting of metastatic gastric adenocarcinoma with known recent thrombectomy of thrombosed peripheral arterial system chronically anticoagulated but now being stopped in favor of comfort measures requiring IV morphine and oral morphine for pain control.        Agus Jacques DO  [facility]  09/18/23  20:11 EDT

## 2023-09-19 NOTE — PLAN OF CARE
Goal Outcome Evaluation:  Pt has rested well this shift. Pt had complaints of pain, PRN pain med given. Pt D/C this morning, currently waiting on EMS. No acute changes noted at this time. Will continue to follow plan of care.

## 2023-09-20 NOTE — NURSING NOTE
Called [facility] EMS at this time to make sure pt was still on transport list. No answer, left voicemail with request for them to get me a call back on lead IP phone.

## 2023-09-20 NOTE — NURSING NOTE
[facility] services were able to transport pt home. Pt's DTR [place] notified. Attempted to call [facility] to notify them that we did not need their services, left voicemail.

## 2023-09-20 NOTE — DISCHARGE SUMMARY
[facility] HOSPITALISTS DISCHARGE SUMMARY    Patient Identification:  Name:  Rupinder Lees  Age:  88 y.o.  Sex:  female  :  1935  MRN:  [id number]  Visit Number:  [id number]    Date of Admission: 2023  Date of Discharge:  2023    PCP: Gifty Kruse MD    DISCHARGE DIAGNOSIS  Metastatic gastric adenocarcinoma  Hematemesis suspicious for upper GI bleeding  Intractable abdominal pain with hematemesis  Currently on hospice with comfort measures only  COPD, not in exacerbation  Acute on chronic hypoxemic respiratory failure   Atrial fibrillation  Prior DVT and stroke  HX of complete heart block s/p PPM   PAD status post femoral artery stent  Right popliteal thrombosis s/p embolectomy/femoral-popliteal bypass   Recent thrombectomy of right popliteal and tibial arteries and endovascular angioplasty of right tibioperoneal trunk on 2023    CONSULTS   Palliative care    PROCEDURES PERFORMED      HOSPITAL COURSE  Patient is a 88 y.o. female presented to [facility] complaining of coffee-ground emesis with abdominal pain and shortness of breath.  Please see the admitting history and physical for further details.      Patient admitted to the hospitalist service after initial evaluation emergency department for hematemesis, severe abdominal pain with shortness of breath.  Patient past medical history significant for metastatic gastric adenocarcinoma with family reporting that she had been resumed on anticoagulation after recent endovascular angioplasty of right tibioperoneal trunk on 2023 and thrombectomy of right popliteal and tibial arteries.  Patient was admitted to the hospitalist service primarily for acute stabilization and treatment of acute pain refractory to home medications.  Patient is a hospice patient but due to the severity of her pain as well as her hematemesis she was admitted to the hospital service for observation.  Patient was started on oral  morphine with as needed Roxicodone with good pain control overnight.  Family did not desire any further work-up or evaluation of her heart hematemesis and instead wished to focus on comfort measures.  Patient tolerated these interventions well and on day of discharge her pain was reasonably controlled on scheduled oral morphine with as needed oxycodone and she was felt to have achieved maximal benefit of continued hospitalization and discharged back home per family wishes with comfort measures and hospice services.    VITAL SIGNS:        on  Flow (L/min):  [4] 4;   Device (Oxygen Therapy): room air    Body mass index is 16.75 kg/m².  Wt Readings from Last 3 Encounters:   09/18/23 38.9 kg (85 lb 12.1 oz)   08/26/23 40.5 kg (89 lb 4.6 oz)   08/23/23 40.4 kg (89 lb)       PHYSICAL EXAM:  Constitutional: Frail cachectic chronically ill-appearing elderly adult female  HENT:  Head: Normocephalic and atraumatic.  Mouth:  Moist mucous membranes.   Eyes:  Conjunctivae and EOM are normal.  Pupils are equal, round, and reactive to light.  No scleral icterus.  Neck:  Neck supple.  No JVD present.    Cardiovascular:  Normal rate, regular rhythm and normal heart sounds with no murmur.  Pulmonary/Chest:  No respiratory distress, no wheezes, no crackles, with normal breath sounds and good air movement.  Abdominal:  Soft.  Bowel sounds are hypoactive.  No distension with mild to moderate tenderness to palpation.   Musculoskeletal:  No tenderness and no deformity.  No red or swollen joints anywhere.    Neurological:  Alert and oriented to person, place, and time.  No cranial nerve deficit.  No tongue deviation.  No facial droop.  No slurred speech.   Skin:  Skin is warm and dry.  No rash noted.  No pallor.   Peripheral vascular:  No edema and pulses on all 4 extremities.    DISCHARGE DISPOSITION   Stable    DISCHARGE MEDICATIONS:     Discharge Medications        New Medications        Instructions Start Date   Morphine 15 MG 12 hr  tablet  Commonly known as: MS CONTIN   15 mg, Oral, Every 12 Hours Scheduled      oxyCODONE 5 MG immediate release tablet  Commonly known as: ROXICODONE   5 mg, Oral, Every 4 Hours PRN      promethazine 12.5 MG tablet  Commonly known as: PHENERGAN   Take 2 tablets (25 mg total) by mouth every 6 (six) hours as needed for nausea or vomiting.             Continue These Medications        Instructions Start Date   pantoprazole 40 MG EC tablet  Commonly known as: PROTONIX   40 mg, Oral, 2 Times Daily      prochlorperazine 10 MG tablet  Commonly known as: COMPAZINE   10 mg, Oral, Every 6 Hours PRN             Stop These Medications      carvedilol 6.25 MG tablet  Commonly known as: COREG                 Contact information for follow-up providers       Gifty Kruse MD .    Specialty: Geriatric Medicine  Contact information:  [facility] [address]  [facility] [id number] [phone]                       Contact information for after-discharge care       Home Medical Care       [facility] .    Service: Home Hospice  Contact information:  [address] [place], [place] [id number] [phone]                                    TEST  RESULTS PENDING AT DISCHARGE       The ASCVD Risk score (Sheela SPEAR, et al., 2019) failed to calculate for the following reasons:    The 2019 ASCVD risk score is only valid for ages 40 to 79    The patient has a prior MI or stroke diagnosis     CODE STATUS  Code Status and Medical Interventions:   Ordered at: 09/18/23 1805     Code Status (Patient has no pulse and is not breathing):    No CPR (Do Not Attempt to Resuscitate)     Medical Interventions (Patient has pulse or is breathing):    Comfort Measures       Agus Jacques DO  [facility] Hospitalist  09/20/23  19:36 EDT    Please note that this discharge summary required more than 30 minutes to complete.

## 2024-04-29 NOTE — ED NOTES
Unable to find pulse in right foot     Mikie Melton, RN  02/06/19 1400     [Evaluation] : evaluation of [FreeTextEntry1] : Post-partum depression and anxiety [FreeTextEntry2] : CARLOS ALBERTO

## 2025-06-02 NOTE — ED NOTES
present for transport. Emtala form signed by patient and sending provider.     Abdirahman Beavers RN  07/08/19 5667     [de-identified] : Euflexxa # 3 Bilateral knee Discussed at length with the patient the planned Euflexxa injection. The risks, benefits, convalescence and alternatives were reviewed. The possible side effects discussed included but were not limited to: pain, swelling, heat and redness. There symptoms are generally mild but if they are extensive then contact the office. Giving pain relievers by mouth such as NSAIDs or Tylenol can generally treat the reactions to Euflexxa. Rare cases of infection have been noted. Rash, hives and itching may occur post injection. If you have muscle pain or cramps, flushing and or swelling of the face, rapid heart beat, nausea, dizziness, fever, chills, headache, difficulty breathing, swelling in the arms or legs, or have a prickly feeling of your skin, contact a health care provider immediately.  Following this discussion, the knee was prepped with betadine and under sterile condition the Euflexxa injection was performed with a 22 gauge needle. The needle was introduced into the joint, aspiration was performed to ensure intra-articular placement and the medication was injected. Upon withdrawal of the needle the site was cleaned with alcohol and a bandaid applied. The patient tolerated the injection well and there were no adverse effects. Post injection instructions included no strenuous activity for 24 hours, cryotherapy and if there are any adverse effects to contact the office.

## (undated) DEVICE — THE BITE BLOCK MAXI, LATEX FREE STRAP IS USED TO PROTECT THE ENDOSCOPE INSERTION TUBE FROM BEING BITTEN BY THE PATIENT.

## (undated) DEVICE — DRAPE,UTILTY,TAPE,15X26, 4EA/PK: Brand: MEDLINE

## (undated) DEVICE — CANN NASL CO2 DIVIDED A/

## (undated) DEVICE — DECANTER: Brand: UNBRANDED

## (undated) DEVICE — ST EXT IV SMARTSITE 2VLV SP M LL 5ML IV1

## (undated) DEVICE — LN INJ CONTRST FLXCIL HP F/M LL 1200PSI10

## (undated) DEVICE — SUT PROLN 6/0 C1 D/A 30IN 8706H

## (undated) DEVICE — LEX ELECTRO PHYSIOLOGY: Brand: MEDLINE INDUSTRIES, INC.

## (undated) DEVICE — CATH F5 INF JR 4 100CM: Brand: INFINITI

## (undated) DEVICE — LIMB HOLDER, WRIST/ANKLE: Brand: DEROYAL

## (undated) DEVICE — FOGARTY - HYDRAGRIP SURGICAL - CLAMP INSERTS: Brand: FOGARTY SOFTJAW

## (undated) DEVICE — DRSNG SURESITE WNDW 4X4.5

## (undated) DEVICE — KT INTRO MINISTICK MAX W/GW NITNL/TUNG ECHO STFF 4F 21G 7CM

## (undated) DEVICE — ENCORE® LATEX MICRO SIZE 7.5, STERILE LATEX POWDER-FREE SURGICAL GLOVE: Brand: ENCORE

## (undated) DEVICE — AVANTI + 4F STD W/GW: Brand: AVANTI

## (undated) DEVICE — ST INF PRI SMRTSTE 20DRP 2VLV 24ML 117

## (undated) DEVICE — SUCTION CANISTER, 2500CC, RIGID: Brand: DEROYAL

## (undated) DEVICE — SHEATH INTRO SUPERSHEATH JWIRE .035 5F 11CM

## (undated) DEVICE — CATH F5 INF JL 4 100CM: Brand: INFINITI

## (undated) DEVICE — CVR PROB ULTRASND/TRANSD W/GEL LNG 18X250CM STRL

## (undated) DEVICE — CATH GUIDE SOFTVU SELECT/V HT OMNI .035 4F 65CM

## (undated) DEVICE — GLV SURG SENSICARE MICRO PF LF 8 STRL

## (undated) DEVICE — HI-TORQUE COMMAND ES GUIDE WIRE .014" 300 CM: Brand: HI-TORQUE COMMAND

## (undated) DEVICE — ADULT DISPOSABLE SINGLE-PATIENT USE PULSE OXIMETER SENSOR: Brand: NONIN

## (undated) DEVICE — DECANT BG O JET

## (undated) DEVICE — ADHS SKIN PREMIERPRO EXOFIN TOPICAL HI/VISC .5ML

## (undated) DEVICE — CVR HNDL LIGHT RIGID

## (undated) DEVICE — BALN EVERCROSS OTW .035 5F 4X20MM 135CM

## (undated) DEVICE — Device

## (undated) DEVICE — DRSNG SURG AQUACEL AG 9X15CM

## (undated) DEVICE — SINGLE PORT MANIFOLD: Brand: NEPTUNE 2

## (undated) DEVICE — TUBING, SUCTION, 1/4" X 20', STRAIGHT: Brand: MEDLINE INDUSTRIES, INC.

## (undated) DEVICE — SUCTION CANISTER, 1500CC, RIGID: Brand: DEROYAL

## (undated) DEVICE — ASMBL SPK CONTRST CONTRL

## (undated) DEVICE — SOL NACL 0.9PCT 1000ML

## (undated) DEVICE — KT MINI ACC 5F .18X40CM SS 21G 7CM

## (undated) DEVICE — MEDI-VAC YANKAUER SUCTION HANDLE W/BULBOUS TIP: Brand: CARDINAL HEALTH

## (undated) DEVICE — GOWN,REINF,POLY,ECL,PP SLV,XL: Brand: MEDLINE

## (undated) DEVICE — FRCP BX RADJAW4 NDL 2.8 240CM LG OG BX40

## (undated) DEVICE — CONN Y IRR DISP 1P/U

## (undated) DEVICE — PERIPHERAL SHIELD-ORANGE: Brand: RADPAD

## (undated) DEVICE — SWAN-GANZ BIPOLAR PACING CATHETER: Brand: SWAN-GANZ

## (undated) DEVICE — ADULT, W/LG. BACK PAD, RADIOTRANSPARENT ELEMENT AND LEAD WIRE: Brand: DEFIBRILLATION ELECTRODES

## (undated) DEVICE — CANSTR COL ENGINE FOR INDIGO SYS

## (undated) DEVICE — SET PRIMARY GRVTY 10DP MALE LL 104IN

## (undated) DEVICE — PK ANGIO OR 10

## (undated) DEVICE — SYR LUERLOK 30CC

## (undated) DEVICE — FOGARTY ARTERIAL EMBOLECTOMY CATHETER 3F 80CM: Brand: FOGARTY

## (undated) DEVICE — DESTINATION PERIPHERAL GUIDING SHEATH: Brand: DESTINATION

## (undated) DEVICE — SHEATH INTRO SUPERSHEATH JWIRE .035 6F 11CM

## (undated) DEVICE — 3M™ STERI-DRAPE™ FLUOROSCOPE DRAPE, 10 PER CARTON / 4 CARTONS PER CASE, 1012: Brand: STERI-DRAPE™

## (undated) DEVICE — Device: Brand: DEFENDO AIR/WATER/SUCTION AND BIOPSY VALVE

## (undated) DEVICE — SYR LL TP 10ML STRL

## (undated) DEVICE — 3M™ STERI-STRIP™ REINFORCED ADHESIVE SKIN CLOSURES, R1547, 1/2 IN X 4 IN (12 MM X 100 MM), 6 STRIPS/ENVELOPE: Brand: 3M™ STERI-STRIP™

## (undated) DEVICE — ANTIBACTERIAL UNDYED BRAIDED (POLYGLACTIN 910), SYNTHETIC ABSORBABLE SUTURE: Brand: COATED VICRYL

## (undated) DEVICE — KT INTRO MINISTICK MAX W/GW SS ECHO 5F 21G 4CM

## (undated) DEVICE — 3M™ IOBAN™ 2 ANTIMICROBIAL INCISE DRAPE 6651EZ: Brand: IOBAN™ 2

## (undated) DEVICE — NAVICROSS SUPPORT CATHETER: Brand: NAVICROSS

## (undated) DEVICE — PENCL E/S HNDSWCH ROCKRBTN HOLSTR 10FT

## (undated) DEVICE — RADIFOCUS GLIDEWIRE ADVANTAGE GUIDEWIRE: Brand: GLIDEWIRE ADVANTAGE

## (undated) DEVICE — IRRIGATOR BULB ASEPTO 60CC STRL

## (undated) DEVICE — PK VASC 10

## (undated) DEVICE — CVR HNDL LT SURG ACCSSRY BLU STRL

## (undated) DEVICE — FOGARTY THRU-LUMEN EMBOLECTOMY CATHETER 4F 80CM: Brand: FOGARTY

## (undated) DEVICE — SI AVANTI+ 6F STD W/GW  NO OBT: Brand: AVANTI

## (undated) DEVICE — PK CATH CARD 70

## (undated) DEVICE — SUT SILK 4/0 TIES 18IN A183H

## (undated) DEVICE — FOGARTY SPRING CLIPS 6MM: Brand: FOGARTY SOFTJAW

## (undated) DEVICE — DESTINATION CAROTID GUIDING SHEATH: Brand: DESTINATION

## (undated) DEVICE — DRSNG SURESITE123 4X4.8IN

## (undated) DEVICE — CANNULA,OXY,ADULT,SUPER SOFT,W/14'TUB,UC: Brand: MEDLINE INDUSTRIES, INC.

## (undated) DEVICE — SKIN AFFIX SURG ADHESIVE 72/CS 0.55ML: Brand: MEDLINE

## (undated) DEVICE — BNDG ELAS ELITE V/CLOSE 4IN 5YD LF STRL

## (undated) DEVICE — CATH F5 INF PIG145 110CM 6SH: Brand: INFINITI

## (undated) DEVICE — CAUTERY TIP POLISHER: Brand: DEVON

## (undated) DEVICE — INTRO TEAR AWAY/LVD W/SD PRT 7F 13CM

## (undated) DEVICE — RADIFOCUS GLIDEWIRE: Brand: GLIDEWIRE

## (undated) DEVICE — SNAP KOVER: Brand: UNBRANDED

## (undated) DEVICE — INFLATION DEVICE: Brand: ENCORE™ 26

## (undated) DEVICE — GW INQW FIX/CORE PTFE J/3MM .035 260CM

## (undated) DEVICE — SUT SILK 2/0 TIES 18IN A185H

## (undated) DEVICE — SYR LL 3CC

## (undated) DEVICE — TUBING, SUCTION, 1/4" X 10', STRAIGHT: Brand: MEDLINE

## (undated) DEVICE — ENDOGATOR AUXILIARY WATER JET CONNECTOR: Brand: ENDOGATOR